# Patient Record
Sex: MALE | Race: BLACK OR AFRICAN AMERICAN | NOT HISPANIC OR LATINO | Employment: OTHER | URBAN - METROPOLITAN AREA
[De-identification: names, ages, dates, MRNs, and addresses within clinical notes are randomized per-mention and may not be internally consistent; named-entity substitution may affect disease eponyms.]

---

## 2017-01-03 ENCOUNTER — AMBULATORY - HEALTHEAST (OUTPATIENT)
Dept: BEHAVIORAL HEALTH | Facility: CLINIC | Age: 52
End: 2017-01-03

## 2017-01-03 DIAGNOSIS — F11.20 OPIOID TYPE DEPENDENCE (H): ICD-10-CM

## 2017-01-03 DIAGNOSIS — F39 EPISODIC MOOD DISORDER (H): ICD-10-CM

## 2017-01-03 DIAGNOSIS — F41.1 GENERALIZED ANXIETY DISORDER: ICD-10-CM

## 2017-01-03 ASSESSMENT — MIFFLIN-ST. JEOR: SCORE: 2271.01

## 2017-01-19 ENCOUNTER — OFFICE VISIT - HEALTHEAST (OUTPATIENT)
Dept: INTERNAL MEDICINE | Facility: CLINIC | Age: 52
End: 2017-01-19

## 2017-01-19 DIAGNOSIS — F11.21 OPIOID DEPENDENCE IN REMISSION (H): ICD-10-CM

## 2017-01-19 DIAGNOSIS — R07.9 CHEST PAIN, UNSPECIFIED TYPE: ICD-10-CM

## 2017-01-19 DIAGNOSIS — Z12.11 SCREENING FOR COLON CANCER: ICD-10-CM

## 2017-01-19 DIAGNOSIS — I10 ESSENTIAL HYPERTENSION WITH GOAL BLOOD PRESSURE LESS THAN 140/90: ICD-10-CM

## 2017-01-19 DIAGNOSIS — G47.33 OSA ON CPAP: ICD-10-CM

## 2017-01-19 DIAGNOSIS — K21.9 GASTROESOPHAGEAL REFLUX DISEASE WITHOUT ESOPHAGITIS: ICD-10-CM

## 2017-01-19 DIAGNOSIS — J41.0 SIMPLE CHRONIC BRONCHITIS (H): ICD-10-CM

## 2017-01-19 DIAGNOSIS — F41.1 GENERALIZED ANXIETY DISORDER: ICD-10-CM

## 2017-01-19 DIAGNOSIS — E66.9 OBESITY: ICD-10-CM

## 2017-01-19 LAB
ATRIAL RATE - MUSE: 85 BPM
CHOLEST SERPL-MCNC: 196 MG/DL
DIASTOLIC BLOOD PRESSURE - MUSE: NORMAL MMHG
FASTING STATUS PATIENT QL REPORTED: YES
HBA1C MFR BLD: 6 % (ref 3.5–6)
HDLC SERPL-MCNC: 56 MG/DL
INTERPRETATION ECG - MUSE: NORMAL
LDLC SERPL CALC-MCNC: 129 MG/DL
P AXIS - MUSE: 61 DEGREES
PR INTERVAL - MUSE: 170 MS
QRS DURATION - MUSE: 100 MS
QT - MUSE: 368 MS
QTC - MUSE: 437 MS
R AXIS - MUSE: 46 DEGREES
SYSTOLIC BLOOD PRESSURE - MUSE: NORMAL MMHG
T AXIS - MUSE: 2 DEGREES
TRIGL SERPL-MCNC: 57 MG/DL
VENTRICULAR RATE- MUSE: 85 BPM

## 2017-01-19 ASSESSMENT — MIFFLIN-ST. JEOR: SCORE: 2280.08

## 2017-01-20 ENCOUNTER — COMMUNICATION - HEALTHEAST (OUTPATIENT)
Dept: INTERNAL MEDICINE | Facility: CLINIC | Age: 52
End: 2017-01-20

## 2017-01-24 ENCOUNTER — COMMUNICATION - HEALTHEAST (OUTPATIENT)
Dept: BEHAVIORAL HEALTH | Facility: CLINIC | Age: 52
End: 2017-01-24

## 2017-01-24 DIAGNOSIS — F41.1 GENERALIZED ANXIETY DISORDER: ICD-10-CM

## 2017-02-03 ENCOUNTER — COMMUNICATION - HEALTHEAST (OUTPATIENT)
Dept: BEHAVIORAL HEALTH | Facility: CLINIC | Age: 52
End: 2017-02-03

## 2017-02-03 DIAGNOSIS — F41.1 GENERALIZED ANXIETY DISORDER: ICD-10-CM

## 2017-02-07 ENCOUNTER — HOSPITAL ENCOUNTER (OUTPATIENT)
Dept: NUCLEAR MEDICINE | Facility: CLINIC | Age: 52
Discharge: HOME | End: 2017-02-07
Attending: INTERNAL MEDICINE

## 2017-02-07 ENCOUNTER — HOSPITAL ENCOUNTER (OUTPATIENT)
Dept: CARDIOLOGY | Facility: CLINIC | Age: 52
Discharge: HOME | End: 2017-02-07
Attending: INTERNAL MEDICINE

## 2017-02-07 ENCOUNTER — AMBULATORY - HEALTHEAST (OUTPATIENT)
Dept: BEHAVIORAL HEALTH | Facility: CLINIC | Age: 52
End: 2017-02-07

## 2017-02-07 DIAGNOSIS — F41.1 GENERALIZED ANXIETY DISORDER: ICD-10-CM

## 2017-02-07 DIAGNOSIS — R07.9 CHEST PAIN, UNSPECIFIED TYPE: ICD-10-CM

## 2017-02-07 DIAGNOSIS — F11.20 OPIOID TYPE DEPENDENCE (H): ICD-10-CM

## 2017-02-07 DIAGNOSIS — F39 EPISODIC MOOD DISORDER (H): ICD-10-CM

## 2017-02-07 LAB
CV STRESS CURRENT BP HE: NORMAL
CV STRESS CURRENT HR HE: 105
CV STRESS CURRENT HR HE: 108
CV STRESS CURRENT HR HE: 82
CV STRESS CURRENT HR HE: 83
CV STRESS CURRENT HR HE: 84
CV STRESS CURRENT HR HE: 84
CV STRESS CURRENT HR HE: 85
CV STRESS CURRENT HR HE: 86
CV STRESS CURRENT HR HE: 87
CV STRESS CURRENT HR HE: 88
CV STRESS CURRENT HR HE: 89
CV STRESS CURRENT HR HE: 90
CV STRESS CURRENT HR HE: 94
CV STRESS CURRENT HR HE: 95
CV STRESS DEVIATION TIME HE: NORMAL
CV STRESS EXERCISE STAGE HE: NORMAL
CV STRESS FINAL RESTING BP HE: NORMAL
CV STRESS FINAL RESTING HR HE: 85
CV STRESS MAX HR HE: 109
CV STRESS MAX TREADMILL GRADE HE: 0
CV STRESS MAX TREADMILL SPEED HE: 0
CV STRESS PEAK DIA BP HE: NORMAL
CV STRESS PEAK SYS BP HE: NORMAL
CV STRESS PHASE HE: NORMAL
CV STRESS PROTOCOL HE: NORMAL
CV STRESS RESTING PT POSITION HE: NORMAL
CV STRESS RESTING PT POSITION HE: NORMAL
CV STRESS ST DEVIATION AMOUNT HE: NORMAL
CV STRESS ST DEVIATION ELEVATION HE: NORMAL
CV STRESS ST EVELATION AMOUNT HE: NORMAL
CV STRESS TEST TYPE HE: NORMAL
CV STRESS TOTAL STAGE TIME MIN 1 HE: NORMAL
NUC STRESS EJECTION FRACTION: 66 %
STRESS ECHO BASELINE BP: NORMAL
STRESS ECHO BASELINE HR: 83
STRESS ECHO LAST STRESS BP: NORMAL
STRESS ECHO LAST STRESS HR: 94

## 2017-02-07 ASSESSMENT — MIFFLIN-ST. JEOR: SCORE: 2275.54

## 2017-03-06 ENCOUNTER — OFFICE VISIT - HEALTHEAST (OUTPATIENT)
Dept: BEHAVIORAL HEALTH | Facility: CLINIC | Age: 52
End: 2017-03-06

## 2017-03-06 DIAGNOSIS — F41.1 GENERALIZED ANXIETY DISORDER: ICD-10-CM

## 2017-03-06 DIAGNOSIS — F11.20 OPIOID USE DISORDER, SEVERE, DEPENDENCE (H): ICD-10-CM

## 2017-03-06 ASSESSMENT — MIFFLIN-ST. JEOR: SCORE: 2266.47

## 2017-03-07 ENCOUNTER — OFFICE VISIT - HEALTHEAST (OUTPATIENT)
Dept: BEHAVIORAL HEALTH | Facility: CLINIC | Age: 52
End: 2017-03-07

## 2017-03-07 DIAGNOSIS — F11.20 OPIOID USE DISORDER, SEVERE, DEPENDENCE (H): ICD-10-CM

## 2017-04-03 ENCOUNTER — OFFICE VISIT - HEALTHEAST (OUTPATIENT)
Dept: BEHAVIORAL HEALTH | Facility: CLINIC | Age: 52
End: 2017-04-03

## 2017-04-03 DIAGNOSIS — F11.20 OPIOID USE DISORDER, SEVERE, DEPENDENCE (H): ICD-10-CM

## 2017-04-03 DIAGNOSIS — J44.9 COPD (CHRONIC OBSTRUCTIVE PULMONARY DISEASE) (H): ICD-10-CM

## 2017-04-03 DIAGNOSIS — F41.1 GENERALIZED ANXIETY DISORDER: ICD-10-CM

## 2017-04-03 ASSESSMENT — MIFFLIN-ST. JEOR: SCORE: 2284.62

## 2017-04-28 ENCOUNTER — OFFICE VISIT - HEALTHEAST (OUTPATIENT)
Dept: INTERNAL MEDICINE | Facility: CLINIC | Age: 52
End: 2017-04-28

## 2017-04-28 DIAGNOSIS — K21.9 GASTROESOPHAGEAL REFLUX DISEASE WITHOUT ESOPHAGITIS: ICD-10-CM

## 2017-04-28 DIAGNOSIS — J41.0 SIMPLE CHRONIC BRONCHITIS (H): ICD-10-CM

## 2017-04-28 DIAGNOSIS — G47.33 OSA ON CPAP: ICD-10-CM

## 2017-04-28 DIAGNOSIS — F41.1 GENERALIZED ANXIETY DISORDER: ICD-10-CM

## 2017-04-28 DIAGNOSIS — I10 ESSENTIAL HYPERTENSION WITH GOAL BLOOD PRESSURE LESS THAN 140/90: ICD-10-CM

## 2017-04-28 DIAGNOSIS — F11.21 OPIOID DEPENDENCE IN REMISSION (H): ICD-10-CM

## 2017-04-28 ASSESSMENT — MIFFLIN-ST. JEOR: SCORE: 2280.08

## 2017-05-01 ENCOUNTER — OFFICE VISIT - HEALTHEAST (OUTPATIENT)
Dept: BEHAVIORAL HEALTH | Facility: CLINIC | Age: 52
End: 2017-05-01

## 2017-05-01 DIAGNOSIS — F41.1 GENERALIZED ANXIETY DISORDER: ICD-10-CM

## 2017-05-01 DIAGNOSIS — F11.20 OPIOID USE DISORDER, SEVERE, DEPENDENCE (H): ICD-10-CM

## 2017-05-01 ASSESSMENT — MIFFLIN-ST. JEOR: SCORE: 2271.01

## 2017-05-10 ENCOUNTER — COMMUNICATION - HEALTHEAST (OUTPATIENT)
Dept: BEHAVIORAL HEALTH | Facility: CLINIC | Age: 52
End: 2017-05-10

## 2017-05-26 ENCOUNTER — OFFICE VISIT - HEALTHEAST (OUTPATIENT)
Dept: INTERNAL MEDICINE | Facility: CLINIC | Age: 52
End: 2017-05-26

## 2017-05-26 DIAGNOSIS — F99 CHRONIC MENTAL ILLNESS: ICD-10-CM

## 2017-05-26 DIAGNOSIS — J44.9 COPD (CHRONIC OBSTRUCTIVE PULMONARY DISEASE) (H): ICD-10-CM

## 2017-05-26 DIAGNOSIS — F41.1 GENERALIZED ANXIETY DISORDER: ICD-10-CM

## 2017-05-26 ASSESSMENT — MIFFLIN-ST. JEOR: SCORE: 2289.15

## 2017-05-30 ENCOUNTER — OFFICE VISIT - HEALTHEAST (OUTPATIENT)
Dept: BEHAVIORAL HEALTH | Facility: CLINIC | Age: 52
End: 2017-05-30

## 2017-05-30 DIAGNOSIS — F11.20 OPIOID USE DISORDER, SEVERE, DEPENDENCE (H): ICD-10-CM

## 2017-05-30 DIAGNOSIS — F41.1 GENERALIZED ANXIETY DISORDER: ICD-10-CM

## 2017-05-30 ASSESSMENT — MIFFLIN-ST. JEOR: SCORE: 2293.69

## 2017-07-27 ENCOUNTER — AMBULATORY - HEALTHEAST (OUTPATIENT)
Dept: BEHAVIORAL HEALTH | Facility: CLINIC | Age: 52
End: 2017-07-27

## 2017-07-27 DIAGNOSIS — F41.1 GENERALIZED ANXIETY DISORDER: ICD-10-CM

## 2017-07-27 DIAGNOSIS — F11.20 OPIOID USE DISORDER, SEVERE, DEPENDENCE (H): ICD-10-CM

## 2017-07-27 ASSESSMENT — MIFFLIN-ST. JEOR: SCORE: 2271.01

## 2017-08-22 ENCOUNTER — OFFICE VISIT - HEALTHEAST (OUTPATIENT)
Dept: INTERNAL MEDICINE | Facility: CLINIC | Age: 52
End: 2017-08-22

## 2017-08-22 DIAGNOSIS — F41.1 GENERALIZED ANXIETY DISORDER: ICD-10-CM

## 2017-08-22 DIAGNOSIS — E66.09 NON MORBID OBESITY DUE TO EXCESS CALORIES: ICD-10-CM

## 2017-08-22 DIAGNOSIS — F99 CHRONIC MENTAL ILLNESS: ICD-10-CM

## 2017-08-22 DIAGNOSIS — K21.9 GASTROESOPHAGEAL REFLUX DISEASE WITHOUT ESOPHAGITIS: ICD-10-CM

## 2017-08-22 DIAGNOSIS — F11.21 OPIOID DEPENDENCE IN REMISSION (H): ICD-10-CM

## 2017-08-22 ASSESSMENT — MIFFLIN-ST. JEOR: SCORE: 2280.08

## 2017-08-25 ENCOUNTER — OFFICE VISIT - HEALTHEAST (OUTPATIENT)
Dept: BEHAVIORAL HEALTH | Facility: CLINIC | Age: 52
End: 2017-08-25

## 2017-08-25 DIAGNOSIS — F11.20 OPIOID USE DISORDER, SEVERE, DEPENDENCE (H): ICD-10-CM

## 2017-08-25 DIAGNOSIS — K59.00 CONSTIPATION: ICD-10-CM

## 2017-08-25 DIAGNOSIS — F41.1 GENERALIZED ANXIETY DISORDER: ICD-10-CM

## 2017-08-25 ASSESSMENT — MIFFLIN-ST. JEOR: SCORE: 2289.15

## 2017-09-19 ENCOUNTER — COMMUNICATION - HEALTHEAST (OUTPATIENT)
Dept: BEHAVIORAL HEALTH | Facility: CLINIC | Age: 52
End: 2017-09-19

## 2017-09-19 DIAGNOSIS — K21.9 GERD (GASTROESOPHAGEAL REFLUX DISEASE): ICD-10-CM

## 2017-10-20 ENCOUNTER — OFFICE VISIT - HEALTHEAST (OUTPATIENT)
Dept: BEHAVIORAL HEALTH | Facility: CLINIC | Age: 52
End: 2017-10-20

## 2017-10-20 DIAGNOSIS — F11.20 OPIOID USE DISORDER, SEVERE, DEPENDENCE (H): ICD-10-CM

## 2017-10-20 DIAGNOSIS — F41.1 GENERALIZED ANXIETY DISORDER: ICD-10-CM

## 2017-10-20 ASSESSMENT — MIFFLIN-ST. JEOR: SCORE: 2311.83

## 2017-11-17 ENCOUNTER — OFFICE VISIT - HEALTHEAST (OUTPATIENT)
Dept: BEHAVIORAL HEALTH | Facility: CLINIC | Age: 52
End: 2017-11-17

## 2017-11-17 DIAGNOSIS — F11.20 OPIOID USE DISORDER, SEVERE, DEPENDENCE (H): ICD-10-CM

## 2017-11-17 DIAGNOSIS — F41.1 GENERALIZED ANXIETY DISORDER: ICD-10-CM

## 2017-11-17 ASSESSMENT — MIFFLIN-ST. JEOR: SCORE: 2298.22

## 2017-12-18 ENCOUNTER — OFFICE VISIT - HEALTHEAST (OUTPATIENT)
Dept: BEHAVIORAL HEALTH | Facility: CLINIC | Age: 52
End: 2017-12-18

## 2017-12-18 DIAGNOSIS — F41.1 GENERALIZED ANXIETY DISORDER: ICD-10-CM

## 2017-12-18 DIAGNOSIS — F11.20 OPIOID USE DISORDER, SEVERE, DEPENDENCE (H): ICD-10-CM

## 2017-12-18 ASSESSMENT — MIFFLIN-ST. JEOR: SCORE: 2284.62

## 2018-01-09 ENCOUNTER — OFFICE VISIT - HEALTHEAST (OUTPATIENT)
Dept: INTERNAL MEDICINE | Facility: CLINIC | Age: 53
End: 2018-01-09

## 2018-01-09 DIAGNOSIS — R06.09 DOE (DYSPNEA ON EXERTION): ICD-10-CM

## 2018-01-09 DIAGNOSIS — E66.9 OBESITY: ICD-10-CM

## 2018-01-09 DIAGNOSIS — G47.33 OSA ON CPAP: ICD-10-CM

## 2018-01-09 DIAGNOSIS — J44.9 COPD (CHRONIC OBSTRUCTIVE PULMONARY DISEASE) (H): ICD-10-CM

## 2018-01-09 DIAGNOSIS — F11.21 OPIOID DEPENDENCE IN REMISSION (H): ICD-10-CM

## 2018-01-09 DIAGNOSIS — R06.09 OTHER FORMS OF DYSPNEA: ICD-10-CM

## 2018-01-09 LAB
ANION GAP SERPL CALCULATED.3IONS-SCNC: 9 MMOL/L (ref 5–18)
BUN SERPL-MCNC: 8 MG/DL (ref 8–22)
CALCIUM SERPL-MCNC: 9.3 MG/DL (ref 8.5–10.5)
CHLORIDE BLD-SCNC: 101 MMOL/L (ref 98–107)
CO2 SERPL-SCNC: 29 MMOL/L (ref 22–31)
CREAT SERPL-MCNC: 0.85 MG/DL (ref 0.7–1.3)
ERYTHROCYTE [DISTWIDTH] IN BLOOD BY AUTOMATED COUNT: 13.7 % (ref 11–14.5)
GFR SERPL CREATININE-BSD FRML MDRD: >60 ML/MIN/1.73M2
GLUCOSE BLD-MCNC: 99 MG/DL (ref 70–125)
HCT VFR BLD AUTO: 39.2 % (ref 40–54)
HGB BLD-MCNC: 13 G/DL (ref 14–18)
MCH RBC QN AUTO: 29.6 PG (ref 27–34)
MCHC RBC AUTO-ENTMCNC: 33.1 G/DL (ref 32–36)
MCV RBC AUTO: 89 FL (ref 80–100)
PLATELET # BLD AUTO: 247 THOU/UL (ref 140–440)
PMV BLD AUTO: 6.9 FL (ref 7–10)
POTASSIUM BLD-SCNC: 3.9 MMOL/L (ref 3.5–5)
RBC # BLD AUTO: 4.38 MILL/UL (ref 4.4–6.2)
SODIUM SERPL-SCNC: 139 MMOL/L (ref 136–145)
WBC: 5.6 THOU/UL (ref 4–11)

## 2018-01-09 ASSESSMENT — MIFFLIN-ST. JEOR: SCORE: 2339.05

## 2018-01-10 ENCOUNTER — AMBULATORY - HEALTHEAST (OUTPATIENT)
Dept: PULMONOLOGY | Facility: OTHER | Age: 53
End: 2018-01-10

## 2018-01-10 ENCOUNTER — COMMUNICATION - HEALTHEAST (OUTPATIENT)
Dept: INTERNAL MEDICINE | Facility: CLINIC | Age: 53
End: 2018-01-10

## 2018-01-10 DIAGNOSIS — R06.00 DYSPNEA: ICD-10-CM

## 2018-01-15 ENCOUNTER — OFFICE VISIT - HEALTHEAST (OUTPATIENT)
Dept: BEHAVIORAL HEALTH | Facility: CLINIC | Age: 53
End: 2018-01-15

## 2018-01-15 DIAGNOSIS — F41.1 GENERALIZED ANXIETY DISORDER: ICD-10-CM

## 2018-01-15 DIAGNOSIS — F11.20 OPIOID USE DISORDER, SEVERE, DEPENDENCE (H): ICD-10-CM

## 2018-01-15 LAB
AMPHETAMINES UR QL SCN: NORMAL
BARBITURATES UR QL: NORMAL
BENZODIAZ UR QL: NORMAL
CANNABINOIDS UR QL SCN: NORMAL
COCAINE UR QL: NORMAL
CREAT UR-MCNC: 138.5 MG/DL
METHADONE UR QL SCN: NORMAL
OPIATES UR QL SCN: NORMAL
OXYCODONE UR QL: NORMAL
PCP UR QL SCN: NORMAL

## 2018-01-15 ASSESSMENT — MIFFLIN-ST. JEOR: SCORE: 2307.3

## 2018-01-18 ENCOUNTER — COMMUNICATION - HEALTHEAST (OUTPATIENT)
Dept: INTERNAL MEDICINE | Facility: CLINIC | Age: 53
End: 2018-01-18

## 2018-01-25 ENCOUNTER — OFFICE VISIT - HEALTHEAST (OUTPATIENT)
Dept: INTERNAL MEDICINE | Facility: CLINIC | Age: 53
End: 2018-01-25

## 2018-01-25 DIAGNOSIS — F11.21 OPIOID DEPENDENCE IN REMISSION (H): ICD-10-CM

## 2018-01-25 DIAGNOSIS — M79.89 LEG SWELLING: ICD-10-CM

## 2018-01-25 DIAGNOSIS — J41.0 SIMPLE CHRONIC BRONCHITIS (H): ICD-10-CM

## 2018-01-25 DIAGNOSIS — F99 CHRONIC MENTAL ILLNESS: ICD-10-CM

## 2018-01-25 DIAGNOSIS — K21.9 GASTROESOPHAGEAL REFLUX DISEASE WITHOUT ESOPHAGITIS: ICD-10-CM

## 2018-01-25 DIAGNOSIS — I10 ESSENTIAL HYPERTENSION: ICD-10-CM

## 2018-01-25 DIAGNOSIS — G47.33 OSA ON CPAP: ICD-10-CM

## 2018-01-25 DIAGNOSIS — Z12.11 SCREEN FOR COLON CANCER: ICD-10-CM

## 2018-01-25 LAB
ALBUMIN SERPL-MCNC: 3.3 G/DL (ref 3.5–5)
ALBUMIN UR-MCNC: ABNORMAL MG/DL
ALP SERPL-CCNC: 78 U/L (ref 45–120)
ALT SERPL W P-5'-P-CCNC: 15 U/L (ref 0–45)
ANION GAP SERPL CALCULATED.3IONS-SCNC: 10 MMOL/L (ref 5–18)
APPEARANCE UR: CLEAR
AST SERPL W P-5'-P-CCNC: 19 U/L (ref 0–40)
BACTERIA #/AREA URNS HPF: ABNORMAL HPF
BILIRUB SERPL-MCNC: 0.4 MG/DL (ref 0–1)
BILIRUB UR QL STRIP: NEGATIVE
BUN SERPL-MCNC: 11 MG/DL (ref 8–22)
CALCIUM SERPL-MCNC: 9.1 MG/DL (ref 8.5–10.5)
CHLORIDE BLD-SCNC: 103 MMOL/L (ref 98–107)
CO2 SERPL-SCNC: 30 MMOL/L (ref 22–31)
COLOR UR AUTO: YELLOW
CREAT SERPL-MCNC: 1.01 MG/DL (ref 0.7–1.3)
D DIMER PPP FEU-MCNC: 0.43 FEU UG/ML
ERYTHROCYTE [DISTWIDTH] IN BLOOD BY AUTOMATED COUNT: 13.4 % (ref 11–14.5)
GFR SERPL CREATININE-BSD FRML MDRD: >60 ML/MIN/1.73M2
GLUCOSE BLD-MCNC: 98 MG/DL (ref 70–125)
GLUCOSE UR STRIP-MCNC: NEGATIVE MG/DL
HCT VFR BLD AUTO: 39.1 % (ref 40–54)
HGB BLD-MCNC: 12.7 G/DL (ref 14–18)
HGB UR QL STRIP: NEGATIVE
KETONES UR STRIP-MCNC: NEGATIVE MG/DL
LEUKOCYTE ESTERASE UR QL STRIP: NEGATIVE
MCH RBC QN AUTO: 29.1 PG (ref 27–34)
MCHC RBC AUTO-ENTMCNC: 32.5 G/DL (ref 32–36)
MCV RBC AUTO: 90 FL (ref 80–100)
NITRATE UR QL: NEGATIVE
PH UR STRIP: 7.5 [PH] (ref 5–8)
PLATELET # BLD AUTO: 349 THOU/UL (ref 140–440)
PMV BLD AUTO: 6.9 FL (ref 7–10)
POTASSIUM BLD-SCNC: 3.8 MMOL/L (ref 3.5–5)
PROT SERPL-MCNC: 7.6 G/DL (ref 6–8)
RBC # BLD AUTO: 4.36 MILL/UL (ref 4.4–6.2)
RBC #/AREA URNS AUTO: ABNORMAL HPF
SODIUM SERPL-SCNC: 143 MMOL/L (ref 136–145)
SP GR UR STRIP: 1.02 (ref 1–1.03)
SQUAMOUS #/AREA URNS AUTO: ABNORMAL LPF
TSH SERPL DL<=0.005 MIU/L-ACNC: 1.72 UIU/ML (ref 0.3–5)
UROBILINOGEN UR STRIP-ACNC: ABNORMAL
WBC #/AREA URNS AUTO: ABNORMAL HPF
WBC: 8.9 THOU/UL (ref 4–11)

## 2018-01-25 ASSESSMENT — MIFFLIN-ST. JEOR: SCORE: 2298.22

## 2018-01-26 ENCOUNTER — COMMUNICATION - HEALTHEAST (OUTPATIENT)
Dept: INTERNAL MEDICINE | Facility: CLINIC | Age: 53
End: 2018-01-26

## 2018-02-02 ENCOUNTER — COMMUNICATION - HEALTHEAST (OUTPATIENT)
Dept: INTERNAL MEDICINE | Facility: CLINIC | Age: 53
End: 2018-02-02

## 2018-02-12 ENCOUNTER — COMMUNICATION - HEALTHEAST (OUTPATIENT)
Dept: BEHAVIORAL HEALTH | Facility: CLINIC | Age: 53
End: 2018-02-12

## 2018-02-12 DIAGNOSIS — F41.1 GENERALIZED ANXIETY DISORDER: ICD-10-CM

## 2018-02-21 ENCOUNTER — OFFICE VISIT - HEALTHEAST (OUTPATIENT)
Dept: PULMONOLOGY | Facility: OTHER | Age: 53
End: 2018-02-21

## 2018-02-21 ENCOUNTER — RECORDS - HEALTHEAST (OUTPATIENT)
Dept: ADMINISTRATIVE | Facility: OTHER | Age: 53
End: 2018-02-21

## 2018-02-21 ENCOUNTER — RECORDS - HEALTHEAST (OUTPATIENT)
Dept: PULMONOLOGY | Facility: OTHER | Age: 53
End: 2018-02-21

## 2018-02-21 DIAGNOSIS — I51.89 DIASTOLIC DYSFUNCTION: ICD-10-CM

## 2018-02-21 DIAGNOSIS — R06.00 DYSPNEA, UNSPECIFIED: ICD-10-CM

## 2018-02-21 ASSESSMENT — MIFFLIN-ST. JEOR: SCORE: 2326.35

## 2018-03-02 ENCOUNTER — HOSPITAL ENCOUNTER (OUTPATIENT)
Dept: CARDIOLOGY | Facility: HOSPITAL | Age: 53
Discharge: HOME | End: 2018-03-02
Attending: INTERNAL MEDICINE

## 2018-03-02 DIAGNOSIS — I51.89 DIASTOLIC DYSFUNCTION: ICD-10-CM

## 2018-03-02 LAB
AORTIC ROOT: 3.9 CM
AORTIC VALVE MEAN VELOCITY: 100 CM/S
AV DIMENSIONLESS INDEX VTI: 0.7
AV MEAN GRADIENT: 5 MMHG
AV PEAK GRADIENT: 7.7 MMHG
AV VALVE AREA: 2.5 CM2
AV VELOCITY RATIO: 0.8
BSA FOR ECHO PROCEDURE: 2.7 M2
CV BLOOD PRESSURE: NORMAL MMHG
CV ECHO HEIGHT: 70 IN
CV ECHO WEIGHT: 325 LBS
DOP CALC AO PEAK VEL: 139 CM/S
DOP CALC AO VTI: 29.8 CM
DOP CALC LVOT AREA: 3.8 CM2
DOP CALC LVOT DIAMETER: 2.2 CM
DOP CALC LVOT PEAK VEL: 105 CM/S
DOP CALC LVOT STROKE VOLUME: 75.2 CM3
DOP CALCLVOT PEAK VEL VTI: 19.8 CM
EJECTION FRACTION: 53 % (ref 55–75)
FRACTIONAL SHORTENING: 29.1 % (ref 28–44)
INTERVENTRICULAR SEPTUM IN END DIASTOLE: 1.1 CM (ref 0.6–1)
IVS/PW RATIO: 0.8
LA AREA 1: 13.1 CM2
LA AREA 2: 19.9 CM2
LEFT ATRIUM LENGTH: 5.24 CM
LEFT ATRIUM SIZE: 2.9 CM
LEFT ATRIUM VOLUME INDEX: 15.7 ML/M2
LEFT ATRIUM VOLUME: 42.3 ML
LEFT VENTRICLE DIASTOLIC VOLUME INDEX: 30 CM3/M2 (ref 34–74)
LEFT VENTRICLE DIASTOLIC VOLUME: 81 CM3 (ref 62–150)
LEFT VENTRICLE MASS INDEX: 100.9 G/M2
LEFT VENTRICLE SYSTOLIC VOLUME INDEX: 14.1 CM3/M2 (ref 11–31)
LEFT VENTRICLE SYSTOLIC VOLUME: 38 CM3 (ref 21–61)
LEFT VENTRICULAR INTERNAL DIMENSION IN DIASTOLE: 5.5 CM (ref 4.2–5.8)
LEFT VENTRICULAR INTERNAL DIMENSION IN SYSTOLE: 3.9 CM (ref 2.5–4)
LEFT VENTRICULAR MASS: 272.4 G
LEFT VENTRICULAR OUTFLOW TRACT MEAN GRADIENT: 2 MMHG
LEFT VENTRICULAR OUTFLOW TRACT MEAN VELOCITY: 68 CM/S
LEFT VENTRICULAR OUTFLOW TRACT PEAK GRADIENT: 4 MMHG
LEFT VENTRICULAR POSTERIOR WALL IN END DIASTOLE: 1.3 CM (ref 0.6–1)
LV STROKE VOLUME INDEX: 27.9 ML/M2
MITRAL VALVE E/A RATIO: 1
MV AVERAGE E/E' RATIO: 7.1 CM/S
MV DECELERATION TIME: 264 MS
MV E'TISSUE VEL-LAT: 11.3 CM/S
MV E'TISSUE VEL-MED: 9.94 CM/S
MV LATERAL E/E' RATIO: 6.7
MV MEDIAL E/E' RATIO: 7.6
MV PEAK A VELOCITY: 79 CM/S
MV PEAK E VELOCITY: 75.5 CM/S
NUC REST DIASTOLIC VOLUME INDEX: 5203.2 LBS
NUC REST SYSTOLIC VOLUME INDEX: 70 IN
TRICUSPID VALVE ANULAR PLANE SYSTOLIC EXCURSION: 2.4 CM

## 2018-03-02 ASSESSMENT — MIFFLIN-ST. JEOR: SCORE: 2326.35

## 2018-03-12 ENCOUNTER — OFFICE VISIT - HEALTHEAST (OUTPATIENT)
Dept: BEHAVIORAL HEALTH | Facility: CLINIC | Age: 53
End: 2018-03-12

## 2018-03-12 DIAGNOSIS — F41.1 GENERALIZED ANXIETY DISORDER: ICD-10-CM

## 2018-03-12 DIAGNOSIS — F11.20 OPIOID USE DISORDER, SEVERE, DEPENDENCE (H): ICD-10-CM

## 2018-03-12 LAB
AMPHETAMINES UR QL SCN: NORMAL
BARBITURATES UR QL: NORMAL
BENZODIAZ UR QL: NORMAL
BUPRENORPHINE QUAL URINE LHE: ABNORMAL
CANNABINOIDS UR QL SCN: NORMAL
COCAINE UR QL: NORMAL
CREAT UR-MCNC: 120.8 MG/DL
CREAT UR-MCNC: 125 MG/DL
METHADONE UR QL SCN: NORMAL
OPIATES UR QL SCN: NORMAL
OXYCODONE UR QL: NORMAL
PCP UR QL SCN: NORMAL

## 2018-03-12 ASSESSMENT — MIFFLIN-ST. JEOR: SCORE: 2307.3

## 2018-03-27 ENCOUNTER — COMMUNICATION - HEALTHEAST (OUTPATIENT)
Dept: INTERNAL MEDICINE | Facility: CLINIC | Age: 53
End: 2018-03-27

## 2018-04-03 ENCOUNTER — COMMUNICATION - HEALTHEAST (OUTPATIENT)
Dept: BEHAVIORAL HEALTH | Facility: CLINIC | Age: 53
End: 2018-04-03

## 2018-04-03 DIAGNOSIS — F41.1 GENERALIZED ANXIETY DISORDER: ICD-10-CM

## 2018-04-11 ENCOUNTER — COMMUNICATION - HEALTHEAST (OUTPATIENT)
Dept: INTERNAL MEDICINE | Facility: CLINIC | Age: 53
End: 2018-04-11

## 2018-04-25 ENCOUNTER — COMMUNICATION - HEALTHEAST (OUTPATIENT)
Dept: INTERNAL MEDICINE | Facility: CLINIC | Age: 53
End: 2018-04-25

## 2018-05-04 ENCOUNTER — COMMUNICATION - HEALTHEAST (OUTPATIENT)
Dept: INTERNAL MEDICINE | Facility: CLINIC | Age: 53
End: 2018-05-04

## 2018-05-04 DIAGNOSIS — J44.9 COPD (CHRONIC OBSTRUCTIVE PULMONARY DISEASE) (H): ICD-10-CM

## 2018-05-09 ENCOUNTER — COMMUNICATION - HEALTHEAST (OUTPATIENT)
Dept: INTERNAL MEDICINE | Facility: CLINIC | Age: 53
End: 2018-05-09

## 2018-05-09 DIAGNOSIS — J44.9 COPD (CHRONIC OBSTRUCTIVE PULMONARY DISEASE) (H): ICD-10-CM

## 2018-05-14 ENCOUNTER — OFFICE VISIT - HEALTHEAST (OUTPATIENT)
Dept: BEHAVIORAL HEALTH | Facility: CLINIC | Age: 53
End: 2018-05-14

## 2018-05-14 ENCOUNTER — AMBULATORY - HEALTHEAST (OUTPATIENT)
Dept: BEHAVIORAL HEALTH | Facility: CLINIC | Age: 53
End: 2018-05-14

## 2018-05-14 DIAGNOSIS — F11.20 OPIOID USE DISORDER, SEVERE, DEPENDENCE (H): ICD-10-CM

## 2018-05-14 DIAGNOSIS — F41.1 GENERALIZED ANXIETY DISORDER: ICD-10-CM

## 2018-05-14 LAB
AMPHETAMINES UR QL SCN: NORMAL
BARBITURATES UR QL: NORMAL
BENZODIAZ UR QL: NORMAL
CANNABINOIDS UR QL SCN: NORMAL
COCAINE UR QL: NORMAL
CREAT UR-MCNC: 66.1 MG/DL
METHADONE UR QL SCN: NORMAL
OPIATES UR QL SCN: NORMAL
OXYCODONE UR QL: NORMAL
PCP UR QL SCN: NORMAL

## 2018-05-14 ASSESSMENT — MIFFLIN-ST. JEOR: SCORE: 2320.9

## 2018-06-07 ENCOUNTER — COMMUNICATION - HEALTHEAST (OUTPATIENT)
Dept: BEHAVIORAL HEALTH | Facility: CLINIC | Age: 53
End: 2018-06-07

## 2018-06-08 ENCOUNTER — COMMUNICATION - HEALTHEAST (OUTPATIENT)
Dept: INTERNAL MEDICINE | Facility: CLINIC | Age: 53
End: 2018-06-08

## 2018-06-11 ENCOUNTER — OFFICE VISIT - HEALTHEAST (OUTPATIENT)
Dept: BEHAVIORAL HEALTH | Facility: CLINIC | Age: 53
End: 2018-06-11

## 2018-06-11 DIAGNOSIS — M54.9 BACK PAIN: ICD-10-CM

## 2018-06-11 DIAGNOSIS — F11.20 OPIOID USE DISORDER, SEVERE, DEPENDENCE (H): ICD-10-CM

## 2018-06-11 DIAGNOSIS — J44.9 COPD (CHRONIC OBSTRUCTIVE PULMONARY DISEASE) (H): ICD-10-CM

## 2018-06-11 ASSESSMENT — MIFFLIN-ST. JEOR: SCORE: 2334.51

## 2018-07-10 ENCOUNTER — COMMUNICATION - HEALTHEAST (OUTPATIENT)
Dept: BEHAVIORAL HEALTH | Facility: CLINIC | Age: 53
End: 2018-07-10

## 2018-07-11 ENCOUNTER — COMMUNICATION - HEALTHEAST (OUTPATIENT)
Dept: BEHAVIORAL HEALTH | Facility: CLINIC | Age: 53
End: 2018-07-11

## 2018-07-11 DIAGNOSIS — F41.1 GENERALIZED ANXIETY DISORDER: ICD-10-CM

## 2018-08-13 ENCOUNTER — OFFICE VISIT - HEALTHEAST (OUTPATIENT)
Dept: PULMONOLOGY | Facility: OTHER | Age: 53
End: 2018-08-13

## 2018-08-13 DIAGNOSIS — J98.8 REVERSIBLE AIRWAYS DISEASE: ICD-10-CM

## 2018-08-17 ENCOUNTER — AMBULATORY - HEALTHEAST (OUTPATIENT)
Dept: PULMONOLOGY | Facility: OTHER | Age: 53
End: 2018-08-17

## 2018-08-20 ENCOUNTER — OFFICE VISIT - HEALTHEAST (OUTPATIENT)
Dept: BEHAVIORAL HEALTH | Facility: CLINIC | Age: 53
End: 2018-08-20

## 2018-08-20 DIAGNOSIS — F41.1 GENERALIZED ANXIETY DISORDER: ICD-10-CM

## 2018-08-20 DIAGNOSIS — F11.20 OPIOID USE DISORDER, SEVERE, DEPENDENCE (H): ICD-10-CM

## 2018-08-20 LAB
AMPHETAMINES UR QL SCN: NORMAL
BARBITURATES UR QL: NORMAL
BENZODIAZ UR QL: NORMAL
BUPRENORPHINE QUAL URINE LHE: ABNORMAL
CANNABINOIDS UR QL SCN: NORMAL
COCAINE UR QL: NORMAL
CREAT UR-MCNC: 127 MG/DL
CREAT UR-MCNC: 127.7 MG/DL
METHADONE UR QL SCN: NORMAL
OPIATES UR QL SCN: NORMAL
OXYCODONE UR QL: NORMAL
PCP UR QL SCN: NORMAL

## 2018-08-20 ASSESSMENT — MIFFLIN-ST. JEOR: SCORE: 2329.98

## 2018-08-22 ENCOUNTER — OFFICE VISIT - HEALTHEAST (OUTPATIENT)
Dept: INTERNAL MEDICINE | Facility: CLINIC | Age: 53
End: 2018-08-22

## 2018-08-22 DIAGNOSIS — J98.4 RESTRICTIVE LUNG DISEASE: ICD-10-CM

## 2018-08-22 DIAGNOSIS — E66.01 SEVERE OBESITY (H): ICD-10-CM

## 2018-08-22 DIAGNOSIS — Z12.11 SCREENING FOR COLON CANCER: ICD-10-CM

## 2018-08-22 DIAGNOSIS — I10 ESSENTIAL HYPERTENSION: ICD-10-CM

## 2018-08-22 DIAGNOSIS — F11.21 OPIOID DEPENDENCE IN REMISSION (H): ICD-10-CM

## 2018-08-22 DIAGNOSIS — R60.0 LOWER EXTREMITY EDEMA: ICD-10-CM

## 2018-08-22 DIAGNOSIS — J41.0 SIMPLE CHRONIC BRONCHITIS (H): ICD-10-CM

## 2018-08-22 DIAGNOSIS — G47.33 OSA ON CPAP: ICD-10-CM

## 2018-08-22 ASSESSMENT — MIFFLIN-ST. JEOR: SCORE: 2334.51

## 2018-10-16 ENCOUNTER — OFFICE VISIT - HEALTHEAST (OUTPATIENT)
Dept: BEHAVIORAL HEALTH | Facility: CLINIC | Age: 53
End: 2018-10-16

## 2018-10-16 ENCOUNTER — AMBULATORY - HEALTHEAST (OUTPATIENT)
Dept: BEHAVIORAL HEALTH | Facility: CLINIC | Age: 53
End: 2018-10-16

## 2018-10-16 DIAGNOSIS — F11.20 OPIOID USE DISORDER, SEVERE, DEPENDENCE (H): ICD-10-CM

## 2018-10-16 DIAGNOSIS — F41.1 GENERALIZED ANXIETY DISORDER: ICD-10-CM

## 2018-10-16 LAB
AMPHETAMINES UR QL SCN: NORMAL
BARBITURATES UR QL: NORMAL
BENZODIAZ UR QL: NORMAL
CANNABINOIDS UR QL SCN: NORMAL
COCAINE UR QL: NORMAL
CREAT UR-MCNC: 153.9 MG/DL
METHADONE UR QL SCN: NORMAL
OPIATES UR QL SCN: NORMAL
OXYCODONE UR QL: NORMAL
PCP UR QL SCN: NORMAL

## 2018-10-16 ASSESSMENT — MIFFLIN-ST. JEOR: SCORE: 2307.3

## 2018-12-12 ENCOUNTER — COMMUNICATION - HEALTHEAST (OUTPATIENT)
Dept: INTERNAL MEDICINE | Facility: CLINIC | Age: 53
End: 2018-12-12

## 2018-12-12 ENCOUNTER — HOSPITAL ENCOUNTER (OUTPATIENT)
Dept: CT IMAGING | Facility: CLINIC | Age: 53
Discharge: HOME | End: 2018-12-12
Attending: INTERNAL MEDICINE

## 2018-12-12 ENCOUNTER — AMBULATORY - HEALTHEAST (OUTPATIENT)
Dept: INTERNAL MEDICINE | Facility: CLINIC | Age: 53
End: 2018-12-12

## 2018-12-12 ENCOUNTER — OFFICE VISIT - HEALTHEAST (OUTPATIENT)
Dept: INTERNAL MEDICINE | Facility: CLINIC | Age: 53
End: 2018-12-12

## 2018-12-12 DIAGNOSIS — I10 ESSENTIAL HYPERTENSION: ICD-10-CM

## 2018-12-12 DIAGNOSIS — I87.2 VENOUS STASIS DERMATITIS OF BOTH LOWER EXTREMITIES: ICD-10-CM

## 2018-12-12 DIAGNOSIS — F11.21 OPIOID DEPENDENCE IN REMISSION (H): ICD-10-CM

## 2018-12-12 DIAGNOSIS — E66.01 MORBID OBESITY (H): ICD-10-CM

## 2018-12-12 DIAGNOSIS — R51.9 ACUTE NONINTRACTABLE HEADACHE, UNSPECIFIED HEADACHE TYPE: ICD-10-CM

## 2018-12-12 DIAGNOSIS — J41.0 SIMPLE CHRONIC BRONCHITIS (H): ICD-10-CM

## 2018-12-12 DIAGNOSIS — F99 CHRONIC MENTAL ILLNESS: ICD-10-CM

## 2018-12-12 DIAGNOSIS — R73.03 PREDIABETES: ICD-10-CM

## 2018-12-12 DIAGNOSIS — J98.4 RESTRICTIVE LUNG DISEASE: ICD-10-CM

## 2018-12-12 DIAGNOSIS — G47.33 OSA ON CPAP: ICD-10-CM

## 2018-12-12 LAB — HBA1C MFR BLD: 6 % (ref 3.5–6)

## 2018-12-12 ASSESSMENT — MIFFLIN-ST. JEOR: SCORE: 2339.05

## 2018-12-13 ENCOUNTER — COMMUNICATION - HEALTHEAST (OUTPATIENT)
Dept: INTERNAL MEDICINE | Facility: CLINIC | Age: 53
End: 2018-12-13

## 2018-12-14 ENCOUNTER — COMMUNICATION - HEALTHEAST (OUTPATIENT)
Dept: INTERNAL MEDICINE | Facility: CLINIC | Age: 53
End: 2018-12-14

## 2018-12-14 DIAGNOSIS — F41.1 GENERALIZED ANXIETY DISORDER: ICD-10-CM

## 2019-01-10 ENCOUNTER — OFFICE VISIT - HEALTHEAST (OUTPATIENT)
Dept: BEHAVIORAL HEALTH | Facility: CLINIC | Age: 54
End: 2019-01-10

## 2019-01-10 ENCOUNTER — COMMUNICATION - HEALTHEAST (OUTPATIENT)
Dept: BEHAVIORAL HEALTH | Facility: CLINIC | Age: 54
End: 2019-01-10

## 2019-01-10 DIAGNOSIS — F11.20 OPIOID USE DISORDER, SEVERE, DEPENDENCE (H): ICD-10-CM

## 2019-01-10 DIAGNOSIS — F41.1 GENERALIZED ANXIETY DISORDER: ICD-10-CM

## 2019-01-10 LAB
AMPHETAMINES UR QL SCN: NORMAL
BARBITURATES UR QL: NORMAL
BENZODIAZ UR QL: NORMAL
BUPRENORPHINE QUAL URINE LHE: ABNORMAL
CANNABINOIDS UR QL SCN: NORMAL
COCAINE UR QL: NORMAL
CREAT UR-MCNC: 173.5 MG/DL
CREAT UR-MCNC: 173.9 MG/DL
METHADONE UR QL SCN: NORMAL
OPIATES UR QL SCN: NORMAL
OXYCODONE UR QL: NORMAL
PCP UR QL SCN: NORMAL

## 2019-01-10 ASSESSMENT — MIFFLIN-ST. JEOR: SCORE: 2316.37

## 2019-01-19 LAB
ETHYL GLUCURONIDE UR CFM-MCNC: <100 NG/ML
ETHYL SULFATE UR CFM-MCNC: 117 NG/ML

## 2019-03-14 ENCOUNTER — OFFICE VISIT - HEALTHEAST (OUTPATIENT)
Dept: BEHAVIORAL HEALTH | Facility: CLINIC | Age: 54
End: 2019-03-14

## 2019-03-14 DIAGNOSIS — F11.20 OPIOID USE DISORDER, SEVERE, DEPENDENCE (H): ICD-10-CM

## 2019-03-14 DIAGNOSIS — F33.1 MODERATE EPISODE OF RECURRENT MAJOR DEPRESSIVE DISORDER (H): ICD-10-CM

## 2019-03-14 DIAGNOSIS — F41.1 GENERALIZED ANXIETY DISORDER: ICD-10-CM

## 2019-03-14 LAB
AMPHETAMINES UR QL SCN: NORMAL
BARBITURATES UR QL: NORMAL
BENZODIAZ UR QL: NORMAL
CANNABINOIDS UR QL SCN: NORMAL
COCAINE UR QL: NORMAL
CREAT UR-MCNC: 126.6 MG/DL
METHADONE UR QL SCN: NORMAL
OPIATES UR QL SCN: NORMAL
OXYCODONE UR QL: NORMAL
PCP UR QL SCN: NORMAL

## 2019-03-14 ASSESSMENT — MIFFLIN-ST. JEOR: SCORE: 2298.22

## 2019-03-18 LAB
ETHYL GLUCURONIDE UR CFM-MCNC: 123 NG/ML
ETHYL SULFATE UR CFM-MCNC: <100 NG/ML

## 2019-04-25 ENCOUNTER — COMMUNICATION - HEALTHEAST (OUTPATIENT)
Dept: BEHAVIORAL HEALTH | Facility: CLINIC | Age: 54
End: 2019-04-25

## 2019-04-25 DIAGNOSIS — F41.1 GENERALIZED ANXIETY DISORDER: ICD-10-CM

## 2019-04-26 ENCOUNTER — COMMUNICATION - HEALTHEAST (OUTPATIENT)
Dept: BEHAVIORAL HEALTH | Facility: CLINIC | Age: 54
End: 2019-04-26

## 2019-04-26 ENCOUNTER — AMBULATORY - HEALTHEAST (OUTPATIENT)
Dept: BEHAVIORAL HEALTH | Facility: CLINIC | Age: 54
End: 2019-04-26

## 2019-04-26 DIAGNOSIS — F11.20 OPIOID USE DISORDER, SEVERE, DEPENDENCE (H): ICD-10-CM

## 2019-04-26 DIAGNOSIS — J98.8 REVERSIBLE AIRWAYS DISEASE: ICD-10-CM

## 2019-04-26 DIAGNOSIS — F41.1 GENERALIZED ANXIETY DISORDER: ICD-10-CM

## 2019-04-29 ENCOUNTER — COMMUNICATION - HEALTHEAST (OUTPATIENT)
Dept: BEHAVIORAL HEALTH | Facility: CLINIC | Age: 54
End: 2019-04-29

## 2019-05-16 ENCOUNTER — OFFICE VISIT - HEALTHEAST (OUTPATIENT)
Dept: BEHAVIORAL HEALTH | Facility: CLINIC | Age: 54
End: 2019-05-16

## 2019-05-16 DIAGNOSIS — E66.01 MORBID OBESITY (H): ICD-10-CM

## 2019-05-16 DIAGNOSIS — F11.20 OPIOID USE DISORDER, SEVERE, DEPENDENCE (H): ICD-10-CM

## 2019-05-16 LAB
AMPHETAMINES UR QL SCN: NORMAL
BARBITURATES UR QL: NORMAL
BENZODIAZ UR QL: NORMAL
CANNABINOIDS UR QL SCN: NORMAL
COCAINE UR QL: NORMAL
CREAT UR-MCNC: 240.2 MG/DL
METHADONE UR QL SCN: NORMAL
OPIATES UR QL SCN: NORMAL
OXYCODONE UR QL: NORMAL
PCP UR QL SCN: NORMAL

## 2019-05-16 ASSESSMENT — MIFFLIN-ST. JEOR: SCORE: 2329.98

## 2019-06-17 ENCOUNTER — COMMUNICATION - HEALTHEAST (OUTPATIENT)
Dept: BEHAVIORAL HEALTH | Facility: CLINIC | Age: 54
End: 2019-06-17

## 2019-07-03 ENCOUNTER — COMMUNICATION - HEALTHEAST (OUTPATIENT)
Dept: PHARMACY | Facility: CLINIC | Age: 54
End: 2019-07-03

## 2019-07-11 ENCOUNTER — OFFICE VISIT - HEALTHEAST (OUTPATIENT)
Dept: INTERNAL MEDICINE | Facility: CLINIC | Age: 54
End: 2019-07-11

## 2019-07-11 DIAGNOSIS — E66.01 MORBID OBESITY (H): ICD-10-CM

## 2019-07-11 DIAGNOSIS — F33.1 MODERATE EPISODE OF RECURRENT MAJOR DEPRESSIVE DISORDER (H): ICD-10-CM

## 2019-07-11 DIAGNOSIS — F11.21 OPIOID DEPENDENCE IN REMISSION (H): ICD-10-CM

## 2019-07-11 DIAGNOSIS — Z13.1 SCREENING FOR DIABETES MELLITUS: ICD-10-CM

## 2019-07-11 DIAGNOSIS — Z13.220 SCREENING FOR HYPERLIPIDEMIA: ICD-10-CM

## 2019-07-11 DIAGNOSIS — Z12.11 SCREEN FOR COLON CANCER: ICD-10-CM

## 2019-07-11 DIAGNOSIS — G47.33 OSA ON CPAP: ICD-10-CM

## 2019-07-11 DIAGNOSIS — Z11.4 SCREENING FOR HIV (HUMAN IMMUNODEFICIENCY VIRUS): ICD-10-CM

## 2019-07-11 DIAGNOSIS — Z11.3 SCREEN FOR STD (SEXUALLY TRANSMITTED DISEASE): ICD-10-CM

## 2019-07-11 DIAGNOSIS — I10 ESSENTIAL HYPERTENSION: ICD-10-CM

## 2019-07-11 DIAGNOSIS — Z12.11 SCREENING FOR COLON CANCER: ICD-10-CM

## 2019-07-11 LAB
ALBUMIN SERPL-MCNC: 3.8 G/DL (ref 3.5–5)
ALP SERPL-CCNC: 75 U/L (ref 45–120)
ALT SERPL W P-5'-P-CCNC: 16 U/L (ref 0–45)
ANION GAP SERPL CALCULATED.3IONS-SCNC: 10 MMOL/L (ref 5–18)
AST SERPL W P-5'-P-CCNC: 17 U/L (ref 0–40)
BILIRUB SERPL-MCNC: 0.2 MG/DL (ref 0–1)
BUN SERPL-MCNC: 12 MG/DL (ref 8–22)
CALCIUM SERPL-MCNC: 9.1 MG/DL (ref 8.5–10.5)
CHLORIDE BLD-SCNC: 107 MMOL/L (ref 98–107)
CHOLEST SERPL-MCNC: 178 MG/DL
CO2 SERPL-SCNC: 27 MMOL/L (ref 22–31)
CREAT SERPL-MCNC: 0.89 MG/DL (ref 0.7–1.3)
ERYTHROCYTE [DISTWIDTH] IN BLOOD BY AUTOMATED COUNT: 14.5 % (ref 11–14.5)
FASTING STATUS PATIENT QL REPORTED: NORMAL
GFR SERPL CREATININE-BSD FRML MDRD: >60 ML/MIN/1.73M2
GLUCOSE BLD-MCNC: 83 MG/DL (ref 70–125)
HBA1C MFR BLD: 6.3 % (ref 3.5–6)
HCT VFR BLD AUTO: 40.2 % (ref 40–54)
HDLC SERPL-MCNC: 52 MG/DL
HGB BLD-MCNC: 13.3 G/DL (ref 14–18)
HIV 1+2 AB+HIV1 P24 AG SERPL QL IA: NEGATIVE
LDLC SERPL CALC-MCNC: 106 MG/DL
MCH RBC QN AUTO: 30 PG (ref 27–34)
MCHC RBC AUTO-ENTMCNC: 33.2 G/DL (ref 32–36)
MCV RBC AUTO: 91 FL (ref 80–100)
PLATELET # BLD AUTO: 316 THOU/UL (ref 140–440)
PMV BLD AUTO: 7 FL (ref 7–10)
POTASSIUM BLD-SCNC: 4.4 MMOL/L (ref 3.5–5)
PROT SERPL-MCNC: 7.4 G/DL (ref 6–8)
RBC # BLD AUTO: 4.43 MILL/UL (ref 4.4–6.2)
SODIUM SERPL-SCNC: 144 MMOL/L (ref 136–145)
TRIGL SERPL-MCNC: 99 MG/DL
WBC: 7.1 THOU/UL (ref 4–11)

## 2019-07-11 ASSESSMENT — MIFFLIN-ST. JEOR: SCORE: 2307.3

## 2019-07-12 ENCOUNTER — COMMUNICATION - HEALTHEAST (OUTPATIENT)
Dept: INTERNAL MEDICINE | Facility: CLINIC | Age: 54
End: 2019-07-12

## 2019-07-31 ENCOUNTER — COMMUNICATION - HEALTHEAST (OUTPATIENT)
Dept: INTERNAL MEDICINE | Facility: CLINIC | Age: 54
End: 2019-07-31

## 2019-08-05 ENCOUNTER — COMMUNICATION - HEALTHEAST (OUTPATIENT)
Dept: INTERNAL MEDICINE | Facility: CLINIC | Age: 54
End: 2019-08-05

## 2019-08-06 ENCOUNTER — AMBULATORY - HEALTHEAST (OUTPATIENT)
Dept: INTERNAL MEDICINE | Facility: CLINIC | Age: 54
End: 2019-08-06

## 2019-08-06 DIAGNOSIS — E11.9 DIABETES MELLITUS, TYPE 2 (H): ICD-10-CM

## 2019-08-13 ENCOUNTER — COMMUNICATION - HEALTHEAST (OUTPATIENT)
Dept: PHARMACY | Facility: CLINIC | Age: 54
End: 2019-08-13

## 2019-08-13 ENCOUNTER — AMBULATORY - HEALTHEAST (OUTPATIENT)
Dept: EDUCATION SERVICES | Facility: CLINIC | Age: 54
End: 2019-08-13

## 2019-08-13 DIAGNOSIS — E11.9 DIABETES MELLITUS, TYPE 2 (H): ICD-10-CM

## 2019-08-14 ENCOUNTER — COMMUNICATION - HEALTHEAST (OUTPATIENT)
Dept: ADMINISTRATIVE | Facility: CLINIC | Age: 54
End: 2019-08-14

## 2019-08-15 ENCOUNTER — OFFICE VISIT - HEALTHEAST (OUTPATIENT)
Dept: BEHAVIORAL HEALTH | Facility: CLINIC | Age: 54
End: 2019-08-15

## 2019-08-15 DIAGNOSIS — F11.20 OPIOID USE DISORDER, SEVERE, DEPENDENCE (H): ICD-10-CM

## 2019-08-15 DIAGNOSIS — F41.1 GENERALIZED ANXIETY DISORDER: ICD-10-CM

## 2019-08-15 LAB
AMPHETAMINES UR QL SCN: NORMAL
BARBITURATES UR QL: NORMAL
BENZODIAZ UR QL: NORMAL
BUPRENORPHINE QUAL URINE LHE: ABNORMAL
CANNABINOIDS UR QL SCN: NORMAL
COCAINE UR QL: NORMAL
CREAT UR-MCNC: 250.3 MG/DL
CREAT UR-MCNC: 256.6 MG/DL
METHADONE UR QL SCN: NORMAL
OPIATES UR QL SCN: NORMAL
OXYCODONE UR QL: NORMAL
PCP UR QL SCN: NORMAL

## 2019-08-15 ASSESSMENT — MIFFLIN-ST. JEOR: SCORE: 2302.76

## 2019-08-16 ENCOUNTER — COMMUNICATION - HEALTHEAST (OUTPATIENT)
Dept: BEHAVIORAL HEALTH | Facility: CLINIC | Age: 54
End: 2019-08-16

## 2019-08-18 LAB
ETHYL GLUCURONIDE UR CFM-MCNC: <100 NG/ML
ETHYL SULFATE UR CFM-MCNC: <100 NG/ML

## 2019-08-19 ENCOUNTER — COMMUNICATION - HEALTHEAST (OUTPATIENT)
Dept: EDUCATION SERVICES | Facility: CLINIC | Age: 54
End: 2019-08-19

## 2019-08-20 ENCOUNTER — AMBULATORY - HEALTHEAST (OUTPATIENT)
Dept: EDUCATION SERVICES | Facility: CLINIC | Age: 54
End: 2019-08-20

## 2019-08-20 DIAGNOSIS — E11.9 DIABETES MELLITUS, TYPE 2 (H): ICD-10-CM

## 2019-08-21 ENCOUNTER — COMMUNICATION - HEALTHEAST (OUTPATIENT)
Dept: ADMINISTRATIVE | Facility: CLINIC | Age: 54
End: 2019-08-21

## 2019-09-03 ENCOUNTER — OFFICE VISIT - HEALTHEAST (OUTPATIENT)
Dept: SURGERY | Facility: CLINIC | Age: 54
End: 2019-09-03

## 2019-09-03 ENCOUNTER — AMBULATORY - HEALTHEAST (OUTPATIENT)
Dept: LAB | Facility: CLINIC | Age: 54
End: 2019-09-03

## 2019-09-03 DIAGNOSIS — R60.0 LOWER LEG EDEMA: ICD-10-CM

## 2019-09-03 DIAGNOSIS — I10 ESSENTIAL HYPERTENSION: ICD-10-CM

## 2019-09-03 DIAGNOSIS — F41.9 ANXIETY AND DEPRESSION: ICD-10-CM

## 2019-09-03 DIAGNOSIS — K21.9 GASTROESOPHAGEAL REFLUX DISEASE WITHOUT ESOPHAGITIS: ICD-10-CM

## 2019-09-03 DIAGNOSIS — J43.9 PULMONARY EMPHYSEMA, UNSPECIFIED EMPHYSEMA TYPE (H): ICD-10-CM

## 2019-09-03 DIAGNOSIS — F32.A ANXIETY AND DEPRESSION: ICD-10-CM

## 2019-09-03 DIAGNOSIS — G47.33 SLEEP APNEA, OBSTRUCTIVE: ICD-10-CM

## 2019-09-03 DIAGNOSIS — M54.5 LOW BACK PAIN, UNSPECIFIED BACK PAIN LATERALITY, UNSPECIFIED CHRONICITY, WITH SCIATICA PRESENCE UNSPECIFIED: ICD-10-CM

## 2019-09-03 DIAGNOSIS — R73.03 PRE-DIABETES: ICD-10-CM

## 2019-09-03 DIAGNOSIS — Z74.09 IMPAIRED PHYSICAL MOBILITY: ICD-10-CM

## 2019-09-03 DIAGNOSIS — E66.01 MORBID OBESITY WITH BMI OF 45.0-49.9, ADULT (H): ICD-10-CM

## 2019-09-03 DIAGNOSIS — M79.673 PAIN OF FOOT, UNSPECIFIED LATERALITY: ICD-10-CM

## 2019-09-03 LAB
25(OH)D3 SERPL-MCNC: 14.1 NG/ML (ref 30–80)
FERRITIN SERPL-MCNC: 114 NG/ML (ref 27–300)
PTH-INTACT SERPL-MCNC: 141 PG/ML (ref 10–86)
VIT B12 SERPL-MCNC: 710 PG/ML (ref 213–816)

## 2019-09-03 ASSESSMENT — MIFFLIN-ST. JEOR: SCORE: 2307.3

## 2019-09-05 LAB
SHBG SERPL-SCNC: 49 NMOL/L (ref 11–80)
TESTOST FREE SERPL-MCNC: 2.56 NG/DL (ref 4.7–24.4)
TESTOST SERPL-MCNC: 176 NG/DL (ref 240–950)

## 2019-09-06 ENCOUNTER — OFFICE VISIT - HEALTHEAST (OUTPATIENT)
Dept: SURGERY | Facility: CLINIC | Age: 54
End: 2019-09-06

## 2019-09-06 DIAGNOSIS — Z71.3 NUTRITIONAL COUNSELING: ICD-10-CM

## 2019-09-06 DIAGNOSIS — R73.03 PREDIABETES: ICD-10-CM

## 2019-09-06 DIAGNOSIS — E66.01 OBESITY, MORBID, BMI 40.0-49.9 (H): ICD-10-CM

## 2019-09-06 ASSESSMENT — MIFFLIN-ST. JEOR: SCORE: 2286.43

## 2019-09-17 ENCOUNTER — OFFICE VISIT - HEALTHEAST (OUTPATIENT)
Dept: EDUCATION SERVICES | Facility: CLINIC | Age: 54
End: 2019-09-17

## 2019-09-17 DIAGNOSIS — R73.03 PRE-DIABETES: ICD-10-CM

## 2019-10-01 ENCOUNTER — COMMUNICATION - HEALTHEAST (OUTPATIENT)
Dept: INTERNAL MEDICINE | Facility: CLINIC | Age: 54
End: 2019-10-01

## 2019-10-01 DIAGNOSIS — K21.9 GASTROESOPHAGEAL REFLUX DISEASE WITHOUT ESOPHAGITIS: ICD-10-CM

## 2019-10-04 ENCOUNTER — OFFICE VISIT - HEALTHEAST (OUTPATIENT)
Dept: SURGERY | Facility: CLINIC | Age: 54
End: 2019-10-04

## 2019-10-04 ENCOUNTER — AMBULATORY - HEALTHEAST (OUTPATIENT)
Dept: SURGERY | Facility: CLINIC | Age: 54
End: 2019-10-04

## 2019-10-04 DIAGNOSIS — Z71.3 NUTRITIONAL COUNSELING: ICD-10-CM

## 2019-10-04 DIAGNOSIS — R73.03 PREDIABETES: ICD-10-CM

## 2019-10-04 DIAGNOSIS — E66.01 OBESITY, MORBID, BMI 40.0-49.9 (H): ICD-10-CM

## 2019-10-04 DIAGNOSIS — E21.3 HYPERPARATHYROIDISM (H): ICD-10-CM

## 2019-10-04 DIAGNOSIS — E56.9 VITAMIN DEFICIENCY: ICD-10-CM

## 2019-10-04 ASSESSMENT — MIFFLIN-ST. JEOR: SCORE: 2225.65

## 2019-10-11 ENCOUNTER — COMMUNICATION - HEALTHEAST (OUTPATIENT)
Dept: INTERNAL MEDICINE | Facility: CLINIC | Age: 54
End: 2019-10-11

## 2019-10-11 ENCOUNTER — OFFICE VISIT - HEALTHEAST (OUTPATIENT)
Dept: INTERNAL MEDICINE | Facility: CLINIC | Age: 54
End: 2019-10-11

## 2019-10-11 DIAGNOSIS — F33.1 MODERATE EPISODE OF RECURRENT MAJOR DEPRESSIVE DISORDER (H): ICD-10-CM

## 2019-10-11 DIAGNOSIS — Z12.11 SCREENING FOR COLON CANCER: ICD-10-CM

## 2019-10-11 DIAGNOSIS — I10 ESSENTIAL HYPERTENSION: ICD-10-CM

## 2019-10-11 DIAGNOSIS — F11.21 OPIOID DEPENDENCE IN REMISSION (H): ICD-10-CM

## 2019-10-11 DIAGNOSIS — R73.03 PREDIABETES: ICD-10-CM

## 2019-10-11 DIAGNOSIS — E66.01 MORBID OBESITY WITH BMI OF 45.0-49.9, ADULT (H): ICD-10-CM

## 2019-10-11 LAB — HBA1C MFR BLD: 5.8 % (ref 3.5–6)

## 2019-10-11 ASSESSMENT — MIFFLIN-ST. JEOR: SCORE: 2248.33

## 2019-10-18 ENCOUNTER — COMMUNICATION - HEALTHEAST (OUTPATIENT)
Dept: INTERNAL MEDICINE | Facility: CLINIC | Age: 54
End: 2019-10-18

## 2019-11-03 ENCOUNTER — COMMUNICATION - HEALTHEAST (OUTPATIENT)
Dept: INTERNAL MEDICINE | Facility: CLINIC | Age: 54
End: 2019-11-03

## 2019-11-03 DIAGNOSIS — F41.1 GENERALIZED ANXIETY DISORDER: ICD-10-CM

## 2019-11-03 DIAGNOSIS — I10 ESSENTIAL HYPERTENSION: ICD-10-CM

## 2019-11-03 DIAGNOSIS — J41.0 SIMPLE CHRONIC BRONCHITIS (H): ICD-10-CM

## 2019-11-08 ENCOUNTER — OFFICE VISIT - HEALTHEAST (OUTPATIENT)
Dept: SURGERY | Facility: CLINIC | Age: 54
End: 2019-11-08

## 2019-11-08 DIAGNOSIS — E66.01 OBESITY, MORBID, BMI 40.0-49.9 (H): ICD-10-CM

## 2019-11-08 DIAGNOSIS — Z71.3 NUTRITIONAL COUNSELING: ICD-10-CM

## 2019-11-08 DIAGNOSIS — R73.03 PREDIABETES: ICD-10-CM

## 2019-11-08 ASSESSMENT — MIFFLIN-ST. JEOR: SCORE: 2200.7

## 2019-11-12 ENCOUNTER — COMMUNICATION - HEALTHEAST (OUTPATIENT)
Dept: BEHAVIORAL HEALTH | Facility: CLINIC | Age: 54
End: 2019-11-12

## 2019-11-12 ENCOUNTER — OFFICE VISIT - HEALTHEAST (OUTPATIENT)
Dept: BEHAVIORAL HEALTH | Facility: CLINIC | Age: 54
End: 2019-11-12

## 2019-11-12 DIAGNOSIS — F41.1 GENERALIZED ANXIETY DISORDER: ICD-10-CM

## 2019-11-12 DIAGNOSIS — F11.20 OPIOID USE DISORDER, SEVERE, DEPENDENCE (H): ICD-10-CM

## 2019-11-12 LAB
AMPHETAMINES UR QL SCN: NORMAL
BARBITURATES UR QL: NORMAL
BENZODIAZ UR QL: NORMAL
BUPRENORPHINE QUAL URINE LHE: ABNORMAL
CANNABINOIDS UR QL SCN: NORMAL
COCAINE UR QL: NORMAL
CREAT UR-MCNC: 110.8 MG/DL
CREAT UR-MCNC: 111.6 MG/DL
METHADONE UR QL SCN: NORMAL
OPIATES UR QL SCN: NORMAL
OXYCODONE UR QL: NORMAL
PCP UR QL SCN: NORMAL

## 2019-11-12 ASSESSMENT — MIFFLIN-ST. JEOR: SCORE: 2198.43

## 2019-11-13 ENCOUNTER — OFFICE VISIT - HEALTHEAST (OUTPATIENT)
Dept: INTERNAL MEDICINE | Facility: CLINIC | Age: 54
End: 2019-11-13

## 2019-11-13 ENCOUNTER — COMMUNICATION - HEALTHEAST (OUTPATIENT)
Dept: INTERNAL MEDICINE | Facility: CLINIC | Age: 54
End: 2019-11-13

## 2019-11-13 DIAGNOSIS — E11.9 TYPE 2 DIABETES MELLITUS WITHOUT COMPLICATION, WITHOUT LONG-TERM CURRENT USE OF INSULIN (H): ICD-10-CM

## 2019-11-13 DIAGNOSIS — R20.0 NUMBNESS AND TINGLING IN RIGHT HAND: ICD-10-CM

## 2019-11-13 DIAGNOSIS — E66.01 MORBID OBESITY WITH BMI OF 45.0-49.9, ADULT (H): ICD-10-CM

## 2019-11-13 DIAGNOSIS — R20.2 NUMBNESS AND TINGLING IN RIGHT HAND: ICD-10-CM

## 2019-11-13 ASSESSMENT — MIFFLIN-ST. JEOR: SCORE: 2212.04

## 2019-11-15 LAB
ETHYL GLUCURONIDE UR CFM-MCNC: <100 NG/ML
ETHYL SULFATE UR CFM-MCNC: <100 NG/ML

## 2019-11-25 ENCOUNTER — HOSPITAL ENCOUNTER (OUTPATIENT)
Dept: PHYSICAL MEDICINE AND REHAB | Facility: CLINIC | Age: 54
Discharge: HOME | End: 2019-11-25
Attending: INTERNAL MEDICINE

## 2019-11-25 DIAGNOSIS — R20.2 NUMBNESS AND TINGLING IN RIGHT HAND: ICD-10-CM

## 2019-11-25 DIAGNOSIS — R20.0 NUMBNESS AND TINGLING IN RIGHT HAND: ICD-10-CM

## 2019-11-26 ENCOUNTER — COMMUNICATION - HEALTHEAST (OUTPATIENT)
Dept: INTERNAL MEDICINE | Facility: CLINIC | Age: 54
End: 2019-11-26

## 2019-12-13 ENCOUNTER — OFFICE VISIT - HEALTHEAST (OUTPATIENT)
Dept: SURGERY | Facility: CLINIC | Age: 54
End: 2019-12-13

## 2019-12-13 DIAGNOSIS — Z71.3 NUTRITIONAL COUNSELING: ICD-10-CM

## 2019-12-13 DIAGNOSIS — E66.01 OBESITY, MORBID, BMI 40.0-49.9 (H): ICD-10-CM

## 2019-12-13 DIAGNOSIS — E11.9 TYPE 2 DIABETES MELLITUS WITHOUT COMPLICATION, WITHOUT LONG-TERM CURRENT USE OF INSULIN (H): ICD-10-CM

## 2019-12-13 ASSESSMENT — MIFFLIN-ST. JEOR: SCORE: 2171.22

## 2019-12-16 ENCOUNTER — COMMUNICATION - HEALTHEAST (OUTPATIENT)
Dept: SURGERY | Facility: CLINIC | Age: 54
End: 2019-12-16

## 2019-12-17 ENCOUNTER — OFFICE VISIT - HEALTHEAST (OUTPATIENT)
Dept: EDUCATION SERVICES | Facility: CLINIC | Age: 54
End: 2019-12-17

## 2019-12-17 DIAGNOSIS — E11.9 TYPE 2 DIABETES MELLITUS WITHOUT COMPLICATION, WITHOUT LONG-TERM CURRENT USE OF INSULIN (H): ICD-10-CM

## 2020-01-10 ENCOUNTER — OFFICE VISIT - HEALTHEAST (OUTPATIENT)
Dept: SURGERY | Facility: CLINIC | Age: 55
End: 2020-01-10

## 2020-01-10 DIAGNOSIS — K21.9 GASTROESOPHAGEAL REFLUX DISEASE WITHOUT ESOPHAGITIS: ICD-10-CM

## 2020-01-10 DIAGNOSIS — Z74.09 IMPAIRED PHYSICAL MOBILITY: ICD-10-CM

## 2020-01-10 DIAGNOSIS — F41.9 ANXIETY AND DEPRESSION: ICD-10-CM

## 2020-01-10 DIAGNOSIS — R60.0 LOWER LEG EDEMA: ICD-10-CM

## 2020-01-10 DIAGNOSIS — F32.A ANXIETY AND DEPRESSION: ICD-10-CM

## 2020-01-10 DIAGNOSIS — R73.03 PRE-DIABETES: ICD-10-CM

## 2020-01-10 DIAGNOSIS — I10 ESSENTIAL HYPERTENSION: ICD-10-CM

## 2020-01-10 DIAGNOSIS — G47.33 SLEEP APNEA, OBSTRUCTIVE: ICD-10-CM

## 2020-01-10 DIAGNOSIS — E66.01 MORBID OBESITY WITH BMI OF 45.0-49.9, ADULT (H): ICD-10-CM

## 2020-01-10 ASSESSMENT — MIFFLIN-ST. JEOR: SCORE: 2166.68

## 2020-01-30 ENCOUNTER — COMMUNICATION - HEALTHEAST (OUTPATIENT)
Dept: INTERNAL MEDICINE | Facility: CLINIC | Age: 55
End: 2020-01-30

## 2020-01-30 DIAGNOSIS — L30.9 DERMATITIS: ICD-10-CM

## 2020-02-13 ENCOUNTER — OFFICE VISIT - HEALTHEAST (OUTPATIENT)
Dept: BEHAVIORAL HEALTH | Facility: CLINIC | Age: 55
End: 2020-02-13

## 2020-02-13 DIAGNOSIS — K59.03 DRUG-INDUCED CONSTIPATION: ICD-10-CM

## 2020-02-13 DIAGNOSIS — F11.20 OPIOID USE DISORDER, SEVERE, DEPENDENCE (H): ICD-10-CM

## 2020-02-13 LAB
AMPHETAMINES UR QL SCN: NORMAL
BARBITURATES UR QL: NORMAL
BENZODIAZ UR QL: NORMAL
BUPRENORPHINE QUAL URINE LHE: ABNORMAL
CANNABINOIDS UR QL SCN: NORMAL
COCAINE UR QL: NORMAL
CREAT UR-MCNC: 171.7 MG/DL
CREAT UR-MCNC: 177.2 MG/DL
METHADONE UR QL SCN: NORMAL
OPIATES UR QL SCN: NORMAL
OXYCODONE UR QL: NORMAL
PCP UR QL SCN: NORMAL

## 2020-02-13 ASSESSMENT — MIFFLIN-ST. JEOR: SCORE: 2144

## 2020-02-16 LAB
ETHYL GLUCURONIDE UR CFM-MCNC: <100 NG/ML
ETHYL SULFATE UR CFM-MCNC: <100 NG/ML

## 2020-02-18 ENCOUNTER — COMMUNICATION - HEALTHEAST (OUTPATIENT)
Dept: INTERNAL MEDICINE | Facility: CLINIC | Age: 55
End: 2020-02-18

## 2020-02-18 ENCOUNTER — AMBULATORY - HEALTHEAST (OUTPATIENT)
Dept: EDUCATION SERVICES | Facility: CLINIC | Age: 55
End: 2020-02-18

## 2020-02-18 DIAGNOSIS — E11.9 DIABETES MELLITUS WITHOUT COMPLICATION (H): ICD-10-CM

## 2020-02-18 LAB — HBA1C MFR BLD: 5.6 % (ref 3.5–6)

## 2020-02-19 ENCOUNTER — COMMUNICATION - HEALTHEAST (OUTPATIENT)
Dept: SURGERY | Facility: CLINIC | Age: 55
End: 2020-02-19

## 2020-02-19 DIAGNOSIS — E21.3 HYPERPARATHYROIDISM (H): ICD-10-CM

## 2020-02-19 DIAGNOSIS — R79.89 LOW SERUM TESTOSTERONE LEVEL: ICD-10-CM

## 2020-02-26 ENCOUNTER — OFFICE VISIT - HEALTHEAST (OUTPATIENT)
Dept: INTERNAL MEDICINE | Facility: CLINIC | Age: 55
End: 2020-02-26

## 2020-02-26 DIAGNOSIS — E11.9 TYPE 2 DIABETES MELLITUS WITHOUT COMPLICATION, WITHOUT LONG-TERM CURRENT USE OF INSULIN (H): ICD-10-CM

## 2020-02-26 DIAGNOSIS — R21 RASH AND NONSPECIFIC SKIN ERUPTION: ICD-10-CM

## 2020-02-26 DIAGNOSIS — F33.1 MODERATE EPISODE OF RECURRENT MAJOR DEPRESSIVE DISORDER (H): ICD-10-CM

## 2020-02-26 DIAGNOSIS — E66.01 MORBID OBESITY WITH BMI OF 45.0-49.9, ADULT (H): ICD-10-CM

## 2020-02-26 DIAGNOSIS — I10 ESSENTIAL HYPERTENSION: ICD-10-CM

## 2020-02-26 DIAGNOSIS — F11.21 OPIOID DEPENDENCE IN REMISSION (H): ICD-10-CM

## 2020-02-26 DIAGNOSIS — G47.33 SLEEP APNEA, OBSTRUCTIVE: ICD-10-CM

## 2020-02-26 ASSESSMENT — PATIENT HEALTH QUESTIONNAIRE - PHQ9: SUM OF ALL RESPONSES TO PHQ QUESTIONS 1-9: 0

## 2020-02-26 ASSESSMENT — MIFFLIN-ST. JEOR: SCORE: 2166.68

## 2020-02-28 ENCOUNTER — OFFICE VISIT - HEALTHEAST (OUTPATIENT)
Dept: SURGERY | Facility: CLINIC | Age: 55
End: 2020-02-28

## 2020-02-28 DIAGNOSIS — E66.01 OBESITY, CLASS III, BMI 40-49.9 (MORBID OBESITY) (H): ICD-10-CM

## 2020-02-28 ASSESSMENT — MIFFLIN-ST. JEOR: SCORE: 2164.41

## 2020-03-04 ENCOUNTER — AMBULATORY - HEALTHEAST (OUTPATIENT)
Dept: LAB | Facility: CLINIC | Age: 55
End: 2020-03-04

## 2020-03-04 DIAGNOSIS — E21.3 HYPERPARATHYROIDISM (H): ICD-10-CM

## 2020-03-04 DIAGNOSIS — R79.89 LOW SERUM TESTOSTERONE LEVEL: ICD-10-CM

## 2020-03-04 LAB
ALBUMIN SERPL-MCNC: 3.2 G/DL (ref 3.5–5)
ALP SERPL-CCNC: 68 U/L (ref 45–120)
ALT SERPL W P-5'-P-CCNC: 12 U/L (ref 0–45)
ANION GAP SERPL CALCULATED.3IONS-SCNC: 10 MMOL/L (ref 5–18)
AST SERPL W P-5'-P-CCNC: 15 U/L (ref 0–40)
BILIRUB SERPL-MCNC: 0.7 MG/DL (ref 0–1)
BUN SERPL-MCNC: 12 MG/DL (ref 8–22)
CALCIUM SERPL-MCNC: 9.2 MG/DL (ref 8.5–10.5)
CHLORIDE BLD-SCNC: 103 MMOL/L (ref 98–107)
CO2 SERPL-SCNC: 29 MMOL/L (ref 22–31)
CREAT SERPL-MCNC: 0.94 MG/DL (ref 0.7–1.3)
GFR SERPL CREATININE-BSD FRML MDRD: >60 ML/MIN/1.73M2
GLUCOSE BLD-MCNC: 83 MG/DL (ref 70–125)
POTASSIUM BLD-SCNC: 4.2 MMOL/L (ref 3.5–5)
PROT SERPL-MCNC: 6.5 G/DL (ref 6–8)
PTH-INTACT SERPL-MCNC: 76 PG/ML (ref 10–86)
SODIUM SERPL-SCNC: 142 MMOL/L (ref 136–145)

## 2020-03-05 ENCOUNTER — COMMUNICATION - HEALTHEAST (OUTPATIENT)
Dept: PHARMACY | Facility: CLINIC | Age: 55
End: 2020-03-05

## 2020-03-05 LAB — 25(OH)D3 SERPL-MCNC: 9.4 NG/ML (ref 30–80)

## 2020-03-09 LAB
SHBG SERPL-SCNC: 42 NMOL/L (ref 11–80)
TESTOST FREE SERPL-MCNC: 3.88 NG/DL (ref 4.7–24.4)
TESTOST SERPL-MCNC: 235 NG/DL (ref 240–950)

## 2020-03-11 ENCOUNTER — AMBULATORY - HEALTHEAST (OUTPATIENT)
Dept: SURGERY | Facility: CLINIC | Age: 55
End: 2020-03-11

## 2020-03-11 DIAGNOSIS — E56.9 VITAMIN DEFICIENCY: ICD-10-CM

## 2020-03-17 ENCOUNTER — COMMUNICATION - HEALTHEAST (OUTPATIENT)
Dept: SURGERY | Facility: CLINIC | Age: 55
End: 2020-03-17

## 2020-04-03 ENCOUNTER — OFFICE VISIT - HEALTHEAST (OUTPATIENT)
Dept: SURGERY | Facility: CLINIC | Age: 55
End: 2020-04-03

## 2020-04-03 DIAGNOSIS — E66.01 OBESITY, CLASS III, BMI 40-49.9 (MORBID OBESITY) (H): ICD-10-CM

## 2020-04-03 DIAGNOSIS — Z71.3 NUTRITIONAL COUNSELING: ICD-10-CM

## 2020-04-03 DIAGNOSIS — E11.9 TYPE 2 DIABETES MELLITUS WITHOUT COMPLICATION, WITHOUT LONG-TERM CURRENT USE OF INSULIN (H): ICD-10-CM

## 2020-04-03 ASSESSMENT — MIFFLIN-ST. JEOR: SCORE: 2164.41

## 2020-04-09 ENCOUNTER — COMMUNICATION - HEALTHEAST (OUTPATIENT)
Dept: INTERNAL MEDICINE | Facility: CLINIC | Age: 55
End: 2020-04-09

## 2020-04-21 ENCOUNTER — OFFICE VISIT - HEALTHEAST (OUTPATIENT)
Dept: INTERNAL MEDICINE | Facility: CLINIC | Age: 55
End: 2020-04-21

## 2020-04-21 DIAGNOSIS — E11.9 TYPE 2 DIABETES MELLITUS WITHOUT COMPLICATION, WITHOUT LONG-TERM CURRENT USE OF INSULIN (H): ICD-10-CM

## 2020-04-21 DIAGNOSIS — R21 RASH AND NONSPECIFIC SKIN ERUPTION: ICD-10-CM

## 2020-04-21 DIAGNOSIS — E66.01 MORBID OBESITY WITH BMI OF 45.0-49.9, ADULT (H): ICD-10-CM

## 2020-04-21 DIAGNOSIS — G47.33 OSA ON CPAP: ICD-10-CM

## 2020-04-21 DIAGNOSIS — F33.1 MODERATE EPISODE OF RECURRENT MAJOR DEPRESSIVE DISORDER (H): ICD-10-CM

## 2020-04-21 DIAGNOSIS — I10 ESSENTIAL HYPERTENSION: ICD-10-CM

## 2020-04-21 DIAGNOSIS — F11.21 OPIOID DEPENDENCE IN REMISSION (H): ICD-10-CM

## 2020-05-05 ENCOUNTER — COMMUNICATION - HEALTHEAST (OUTPATIENT)
Dept: SURGERY | Facility: CLINIC | Age: 55
End: 2020-05-05

## 2020-05-06 ENCOUNTER — OFFICE VISIT - HEALTHEAST (OUTPATIENT)
Dept: SURGERY | Facility: CLINIC | Age: 55
End: 2020-05-06

## 2020-05-06 DIAGNOSIS — G47.33 OSA ON CPAP: ICD-10-CM

## 2020-05-06 DIAGNOSIS — E66.01 MORBID OBESITY WITH BMI OF 45.0-49.9, ADULT (H): ICD-10-CM

## 2020-05-06 DIAGNOSIS — E56.9 VITAMIN DEFICIENCY: ICD-10-CM

## 2020-05-06 DIAGNOSIS — K59.03 CONSTIPATION DUE TO PAIN MEDICATION: ICD-10-CM

## 2020-05-07 ENCOUNTER — OFFICE VISIT - HEALTHEAST (OUTPATIENT)
Dept: BEHAVIORAL HEALTH | Facility: CLINIC | Age: 55
End: 2020-05-07

## 2020-05-07 DIAGNOSIS — F11.20 OPIOID USE DISORDER, SEVERE, DEPENDENCE (H): ICD-10-CM

## 2020-05-19 ENCOUNTER — AMBULATORY - HEALTHEAST (OUTPATIENT)
Dept: EDUCATION SERVICES | Facility: CLINIC | Age: 55
End: 2020-05-19

## 2020-05-19 DIAGNOSIS — E11.9 TYPE 2 DIABETES MELLITUS WITHOUT COMPLICATION, WITHOUT LONG-TERM CURRENT USE OF INSULIN (H): ICD-10-CM

## 2020-06-16 ENCOUNTER — COMMUNICATION - HEALTHEAST (OUTPATIENT)
Dept: INTERNAL MEDICINE | Facility: CLINIC | Age: 55
End: 2020-06-16

## 2020-06-17 ENCOUNTER — OFFICE VISIT - HEALTHEAST (OUTPATIENT)
Dept: INTERNAL MEDICINE | Facility: CLINIC | Age: 55
End: 2020-06-17

## 2020-06-17 DIAGNOSIS — Z20.822 EXPOSURE TO 2019 NOVEL CORONAVIRUS: ICD-10-CM

## 2020-06-19 ENCOUNTER — AMBULATORY - HEALTHEAST (OUTPATIENT)
Dept: FAMILY MEDICINE | Facility: CLINIC | Age: 55
End: 2020-06-19

## 2020-06-19 DIAGNOSIS — Z20.822 EXPOSURE TO 2019 NOVEL CORONAVIRUS: ICD-10-CM

## 2020-06-20 ENCOUNTER — COMMUNICATION - HEALTHEAST (OUTPATIENT)
Dept: FAMILY MEDICINE | Facility: CLINIC | Age: 55
End: 2020-06-20

## 2020-06-22 ENCOUNTER — COMMUNICATION - HEALTHEAST (OUTPATIENT)
Dept: FAMILY MEDICINE | Facility: CLINIC | Age: 55
End: 2020-06-22

## 2020-07-02 ENCOUNTER — COMMUNICATION - HEALTHEAST (OUTPATIENT)
Dept: BEHAVIORAL HEALTH | Facility: CLINIC | Age: 55
End: 2020-07-02

## 2020-07-09 ENCOUNTER — COMMUNICATION - HEALTHEAST (OUTPATIENT)
Dept: SURGERY | Facility: CLINIC | Age: 55
End: 2020-07-09

## 2020-07-10 ENCOUNTER — COMMUNICATION - HEALTHEAST (OUTPATIENT)
Dept: SURGERY | Facility: CLINIC | Age: 55
End: 2020-07-10

## 2020-07-27 ENCOUNTER — COMMUNICATION - HEALTHEAST (OUTPATIENT)
Dept: EDUCATION SERVICES | Facility: CLINIC | Age: 55
End: 2020-07-27

## 2020-07-27 DIAGNOSIS — E11.9 DIABETES MELLITUS, TYPE 2 (H): ICD-10-CM

## 2020-08-07 ENCOUNTER — OFFICE VISIT - HEALTHEAST (OUTPATIENT)
Dept: BEHAVIORAL HEALTH | Facility: CLINIC | Age: 55
End: 2020-08-07

## 2020-08-07 DIAGNOSIS — F11.20 OPIOID USE DISORDER, SEVERE, DEPENDENCE (H): ICD-10-CM

## 2020-08-07 DIAGNOSIS — K59.03 DRUG-INDUCED CONSTIPATION: ICD-10-CM

## 2020-08-07 DIAGNOSIS — J98.8 REVERSIBLE AIRWAYS DISEASE: ICD-10-CM

## 2020-08-07 ASSESSMENT — ANXIETY QUESTIONNAIRES
7. FEELING AFRAID AS IF SOMETHING AWFUL MIGHT HAPPEN: NOT AT ALL
3. WORRYING TOO MUCH ABOUT DIFFERENT THINGS: NOT AT ALL
6. BECOMING EASILY ANNOYED OR IRRITABLE: NOT AT ALL
5. BEING SO RESTLESS THAT IT IS HARD TO SIT STILL: NOT AT ALL
2. NOT BEING ABLE TO STOP OR CONTROL WORRYING: NOT AT ALL
GAD7 TOTAL SCORE: 0
4. TROUBLE RELAXING: NOT AT ALL
1. FEELING NERVOUS, ANXIOUS, OR ON EDGE: NOT AT ALL

## 2020-08-07 ASSESSMENT — PATIENT HEALTH QUESTIONNAIRE - PHQ9: SUM OF ALL RESPONSES TO PHQ QUESTIONS 1-9: 3

## 2020-08-10 ENCOUNTER — COMMUNICATION - HEALTHEAST (OUTPATIENT)
Dept: INTERNAL MEDICINE | Facility: CLINIC | Age: 55
End: 2020-08-10

## 2020-08-11 ENCOUNTER — COMMUNICATION - HEALTHEAST (OUTPATIENT)
Dept: INTERNAL MEDICINE | Facility: CLINIC | Age: 55
End: 2020-08-11

## 2020-08-11 ENCOUNTER — AMBULATORY - HEALTHEAST (OUTPATIENT)
Dept: EDUCATION SERVICES | Facility: CLINIC | Age: 55
End: 2020-08-11

## 2020-08-11 DIAGNOSIS — E11.9 TYPE 2 DIABETES MELLITUS WITHOUT COMPLICATION, WITHOUT LONG-TERM CURRENT USE OF INSULIN (H): ICD-10-CM

## 2020-08-17 ENCOUNTER — COMMUNICATION - HEALTHEAST (OUTPATIENT)
Dept: BEHAVIORAL HEALTH | Facility: CLINIC | Age: 55
End: 2020-08-17

## 2020-08-23 ENCOUNTER — COMMUNICATION - HEALTHEAST (OUTPATIENT)
Dept: EDUCATION SERVICES | Facility: CLINIC | Age: 55
End: 2020-08-23

## 2020-08-23 DIAGNOSIS — E11.9 DIABETES MELLITUS, TYPE 2 (H): ICD-10-CM

## 2020-08-27 ENCOUNTER — COMMUNICATION - HEALTHEAST (OUTPATIENT)
Dept: INTERNAL MEDICINE | Facility: CLINIC | Age: 55
End: 2020-08-27

## 2020-08-28 ENCOUNTER — OFFICE VISIT - HEALTHEAST (OUTPATIENT)
Dept: INTERNAL MEDICINE | Facility: CLINIC | Age: 55
End: 2020-08-28

## 2020-08-28 DIAGNOSIS — R07.89 ATYPICAL CHEST PAIN: ICD-10-CM

## 2020-08-28 DIAGNOSIS — E66.01 MORBID OBESITY WITH BMI OF 45.0-49.9, ADULT (H): ICD-10-CM

## 2020-08-28 DIAGNOSIS — Z12.11 SCREEN FOR COLON CANCER: ICD-10-CM

## 2020-08-28 DIAGNOSIS — I10 ESSENTIAL HYPERTENSION: ICD-10-CM

## 2020-08-28 DIAGNOSIS — K21.9 GASTROESOPHAGEAL REFLUX DISEASE WITHOUT ESOPHAGITIS: ICD-10-CM

## 2020-08-28 ASSESSMENT — MIFFLIN-ST. JEOR: SCORE: 2257.4

## 2020-09-01 ENCOUNTER — COMMUNICATION - HEALTHEAST (OUTPATIENT)
Dept: SURGERY | Facility: CLINIC | Age: 55
End: 2020-09-01

## 2020-09-01 DIAGNOSIS — R79.89 LOW SERUM TESTOSTERONE LEVEL: ICD-10-CM

## 2020-09-01 DIAGNOSIS — E21.3 HYPERPARATHYROIDISM (H): ICD-10-CM

## 2020-09-11 ENCOUNTER — AMBULATORY - HEALTHEAST (OUTPATIENT)
Dept: LAB | Facility: CLINIC | Age: 55
End: 2020-09-11

## 2020-09-11 DIAGNOSIS — E21.3 HYPERPARATHYROIDISM (H): ICD-10-CM

## 2020-09-11 DIAGNOSIS — R79.89 LOW SERUM TESTOSTERONE LEVEL: ICD-10-CM

## 2020-09-11 LAB
25(OH)D3 SERPL-MCNC: 33.9 NG/ML (ref 30–80)
ALBUMIN SERPL-MCNC: 3.9 G/DL (ref 3.5–5)
ALP SERPL-CCNC: 82 U/L (ref 45–120)
ALT SERPL W P-5'-P-CCNC: 25 U/L (ref 0–45)
ANION GAP SERPL CALCULATED.3IONS-SCNC: 10 MMOL/L (ref 5–18)
AST SERPL W P-5'-P-CCNC: 27 U/L (ref 0–40)
BILIRUB SERPL-MCNC: 0.3 MG/DL (ref 0–1)
BUN SERPL-MCNC: 12 MG/DL (ref 8–22)
CALCIUM SERPL-MCNC: 9.4 MG/DL (ref 8.5–10.5)
CHLORIDE BLD-SCNC: 100 MMOL/L (ref 98–107)
CO2 SERPL-SCNC: 29 MMOL/L (ref 22–31)
CREAT SERPL-MCNC: 0.88 MG/DL (ref 0.7–1.3)
GFR SERPL CREATININE-BSD FRML MDRD: >60 ML/MIN/1.73M2
GLUCOSE BLD-MCNC: 88 MG/DL (ref 70–125)
POTASSIUM BLD-SCNC: 4.5 MMOL/L (ref 3.5–5)
PROT SERPL-MCNC: 8.1 G/DL (ref 6–8)
PTH-INTACT SERPL-MCNC: 108 PG/ML (ref 10–86)
SODIUM SERPL-SCNC: 139 MMOL/L (ref 136–145)

## 2020-09-16 ENCOUNTER — AMBULATORY - HEALTHEAST (OUTPATIENT)
Dept: SURGERY | Facility: CLINIC | Age: 55
End: 2020-09-16

## 2020-09-16 DIAGNOSIS — E56.9 VITAMIN DEFICIENCY: ICD-10-CM

## 2020-09-16 DIAGNOSIS — E21.3 HYPERPARATHYROIDISM (H): ICD-10-CM

## 2020-09-16 LAB
SHBG SERPL-SCNC: 46 NMOL/L (ref 11–80)
TESTOST FREE SERPL-MCNC: 6.28 NG/DL (ref 4.7–24.4)
TESTOST SERPL-MCNC: 385 NG/DL (ref 240–950)

## 2020-09-25 ENCOUNTER — OFFICE VISIT - HEALTHEAST (OUTPATIENT)
Dept: CARDIOLOGY | Facility: CLINIC | Age: 55
End: 2020-09-25

## 2020-09-25 DIAGNOSIS — I50.30 HEART FAILURE WITH PRESERVED EJECTION FRACTION, NYHA CLASS I (H): ICD-10-CM

## 2020-09-25 DIAGNOSIS — I20.89 OTHER FORMS OF ANGINA PECTORIS (H): ICD-10-CM

## 2020-09-25 ASSESSMENT — MIFFLIN-ST. JEOR: SCORE: 2263.3

## 2020-09-29 ENCOUNTER — COMMUNICATION - HEALTHEAST (OUTPATIENT)
Dept: EDUCATION SERVICES | Facility: CLINIC | Age: 55
End: 2020-09-29

## 2020-09-29 ENCOUNTER — COMMUNICATION - HEALTHEAST (OUTPATIENT)
Dept: INTERNAL MEDICINE | Facility: CLINIC | Age: 55
End: 2020-09-29

## 2020-09-29 DIAGNOSIS — E11.9 TYPE 2 DIABETES MELLITUS WITHOUT COMPLICATION, WITHOUT LONG-TERM CURRENT USE OF INSULIN (H): ICD-10-CM

## 2020-10-01 ENCOUNTER — OFFICE VISIT - HEALTHEAST (OUTPATIENT)
Dept: EDUCATION SERVICES | Facility: CLINIC | Age: 55
End: 2020-10-01

## 2020-10-01 ENCOUNTER — COMMUNICATION - HEALTHEAST (OUTPATIENT)
Dept: INTERNAL MEDICINE | Facility: CLINIC | Age: 55
End: 2020-10-01

## 2020-10-01 DIAGNOSIS — E11.9 DIABETES MELLITUS, TYPE 2 (H): ICD-10-CM

## 2020-10-01 LAB — HBA1C MFR BLD: 5.7 %

## 2020-10-02 ENCOUNTER — HOSPITAL ENCOUNTER (OUTPATIENT)
Dept: CARDIOLOGY | Facility: CLINIC | Age: 55
Discharge: HOME | End: 2020-10-02
Attending: INTERNAL MEDICINE

## 2020-10-02 LAB
AORTIC ROOT: 2.8 CM
AORTIC VALVE MEAN VELOCITY: 77.6 CM/S
ASCENDING AORTA: 3.6 CM
AV DIMENSIONLESS INDEX VTI: 0.6
AV MEAN GRADIENT: 3 MMHG
AV PEAK GRADIENT: 4.2 MMHG
AV VALVE AREA: 2.3 CM2
AV VELOCITY RATIO: 0.7
BSA FOR ECHO PROCEDURE: 2.64 M2
CV BLOOD PRESSURE: NORMAL MMHG
CV ECHO HEIGHT: 70 IN
CV ECHO WEIGHT: 311 LBS
DOP CALC AO PEAK VEL: 103 CM/S
DOP CALC AO VTI: 22.9 CM
DOP CALC LVOT AREA: 3.8 CM2
DOP CALC LVOT DIAMETER: 2.2 CM
DOP CALC LVOT PEAK VEL: 69.9 CM/S
DOP CALC LVOT STROKE VOLUME: 52.8 CM3
DOP CALCLVOT PEAK VEL VTI: 13.9 CM
EJECTION FRACTION: 50 % (ref 55–75)
FRACTIONAL SHORTENING: 28.8 % (ref 28–44)
INTERVENTRICULAR SEPTUM IN END DIASTOLE: 1 CM (ref 0.6–1)
IVS/PW RATIO: 1
LA AREA 1: 19.7 CM2
LA AREA 2: 19.4 CM2
LEFT ATRIUM LENGTH: 5.04 CM
LEFT ATRIUM SIZE: 3.2 CM
LEFT ATRIUM VOLUME INDEX: 24.4 ML/M2
LEFT ATRIUM VOLUME: 64.5 ML
LEFT VENTRICLE CARDIAC INDEX: 1.5 L/MIN/M2
LEFT VENTRICLE CARDIAC OUTPUT: 3.9 L/MIN
LEFT VENTRICLE DIASTOLIC VOLUME INDEX: 42 CM3/M2 (ref 34–74)
LEFT VENTRICLE DIASTOLIC VOLUME: 111 CM3 (ref 62–150)
LEFT VENTRICLE HEART RATE: 74 BPM
LEFT VENTRICLE MASS INDEX: 73.5 G/M2
LEFT VENTRICLE SYSTOLIC VOLUME INDEX: 20.8 CM3/M2 (ref 11–31)
LEFT VENTRICLE SYSTOLIC VOLUME: 55 CM3 (ref 21–61)
LEFT VENTRICULAR INTERNAL DIMENSION IN DIASTOLE: 5.2 CM (ref 4.2–5.8)
LEFT VENTRICULAR INTERNAL DIMENSION IN SYSTOLE: 3.7 CM (ref 2.5–4)
LEFT VENTRICULAR MASS: 194.2 G
LEFT VENTRICULAR OUTFLOW TRACT MEAN GRADIENT: 1 MMHG
LEFT VENTRICULAR OUTFLOW TRACT MEAN VELOCITY: 49.8 CM/S
LEFT VENTRICULAR OUTFLOW TRACT PEAK GRADIENT: 2 MMHG
LEFT VENTRICULAR POSTERIOR WALL IN END DIASTOLE: 1 CM (ref 0.6–1)
LV STROKE VOLUME INDEX: 20 ML/M2
MITRAL VALVE E/A RATIO: 1
MV AVERAGE E/E' RATIO: 4.9 CM/S
MV DECELERATION TIME: 211 MS
MV E'TISSUE VEL-LAT: 16.4 CM/S
MV E'TISSUE VEL-MED: 13 CM/S
MV LATERAL E/E' RATIO: 4.4
MV MEDIAL E/E' RATIO: 5.6
MV PEAK A VELOCITY: 70.1 CM/S
MV PEAK E VELOCITY: 72.3 CM/S
NUC REST DIASTOLIC VOLUME INDEX: 4976 LBS
NUC REST SYSTOLIC VOLUME INDEX: 70 IN

## 2020-10-02 ASSESSMENT — MIFFLIN-ST. JEOR: SCORE: 2261.94

## 2020-10-06 ENCOUNTER — COMMUNICATION - HEALTHEAST (OUTPATIENT)
Dept: CARDIOLOGY | Facility: CLINIC | Age: 55
End: 2020-10-06

## 2020-10-07 ENCOUNTER — HOSPITAL ENCOUNTER (OUTPATIENT)
Dept: CARDIOLOGY | Facility: CLINIC | Age: 55
Discharge: HOME | End: 2020-10-07
Attending: INTERNAL MEDICINE

## 2020-10-07 ENCOUNTER — HOSPITAL ENCOUNTER (OUTPATIENT)
Dept: NUCLEAR MEDICINE | Facility: CLINIC | Age: 55
Discharge: HOME | End: 2020-10-07
Attending: INTERNAL MEDICINE

## 2020-10-07 LAB
CV STRESS CURRENT BP HE: NORMAL
CV STRESS CURRENT HR HE: 103
CV STRESS CURRENT HR HE: 105
CV STRESS CURRENT HR HE: 106
CV STRESS CURRENT HR HE: 113
CV STRESS CURRENT HR HE: 80
CV STRESS CURRENT HR HE: 81
CV STRESS CURRENT HR HE: 81
CV STRESS CURRENT HR HE: 82
CV STRESS CURRENT HR HE: 93
CV STRESS CURRENT HR HE: 93
CV STRESS CURRENT HR HE: 94
CV STRESS CURRENT HR HE: 95
CV STRESS CURRENT HR HE: 98
CV STRESS CURRENT HR HE: 99
CV STRESS DEVIATION TIME HE: NORMAL
CV STRESS ECHO PERCENT HR HE: NORMAL
CV STRESS EXERCISE STAGE HE: NORMAL
CV STRESS FINAL RESTING BP HE: NORMAL
CV STRESS FINAL RESTING HR HE: 95
CV STRESS MAX HR HE: 115
CV STRESS MAX TREADMILL GRADE HE: 0
CV STRESS MAX TREADMILL SPEED HE: 0
CV STRESS PEAK DIA BP HE: NORMAL
CV STRESS PEAK SYS BP HE: NORMAL
CV STRESS PHASE HE: NORMAL
CV STRESS PROTOCOL HE: NORMAL
CV STRESS RESTING PT POSITION HE: NORMAL
CV STRESS RESTING PT POSITION HE: NORMAL
CV STRESS ST DEVIATION AMOUNT HE: NORMAL
CV STRESS ST DEVIATION ELEVATION HE: NORMAL
CV STRESS ST EVELATION AMOUNT HE: NORMAL
CV STRESS TEST TYPE HE: NORMAL
CV STRESS TOTAL STAGE TIME MIN 1 HE: NORMAL
NUC STRESS EJECTION FRACTION: 63 %
RATE PRESSURE PRODUCT: NORMAL
STRESS ECHO BASELINE DIASTOLIC HE: 67
STRESS ECHO BASELINE HR: 81
STRESS ECHO BASELINE SYSTOLIC BP: 108
STRESS ECHO CALCULATED PERCENT HR: 69 %
STRESS ECHO LAST STRESS DIASTOLIC BP: 71
STRESS ECHO LAST STRESS HR: 103
STRESS ECHO LAST STRESS SYSTOLIC BP: 103
STRESS ECHO TARGET HR: 167

## 2020-10-08 ENCOUNTER — COMMUNICATION - HEALTHEAST (OUTPATIENT)
Dept: BEHAVIORAL HEALTH | Facility: CLINIC | Age: 55
End: 2020-10-08

## 2020-10-21 ENCOUNTER — OFFICE VISIT - HEALTHEAST (OUTPATIENT)
Dept: CARDIOLOGY | Facility: CLINIC | Age: 55
End: 2020-10-21

## 2020-10-21 DIAGNOSIS — I50.32 CHRONIC DIASTOLIC HEART FAILURE (H): ICD-10-CM

## 2020-10-21 ASSESSMENT — MIFFLIN-ST. JEOR: SCORE: 2326.8

## 2020-11-04 ENCOUNTER — OFFICE VISIT - HEALTHEAST (OUTPATIENT)
Dept: BEHAVIORAL HEALTH | Facility: CLINIC | Age: 55
End: 2020-11-04

## 2020-11-04 DIAGNOSIS — F33.1 MODERATE EPISODE OF RECURRENT MAJOR DEPRESSIVE DISORDER (H): ICD-10-CM

## 2020-11-04 DIAGNOSIS — F41.1 GENERALIZED ANXIETY DISORDER: ICD-10-CM

## 2020-11-04 DIAGNOSIS — F11.20 OPIOID USE DISORDER, SEVERE, DEPENDENCE (H): ICD-10-CM

## 2020-11-04 DIAGNOSIS — K59.03 DRUG-INDUCED CONSTIPATION: ICD-10-CM

## 2020-11-04 ASSESSMENT — ANXIETY QUESTIONNAIRES
IF YOU CHECKED OFF ANY PROBLEMS ON THIS QUESTIONNAIRE, HOW DIFFICULT HAVE THESE PROBLEMS MADE IT FOR YOU TO DO YOUR WORK, TAKE CARE OF THINGS AT HOME, OR GET ALONG WITH OTHER PEOPLE: NOT DIFFICULT AT ALL
GAD7 TOTAL SCORE: 0
5. BEING SO RESTLESS THAT IT IS HARD TO SIT STILL: NOT AT ALL
4. TROUBLE RELAXING: NOT AT ALL
2. NOT BEING ABLE TO STOP OR CONTROL WORRYING: NOT AT ALL
3. WORRYING TOO MUCH ABOUT DIFFERENT THINGS: NOT AT ALL
7. FEELING AFRAID AS IF SOMETHING AWFUL MIGHT HAPPEN: NOT AT ALL
6. BECOMING EASILY ANNOYED OR IRRITABLE: NOT AT ALL
1. FEELING NERVOUS, ANXIOUS, OR ON EDGE: NOT AT ALL

## 2020-11-04 ASSESSMENT — PATIENT HEALTH QUESTIONNAIRE - PHQ9: SUM OF ALL RESPONSES TO PHQ QUESTIONS 1-9: 0

## 2020-11-23 ENCOUNTER — OFFICE VISIT - HEALTHEAST (OUTPATIENT)
Dept: INTERNAL MEDICINE | Facility: CLINIC | Age: 55
End: 2020-11-23

## 2020-11-23 DIAGNOSIS — L03.319 CELLULITIS AND ABSCESS OF TRUNK: ICD-10-CM

## 2020-11-23 DIAGNOSIS — L02.219 CELLULITIS AND ABSCESS OF TRUNK: ICD-10-CM

## 2020-11-23 ASSESSMENT — MIFFLIN-ST. JEOR: SCORE: 2320.9

## 2020-12-02 ENCOUNTER — COMMUNICATION - HEALTHEAST (OUTPATIENT)
Dept: BEHAVIORAL HEALTH | Facility: CLINIC | Age: 55
End: 2020-12-02

## 2020-12-02 DIAGNOSIS — K59.03 DRUG-INDUCED CONSTIPATION: ICD-10-CM

## 2020-12-17 ENCOUNTER — COMMUNICATION - HEALTHEAST (OUTPATIENT)
Dept: INTERNAL MEDICINE | Facility: CLINIC | Age: 55
End: 2020-12-17

## 2020-12-17 DIAGNOSIS — K59.03 DRUG-INDUCED CONSTIPATION: ICD-10-CM

## 2020-12-30 ENCOUNTER — COMMUNICATION - HEALTHEAST (OUTPATIENT)
Dept: BEHAVIORAL HEALTH | Facility: CLINIC | Age: 55
End: 2020-12-30

## 2020-12-30 DIAGNOSIS — F11.20 OPIOID USE DISORDER, SEVERE, DEPENDENCE (H): ICD-10-CM

## 2021-01-05 ENCOUNTER — COMMUNICATION - HEALTHEAST (OUTPATIENT)
Dept: BEHAVIORAL HEALTH | Facility: CLINIC | Age: 56
End: 2021-01-05

## 2021-01-14 ENCOUNTER — OFFICE VISIT - HEALTHEAST (OUTPATIENT)
Dept: BEHAVIORAL HEALTH | Facility: CLINIC | Age: 56
End: 2021-01-14

## 2021-01-14 DIAGNOSIS — F11.20 OPIOID USE DISORDER, SEVERE, DEPENDENCE (H): ICD-10-CM

## 2021-01-14 DIAGNOSIS — K59.03 DRUG-INDUCED CONSTIPATION: ICD-10-CM

## 2021-01-14 ASSESSMENT — ANXIETY QUESTIONNAIRES
7. FEELING AFRAID AS IF SOMETHING AWFUL MIGHT HAPPEN: NOT AT ALL
GAD7 TOTAL SCORE: 0
6. BECOMING EASILY ANNOYED OR IRRITABLE: NOT AT ALL
2. NOT BEING ABLE TO STOP OR CONTROL WORRYING: NOT AT ALL
4. TROUBLE RELAXING: NOT AT ALL
5. BEING SO RESTLESS THAT IT IS HARD TO SIT STILL: NOT AT ALL
1. FEELING NERVOUS, ANXIOUS, OR ON EDGE: NOT AT ALL
3. WORRYING TOO MUCH ABOUT DIFFERENT THINGS: NOT AT ALL

## 2021-01-14 ASSESSMENT — PATIENT HEALTH QUESTIONNAIRE - PHQ9: SUM OF ALL RESPONSES TO PHQ QUESTIONS 1-9: 2

## 2021-01-25 ENCOUNTER — COMMUNICATION - HEALTHEAST (OUTPATIENT)
Dept: BEHAVIORAL HEALTH | Facility: CLINIC | Age: 56
End: 2021-01-25

## 2021-03-21 ENCOUNTER — COMMUNICATION - HEALTHEAST (OUTPATIENT)
Dept: BEHAVIORAL HEALTH | Facility: CLINIC | Age: 56
End: 2021-03-21

## 2021-03-21 DIAGNOSIS — I10 ESSENTIAL HYPERTENSION: ICD-10-CM

## 2021-03-21 DIAGNOSIS — J98.8 REVERSIBLE AIRWAYS DISEASE: ICD-10-CM

## 2021-03-22 ENCOUNTER — COMMUNICATION - HEALTHEAST (OUTPATIENT)
Dept: INTERNAL MEDICINE | Facility: CLINIC | Age: 56
End: 2021-03-22

## 2021-03-22 DIAGNOSIS — I10 ESSENTIAL HYPERTENSION: ICD-10-CM

## 2021-03-22 DIAGNOSIS — J98.8 REVERSIBLE AIRWAYS DISEASE: ICD-10-CM

## 2021-03-22 DIAGNOSIS — K59.03 DRUG-INDUCED CONSTIPATION: ICD-10-CM

## 2021-03-29 ENCOUNTER — OFFICE VISIT - HEALTHEAST (OUTPATIENT)
Dept: INTERNAL MEDICINE | Facility: CLINIC | Age: 56
End: 2021-03-29

## 2021-03-29 DIAGNOSIS — J44.1 CHRONIC OBSTRUCTIVE PULMONARY DISEASE WITH ACUTE EXACERBATION (H): ICD-10-CM

## 2021-03-29 DIAGNOSIS — G47.33 OSA ON CPAP: ICD-10-CM

## 2021-03-29 DIAGNOSIS — E66.01 MORBID OBESITY WITH BMI OF 45.0-49.9, ADULT (H): ICD-10-CM

## 2021-03-29 DIAGNOSIS — F33.1 MODERATE EPISODE OF RECURRENT MAJOR DEPRESSIVE DISORDER (H): ICD-10-CM

## 2021-03-29 DIAGNOSIS — I50.32 CHRONIC DIASTOLIC HEART FAILURE (H): ICD-10-CM

## 2021-04-15 ENCOUNTER — OFFICE VISIT - HEALTHEAST (OUTPATIENT)
Dept: BEHAVIORAL HEALTH | Facility: CLINIC | Age: 56
End: 2021-04-15

## 2021-04-15 ENCOUNTER — OFFICE VISIT - HEALTHEAST (OUTPATIENT)
Dept: SURGERY | Facility: CLINIC | Age: 56
End: 2021-04-15

## 2021-04-15 DIAGNOSIS — K21.9 GASTROESOPHAGEAL REFLUX DISEASE WITHOUT ESOPHAGITIS: ICD-10-CM

## 2021-04-15 DIAGNOSIS — E66.01 MORBID OBESITY WITH BMI OF 45.0-49.9, ADULT (H): ICD-10-CM

## 2021-04-15 DIAGNOSIS — G47.33 SLEEP APNEA, OBSTRUCTIVE: ICD-10-CM

## 2021-04-15 DIAGNOSIS — F32.A ANXIETY AND DEPRESSION: ICD-10-CM

## 2021-04-15 DIAGNOSIS — F41.9 ANXIETY AND DEPRESSION: ICD-10-CM

## 2021-04-15 DIAGNOSIS — Z74.09 IMPAIRED PHYSICAL MOBILITY: ICD-10-CM

## 2021-04-15 DIAGNOSIS — R60.0 LOWER LEG EDEMA: ICD-10-CM

## 2021-04-15 DIAGNOSIS — I10 ESSENTIAL HYPERTENSION: ICD-10-CM

## 2021-04-15 DIAGNOSIS — R73.03 PRE-DIABETES: ICD-10-CM

## 2021-04-15 DIAGNOSIS — F11.20 OPIOID USE DISORDER, SEVERE, DEPENDENCE (H): ICD-10-CM

## 2021-04-22 ENCOUNTER — COMMUNICATION - HEALTHEAST (OUTPATIENT)
Dept: INTERNAL MEDICINE | Facility: CLINIC | Age: 56
End: 2021-04-22

## 2021-04-22 DIAGNOSIS — J98.8 REVERSIBLE AIRWAYS DISEASE: ICD-10-CM

## 2021-04-27 ENCOUNTER — COMMUNICATION - HEALTHEAST (OUTPATIENT)
Dept: SURGERY | Facility: CLINIC | Age: 56
End: 2021-04-27

## 2021-04-27 DIAGNOSIS — R73.03 PRE-DIABETES: ICD-10-CM

## 2021-04-27 DIAGNOSIS — F41.9 ANXIETY AND DEPRESSION: ICD-10-CM

## 2021-04-27 DIAGNOSIS — R60.0 LOWER LEG EDEMA: ICD-10-CM

## 2021-04-27 DIAGNOSIS — K21.9 GASTROESOPHAGEAL REFLUX DISEASE WITHOUT ESOPHAGITIS: ICD-10-CM

## 2021-04-27 DIAGNOSIS — Z74.09 IMPAIRED PHYSICAL MOBILITY: ICD-10-CM

## 2021-04-27 DIAGNOSIS — I10 ESSENTIAL HYPERTENSION: ICD-10-CM

## 2021-04-27 DIAGNOSIS — F32.A ANXIETY AND DEPRESSION: ICD-10-CM

## 2021-04-27 DIAGNOSIS — E66.01 MORBID OBESITY WITH BMI OF 45.0-49.9, ADULT (H): ICD-10-CM

## 2021-04-27 DIAGNOSIS — G47.33 SLEEP APNEA, OBSTRUCTIVE: ICD-10-CM

## 2021-04-30 ENCOUNTER — COMMUNICATION - HEALTHEAST (OUTPATIENT)
Dept: SURGERY | Facility: CLINIC | Age: 56
End: 2021-04-30

## 2021-05-11 ENCOUNTER — AMBULATORY - HEALTHEAST (OUTPATIENT)
Dept: SURGERY | Facility: CLINIC | Age: 56
End: 2021-05-11

## 2021-05-18 ENCOUNTER — COMMUNICATION - HEALTHEAST (OUTPATIENT)
Dept: INTERNAL MEDICINE | Facility: CLINIC | Age: 56
End: 2021-05-18

## 2021-05-18 DIAGNOSIS — J98.8 REVERSIBLE AIRWAYS DISEASE: ICD-10-CM

## 2021-05-20 ENCOUNTER — OFFICE VISIT - HEALTHEAST (OUTPATIENT)
Dept: SURGERY | Facility: CLINIC | Age: 56
End: 2021-05-20

## 2021-05-20 DIAGNOSIS — E66.01 OBESITY, MORBID, BMI 40.0-49.9 (H): ICD-10-CM

## 2021-05-20 DIAGNOSIS — Z71.3 NUTRITIONAL COUNSELING: ICD-10-CM

## 2021-05-20 DIAGNOSIS — E11.9 TYPE 2 DIABETES MELLITUS WITHOUT COMPLICATION, WITHOUT LONG-TERM CURRENT USE OF INSULIN (H): ICD-10-CM

## 2021-05-27 ASSESSMENT — PATIENT HEALTH QUESTIONNAIRE - PHQ9
SUM OF ALL RESPONSES TO PHQ QUESTIONS 1-9: 2
SUM OF ALL RESPONSES TO PHQ QUESTIONS 1-9: 3
SUM OF ALL RESPONSES TO PHQ QUESTIONS 1-9: 0
SUM OF ALL RESPONSES TO PHQ QUESTIONS 1-9: 0

## 2021-05-28 ASSESSMENT — ANXIETY QUESTIONNAIRES
GAD7 TOTAL SCORE: 0

## 2021-05-28 ASSESSMENT — ASTHMA QUESTIONNAIRES
ACT_TOTALSCORE: 25
ACT_TOTALSCORE: 25
ACT_TOTALSCORE: 24

## 2021-05-28 NOTE — TELEPHONE ENCOUNTER
Generic substitution request received from [facility]  Medication: Suboxone 8/2 mg subl/bucc/film     Given to provider to review

## 2021-05-28 NOTE — TELEPHONE ENCOUNTER
Patient says that he was prescribed the generic Suboxone and needs the name brand. He has not picked up the generic prescription and has 2 days supply left.

## 2021-05-28 NOTE — TELEPHONE ENCOUNTER
Received notification from [facility] that a system issue caused the medication to be rejected at the pharmacy. However, the medication does not require a prior authorization for up to 3 films per day. They have corrected the system issue and the pharmacy has approved the claim. No PA necessary. Paperwork sent to medical records.

## 2021-05-29 NOTE — TELEPHONE ENCOUNTER
Form ([facility] physician verification form)needs form completed asap and faxed back to [facility] at [phone].form labeled and placed in Dr.Brunner mailbox.

## 2021-05-30 ENCOUNTER — RECORDS - HEALTHEAST (OUTPATIENT)
Dept: ADMINISTRATIVE | Facility: CLINIC | Age: 56
End: 2021-05-30

## 2021-05-30 VITALS — HEIGHT: 70 IN | BODY MASS INDEX: 44.67 KG/M2 | WEIGHT: 312 LBS

## 2021-05-30 VITALS — HEIGHT: 70 IN | BODY MASS INDEX: 45.1 KG/M2 | WEIGHT: 315 LBS

## 2021-05-30 VITALS — WEIGHT: 315 LBS | HEIGHT: 70 IN | BODY MASS INDEX: 45.1 KG/M2

## 2021-05-30 VITALS — BODY MASS INDEX: 45.1 KG/M2 | WEIGHT: 315 LBS | HEIGHT: 70 IN

## 2021-05-30 VITALS — HEIGHT: 70 IN | WEIGHT: 313 LBS | BODY MASS INDEX: 44.81 KG/M2

## 2021-05-30 VITALS — HEIGHT: 70 IN | WEIGHT: 314 LBS | BODY MASS INDEX: 44.95 KG/M2

## 2021-05-30 VITALS — HEIGHT: 70 IN | WEIGHT: 315 LBS | BODY MASS INDEX: 45.1 KG/M2

## 2021-05-30 VITALS — WEIGHT: 313 LBS | HEIGHT: 70 IN | BODY MASS INDEX: 44.81 KG/M2

## 2021-05-30 NOTE — PROGRESS NOTES
Office Visit - Follow Up   Tobin Bryant   54 y.o. male    Date of Visit: 7/11/2019    Chief Complaint   Patient presents with     Nutrition Counseling     Labs Only     dm and hepatitis        Assessment and Plan   1. Moderate episode of recurrent major depressive disorder (H)  Seems stable, continue current medications per psychiatry    2. Opioid dependence in remission (H)  On Suboxone, per Tushar Crawford    3. Essential hypertension  Blood pressure controlled, continue current medications    4. YANIRA on CPAP  Stable    5. Screen for colon cancer  - Ambulatory referral for Colonoscopy    6. Screening for hyperlipidemia  - Lipid Cascade    7. Screening for diabetes mellitus  - Glycosylated Hemoglobin A1c    8. Screen for STD (sexually transmitted disease)  - Chlamydia trachomatis & Neisseria gonorrhoeae, Amplified Detection    9. Screening for colon cancer  - Occult Blood, QL, Immunochemical, Fecal; Future    10. Screening for HIV (human immunodeficiency virus)  - HIV Antigen/Antibody Screening Cascade    11. Morbid obesity (H)  - HM2(CBC w/o Differential)  - Comprehensive Metabolic Panel  - Ambulatory referral to Bariatric Care: Surgical and Non-Surgical  The following high BMI interventions were performed this visit: encouragement to exercise and lifestyle education regarding diet    Return in about 4 weeks (around 8/8/2019) for recheck.     History of Present Illness   This 54 y.o. old man comes in for follow-up.  Overall feeling okay.  Mood stable.  Would like to lose weight but is having a hard time.  Breathing generally stable.  No chest pain.  No nausea vomiting or diarrhea.  No fever chills or night sweats.  Reflux controlled.    Review of Systems: A comprehensive review of systems was negative except as noted.     Medications, Allergies and Problem List   Reviewed, reconciled and updated  Post Discharge Medication Reconciliation Status:      Physical Exam   General Appearance:   No acute distress    BP  "122/84 (Patient Site: Left Arm, Patient Position: Sitting, Cuff Size: Adult Regular)   Pulse 81   Ht 5' 10\" (1.778 m)   Wt (!) 321 lb (145.6 kg)   SpO2 97%   BMI 46.06 kg/m      HEENT exam is unremarkable  Neck supple no thyromegaly or nodule palpable  Lymphatic no cervical lymphadenopathy  Cardiovascular regular rate and rhythm no murmur gallop or rub  Pulmonary lungs are clear to auscultation bilaterally  Gastrointestinal abdomen soft nontender nondistended no organomegaly  Neurologic exam is non focal  Psychiatric pleasant, no confusion or agitation        Additional Information   Current Outpatient Medications   Medication Sig Dispense Refill     albuterol (PROAIR HFA;PROVENTIL HFA;VENTOLIN HFA) 90 mcg/actuation inhaler Inhale 2 puffs every 6 (six) hours as needed for wheezing. 1 Inhaler 6     ARIPiprazole (ABILIFY) 10 MG tablet Take 10 mg by mouth daily.  3     budesonide-formoterol (SYMBICORT) 80-4.5 mcg/actuation inhaler Inhale 2 puffs 2 (two) times a day. 1 Inhaler 6     buPROPion (WELLBUTRIN XL) 150 MG 24 hr tablet Take 1 tablet by mouth every morning.       cane Marisel Use as needed to assist with ambulation.   Pt with COPD. 1 each 0     cloNIDine HCl (CATAPRES) 0.1 MG tablet TAKE 1 TABLET(0.1 MG) BY MOUTH TWICE DAILY AS NEEDED. 60 tablet 0     furosemide (LASIX) 20 MG tablet Take 1 tablet (20 mg total) by mouth daily. 90 tablet 3     omeprazole (PRILOSEC) 20 MG capsule Take 1 capsule (20 mg total) by mouth 2 (two) times a day before meals. 60 capsule 1     polyethylene glycol (MIRALAX) 17 gram packet Take 17 g by mouth daily as needed.       SUBOXONE 8-2 mg Film per sublingual film 1 film three times a day SL 90 Film 2     triamcinolone (KENALOG) 0.1 % cream Apply topically 2 (two) times a day. 80 g 5     No current facility-administered medications for this visit.      Allergies   Allergen Reactions     Seafood Other (See Comments)     Eyes turn yellow       Ibuprofen Nausea And Vomiting     Social " History     Tobacco Use     Smoking status: Never Smoker     Smokeless tobacco: Never Used   Substance Use Topics     Alcohol use: No     Drug use: No       Review and/or order of clinical lab tests:  Review and/or order of radiology tests:  Review and/or order of medicine tests:  Discussion of test results with performing physician:  Decision to obtain old records and/or obtain history from someone other than the patient:  Review and summarization of old records and/or obtaining history from someone other than the patient and.or discussion of case with another health care provider:  Independent visualization of image, tracing or specimen itself:    Time:      Pawan Castro MD

## 2021-05-31 ENCOUNTER — RECORDS - HEALTHEAST (OUTPATIENT)
Dept: ADMINISTRATIVE | Facility: CLINIC | Age: 56
End: 2021-05-31

## 2021-05-31 VITALS — BODY MASS INDEX: 45.1 KG/M2 | HEIGHT: 70 IN | WEIGHT: 315 LBS

## 2021-05-31 VITALS — BODY MASS INDEX: 45.1 KG/M2 | WEIGHT: 315 LBS | HEIGHT: 70 IN

## 2021-05-31 VITALS — WEIGHT: 313 LBS | HEIGHT: 70 IN | BODY MASS INDEX: 44.81 KG/M2

## 2021-05-31 VITALS — HEIGHT: 70 IN | BODY MASS INDEX: 45.1 KG/M2 | WEIGHT: 315 LBS

## 2021-05-31 VITALS — WEIGHT: 315 LBS | HEIGHT: 70 IN | BODY MASS INDEX: 45.1 KG/M2

## 2021-05-31 VITALS — WEIGHT: 315 LBS | BODY MASS INDEX: 45.1 KG/M2 | HEIGHT: 70 IN

## 2021-05-31 VITALS — HEIGHT: 70 IN | WEIGHT: 315 LBS | BODY MASS INDEX: 45.1 KG/M2

## 2021-05-31 NOTE — PROGRESS NOTES
Assessment: pt is here for diabetes education and counseling. New dx. Has a strong family history on both sides. Lives by himself but has great family support.     - Is trying to watch his diet/habits more closely now.  - eats 2-3 meals/d, skips lunch at times. Drinks plenty water for hydration now than drinking juice previously. Uses a lot of canned fruits and veggies. Encouraged more fresh f/v and using the frozen (plain) kind as a back up.     Food recall:  BF: 2-3 fruits, water  L: salami sandwich, canned veg/fruit  D: pork chops, canned beans    - Has also been trying to get back into a walking routine now. Encouraged  at least 30 mins of PA/exercise daily, as able.     Discussed DM basics, BS and A1C goals, complications of uncontrolled diabetes, sx and tx of hyper and hypoglycemia, general diet guidelines and CHO rich foods, concept of budgeting and balancing. Encouraged small steps. Demonstrated Accu Check Guide Meter: pt returned demonstration of meter use. Will send in prescription for testing supplies.        Plan:   - test BS 2x/d  - eat 3 meals/d, do not skip meals  - PA/exercise as able. Aim for at least 30 mins on most days. You can spread it out throughout the day.  - eat fruit Vs drinking fruit juice  - continue to drink plenty of water  - switch to fresh fruits and veggies Vs the canned ones  -bring meter/BS log to f/u appt  -take small steps  - f/u in 4-6 weeks        Subjective and Objective:      Tobin Bryant is referred by Dr. Castro for Diabetes Education.     Lab Results   Component Value Date    HGBA1C 6.3 (H) 07/11/2019     Goals:   - test BS 2x/d  - eat 3 meals/d, do not skip meals  - f/u in 4-6 weeks          Education:     Monitoring   Meter (per above goals): Assessed, Discussed and Literature provided  Monitoring: Assessed, Discussed and Literature provided  BG goals: Assessed, Discussed and Literature provided    Nutrition Management  Nutrition Management: Assessed, Discussed and  Literature provided  Weight: Assessed, Discussed and Literature provided  Portions/Balance: Assessed, Discussed and Literature provided  Carb ID/Count: Assessed, Discussed and Literature provided  Label Reading: Assessed, Discussed and Literature provided  Heart Healthy Fats: Assessed, Discussed and Literature provided  Menu Planning: Assessed, Discussed and Literature provided  Dining Out: Assessed and Discussed  Physical Activity: Assessed, Discussed and Literature provided  Medications: Assessed and Discussed  Orals: Assessed and Discussed  Injected Medications: Not addressed       Diabetes Disease Process: Assessed, Discussed and Literature provided    Acute Complications: Prevent, Detect, Treat:  Hypoglycemia: Assessed, Discussed and Literature provided  Hyperglycemia: Assessed, Discussed and Literature provided  Sick Days: Assessed, Discussed and Literature provided  Driving: Not addressed    Chronic Complications  Foot Care:Assessed and Discussed  Skin Care: Assessed and Discussed  Eye: Assessed and Discussed  ABC: Assessed, Discussed and Literature provided  Teeth:Assessed and Discussed  Goal Setting and Problem Solving: Assessed and Discussed  Barriers: Assessed and Discussed  Psychosocial Adjustments: Assessed      Time spent with the patient: 60 minutes for diabetes education and counseling.   Previous Education: no  Visit Type:JOSIANE Chery  8/13/2019

## 2021-05-31 NOTE — PATIENT INSTRUCTIONS - HE
[facility] Bariatric Basics    Remember to:    -Eat 3 meals a day (not 2, not 5) Chew your food well/SLOW down  -Eat your protein first  -Be a water drinker/Minize liquid calories (no regular pop, no juice) skim or 1% milk OK  -Sleep 7-8 hours each night. Address sleep if problematic  -Stress management is important. Address if problematic  -Move-8000 steps daily Muscle: maintain your muscle mass (strength training 2X/wk)  -Wheat, not white (bread, pasta, crackers, kendal, bagels, tortillas, rice)  -Limit restaurant, cafeteria, take out, drive through to 2 times per week or less  -Minimize caffeine, alcohol, and night-time snacking  -Consider keeping a food diary (i.e. My Fitness Pal, Lose It, or other food tracker)  -Follow up with the dietitian      **Some lean proteins: chicken, turkey, tuna, salmon, crab, fish, shrimp, scallops, lobster, lean cuts of beef and pork, luncheon meats, veggie burgers, beans (black, lima, garbanzo, amezquita, kidney, refried), chile, cottage cheese, string cheese, other cheese, eggs, tofu, peanut butter, nuts, vegan crumbles, greek yogurt

## 2021-05-31 NOTE — PROGRESS NOTES
Tobin is here today for reviewing meter use since he had been having issues testing BS at home. He arrived with his Accu Chek Guide Me meter, strips and lancing device. Reviewed testing basics and demonstrated meter use. Pt was able to return demonstration, however, he kept getting an error message on his meter. Had him use a different (new) meter and that issue was resolved. Sent home a new meter with him. Pt verbalized understanding and agreeable to call with any further questions/concerns - number provided.     Hayley Chery RDN, LD  Diabetes Care and Education

## 2021-05-31 NOTE — TELEPHONE ENCOUNTER
Recruitment call for MTM and talked to patient, would like call back in 2 weeks.    Dahiana Byrd, MTM coordinator intern

## 2021-05-31 NOTE — TELEPHONE ENCOUNTER
Hayley    Patient is having issues with his meter. He does not know what he is doing wrong.    He also states that he is not on any medication, so why is he testing his sugars and seeing a DM ED?    I notified him that his PCP was the person who referred him to see a DM ED, so if he as further questions, he can ask his PCP.    Hayley, please call pt back about the meter issue @ [phone]

## 2021-05-31 NOTE — TELEPHONE ENCOUNTER
Patient is still having issues with using the meter. He would like to come in to see you asap. The soonest 60 minute appointment with you isn't until the end of September. Are you okay with seeing him at your next 30 minute visit?

## 2021-05-31 NOTE — TELEPHONE ENCOUNTER
Patient was called and decided that he would check with Dr. Castro for a refill since Dr. Castro is his PCP.

## 2021-05-31 NOTE — TELEPHONE ENCOUNTER
Hayley,    Patient called. He would like a call back from you to discuss his blood sugar level. He had a blood sugar reading in the 120's today.    Tobin @ [phone]

## 2021-05-31 NOTE — TELEPHONE ENCOUNTER
"Patient is interested in Dr Brunner prescribing him a \"water pill\" as she has done once in the past. Patient just saw Dr Brunner yesterday. [phone] is call back number, pt was informed that a RN would give him a call likely to get some more information.   "

## 2021-05-31 NOTE — TELEPHONE ENCOUNTER
Who is calling:  patient  Reason for Call:  Patient received lab results in the mail & would like to speak with someone to clarify the results.  Date of last appointment with primary care: 7/11/19  Okay to leave a detailed message: Yes

## 2021-05-31 NOTE — PROGRESS NOTES
BARIATRIC CONSULTATION    Impression: Tobin Bryant is a 54 y.o. year old male with  has a past medical history of Anxiety and depression, Foot pain, GERD (gastroesophageal reflux disease), Hypertension, Impaired physical mobility, Low back pain, Lower leg edema, Morbid obesity with BMI of 45.0-49.9, adult (H), Pre-diabetes, and Sleep apnea, obstructive.  Poor functional capacity and musculoskeletal disability in the setting of the abovementioned weight related co-morbidities. His Body mass index is 46.06 kg/m ..    Plan: Will work on label reading. He is confident he can take a walk a day and has almost eliminated pop and juice which will help his sugars. He is not interested in a GLP-1 RA d/t injection. Dietitian visit. Phentermine. Labs  We discussed [facility] Bariatric Basics including:  -eating 3 meals daily  -eating protein first  -eating slowly, chewing food well  -avoiding/limiting calorie containing beverages  -choosing wheat, not white with breads, crackers, pastas, kendal, bagels, tortillas, rice  -limiting restaurant or cafeteria eating to twice a week or less    We discussed the importance of restorative sleep and stress management in maintaining a healthy weight.    We reviewed medications associated with weight gain.    We discussed insulin resistance and glycemic index as it relates to appetite and weight control.     We discussed the National Weight Control Registry healthy weight maintenance strategies and ways to optimize metabolism.  We discussed the importance of physical activity including cardiovascular and strength training in maintaining a healthier weight and explored viable options.    We discussed medications available for weight loss including Phentermine, Phendimetrazine, Topamax, Qsymia, Lorcaserin, Diethylproprion, Orlistat, Contrave, Saxenda, and Vyvanse. We discussed the risks and benefits of each. We discussed indications, contraindications, potential side effects, and estimated  "costs of each. Literature was provided. Tobin understands that not using a weight loss medication is an option.   60 minutes spent with patient. >50% in counseling.      History Surrounding Consultation  Struggles with weight started at age 5 yrs ago  His weight at age 20 was played FB and Basket ball in HS, 190#  He has had several past supervised and unsupervised weight loss attempts  The most weight lost was: ?  Unfortunately there was not durable weight maintenance.  History of bulimia, anorexia, or binge eating disorder? no  If Present has eating disorder been in remission at least 3 years? NA  Night time eating? no    Dietary History  Meals per day: varies 2  Snacks: late night  Typical Snack: cereal, sandwich, oranges, apples  Who does the grocery shopping? He does  Who does the cooking? He does  A typical meal includes: B: cereal or eggs, grimaldo, L: sandwich on white, D: BBQ ribs, corn  Regular Pop: Mountain Dew, Lemonade, Whole milk, cut out juices   Juice: yes-recently stopped d/t pre diabetes was \"a lot.\"  Caffeine: Mt. Dew  Amount of restaurant eating per week: 2-3  Eating a the table with the TV off? In bedroom    Physical Activity Patterns  Current physical activity routine includes: used to walk,     Limitations from being physically active on a regular basis includes: cold weather, LBP    He describes his general health as: good/on the bad side    Past Medical History  HTN: yes  Dyslipidemia: no  YANIRA: yes  Obesity Hypoventilation: NO  DM2: no DM1: no DX: no Most recent AIC: 6.3  Neuropathy: hands  Nephropathy: no  Retinopathy: no  IFG or \"pre-DM\": yes pre-diabetes  MI: no  CVA:no  CHF: no  Heart Valves: no  Previous cardiac testing includes: NA  Cancers: no  Kidney Disease: no  DVT: no  PE: no  Colitis: no  Crohn's: no  IBS: no  PUD: no  Fatty Liver: no  Abnormal LFTs: no  Hepatitis: no  Asthma: yes  Bronchitis:yes  Pneumonia: no  Other Lung Problems: COPD  Back Pain:yes  DDD: ?  Gout: " no  Fibromyalgia: no  USI: no  Severe Headaches: no  Seizures: no If so, last seizure: no  Pseudotumor: no  PCOS: NA  Menstrual Irregularity: NA  Menorrhagia: NA  Infertility: NA  Thyroid problems: no  Thyroid medications: no  Glaucoma: no  HIV positive: NO  MRSA/VRE history: no  History of Blood transfusion: no  Anemia: yes    Health Care Maintenance  Colonoscopy: has had colon screening with guiaic  Mammogram: NA  Pap: NA    Medications   Current Outpatient Medications   Medication Sig Dispense Refill     albuterol (PROAIR HFA;PROVENTIL HFA;VENTOLIN HFA) 90 mcg/actuation inhaler Inhale 2 puffs every 6 (six) hours as needed for wheezing. 1 Inhaler 6     ARIPiprazole (ABILIFY) 10 MG tablet Take 10 mg by mouth daily.  3     blood glucose test (ACCU-CHEK GUIDE) strips Test 2 times a day 100 strip 6     budesonide-formoterol (SYMBICORT) 80-4.5 mcg/actuation inhaler Inhale 2 puffs 2 (two) times a day. 1 Inhaler 6     buPROPion (WELLBUTRIN XL) 150 MG 24 hr tablet Take 1 tablet by mouth every morning.       cane Marisel Use as needed to assist with ambulation.   Pt with COPD. 1 each 0     cloNIDine HCl (CATAPRES) 0.1 MG tablet TAKE 1 TABLET(0.1 MG) BY MOUTH TWICE DAILY AS NEEDED. 60 tablet 0     furosemide (LASIX) 20 MG tablet Take 1 tablet (20 mg total) by mouth daily. 90 tablet 3     generic lancets Test 2 times a day 100 each 6     omeprazole (PRILOSEC) 20 MG capsule Take 1 capsule (20 mg total) by mouth 2 (two) times a day before meals. 60 capsule 1     polyethylene glycol (MIRALAX) 17 gram packet Take 17 g by mouth daily as needed.       SUBOXONE 8-2 mg Film per sublingual film 1 film three times a day SL 90 Film 2     triamcinolone (KENALOG) 0.1 % cream Apply topically 2 (two) times a day. 80 g 5     No current facility-administered medications for this visit.      Allergies   Seafood and Ibuprofen  Past Surgical History  Past Surgical History:   Procedure Laterality Date     CATARACT EXTRACTION Right      KELOID  EXCISION      neck     History of problems with anesthesia: no  History of Malignant Hyperthermia: NO    Gynecological History  NA    Family History  family history includes No Medical Problems in his brother, brother, daughter, father, mother, sister, sister, sister, sister, and son.    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, zhen      Addiction History  Smoking History:   Started smoking: never Quit smoking: NA Total years of tobacco use: never  Alcohol use: none  Current or Past history of alcohol or substance abuse: yes-  Last used: NA  Chemical Dependency Treatment History: yes couple of times  Chemicals: heroin    Psychiatric History  Diagnoses: depression and axiety  Treated by: [facility]  Psychiatric Hospitalizations: no  Suicide attempts: no  ECT: no  Panic attacks: no  History of Abuse: no    Palliative Medicine History  Involvement in a pain clinic: no    ROS  Sleep  Snoring: YANIRA  PND: YANIRA  Witnessed Apneas: YANIRA  [place]: YANIRA  STOP BANG: YANIRA  General  Fatigue: yes  Sleep Quality:interrupted with CPAP maybe 6-8 hours  HEENT  Visual changes: no  Gastrointestinal  Heartburn: yes  Dysphagia: no  Cardiovascular  Murmur: no  Elevated BP: no  Chest Pain with Exertion: no  Dyspnea with Exertion: yes  Palpitations: no  Lower Extremity Edema: yes  Syncope: no  Pulmonary  Shortness of breath at rest: no  Snoring: yes  PND: YANIRA  Wheezing: yes  CPAP use: yes-trying  Gastrointestinal  Trouble swallowing:no  Heartburn: yes  HX UGI/EGD: no  Abdominal pain: no  Hematochezia: no  Urologic  Hesitancy: no  Urgency: no  Genitourinary  ED: no  Menorrhagia: NA  Dysmenorrhea: NA  Neurologic  Severe headache:no  Paresthesias: hands  Psychiatric  Moods Stable: yes  Hallucinations: no  Rheumatologic  Myalgias: yes  Arthralgias: yes  Endocrine  Polydipsia: yes  Polyuria: no  Galactorrhea: no  Heat intolerance: maybe  Hirsutism:  "no  Musculoskeletal  Joint pain;yes  Falls: no  Use of cane, crutch or motorized scooter: cane  Hematologic  Abnormal Bleeding or Clotting: no  Dermatologic  Skin Tags: no  Striae: no  Furuncless: no  Acne: no  Intertrigo: no  Lower Leg ulcers: no      Physical Exam  Height: 5' 10\" (1.778 m) (9/3/2019 10:08 AM)  Initial Weight: 321 lbs (9/3/2019 10:08 AM)  Weight: (!) 321 lb (145.6 kg) (9/3/2019 10:08 AM)  Weight loss from initial: 0 (9/3/2019 10:08 AM)  % Weight loss: 0 % (9/3/2019 10:08 AM)  BMI (Calculated): 46.1 (9/3/2019 10:08 AM)  SpO2: 95 % (9/3/2019 10:08 AM)  Waist Circumference (In): 57 Inches (9/3/2019 10:08 AM)  Hip Circumference (In): 58 Inches (9/3/2019 10:08 AM)  Neck Circumference (In): 19 Inches (9/3/2019 10:08 AM)        General Appearance  No acute distress. Obesity: central  Alert: yes  Sleepy: a little  HEENT  PERRLA, EOMI  Neck  Stout: 19\" No carotid bruits  Airway: 3+  Cardiovascular  Rhythm regular Rate Regular  Murmur: no  Pulmonary  [place] Score: YANIRA  Lungs clear to ascultation  Abdomen  No rashes.   Post surgical Scars: no  Extremities:  Pitting edema: managed  Palpable distal pulses: 2+  Varicose veins: no  Neurologic  Tremors: no  Psychiatric  Thought Content Organized  Mood appears stable  Endocrine  Moon Facies: NO  Dorsal Thoracic Prominence: NO  Skin tags: yes  Acanthosis nigricans: yes  Dermatologic  Intertrigo: no, LE edema and discoloration    No images are attached to the encounter.    Total time with patient 60 minutes, >50% in counseling and coordination of care.        "

## 2021-05-31 NOTE — TELEPHONE ENCOUNTER
Test Results  Who is calling ?: Patient  Who ordered the test:  Pawan Castro MD  Type of test: Lab  Date of test:  7/15/19  Where was the test performed: Clinic  What are your questions/concerns?: Patient received lab letter, requesting to speak to nurse for clarification of diabetes results.   Okay to leave a detailed message?:  Yes

## 2021-05-31 NOTE — TELEPHONE ENCOUNTER
Order placed for diabetic educator-patient notified that he will be receiving a call to get set up for that. He verbalized understanding.

## 2021-05-31 NOTE — TELEPHONE ENCOUNTER
Called and spoke with patient and went through his lab results with him.  He is wondering about his A1C.

## 2021-05-31 NOTE — TELEPHONE ENCOUNTER
Hayley- He is having difficulty with using the meter you provided him with yesterday. Please call him to discuss.    Tobin @ [phone]

## 2021-06-01 VITALS — WEIGHT: 315 LBS | BODY MASS INDEX: 45.1 KG/M2 | HEIGHT: 70 IN

## 2021-06-01 VITALS — BODY MASS INDEX: 46.99 KG/M2 | WEIGHT: 315 LBS

## 2021-06-01 VITALS — WEIGHT: 315 LBS | HEIGHT: 70 IN | BODY MASS INDEX: 45.1 KG/M2

## 2021-06-01 VITALS — HEIGHT: 70 IN | WEIGHT: 315 LBS | BODY MASS INDEX: 45.1 KG/M2

## 2021-06-01 VITALS — BODY MASS INDEX: 45.1 KG/M2 | WEIGHT: 315 LBS | HEIGHT: 70 IN

## 2021-06-01 NOTE — TELEPHONE ENCOUNTER
Refill Approved    Rx renewed per Medication Renewal Policy. Medication was last renewed on 12/14/18.    Shabnam Peterson, [facility] Triage/Med Refill 10/2/2019     Requested Prescriptions   Pending Prescriptions Disp Refills     omeprazole (PRILOSEC) 20 MG capsule [Pharmacy Med Name: OMEPRAZOLE 20MG CAPSULES] 60 capsule 0     Sig: TAKE 1 CAPSULE(20 MG) BY MOUTH TWICE DAILY BEFORE MEALS       GI Medications Refill Protocol Passed - 10/1/2019  2:38 PM        Passed - PCP or prescribing provider visit in last 12 or next 3 months.     Last office visit with prescriber/PCP: 7/11/2019 Pawan Castro MD OR same dept: 7/11/2019 Pawan Castro MD OR same specialty: 7/11/2019 Pawan Castro MD  Last physical: Visit date not found Last MTM visit: Visit date not found   Next visit within 3 mo: Visit date not found  Next physical within 3 mo: Visit date not found  Prescriber OR PCP: Pawan Castro MD  Last diagnosis associated with med order: There are no diagnoses linked to this encounter.  If protocol passes may refill for 12 months if within 3 months of last provider visit (or a total of 15 months).

## 2021-06-01 NOTE — PROGRESS NOTES
Assessment: Tobin is here for diabetes management follow up. He arrives with his Accu Check Guide Me meter. BS control is looking very good overall. Tests 2x/d: fasting and 2 hours after dinner. He has also lost 8 lbs since the last visit and can feel that the clothes are fitting better. He attributes it to the dietary changes that he has been making and also the wt loss medication that he is on. Pt has no concerns at this point.    Fasting BS: 107, 92, 86, 84, 79, 92, 94, 81, 91, 83, 90, 111, 103, 112, 89, 100, 89, 103, 102, 123, 94  with 100% BS in range  BS after dinner: 110,130,115,110,120,108,107, 105, 122, 133,117, 101, 108,105,120,127 with 100% BS in range  Lows: none     7 day avg (n= 11) =102  14 day avg (n=21) = 100    -  Is trying to watch his diet more closely, faviola the high CHO foods and portions. Has 2 meals/d typically. Is using less canned f/v now than before. Reviewed general diet guidelines and also the wt loss basics. Congratulated him on his progress and on making positive lifestyle changes. Discussed how even 5-10% of his current body wt would help improve his BS control. Encouraged small steps.     Food recall:   BF: 2 bowls of unsweetened cheerios with whole milk  D: hamburger/turkey sandwich   Drinks lots of water throughout the day    - Has also been trying to get back into a walking routine. Encouraged  at least 30 mins of PA/exercise daily, as able. It can be spread out throughout the day if time is a constraint.          Plan:   - continue to test BS 2x/d  - eat 3 meals/d, do not skip meals  - PA/exercise as able. Aim for at least 30 mins on most days. You can spread it out throughout the day.  - continue to drink plenty of water  -bring meter/BS log to f/u appt  - f/u in 3 months or sooner if concerns            Subjective and Objective:       Tobin Bryant is referred by Dr. Castro for Diabetes Education.            Lab Results   Component Value Date     HGBA1C 6.3 (H) 07/11/2019       Goals:   - test BS 2x/d  - eat 3 meals/d, do not skip meals  - f/u in 3 months or sooner if concerns        Subjective and Objective:      Tobin Bryant is referred by Dr. Castro for Diabetes Education.     Lab Results   Component Value Date    HGBA1C 6.3 (H) 07/11/2019         Education:     Monitoring   Meter (per above goals): Assessed and Discussed  Monitoring: Assessed and Discussed  BG goals: Assessed and Discussed    Nutrition Management  Nutrition Management: Assessed and Discussed  Weight: Assessed and Discussed  Portions/Balance: Assessed and Discussed  Carb ID/Count: Assessed and Discussed  Label Reading: Assessed  Heart Healthy Fats: Assessed and Discussed  Menu Planning: Assessed and Discussed  Dining Out: Not addressed  Physical Activity: Assessed and Discussed  Medications: Assessed  Orals: Not addressed    Diabetes Disease Process: Assessed    Acute Complications: Prevent, Detect, Treat:  Hypoglycemia: Assessed and Discussed  Hyperglycemia: Assessed and Discussed  Sick Days: Not addressed  Driving: Not addressed    Chronic Complications  Foot Care:Assessed  Skin Care: Assessed  Eye: Assessed  ABC: Assessed  Teeth:Not addressed  Goal Setting and Problem Solving: Assessed and Discussed  Barriers: Assessed and Discussed  Psychosocial Adjustments: Assessed      Time spent with the patient: 60 minutes for diabetes education and counseling.   Previous Education: yes  Visit Type:JOSIANE Chery  9/17/2019

## 2021-06-01 NOTE — PROGRESS NOTES
"Medical  Weight Loss Initial Diet Evaluation  Tobin is presenting today for a new weight management nutrition consultation. Pt has had an initial appointment with Dr. Burnham.   Pt desires weight loss to improve blood sugars level to prevent DM. Since meeting with bariatrician, pt reports cutting back on breads, rice, and sweets. Pt also kept a simple food log for initial visit.   Weight loss medication: Phentermine. Taking 1/2 pill daily.   Weight Loss Goal: 250 lb   Nutrition Assessment:   Anthropometrics:  Pt's Initial Weight: 321 lbs  Weight: (!) 316 lb 6.4 oz (143.5 kg)  Weight loss from initial: 4.6  % Weight loss: 1.43 %    BMI:   Vitals:    09/06/19 1300   Weight: (!) 316 lb 6.4 oz (143.5 kg)      IBW: 172 lb   Estimated RMR ([place]-St Jeor equation): 2293 kcal   Recommended Protein Intake: 60-80 grams of protein/day  Medical History:     Patient Active Problem List   Diagnosis     Opioid dependence, on agonist therapy with buprenorphine, Dr. Rincon     Generalized anxiety disorder     COPD (chronic obstructive pulmonary disease) (H)     Chronic mental illness - Yrn     Essential hypertension     YANIRA on CPAP     Gastroesophageal reflux disease without esophagitis     Restrictive lung disease     Morbid obesity (H)     Moderate episode of recurrent major depressive disorder (H)     Hypertension     Pre-diabetes     Sleep apnea, obstructive     Anxiety and depression     Low back pain     Foot pain     GERD (gastroesophageal reflux disease)     Emphysema of lung (H)     Lower leg edema     Impaired physical mobility     Morbid obesity with BMI of 45.0-49.9, adult (H)     Low serum testosterone level     Vitamin deficiency     Increased PTH level     Diabetes: No  Nutrition History:   Food allergies: Yes Seafood and peach.   Food intolerances/aversions: No   Cultural or Adventism food customs: No  Weight loss history: \"it just fell off me\"   Biggest weight loss struggle per pt: sedentary lifestyle.  "   Vitamins/Mineral Supplementation: none   Dietary Recall:  Wake up time: 10:00am  Breakfast: none   Snack:none   Lunch:12:00pm frosted flakes (6g)   Snack:  Mandarin oranges or lemonade   Dinner: double cheeseburger with chips (30g)  Snack: 1 can soda and cupcake   Overnight eating: No  Eating out (frequency/week): seldom   Hydration (type/oz. per day):  Water: 64 oz   Caffeine:soda  Carbonation: at night 3 cans mountain dew   Juice: none   Alcohol : none   Exercise:  Routine exercise established: No  Pt tries to go walking.    Nutrition Diagnosis (PES statement):   1. (NI-1.3)Excessive energy intake related to Food and nutrition related knowledge deficit concerning excessive energy/oral intake as evidenced by Intake of high caloric density foods/beverages (soda) at meals and/or snacks; large portion; frequent grazing; Estimated intake that exceeds estimated daily energy intake; Binge eating patterns;  and BMI 45.   Nutrition Intervention:  1. Discussed with patient how to build a meal: the importance of including a lean/low fat protein at each meal, include a source of vegetables at a minimum of lunch and dinner, and limiting carbohydrate intake to 1 serving (15 grams) per meal.  2. Placed emphasis on importance of developing a healthy eating routine, aiming for 3 meals a day and no snacks.   3. Educated on sources of lean protein, portion sizes, and the amount of grams found in each source. Recommend pt to aim for 20-30 grams of protein at each meal; 60-80 grams per day.  Goals established by patient:   1. Eat three meals per day   Handouts provided:  Plate Method  Meal Planner  Nutrition Monitoring/Evaluation:   Follow up:  Pt will follow up in 1 month(s) with bariatrician and 1 month(s) with dietitian.   Time spent with patient: 30 minutes  Safia Yang RD   ABN: Yes

## 2021-06-02 VITALS — BODY MASS INDEX: 45.1 KG/M2 | WEIGHT: 315 LBS | HEIGHT: 70 IN

## 2021-06-02 VITALS — HEIGHT: 70 IN | WEIGHT: 315 LBS | BODY MASS INDEX: 45.1 KG/M2

## 2021-06-02 VITALS — HEIGHT: 70 IN | BODY MASS INDEX: 45.1 KG/M2 | WEIGHT: 315 LBS

## 2021-06-02 VITALS — BODY MASS INDEX: 45.1 KG/M2 | HEIGHT: 70 IN | WEIGHT: 315 LBS

## 2021-06-02 NOTE — TELEPHONE ENCOUNTER
Test Results  Who is calling? Patient     Who ordered the test: PCP     Type of test: Lab     Date of test:  10/11/19    Where was the test performed:  [facility]     What are your questions/concerns?:  Pt would like to know what numbers should he bring his A1c level down to? Patient called in for results CMA relayed result note and patient has further questions.  Please advise   Okay to leave a detailed message?:  Yes

## 2021-06-02 NOTE — PROGRESS NOTES
"  Office Visit - Follow Up   Tobin Bryant   54 y.o. male    Date of Visit: 10/11/2019    Chief Complaint   Patient presents with     Diabetes     Hypertension        Assessment and Plan   1. Opioid dependence in remission (H)  Continue Suboxone    2. Essential hypertension  Pressure controlled continue current medication    3. Moderate episode of recurrent major depressive disorder (H)  Mood stable, continue current medication    4. Prediabetes  With excellent weight loss I expect improvement in his numbers  - Glycosylated Hemoglobin A1c    5. Screening for colon cancer  - Occult Blood(ICT); Future  - Occult Blood(ICT); Future  - Occult Blood(ICT); Future    6. Morbid obesity with BMI of 45.0-49.9, adult (H)  The following high BMI interventions were performed this visit: encouragement to exercise and lifestyle education regarding diet    Return in about 3 months (around 1/11/2020) for recheck.     History of Present Illness   This 54 y.o. old man comes in for follow-up.  Overall doing well.  Has lost 18 pounds with the help of the bariatric clinic.  He is eating less carbohydrates and less food in general.  He is trying to increase his physical activity.  Mood stable.  Not using drugs.  No chest pain or shortness of breath.    Review of Systems: A comprehensive review of systems was negative except as noted.     Medications, Allergies and Problem List   Reviewed, reconciled and updated  Post Discharge Medication Reconciliation Status:      Physical Exam   General Appearance:   No acute distress    /78 (Patient Site: Left Arm, Patient Position: Sitting, Cuff Size: Adult Regular)   Pulse 85   Ht 5' 10\" (1.778 m)   Wt (!) 308 lb (139.7 kg)   SpO2 93%   BMI 44.19 kg/m      HEENT exam is unremarkable  Neck supple no thyromegaly or nodule palpable  Lymphatic no cervical lymphadenopathy  Cardiovascular regular rate and rhythm no murmur gallop or rub  Pulmonary lungs are clear to auscultation " bilaterally  Gastrointestinal abdomen soft nontender nondistended no organomegaly  Neurologic exam is non focal  Psychiatric pleasant, no confusion or agitation        Additional Information   Current Outpatient Medications   Medication Sig Dispense Refill     albuterol (PROAIR HFA;PROVENTIL HFA;VENTOLIN HFA) 90 mcg/actuation inhaler Inhale 2 puffs every 6 (six) hours as needed for wheezing. 1 Inhaler 6     ARIPiprazole (ABILIFY) 10 MG tablet Take 10 mg by mouth daily.  3     blood glucose test (ACCU-CHEK GUIDE) strips Test 2 times a day 100 strip 6     budesonide-formoterol (SYMBICORT) 80-4.5 mcg/actuation inhaler Inhale 2 puffs 2 (two) times a day. 1 Inhaler 6     buPROPion (WELLBUTRIN XL) 150 MG 24 hr tablet Take 1 tablet by mouth every morning.       cane Marisel Use as needed to assist with ambulation.   Pt with COPD. 1 each 0     cloNIDine HCl (CATAPRES) 0.1 MG tablet TAKE 1 TABLET(0.1 MG) BY MOUTH TWICE DAILY AS NEEDED. 60 tablet 0     ergocalciferol (VITAMIN D2) 50,000 unit capsule Take 1 capsule (50,000 Units total) by mouth 3 (three) times a week. 39 capsule 0     furosemide (LASIX) 20 MG tablet Take 1 tablet (20 mg total) by mouth daily. 90 tablet 3     generic lancets Test 2 times a day 100 each 6     omeprazole (PRILOSEC) 20 MG capsule TAKE 1 CAPSULE(20 MG) BY MOUTH TWICE DAILY BEFORE MEALS 180 capsule 3     phentermine (ADIPEX-P) 37.5 mg tablet Take 1/2 to 1 tablet in the morning. 90 tablet 0     polyethylene glycol (MIRALAX) 17 gram packet Take 17 g by mouth daily as needed.       SUBOXONE 8-2 mg Film per sublingual film 1 film three times a day SL 90 Film 2     triamcinolone (KENALOG) 0.1 % cream Apply topically 2 (two) times a day. 80 g 5     No current facility-administered medications for this visit.      Allergies   Allergen Reactions     Seafood Other (See Comments)     Eyes turn yellow       Ibuprofen Nausea And Vomiting     Social History     Tobacco Use     Smoking status: Never Smoker      Smokeless tobacco: Never Used   Substance Use Topics     Alcohol use: No     Drug use: No       Review and/or order of clinical lab tests:  Review and/or order of radiology tests:  Review and/or order of medicine tests:  Discussion of test results with performing physician:  Decision to obtain old records and/or obtain history from someone other than the patient:  Review and summarization of old records and/or obtaining history from someone other than the patient and.or discussion of case with another health care provider:  Independent visualization of image, tracing or specimen itself:    Time:      Pawan Castro MD

## 2021-06-02 NOTE — TELEPHONE ENCOUNTER
Refill Approved    Rx renewed per Medication Renewal Policy. Medication was last renewed on .  cloNIDine HCl (CATAPRES) 0.1 MG tablet 60 tablet 0 12/14/2018     furosemide (LASIX) 20 MG tablet 90 tablet 3 8/22/2018       Jordyn [place], [facility] Triage/Med Refill 11/3/2019     Requested Prescriptions   Pending Prescriptions Disp Refills     furosemide (LASIX) 20 MG tablet [Pharmacy Med Name: FUROSEMIDE 20MG TABLETS] 330 tablet 0     Sig: TAKE 1 TABLET BY MOUTH EVERY DAY       Diuretics/Combination Diuretics Refill Protocol  Passed - 11/3/2019  3:52 AM        Passed - Visit with PCP or prescribing provider visit in past 12 months     Last office visit with prescriber/PCP: 10/11/2019 Pawan Castro MD OR same dept: 10/11/2019 Pawan Castro MD OR same specialty: 10/11/2019 Pawan Castro MD  Last physical: Visit date not found Last MTM visit: Visit date not found   Next visit within 3 mo: Visit date not found  Next physical within 3 mo: Visit date not found  Prescriber OR PCP: Pawan Castro MD  Last diagnosis associated with med order: 1. Generalized anxiety disorder  - cloNIDine HCl (CATAPRES) 0.1 MG tablet [Pharmacy Med Name: CLONIDINE 0.1MG TABLETS]; TAKE 1 TABLET(0.1 MG) BY MOUTH TWICE DAILY AS NEEDED.  Dispense: 60 tablet; Refill: 0    If protocol passes may refill for 12 months if within 3 months of last provider visit (or a total of 15 months).             Passed - Serum Potassium in past 12 months      Lab Results   Component Value Date    Potassium 4.4 07/11/2019             Passed - Serum Sodium in past 12 months      Lab Results   Component Value Date    Sodium 144 07/11/2019             Passed - Blood pressure on file in past 12 months     BP Readings from Last 1 Encounters:   10/11/19 124/78             Passed - Serum Creatinine in past 12 months      Creatinine   Date Value Ref Range Status   07/11/2019 0.89 0.70 - 1.30 mg/dL Final             cloNIDine HCl  (CATAPRES) 0.1 MG tablet [Pharmacy Med Name: CLONIDINE 0.1MG TABLETS] 60 tablet 0     Sig: TAKE 1 TABLET(0.1 MG) BY MOUTH TWICE DAILY AS NEEDED       Clonidine/Hydralazine Refill Protocol Passed - 11/3/2019  3:52 AM        Passed - PCP or prescribing provider visit in past 12 months       Last office visit with prescriber/PCP: 10/11/2019 Pawan Castro MD OR same dept: 10/11/2019 Pawan Castro MD OR same specialty: 10/11/2019 Pawan Castro MD  Last physical: Visit date not found Last MTM visit: Visit date not found   Next visit within 3 mo: Visit date not found  Next physical within 3 mo: Visit date not found  Prescriber OR PCP: Pawan Castro MD  Last diagnosis associated with med order: 1. Generalized anxiety disorder  - cloNIDine HCl (CATAPRES) 0.1 MG tablet [Pharmacy Med Name: CLONIDINE 0.1MG TABLETS]; TAKE 1 TABLET(0.1 MG) BY MOUTH TWICE DAILY AS NEEDED.  Dispense: 60 tablet; Refill: 0    If protocol passes may refill for 12 months if within 3 months of last provider visit (or a total of 15 months).             Passed - Blood pressure filed in past 12 months     BP Readings from Last 1 Encounters:   10/11/19 124/78

## 2021-06-02 NOTE — PROGRESS NOTES
Medical  Weight Loss Follow-Up Diet Evaluation  Assessment:  Tobin is presenting today for a follow up weight management nutrition consultation. Pt has had an initial appointment with Dr. Burnham.  Weight loss medication: Phentermine. Taking 1/2 pill daily.  Pt's Initial Weight: 321 lbs  Weight: (!) 303 lb (137.4 kg)  Weight loss from initial: 18  % Weight loss: 5.61 %    BMI: Body mass index is 43.48 kg/m .  IBW: 166 lbs    Estimated RMR ([place]-St Jeor equation): 2293 kcals   Recommended Protein Intake: 60-80 grams of protein/day  Patient Active Problem List:     Patient Active Problem List   Diagnosis     Opioid dependence, on agonist therapy with buprenorphine, Dr. Rincon     Generalized anxiety disorder     COPD (chronic obstructive pulmonary disease) (H)     Chronic mental illness - Yrn     Essential hypertension     YANIRA on CPAP     Gastroesophageal reflux disease without esophagitis     Restrictive lung disease     Morbid obesity (H)     Moderate episode of recurrent major depressive disorder (H)     Hypertension     Pre-diabetes     Sleep apnea, obstructive     Anxiety and depression     Low back pain     Foot pain     GERD (gastroesophageal reflux disease)     Emphysema of lung (H)     Lower leg edema     Impaired physical mobility     Morbid obesity with BMI of 45.0-49.9, adult (H)     Low serum testosterone level     Vitamin deficiency     Increased PTH level     Diabetes: pre-diabetes    Progress on goals from last visit: Pt cut out chocolate milk and soda over past month. Pt also took out sweetened cereal and starting to have breakfast sometimes.     Dietary Recall:  Breakfast: 9-10am- cheerios w/ whole milk (10g)   Snack:none   Lunch:1-2pm- 2 oranges (0g)  Snack: none   Dinner:chili w/ 2 hot dogs (20g)  Snack: crackers   Overnight eating: No  Eating out (frequency/week): none   Hydration (type/oz. per day):  Water: 64 +crystal light   Exercise:  Routine exercise established: Yes  Walking.       Nutrition Diagnosis:    Overweight/Obesity (NC 3.3) related to overeating and poor lifestyle habits as evidenced by patient's subjective statements and BMI 43.     Intervention:  1. Recommend calorie/nutrient modification via increased protein.  2. Reviewed lean protein sources; aiming for 20-30 grams each meal.  3. Discussed benefits of exercise for weight loss.     Monitoring/Evaluation:    Goals:  1. Increased exercise via steps and weights  2. Increased protein at each meal.     Follow up:  Pt will follow up in 1 month(s) with bariatrician and 1 month(s) with dietitian.     Time spent with patient: 15 minutes  Safia Yang RD     ABN signed: Yes

## 2021-06-03 ENCOUNTER — RECORDS - HEALTHEAST (OUTPATIENT)
Dept: ADMINISTRATIVE | Facility: CLINIC | Age: 56
End: 2021-06-03

## 2021-06-03 VITALS — HEIGHT: 70 IN | WEIGHT: 315 LBS | BODY MASS INDEX: 45.1 KG/M2

## 2021-06-03 VITALS — BODY MASS INDEX: 43.38 KG/M2 | WEIGHT: 303 LBS | HEIGHT: 70 IN

## 2021-06-03 VITALS — BODY MASS INDEX: 41.66 KG/M2 | HEIGHT: 70 IN | WEIGHT: 291 LBS

## 2021-06-03 VITALS
SYSTOLIC BLOOD PRESSURE: 117 MMHG | HEIGHT: 70 IN | WEIGHT: 315 LBS | BODY MASS INDEX: 45.1 KG/M2 | OXYGEN SATURATION: 95 % | DIASTOLIC BLOOD PRESSURE: 77 MMHG | HEART RATE: 79 BPM | RESPIRATION RATE: 16 BRPM

## 2021-06-03 VITALS
HEART RATE: 117 BPM | WEIGHT: 300 LBS | SYSTOLIC BLOOD PRESSURE: 132 MMHG | BODY MASS INDEX: 42.95 KG/M2 | OXYGEN SATURATION: 90 % | DIASTOLIC BLOOD PRESSURE: 84 MMHG | HEIGHT: 70 IN

## 2021-06-03 VITALS
DIASTOLIC BLOOD PRESSURE: 78 MMHG | BODY MASS INDEX: 44.09 KG/M2 | SYSTOLIC BLOOD PRESSURE: 124 MMHG | WEIGHT: 308 LBS | OXYGEN SATURATION: 93 % | HEIGHT: 70 IN | HEART RATE: 85 BPM

## 2021-06-03 VITALS — WEIGHT: 292 LBS | BODY MASS INDEX: 41.9 KG/M2

## 2021-06-03 VITALS
HEART RATE: 87 BPM | WEIGHT: 297 LBS | DIASTOLIC BLOOD PRESSURE: 76 MMHG | SYSTOLIC BLOOD PRESSURE: 142 MMHG | HEIGHT: 70 IN | BODY MASS INDEX: 42.52 KG/M2

## 2021-06-03 VITALS — BODY MASS INDEX: 45.1 KG/M2 | WEIGHT: 315 LBS | HEIGHT: 70 IN

## 2021-06-03 VITALS — WEIGHT: 297.5 LBS | HEIGHT: 70 IN | BODY MASS INDEX: 42.59 KG/M2

## 2021-06-03 VITALS — WEIGHT: 308.04 LBS | BODY MASS INDEX: 44.2 KG/M2

## 2021-06-03 NOTE — PROGRESS NOTES
Patient presents at the request of Dr. Pawan Castro for right upper extremity EMG.  He has diffuse right upper extremity paresthesias intermittently to include all fingers.  He is right-handed.  No muscle atrophy or weakness noted on exam.    EMG/NCS  results: Please see scanned full report    Comment NCS: Normal study  1.  Normal nerve conduction studies right upper extremity.    Comment EMG: Normal study  1.  Normal needle EMG right upper extremity.    Interpretation: Normal study    1. There is no electrodiagnostic evidence of cervical radiculopathy, brachial plexopathy, or focal neuropathy in the right upper extremity.  Specifically, there is no electrodiagnostic evidence of median neuropathy at the wrist in the right upper extremity.      The testing was completed in its entirety by the physician.       It was our pleasure caring for your patient today, if there any questions or concerns please do not hesitate to contact us.

## 2021-06-03 NOTE — PATIENT INSTRUCTIONS - HE
Thank you for choosing the [facility] for your EMG testing.    The ordering provider will receive your final EMG results within the next few days.  Please follow up with your provider for the results and further treatment recommendations.

## 2021-06-03 NOTE — TELEPHONE ENCOUNTER
Refill request received for Suboxone.  One was sent to [facility] today, from appointment.  Call placed to [facility], and they stated they received the electronic script, so this request was refused.

## 2021-06-03 NOTE — TELEPHONE ENCOUNTER
----- Message from Pawan Castro MD sent at 11/26/2019  8:30 AM CST -----      ----- Message -----  From: Gurmeet Coned DO  Sent: 11/25/2019   1:07 PM CST  To: Pawan Castro MD     Yes

## 2021-06-03 NOTE — PROGRESS NOTES
"Medical  Weight Loss Follow-Up Diet Evaluation  Assessment:  Tobin is presenting today for a follow up weight management nutrition consultation. Pt has had an initial appointment with Dr. Burnham.  Weight loss medication: Phentermine. Not discussed.  Pt's Initial Weight: 321 lbs  Weight: (!) 297 lb 8 oz (134.9 kg)  Weight loss from initial: 23.5  % Weight loss: 7.32 %    BMI: Body mass index is 42.69 kg/m .  IBW: 166 lbs    Estimated RMR ([place]-St Jeor equation): 2293 kcals   Recommended Protein Intake: 60-80 grams of protein/day  Patient Active Problem List:     Patient Active Problem List   Diagnosis     Opioid dependence, on agonist therapy with buprenorphine, Dr. Rincon     COPD (chronic obstructive pulmonary disease) (H)     Chronic mental illness - Yrn     Essential hypertension     YANIRA on CPAP     Gastroesophageal reflux disease without esophagitis     Restrictive lung disease     Moderate episode of recurrent major depressive disorder (H)     Pre-diabetes     Sleep apnea, obstructive     GERD (gastroesophageal reflux disease)     Morbid obesity with BMI of 45.0-49.9, adult (H)     Low serum testosterone level     Increased PTH level     Diabetes: Yes  Testing blood sugars at home 87-92 mg/dL     Progress on goals from last visit: Pt reports sticking to \"routine\" by cutting out simple carbohydrates and reducing portion sizes.He also has been cutting back on fruit.     Dietary Recall:  Breakfast: none or cheerios and 2% milk (10g)  Snack:none   Lunch: none or salamni sandwich   Snack: none   Dinner:2 bbq pork chops, vegetables (40g)   Snack: none   Overnight eating: No  Hydration (type/oz. per day):  Water: 64 oz + crystal light   Exercise:  Routine exercise established: Yes  Mountain climbing- 30 minutes.      Nutrition Diagnosis:    Overweight/Obesity (NC 3.3) related to overeating and poor lifestyle habits as evidenced by patient's subjective statements and BMI 42.     Intervention:  1. Nutrition " education: Reviewed food groups and encouraged increased protein intake (20-30 grams) at each meal.   2. Nutrition counseling: Encouraged pt to cut back on cereal and try higher protein breakfast option.     Monitoring/Evaluation:    Goals:  1. Swap breakfast from cereal to eggs and english muffin.   2. Eat three meals per day.     Follow up:  Pt will follow up in 1 month(s) with bariatrician and 1 month(s) with dietitian.     Time spent with patient: 30 minutes  Safia Yang RD     ABN signed: Yes

## 2021-06-04 VITALS
HEART RATE: 80 BPM | WEIGHT: 315 LBS | HEIGHT: 70 IN | BODY MASS INDEX: 45.1 KG/M2 | RESPIRATION RATE: 12 BRPM | SYSTOLIC BLOOD PRESSURE: 120 MMHG | DIASTOLIC BLOOD PRESSURE: 80 MMHG

## 2021-06-04 VITALS
DIASTOLIC BLOOD PRESSURE: 70 MMHG | OXYGEN SATURATION: 95 % | BODY MASS INDEX: 44.38 KG/M2 | HEIGHT: 70 IN | HEART RATE: 84 BPM | SYSTOLIC BLOOD PRESSURE: 122 MMHG | WEIGHT: 310 LBS

## 2021-06-04 VITALS — BODY MASS INDEX: 41.54 KG/M2 | HEIGHT: 70 IN

## 2021-06-04 VITALS
WEIGHT: 285 LBS | HEART RATE: 107 BPM | BODY MASS INDEX: 40.8 KG/M2 | SYSTOLIC BLOOD PRESSURE: 128 MMHG | DIASTOLIC BLOOD PRESSURE: 83 MMHG | HEIGHT: 70 IN

## 2021-06-04 VITALS
DIASTOLIC BLOOD PRESSURE: 70 MMHG | HEART RATE: 88 BPM | RESPIRATION RATE: 16 BRPM | WEIGHT: 311.3 LBS | BODY MASS INDEX: 44.57 KG/M2 | SYSTOLIC BLOOD PRESSURE: 102 MMHG | HEIGHT: 70 IN

## 2021-06-04 VITALS — WEIGHT: 285 LBS | BODY MASS INDEX: 40.89 KG/M2

## 2021-06-04 VITALS
OXYGEN SATURATION: 98 % | HEART RATE: 100 BPM | HEIGHT: 70 IN | DIASTOLIC BLOOD PRESSURE: 74 MMHG | SYSTOLIC BLOOD PRESSURE: 122 MMHG | BODY MASS INDEX: 41.52 KG/M2 | WEIGHT: 290 LBS

## 2021-06-04 VITALS — HEIGHT: 70 IN | BODY MASS INDEX: 44.52 KG/M2 | WEIGHT: 311 LBS

## 2021-06-04 VITALS
HEART RATE: 93 BPM | HEIGHT: 70 IN | SYSTOLIC BLOOD PRESSURE: 123 MMHG | DIASTOLIC BLOOD PRESSURE: 79 MMHG | WEIGHT: 290 LBS | OXYGEN SATURATION: 98 % | BODY MASS INDEX: 41.52 KG/M2 | RESPIRATION RATE: 16 BRPM

## 2021-06-04 VITALS — HEIGHT: 70 IN | BODY MASS INDEX: 41.44 KG/M2 | WEIGHT: 289.5 LBS

## 2021-06-04 VITALS — WEIGHT: 289.5 LBS | BODY MASS INDEX: 41.44 KG/M2 | HEIGHT: 70 IN

## 2021-06-04 NOTE — PROGRESS NOTES
Assessment/plan: pt here for diabetes management follow up. He arrives with his Accu Check Guide Me meter. Had questions about the poking device and changing lancets. Diabetes educator reviewed steps: pt verbalized understanding. Excellent BS control overall. He has lost ~ 30 lbs since August and feels that the clothes are fitting better; will continue to work on losing more, especially around his mid section.     - he attributes the wt loss to:healthier cooking methods/choices, no rice/sweet cereals/bread, more whole grains, limiting sweets/desserts to 2x/wk, doing more vegetables with meals and also drinking plenty of water (with sugar free crystal lite)     - No other concerns at this point.     SMBG:   Arrives with his meter, tests, 2x/d   Fasting BS: 76 -145 with 99% BS in range  BS after dinner: 82 - 142  with 100% BS in range  Lows: none, knows how to treat. Reviewed sx and tx of hypo and hyperglycemia: pt verbalized understanding.     7 day avg (n= 11) =110  14 day avg (n=21) = 106     -  Has been trying to follow the guidelines for his diet and intake. Consumes 2-3 meals/d typically. Reviewed general diet guidelines and also the wt loss basics.   -  Exercises at home, 1-3x/wk.  Encouraged  at least 30 mins of PA/exercise daily, as able. It can be spread out throughout the day      Congratulated Tobin on his progress and on making positive lifestyle changes.Disucssed how wt loss would help with insulin sensitivity and hence with overall diabetes management and other chronic conditions. Encouraged small steps. He will follow up in 3 months and will also be due for an A1C then.      -     Subjective and Objective:      Tobin Bryant is referred by Dr. Castro for Diabetes Education.     Lab Results   Component Value Date    HGBA1C 5.8 10/11/2019         Education:     Monitoring   Meter (per above goals): Assessed  Monitoring: Assessed  BG goals: Assessed and Discussed    Nutrition Management  Nutrition  Management: Assessed  Weight: Assessed  Portions/Balance: Assessed and Discussed  Carb ID/Count: Assessed and Discussed  Label Reading: Assessed and Discussed  Heart Healthy Fats: Assessed  Menu Planning: Assessed  Dining Out: assessed and discussed   Physical Activity: Assessed and Discussed  Medications: Assessed  Orals: Assessed  Injected Medications: Assessed     Diabetes Disease Process: Assessed and Discussed    Acute Complications: Prevent, Detect, Treat:  Hypoglycemia: Assessed and Discussed  Hyperglycemia: Assessed and Discussed  Sick Days: Assessed  Driving: Assessed    Chronic Complications  Foot Care:Assessed and Discussed  Skin Care: Assessed  Eye: Assessed and Discussed  ABC: Assessed  Teeth:Assessed  Goal Setting and Problem Solving: Assessed and Discussed  Barriers: Assessed and Discussed  Psychosocial Adjustments: Assessed      Time spent with the patient: 60 minutes for diabetes education and counseling.   Previous Education: yes  Visit Type:JOSIANE Chery  12/17/2019

## 2021-06-04 NOTE — TELEPHONE ENCOUNTER
Pt called asking for review of nutrition intervention presented at last RD visit. Discussed components of last visit.

## 2021-06-04 NOTE — PROGRESS NOTES
Medical  Weight Loss Follow-Up Diet Evaluation  Assessment:  Tobin is presenting today for a follow up weight management nutrition consultation. Pt has had an initial appointment with Dr. Burnham.  Weight loss medication: Phentermine.   Pt's Initial Weight: 321 lbs  Weight: (!) 291 lb (132 kg)  Weight loss from initial: 30  % Weight loss: 9.35 %    BMI: Body mass index is 41.75 kg/m .  IBW:  166 lbs    Estimated RMR ([place]-St Jeor equation): 2293 kcal  Recommended Protein Intake: 60-80 grams of protein/day  Patient Active Problem List:     Patient Active Problem List   Diagnosis     Opioid dependence, on agonist therapy with buprenorphine, Dr. Rincon     COPD (chronic obstructive pulmonary disease) (H)     Chronic mental illness - Yrn     Essential hypertension     YANIRA on CPAP     Gastroesophageal reflux disease without esophagitis     Restrictive lung disease     Moderate episode of recurrent major depressive disorder (H)     Sleep apnea, obstructive     GERD (gastroesophageal reflux disease)     Morbid obesity with BMI of 45.0-49.9, adult (H)     Low serum testosterone level     Increased PTH level     Type 2 diabetes mellitus without complication, without long-term current use of insulin (H)     Diabetes: Yes     Progress on goals from last visit: Pt switched to egg and english muffin homemade for breakfast.     Dietary Recall:  Breakfast: english muffin, egg, sausage ( 20g)   Snack:none   Lunch:1/2 turkey sandwich (20g)   Snack: none   Dinner:can pork and beans, 2 hot dogs, mixed vegetables (30g)   Snack: none   Overnight eating: No  Eating out (frequency/week): none   Hydration (type/oz. per day):  Water: 64 oz + crystal light   Hot chocolate periodically   Exercise:  Routine exercise established: Yes  Mountain climbing, TV exercises.      Nutrition Diagnosis:    Overweight/Obesity (NC 3.3) related to overeating and poor lifestyle habits as evidenced by patient's subjective statements and BMI 41.        Intervention:  1. Nutrition education: Discussed the benefit of strength training for muscle building and weight loss. Reviewed whole grains and benefits of increasing vegetables.   2. Nutrition counseling: Encouraged pt via motivational interviewing techniques.     Monitoring/Evaluation:    Goals:  1. Incorporate strength training.  2. At a serving of vegetables at lunch meal.    Follow up:  Pt will follow up in 1 month(s) with bariatrician and 2 month(s) with dietitian.     Time spent with patient: 15 minutes  Safia Yang RD     ABN signed: Yes

## 2021-06-05 VITALS
SYSTOLIC BLOOD PRESSURE: 128 MMHG | HEART RATE: 88 BPM | HEIGHT: 70 IN | BODY MASS INDEX: 45.1 KG/M2 | OXYGEN SATURATION: 97 % | DIASTOLIC BLOOD PRESSURE: 84 MMHG | WEIGHT: 315 LBS

## 2021-06-05 VITALS — BODY MASS INDEX: 46.49 KG/M2 | HEIGHT: 70 IN

## 2021-06-05 NOTE — TELEPHONE ENCOUNTER
RN cannot approve Refill Request    RN can NOT refill this medication med is not covered by policy/route to provider. Last office visit: 11/13/2019 Pawan Castro MD Last Physical: Visit date not found Last MTM visit: Visit date not found Last visit same specialty: 11/13/2019 Pawan Castro MD.  Next visit within 3 mo: Visit date not found  Next physical within 3 mo: Visit date not found      Barbara Higgins, [facility] Triage/Med Refill 1/30/2020    Requested Prescriptions   Pending Prescriptions Disp Refills     triamcinolone (KENALOG) 0.1 % cream [Pharmacy Med Name: TRIAMCINOLONE 0.1% CREAM 80GM] 80 g 5     Sig: APPLY EXTERNALLY TO THE AFFECTED AREA TWICE DAILY       There is no refill protocol information for this order

## 2021-06-05 NOTE — PROGRESS NOTES
"Bariatric Follow-up    54 y.o.  male BMI:Body mass index is 41.61 kg/m .    Weight:   Wt Readings from Last 1 Encounters:   01/10/20 (!) 290 lb (131.5 kg)    pounds  Height: 5' 10\" (1.778 m) (1/10/2020 11:10 AM)  Initial Weight: 321 lbs (1/10/2020 11:10 AM)  Weight: (!) 290 lb (131.5 kg) (1/10/2020 11:10 AM)  Weight loss from initial: 31 (1/10/2020 11:10 AM)  % Weight loss: 9.66 % (1/10/2020 11:10 AM)  BMI (Calculated): 41.6 (1/10/2020 11:10 AM)  SpO2: 98 % (1/10/2020 11:10 AM)  Waist Circumference (In): 57 Inches (9/3/2019 10:08 AM)  Hip Circumference (In): 58 Inches (9/3/2019 10:08 AM)  Neck Circumference (In): 19 Inches (9/3/2019 10:08 AM)      Comorbidities:  Patient Active Problem List   Diagnosis     Opioid dependence, on agonist therapy with buprenorphine, Dr. Rincon     COPD (chronic obstructive pulmonary disease) (H)     Chronic mental illness - Yrn     Essential hypertension     YANIRA on CPAP     Gastroesophageal reflux disease without esophagitis     Restrictive lung disease     Moderate episode of recurrent major depressive disorder (H)     Sleep apnea, obstructive     GERD (gastroesophageal reflux disease)     Morbid obesity with BMI of 45.0-49.9, adult (H)     Low serum testosterone level     Increased PTH level     Type 2 diabetes mellitus without complication, without long-term current use of insulin (H)     Reversible airways disease     Moderate persistent asthma with acute exacerbation     History of heroin abuse (H)     Interim: 35# down (he reports initial as 325#). A1c has gone down from 6.3 to 5.8 He will have that rechecked in Feb. He has a f/u with the sleep center for f/u. Feels he doesn't need his CPAP anymore.  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 F/U dietitian. Stay the course. .Continue elevation of legs and compression stockings. Refill phentermine  -We reviewed his medications and whether associated with " weight gain.    We discussed [facility] Bariatric Basics including:  -eating 3 meals daily  -eating protein first  -eating slowly, chewing food well  -avoiding/limiting calorie containing beverages  -choosing wheat, not white with breads, crackers, pastas, kendal, bagels, tortillas, rice  -limiting restaurant or cafeteria eating to twice a week or less  -We discussed the importance of restorative sleep and stress management in maintaining a healthy weight.  -We discussed insulin resistance and glycemic index as it relates to appetite and weight control  -We discussed the National Weight Control Registry healthy weight maintenance strategies and ways to optimize metabolism.  -We discussed the importance of physical activity including cardiovascular and strength training in maintaining a healthier weight and explored viable options.    Most recent labs:  Lab Results   Component Value Date    WBC 7.1 07/11/2019    HGB 13.3 (L) 07/11/2019    HCT 40.2 07/11/2019    MCV 91 07/11/2019     07/11/2019     Lab Results   Component Value Date    CHOL 178 07/11/2019     Lab Results   Component Value Date    HDL 52 07/11/2019     Lab Results   Component Value Date    LDLCALC 106 07/11/2019     Lab Results   Component Value Date    TRIG 99 07/11/2019       Lab Results   Component Value Date    ALT 16 07/11/2019    AST 17 07/11/2019    ALKPHOS 75 07/11/2019    BILITOT 0.2 07/11/2019     Lab Results   Component Value Date    HGBA1C 5.8 10/11/2019     Lab Results   Component Value Date    PZYSWDNS02 710 09/03/2019     Lab Results   Component Value Date    SFOYTFCR26OF 14.1 (L) 09/03/2019     Lab Results   Component Value Date    FERRITIN 114 09/03/2019     Lab Results   Component Value Date     (H) 09/03/2019       Lab Results   Component Value Date    TSH 1.72 01/25/2018       DIETARY HISTORY  Meals Per Day: 2-3  Eating Protein First?: sometimes  Food Diary: B:english muffin with egg and sausage or 2 waffles,  L:turkey  "or ham sandwich on [employer] D:baked pork chop  Snacks Per Day: occ  Typical Snack: sandwich on [employer] or cookies or cupcake  Fluid Intake: intentional  Portion Control: improved  Calorie Containing Beverages: no. Just Crystal light  Alcohol per week: no  Typical Protein Food Choices: variety  Choosing Whole Grains: yes  Grocery Shopping is done by: he does  Food Preparation is done by: he does  Meals at Restaurant/Cafeteria/Take Out Per Week: none  Eating at the Table: no  TV is Off During Meals: no    Positive Changes Since Last Visit: baked, not fried, no pop, exericising 3-4X/wk  Struggling With: nothing comes to mind    Knowledgeable in Reading Food Labels: yes  Getting Adequate Protein: yes  Sleeping 7-8 hours/day Ok  Stress management OK    PHYSICAL ACTIVITY PATTERNS:  Cardiovascular: mountain climbing  Strength Training: no    REVIEW OF SYSTEMS  GENERAL/CONSTITUTIONAL:  Fatigue: yes-edications  CARDIOVASCULAR:  Chest Pain with Exertion: no  PULMONARY:  Dyspnea on exertion: improved  CPAP Use: on and off  Tobacco Use: never  Asthma Controlled: yes  GASTROINTESTINAL:  GERD/Heartburn: no  Gallbladder:   UROLOGIC:  Urinary Symptoms: no  NEUROLOGIC:  Headaches: couple times stress related  Paresthesias:   PSYCHIATRIC:  Moods: OK  MUSCULOSKELETAL/RHEUMATOLOGIC  Arthralgias: improved  Myalgias: improved  ENDOCRINE:  Monitoring Blood Sugars: no  Sugars Well Controlled: yes A1c normalized  DERMATOLOGIC:  Rashes: no    PHYSICAL EXAM:  Vitals: /79   Pulse 93   Resp 16   Ht 5' 10\" (1.778 m)   Wt (!) 290 lb (131.5 kg)   SpO2 98%   BMI 41.61 kg/m    Height: 5' 10\" (1.778 m) (1/10/2020 11:10 AM)  Initial Weight: 321 lbs (1/10/2020 11:10 AM)  Weight: (!) 290 lb (131.5 kg) (1/10/2020 11:10 AM)  Weight loss from initial: 31 (1/10/2020 11:10 AM)  % Weight loss: 9.66 % (1/10/2020 11:10 AM)  BMI (Calculated): 41.6 (1/10/2020 11:10 AM)  SpO2: 98 % (1/10/2020 11:10 AM)  Waist Circumference (In): 57 Inches (9/3/2019 10:08 AM)  Hip " Circumference (In): 58 Inches (9/3/2019 10:08 AM)  Neck Circumference (In): 19 Inches (9/3/2019 10:08 AM)      GEN: Pleasant, well groomed, in no acute distress  EYES: EOMI,  ENT: airway patent  NECK: no carotid bruits, no anterior/supraclavicular lymphadenopathy, thyroid normal   HEART: Rhythm regular, rate regular, no murmur   LUNGS: Clear  ABDOMEN: soft, non-tender, obese, no rashes   VASCULAR: 2+  lower extremity edema  MUSCULOSKELETAL:  muscle mass OK  SKIN:  + color changes of venous stasis, no ulcerations    Time spent with patients 30 minutes, >50% in counseling and coordination of care.

## 2021-06-05 NOTE — PATIENT INSTRUCTIONS - HE
MEDICATIONS FOR WEIGHT LOSS    PHENTERMINE (Adipex): approved in 1959 for appetite suppression.  It has stimulant effects and cannot be used with Ritalin, Concerta, or other stimulants.  It is not addictive although it's chemically related to amphetamines.  Amphetamines are addictive. The most common side effects are dry mouth, increased energy and concentration, increased pulse, and constipation.  You should not take phentermine if you have glaucoma, hyperthyroidism, or uncontrolled/untreated hypertension.  $24-$30 for 90 tablets    PHENDIMETRAZINE (Bontril): Appetite suppressant/sympathomimetic.  Controlled substance.  Side effects and contraindications similar to phentermine.  $45-$60 for 3 month supply    TOPIRAMATE (Topamax): Anti-seizure medication, also used to prevent migraines.  Side effects include paresthesia, glaucoma, altered concentration, attention difficulties, memory and speech problems, metabolic acidosis, depression, increase in body temperature and decrease sweating, kidney stones, and weight loss.  Do not take Topamax while taking Depakote as this can cause high ammonia levels.  You must have reliable birth control as Topamax can cause birth defects.  Discontinue slowly to avoid seizure.  Insurance usually covers Topiramate.    QSYMIA (Phentermine + Topamax):  See above information about phentermine and Topamax.  Most common side effects are paresthesia, dizziness, distortion of taste, insomnia, constipation, and dry mouth.  $150-$220 per month    LORCASERIN (Belviq):  Approved in 2012. It is a serotonin 2C receptor agonist which reduces appetite and promotes satiety.  Average weight loss in 6000 patients was 3-3.7% over placebo.  Most common side effects include headache, dizziness, fatigue, nausea, dry mouth, and constipation. Lorcaserin should be discontinued after 12 weeks if the patient does not lose 5% of their body weight.  Use caution in patients on SSRIs, triptans, bupropion, and  tramadol.  $120-$215 per month    DIETHYLPROPRION (Tenuate): Sympathomimetic amine.  Appetite suppressant.  Doses 25 mg before meals or 75 mg per day.  Most common side effects are hypertension, palpitations, EKG changes, and increased seizures in epileptics.  There can be a possible adverse reaction with alcohol.  $70-$90 per 3 months    XENICAL(Orlistat) (William OTC): Approved in 1999.  A fat-blocker.  It reduces absorption of fat by approximately 30%.  It has beneficial effects on lipid levels.  Side effects include diarrhea, abdominal cramping, fecal incontinence, oily spotting, and flatus with discharge.  Side effects are minimized if the patient limits their dietary fat to no more than 30% of their diet.  Patients must take a multivitamin daily to avoid vitamin D, E, A, and K deficiency.  $120 per month          CONTRAVE (naltrexone/buproprion): Approved in 2014.  It is a combination pill including an opioid receptor blocker and a long-standing antidepressant.  Most common side effects include nausea, constipation, headache, vomiting, dizziness, trouble sleeping, dry mouth, and diarrhea.  With all antidepressants watch for mood changes and suicide ideation.  Bupropion has been known to lower the seizure threshold in those prone to seizures.  It should not be used in a patient with a recent history of bulimia. It has been associated with liver damage from taking higher than recommended doses.  Do not use countrave if you have taken opioid medications or opioid street drugs in the past 7-10 days, if you are currently on opioids, methadone, or if you are pregnant.  Do not use contrave if you have recently stopped using alcohol or benzodiazepines.  Taper off contrave slowly.  Dosing: titrate up to 2 tablets twice daily of the Naltrexone 8 mg/ Buproprion 90 mg tablets.  $200 for 90 tablets    SAXENDA (Liraglutide): A daily injectable (3mg daily) medication used for type 2 diabetes. Glucagon-like peptide-1 (GLP-1)  agonist. Contraindications include personal or family history of medullary thyroid cancer or MEN type 2. Acute pancreatitis has been observed in patients taking liraglutide. Liraglutide causes C-cell tumors in rats and mice. It is unknown whether liraglutide causes tumors in humans. Start at 0.6mg, increasing the dose weekly up to 3mg.     VYVANSE (Lisdexamfetamine dimesylate): a CNS stimulant used to treat ADHD. Indicated for the treatment of moderate to severe Binge Eating Disorder in Adults. Contraindicated in patients with known heart disease, structural abnormalities of the heart, serious heart arrhythmias or unexplained syncope. CNS stimulants such as vyvanse may cause manic or psychotic symptoms in patients with BPAD or pre-existing psychosis. Use with caution in patients with Raynaud's phenomenon. Most common side effects include dry mouth, insomnia, decreased appetite, increased heart rate, jittery feeling, constipation and anxiety.

## 2021-06-06 NOTE — PROGRESS NOTES
"  Office Visit - Follow Up   Tobin Bryant   54 y.o. male    Date of Visit: 2/26/2020    Chief Complaint   Patient presents with     Rash        Assessment and Plan   1. Type 2 diabetes mellitus without complication, without long-term current use of insulin (H)  Well-controlled continue current plan    2. Essential hypertension  Blood pressure controlled continue same    3. Rash and nonspecific skin eruption  - triamcinolone (KENALOG) 0.1 % ointment; Apply topically 2 (two) times a day.  Dispense: 80 g; Refill: 0    4. Sleep apnea, obstructive  Continue CPAP    5. Moderate episode of recurrent major depressive disorder (H)  Mood stable continue current medication    6. Opioid dependence in remission (H)  Continue Suboxone    7. Morbid obesity with BMI of 45.0-49.9, adult (H)  The following high BMI interventions were performed this visit: encouragement to exercise and lifestyle education regarding diet    Return in about 3 months (around 5/26/2020) for recheck.     History of Present Illness   This 54 y.o. old man comes in for follow-up.  He continues to work on losing weight.  Has had a bit of a plateau.  Still eating a healthy diet and trying to exercise.  His blood sugars have come down nicely last hemoglobin A1c was under 6%.  Remains sober, using Suboxone.  Using CPAP for sleep apnea.  Mood stable.  Has a rash on his legs    Review of Systems: A comprehensive review of systems was negative except as noted.     Medications, Allergies and Problem List   Reviewed, reconciled and updated  Post Discharge Medication Reconciliation Status:      Physical Exam   General Appearance:   No acute distress    /74 (Patient Site: Right Arm, Patient Position: Sitting, Cuff Size: Adult Regular)   Pulse 100   Ht 5' 10\" (1.778 m)   Wt (!) 290 lb (131.5 kg)   SpO2 98%   BMI 41.61 kg/m      HEENT exam is unremarkable  Neck supple no thyromegaly or nodule palpable  Lymphatic no cervical lymphadenopathy  Cardiovascular " regular rate and rhythm no murmur gallop or rub  Pulmonary lungs are clear to auscultation bilaterally  Gastrointestinal abdomen soft nontender nondistended no organomegaly  Neurologic exam is non focal  Psychiatric pleasant, no confusion or agitation   He has scaly raised plaques on his legs with some excoriation     Additional Information   Current Outpatient Medications   Medication Sig Dispense Refill     albuterol (PROAIR HFA;PROVENTIL HFA;VENTOLIN HFA) 90 mcg/actuation inhaler Inhale 2 puffs every 6 (six) hours as needed for wheezing. 1 Inhaler 6     ARIPiprazole (ABILIFY) 10 MG tablet Take 10 mg by mouth daily.  3     aspirin 81 MG EC tablet Take 1 tablet (81 mg total) by mouth daily.  0     atorvastatin (LIPITOR) 20 MG tablet Take 1 tablet (20 mg total) by mouth daily. 90 tablet 3     blood glucose test (ACCU-CHEK GUIDE) strips Test 2 times a day 100 strip 6     budesonide-formoterol (SYMBICORT) 80-4.5 mcg/actuation inhaler Inhale 2 puffs 2 (two) times a day. 1 Inhaler 6     buPROPion (WELLBUTRIN XL) 150 MG 24 hr tablet Take 1 tablet by mouth every morning.       cane Marisel Use as needed to assist with ambulation.   Pt with COPD. 1 each 0     cloNIDine HCl (CATAPRES) 0.1 MG tablet TAKE 1 TABLET(0.1 MG) BY MOUTH TWICE DAILY AS NEEDED 180 tablet 3     docusate sodium (COLACE) 100 MG capsule Take 1 capsule (100 mg total) by mouth 2 (two) times a day. 60 capsule 1     ergocalciferol (VITAMIN D2) 50,000 unit capsule Take 1 capsule (50,000 Units total) by mouth 3 (three) times a week. 39 capsule 0     furosemide (LASIX) 20 MG tablet TAKE 1 TABLET BY MOUTH EVERY DAY 90 tablet 3     generic lancets Test 2 times a day 100 each 6     omeprazole (PRILOSEC) 20 MG capsule TAKE 1 CAPSULE(20 MG) BY MOUTH TWICE DAILY BEFORE MEALS 180 capsule 3     phentermine (ADIPEX-P) 37.5 mg tablet Take 1/2 to 1 tablet in the morning. 90 tablet 1     polyethylene glycol (MIRALAX) 17 gram packet Take 17 g by mouth daily as needed.        SUBOXONE 8-2 mg Film per sublingual film 1 film three times a day SL 90 Film 2     triamcinolone (KENALOG) 0.1 % ointment Apply topically 2 (two) times a day. 80 g 0     No current facility-administered medications for this visit.      Allergies   Allergen Reactions     Seafood Other (See Comments)     Eyes turn yellow       Ibuprofen Nausea And Vomiting     Social History     Tobacco Use     Smoking status: Never Smoker     Smokeless tobacco: Never Used   Substance Use Topics     Alcohol use: No     Drug use: No       Review and/or order of clinical lab tests:  Review and/or order of radiology tests:  Review and/or order of medicine tests:  Discussion of test results with performing physician:  Decision to obtain old records and/or obtain history from someone other than the patient:  Review and summarization of old records and/or obtaining history from someone other than the patient and.or discussion of case with another health care provider:  Independent visualization of image, tracing or specimen itself:    Time:      Pawan Castro MD

## 2021-06-06 NOTE — TELEPHONE ENCOUNTER
----- Message from Adelina Burnham MD sent at 9/5/2019  5:55 PM CDT -----  Regarding: f/u labs  PTH, D, CMP and Testosterone in 6 mo. Make sure this is an AM lab. Does not need to be fasting but should be done as close to 8am as possible (d/t the Testosterone being highest in the am). Thanks!!!

## 2021-06-06 NOTE — TELEPHONE ENCOUNTER
Called pt to discuss having his labs checked around 8 am at [facility] at the beginning of March.  He will need to set that up with his medical ride company closer to that date. We will discuss at his upcoming visit with the dietitian.     Yaneli Gibbons RN, CBN  [facility]  P [phone]  F [phone]

## 2021-06-06 NOTE — TELEPHONE ENCOUNTER
Received MTM referral from [employer].    Attempt #1 on 03/05/2020 at 11:39am    Outcome: patient declined.    Thank you,  Chata Brown, MTM Coordinator Intern

## 2021-06-06 NOTE — PROGRESS NOTES
Correct pharmacy verified with patient and confirmed in snapshot? [x] yes []no    Charge captured ? [x] yes  [] no    Medications Phoned  to Pharmacy [] yes [x]no  Name of Pharmacist:  List Medications, including dose, quantity and instructions      Medication Prescriptions given to patient   [] yes  [x] no   List the name of the drug the prescription was written for.       Medications ordered this visit were e-scribed.  Verified by order class [x] yes  [] no  Suboxone 8-2 mg  Colace 100 mg    Medication changes or discontinuations were communicated to patient's pharmacy: [] yes  [x] no    UA collected [x] yes  [] no    [place] Prescription Monitoring Program Reviewed? [x] yes  [] no    Referrals were made to:none      Future appointment was made: [x] yes  [] no    Dictation completed at time of chart check: [] yes  [x] no    I have checked the documentation for today s encounters and the above information has been reviewed and completed.

## 2021-06-06 NOTE — TELEPHONE ENCOUNTER
Pt called in and would like a call from Olga to discuss his diet. He is wondering about doing intermittent fasting.    Yaneli Gibbons RN, CBN  [facility]  P [phone]  F [phone]

## 2021-06-06 NOTE — PROGRESS NOTES
Medical  Weight Loss Follow-Up Diet Evaluation  Assessment:  Tobin is presenting today for a follow up weight management nutrition consultation. Pt has had an initial appointment with Dr. Burnham  Weight loss medication: Phentermine- recently ran out, will start again today- goal weight is 270lb  Pt's Initial Weight: 321 lbs  Weight: (!) 289 lb 8 oz (131.3 kg)  Weight loss from initial: 31.5  % Weight loss: 9.81 %    BMI: Body mass index is 41.54 kg/m .  IBW: 165-175 lbs    Estimated RMR ([place]-St Jeor equation): 2174kcal   Recommended Protein Intake: 60-80 grams of protein/day  Patient Active Problem List:  Patient Active Problem List   Diagnosis     Opioid dependence, on agonist therapy with buprenorphine, Dr. Rincon     COPD (chronic obstructive pulmonary disease) (H)     Chronic mental illness - Yrn     Essential hypertension     YANIRA on CPAP     Gastroesophageal reflux disease without esophagitis     Restrictive lung disease     Moderate episode of recurrent major depressive disorder (H)     GERD (gastroesophageal reflux disease)     Morbid obesity with BMI of 45.0-49.9, adult (H)     Low serum testosterone level     Increased PTH level     Type 2 diabetes mellitus without complication, without long-term current use of insulin (H)     Moderate persistent asthma with acute exacerbation     Diabetes: Yes usually running 89-95 in am     Progress on goals from last visit: patient is eating 1-2 meals per day  1. Strength training- goal not met  2. Vegetable at lunch- goal somewhat met- bought some canned mixed vegetables  Patient feels as though he doesn't need his CPAP anymore    Dietary Recall:  Breakfast: 6am- 2 english muffin with scrambled egg and sausage  Lunch:none  Dinner:8:30p- 2 hot dogs with buns  Hydration (type/oz. per day):  Patient reports that he only drinks crystal light  Exercise:  Routine exercise established: Yes  Exercise at home 2 times per week 30 min (7 songs)- mountain climbers-  encouraged adding in other movements for variety    Nutrition Diagnosis:  Overweight/Obesity (NC 3.3) related to overeating and poor lifestyle habits as evidenced by skipping meals, lack of exercise and BMI 41.54  Intervention:  1. Food and/or nutrient delivery: encouraged Robles to eat three meals per day, focusing on protein at each meal- options for this were discussed  2. Nutrition counseling: goal setting    Monitoring/Evaluation:    Goals:  1. Eat three meals per day  2. Exercise at least 3 days per week    Follow up:  Pt will follow up in 1 month(s) with dietitian.     Time spent with patient: 25 minutes  Olga Hayes RD     ABN signed: Yes

## 2021-06-06 NOTE — PROGRESS NOTES
Assessment: Robles is here for diabetes management follow up. Excellent glycemic control; A1C today: 5.6<5.8 (10/11/20). Has continued to lose wt and is down ~23  lbs since October 2019, wants to shed more.     - shares that he has been making tremendous changes in his lifestyle, especially in the way he eats and also his physical activity routine.      - No other concerns at this point.     SMBG:   Arrives with his Accu Check Guide Me meter. Tests, 1-2x/d   Fasting BS: 76 - 142 with 99% BS in range  BS after dinner: 85 - 135  with 100% BS in range  7 day avg  =104  14 day avg = 103  Lows: none, knows how to treat.   Reviewed sx and tx of hypo and hyperglycemia: pt verbalized understanding.        -  Consumes 2 meals/d, typically ends up skipping Lunch. We reviewed general diet guidelines and also the wt loss basics.   Food Recall:  BF: english muffin, sausage  L; hamburger (homemade)  Snack: a bowl of regular vanilla ice cream   D: pork chop sandwich (home made)  S: vanilla Ice cream     - Drinks water with crystal lite.    -  Continues to exercise at home, 2-3x/wk. Encouraged at least 30 mins of PA/exercise daily, as able. We discussed how it can also be spread out throughout the day       Plan: Congratulated Tobin on his hard work and encouraged him to continue. Reviewed how wt loss would help with insulin sensitivity and hence with overall diabetes management and other chronic conditions. He will continue to stay active and work on eating more healthfully. Robles will follow up in 3 months and will also be due for A1C draw. Pt agreeable to call if 3 high or 2 low BS in a week; has our phone number          Subjective and Objective:      Tobin Bryant is referred by Dr. Castro for Diabetes Education.     Lab Results   Component Value Date    HGBA1C 5.8 10/11/2019     Goals:  Not skip meals  Include more veggies and protein for satiety  F/u in 3 months       Education:     Monitoring   Meter (per above goals):  Assessed  Monitoring: Assessed  BG goals: Assessed and Discussed    Nutrition Management  Nutrition Management: Assessed and Discussed  Weight: Assessed and Discussed  Portions/Balance: Assessed  Carb ID/Count: Assessed  Label Reading: Assessed  Heart Healthy Fats: Assessed  Menu Planning: Assessed  Dining Out: Assessed  Physical Activity: Assessed and Discussed  Medications: Assessed  Orals: Assessed  Injected Medications: Assessed     Diabetes Disease Process: Assessed    Acute Complications: Prevent, Detect, Treat:  Hypoglycemia: Assessed and Discussed  Hyperglycemia: Assessed and Discussed  Sick Days: Assessed  Driving: Assessed    Chronic Complications  Foot Care:Assessed  Skin Care: Assessed  Eye: Assessed  ABC: Assessed  Teeth:Assessed  Goal Setting and Problem Solving: Assessed and Discussed  Barriers: Assessed and discussed  Psychosocial Adjustments: Assessed      Time spent with the patient: 60 minutes for diabetes education and counseling.   Previous Education: yes  Visit Type:JOSIANE Chery  2/18/2020

## 2021-06-07 NOTE — PROGRESS NOTES
"Tobin Bryant is a 54 y.o. male who is being evaluated via a billable telephone visit.      The patient has been notified of following:     \"This telephone visit will be conducted via a call between you and your physician/provider. We have found that certain health care needs can be provided without the need for a physical exam.  This service lets us provide the care you need with a short phone conversation.  If a prescription is necessary we can send it directly to your pharmacy.  If lab work is needed we can place an order for that and you can then stop by our lab to have the test done at a later time.    If during the course of the call the physician/provider feels a telephone visit is not appropriate, you will not be charged for this service.\"     Tobin Bryant complains of    Chief Complaint   Patient presents with     Nutrition Counseling       I have reviewed and updated the patient's Past Medical History, Social History, Family History and Medication List.    ALLERGIES  Seafood and Ibuprofen    Additional provider notes:     Medical  Weight Loss Follow-Up Diet Evaluation  Assessment:  Tobin is presenting today for a follow up weight management nutrition consultation. Pt has had an initial appointment with Dr. Burnham  Weight loss medication: Phentermine.  Pt's Initial Weight: 321 lbs  Weight: (!) 289 lb 8 oz (131.3 kg)  Weight loss from initial: 31.5  % Weight loss: 9.81 %    BMI: Body mass index is 41.54 kg/m .  IBW: 165-175 lbs    Estimated RMR ([place]-St Jeor equation): 2174kcal   Recommended Protein Intake: 60-80 grams of protein/day  Patient Active Problem List:  Patient Active Problem List   Diagnosis     Opioid dependence, on agonist therapy with buprenorphine, Dr. Rincon     COPD (chronic obstructive pulmonary disease) (H)     Chronic mental illness - Yrn     Essential hypertension     YANIRA on CPAP     Gastroesophageal reflux disease without esophagitis     Restrictive lung disease     " Moderate episode of recurrent major depressive disorder (H)     GERD (gastroesophageal reflux disease)     Morbid obesity with BMI of 45.0-49.9, adult (H)     Low serum testosterone level     Increased PTH level     Type 2 diabetes mellitus without complication, without long-term current use of insulin (H)     Moderate persistent asthma with acute exacerbation     Diabetes: Yes 100-135    Progress on goals from last visit: patient reports that he has been fasting, sometimes only eating one meal between 5-7am. We discussed balanced meals with protein, healthy carb and veggies.    Dietary Recall:  Breakfast: 5am- 2 breakfast sandwiches  Dinner:7-8pm- pork chop sandwich  Hydration (type/oz. per day):  Water: 64oz- water and crystal light  Exercise:  Routine exercise established: Yes  No change in exercise- doing this 2 times per week     Nutrition Diagnosis:    Overweight/Obesity (NC 3.3) related to overeating and poor lifestyle habits as evidenced by skipping meals, lack of exercise and BMI 41.54    Intervention:  1. Food and/or nutrient delivery: encouraged patient to increase veggie intake, having a veggie and protein for at least one meal per day  2. Nutrition counseling: motivational interviewing    Monitoring/Evaluation:    Goals:  1. Eat at least 1 veggie per day    Assessment/Plan:  1. Type 2 diabetes mellitus without complication, without long-term current use of insulin (H)    2. Obesity, Class III, BMI 40-49.9 (morbid obesity) (H)    3. Nutritional counseling      Patient to follow up in 1 months(s) with bariatrician and 2-3 month(s) with RD    Phone call duration: 15 minutes    Olga Hayes RD

## 2021-06-07 NOTE — TELEPHONE ENCOUNTER
"Patient calling to request an order for the same heart test he had \"Over in [place]\"  Patient could not tell me what type of heart test he requesting but he said they wanted him to come back in about a month to repeat it.  He said \"Dr. Castro or his nurse would know.\"  Can you ask Dr. Castro what he would like to do with this patient as I did not want to schedule him for a f/u phone visit with any Internal Medicine provider as not sure what he exactly needs, and patient did not seem to not completely know either.  \"I think I went to [facility].\" I did not see any tests he needs to have done, I do see labs in the system, but asked him and he said it was a repeat heart scan of some sort.\" Can Dr. Castro call him back to let him know what he should do? Thank you!    "

## 2021-06-07 NOTE — PROGRESS NOTES
"Tobin Bryant is a 54 y.o. male who is being evaluated via a billable telephone visit.      The patient has been notified of following:     \"This telephone visit will be conducted via a call between you and your physician/provider. We have found that certain health care needs can be provided without the need for a physical exam.  This service lets us provide the care you need with a short phone conversation.  If a prescription is necessary we can send it directly to your pharmacy.  If lab work is needed we can place an order for that and you can then stop by our lab to have the test done at a later time.    Telephone visits are billed at different rates depending on your insurance coverage. During this emergency period, for some insurers they may be billed the same as an in-person visit.  Please reach out to your insurance provider with any questions.    If during the course of the call the physician/provider feels a telephone visit is not appropriate, you will not be charged for this service.\"    Patient has given verbal consent to a Telephone visit? Yes    Patient would like to receive their AVS by AVS Preference: Sherri.    Additional provider notes: This patient is seen via a phone visit.  Overall doing well.  Continues to lose weight/stable weight.  No chest pain or shortness of breath.  Improved exercise tolerance.  Rash is improved on his leg.  Has some what sounds like hemosiderin deposition.  Mood stable.  Remains sober.  Following with the weight loss clinic.    Patient does not appear short of breath with telephonic visit    Assessment/Plan:  1. Essential hypertension  Blood pressure has been okay continue current medications follow-up 3 months    2. Type 2 diabetes mellitus without complication, without long-term current use of insulin (H)  Has been well controlled, continue current plan    3. YANIRA on CPAP  Able    4. Opioid dependence in remission (H)  Remains on Suboxone per the chemical dependency " program    5. Moderate episode of recurrent major depressive disorder (H)  Continue with current medication per psychiatry    6. Rash and nonspecific skin eruption  Resolved with triamcinolone, use as needed    7. Morbid obesity with BMI of 45.0-49.9, adult (H)  Discussed importance of weight loss through current measures as he is done an excellent job    Phone call duration:  11 minutes    Pawan Castro MD

## 2021-06-08 NOTE — PROGRESS NOTES
"Addiction  Nurse Only Visit Note    2/7/2017  Date of last visit: 1/3/17    Reason for today's visit:   Chief Complaint   Patient presents with     Follow-up       Vitals:    02/07/17 1017   BP: 119/80   Patient Site: Right Arm   Patient Position: Sitting   Cuff Size: Adult Large   Pulse: 84   Temp: 98.4  F (36.9  C)   TempSrc: Oral   Weight: (!) 314 lb (142.4 kg)   Height: 5' 10\" (1.778 m)        If having pain, who will address pain:  0    Is pt assisted: No    Urine for toxicology sent today: yes    Last UA date: 1/3/17  Reviewed with patient: yes  Results: Dam neg    AIMS:     Pill count: NA  (Controlled substance only)  Medications needing renewal: see note    Substance use history:    Using alcohol:No  Using street drugs or unprescribed medications: No  Using opioids other that Suboxone:No  Using tobacco:No    Recovery environment:  Attending formal chemical dependency programming: No  Attending \"outside\" mutual help meetings: No  Has a chemical dependency sponsor: Yes: Describe: friend that acts as sponsor  Has other elements of structure: Yes: Describe: TV, read  Current Living Situation: own place    Cravings: no    Sleep: no    Depression: yes 5/5 see psychiatrist at [facility]    Anxiety: yes 4/5    Panic Attacks: no    Paranoia: no, doesn't like crowds    Hallucinations: no    Suicidal Ideation: no    Homicidal Ideation:no  Comments: none    Physical Problems: yes doing follow-up cardiac appts/lab, he's not sure why these are scheduled    [place]  was reviewed prior to seeing patient today. See note below for embedded report.     Note: No issues or concerns regarding Suboxone. Sees psychiatry provider at [facility]. Has [facility] appt today, anxious that he'll get there in time. Doesn't go to meetings, doesn't like [employer], has a friend whom he considers a sponsor who has been sober for a long time. Says he doesn't do much, stays home and watches TV. Concerned about his weight today, briefly discussed healthy " eating and exercise.     Suboxone 8/2 mg sl film, takes tid, #90 0-RF, called to pharmacy, spoke with pharmacist Bryon, order read back for accuracy.    Patient Instructions   Continue Medications as ordered.  No alcohol or unprescribed drug use.  No driving, if sedated.  Come to the Emergency Room if not feeling safe.  Call the clinic with any questions [phone].  Follow up as directed, for your appointments, per your After Visit Summary Form.      Call the clinic if any concerns: [facility] [phone]  [facility] [phone]  and Report to the emergency room if you feel like harming yourself or others or are psychotic        Vicky VAMSI Santos    2017 10:23 AM    MATT BUTTS  Search Criteria: Last Name 'matt' and First Name 'criseldas' and  = ' and Request Period = '16' to  ' - 3 out of 3 Recipients Selected.  Fill Date Product, Str, Form Qty Days Pt ID Prescriber Written RX# N/R* Pharm MED+  ---------- -------------------------------- ------ ---- --------- ---------- ---------- ------------ ----- --------- ------  2017 SUBOXONE 8 MG-2 MG SL FILM 90.00 30 [id number] 2017 [id number] N [id number] 720.0  2016 SUBOXONE 8 MG-2 MG SL FILM 90.00 30 [id number] 2016 [id number] N [id number] 720.0  *N/R N=New R=Refill  +MED Daily  Prescribers for prescriptions listed  ----------------------------------------------------------------------------------------------------------------------------------  [id number] ADAN CARCAMO MD; [address], [place] [address]  Pharmacies that dispensed prescriptions listed  ----------------------------------------------------------------------------------------------------------------------------------  [id number] [facility]; : LUIS # [id number], [address]  [facility] [address],  Patients that match search  criteria  ----------------------------------------------------------------------------------------------------------------------------------  [id number] MATT BUTTS CONCHA, [facility]; [address] [place] [address] , [place] [address]  [id number] MATT BUTTS, [facility]; [address], [place] [address]  [id number] MATT BUTTS, [facility]; [address] , [place] [address]  MED Summary  This section displays cumulative MED values by unique recipient. The MED Max value is the maximum occurrence of cumulative MED  sustained for any 3 consecutive days. This value is calculated based on prescriptions dispensed during the date range requested.  -----------------------------------------------------------------------------------------------------------------------------------  [address]; 1965; [address] [facility] [address], [place] [address]

## 2021-06-08 NOTE — PROGRESS NOTES
"Tobin Bryant is a 54 y.o. male who is being evaluated via a billable telephone visit.      The patient has been notified of following:     \"This telephone visit will be conducted via a call between you and your physician/provider. We have found that certain health care needs can be provided without the need for a physical exam.  This service lets us provide the care you need with a short phone conversation.  If a prescription is necessary we can send it directly to your pharmacy.  If lab work is needed we can place an order for that and you can then stop by our lab to have the test done at a later time.    Telephone visits are billed at different rates depending on your insurance coverage. During this emergency period, for some insurers they may be billed the same as an in-person visit.  Please reach out to your insurance provider with any questions.    If during the course of the call the physician/provider feels a telephone visit is not appropriate, you will not be charged for this service.\"    Patient has given verbal consent to a Telephone visit? Yes    What phone number would you like to be contacted at? [phone]    Patient would like to receive their AVS by AVS Preference: Mail a copy.     10;48 AM  10:59 AM    Additional provider notes: I am seeing Robles for evaluation of some concerns about exposure to COVID-19.  He has a close friend who had the disease.  His friend's mother also  from the disease.  He has been in close contact with him.  He does not have any specific symptoms related to COVID.  No fever chills cough shortness of breath body aches or other symptoms.    Assessment/Plan:  1. Exposure to 2019 novel coronavirus  - Asymptomatic COVID-19 Virus (CORONAVIRUS) PCR; Future    Phone call duration:  11 minutes    Pawan Castro MD;e  "

## 2021-06-08 NOTE — TELEPHONE ENCOUNTER
Orders being requested:   Covid 19 PCR    Reason service is needed/diagnosis:   Possible exposure    When are orders needed by:   ASAP    Where to send Orders: Epic     Okay to leave detailed message?  Yes    States he used the [facility] screening and they informed him since no active symptoms he is to check with his PCP for orders.

## 2021-06-08 NOTE — PROGRESS NOTES
"Tobin Bryant is a 54 y.o. male who is being evaluated via a billable telephone visit.      The patient has been notified of following:     \"This telephone visit will be conducted via a call between you and your physician/provider. We have found that certain health care needs can be provided without the need for a physical exam.  This service lets us provide the care you need with a short phone conversation.  If a prescription is necessary we can send it directly to your pharmacy.  If lab work is needed we can place an order for that and you can then stop by our lab to have the test done at a later time.    If during the course of the call the physician/provider feels a telephone visit is not appropriate, you will not be charged for this service.\"     Tobin Bryant complains of    Chief Complaint   Patient presents with     Bariatric     MWM Initial weight 321#       I have reviewed and updated the patient's Past Medical History, Social History, Family History and Medication List.    ALLERGIES  Seafood and Ibuprofen      Assessment/Plan:    1. Constipation due to pain medication  polyethylene glycol (GLYCOLAX) 17 gram/dose powder   2. Vitamin deficiency  ergocalciferol (VITAMIN D2) 1,250 mcg (50,000 unit) capsule      See below    Phone call contact time 33 min    Call Started at: 12:05  Call Ended at: 12:38      Signature:  Adelina Burnham MD, FAAFP    Bariatric Follow-up    54 y.o.  male BMI:Body mass index is 41.54 kg/m .    Weight:   Wt Readings from Last 1 Encounters:   04/03/20 (!) 289 lb 8 oz (131.3 kg)    pounds  Height: 5' 10\" (1.778 m) (5/6/2020 12:07 PM)  Initial Weight: 321 lbs (4/3/2020  2:00 PM)  Weight: (!) 289 lb 8 oz (131.3 kg) (4/3/2020  2:00 PM)  Weight loss from initial: 31.5 (4/3/2020  2:00 PM)  % Weight loss: 9.81 % (4/3/2020  2:00 PM)  BMI (Calculated): 41.5 (4/3/2020  2:00 PM)  SpO2: 98 % (2/26/2020  1:50 PM)  Waist Circumference (In): 57 Inches (9/3/2019 10:08 AM)  Hip " Circumference (In): 58 Inches (9/3/2019 10:08 AM)  Neck Circumference (In): 19 Inches (9/3/2019 10:08 AM)      Comorbidities:  Patient Active Problem List   Diagnosis     Opioid dependence, on agonist therapy with buprenorphine, Dr. Rincon     COPD (chronic obstructive pulmonary disease) (H)     Chronic mental illness - Yrn     Essential hypertension     YANIRA on CPAP     Gastroesophageal reflux disease without esophagitis     Restrictive lung disease     Moderate episode of recurrent major depressive disorder (H)     GERD (gastroesophageal reflux disease)     Morbid obesity with BMI of 45.0-49.9, adult (H)     Low serum testosterone level     Increased PTH level     Type 2 diabetes mellitus without complication, without long-term current use of insulin (H)     Moderate persistent asthma with acute exacerbation         Interim: Unsure of his weight. Continues vitamin D. Concerned about gynecomastia. Had a friend in [place] die with COVID-19 so staying at home    Plan:  DIET  Considering intermittent fasting. Encouraged 3 meals, protein first. To decrease late night binges and sweet cravings.   EXERCISE minimal lately, some walking to the store and rare mountain climbing exercises   PHARMACOTHERAPY phentermine on and off. Discussed taking a break and restarting. Discussed GLP-1 RA medications. Refilled D and miralax. PTH in 70's. Staying indoors.    using CPAP can help with Testosterone levels. Weight loss also has positive effect on T levels. Encouraged Robles to use his CPAP, resume walking and his mountain climber exercises with consistency. Push ups (or other chest exercises) can help with his core and chest muscles.      -We reviewed his medications and whether associated with weight gain.  Current Outpatient Medications on File Prior to Visit   Medication Sig Dispense Refill     albuterol (PROAIR HFA;PROVENTIL HFA;VENTOLIN HFA) 90 mcg/actuation inhaler Inhale 2 puffs every 6 (six) hours as needed for  wheezing. 1 Inhaler 6     ARIPiprazole (ABILIFY) 10 MG tablet Take 10 mg by mouth daily.  3     blood glucose test (ACCU-CHEK GUIDE) strips Test 2 times a day 100 strip 6     budesonide-formoterol (SYMBICORT) 80-4.5 mcg/actuation inhaler Inhale 2 puffs 2 (two) times a day. 1 Inhaler 6     buPROPion (WELLBUTRIN XL) 150 MG 24 hr tablet Take 1 tablet by mouth every morning.       cloNIDine HCl (CATAPRES) 0.1 MG tablet TAKE 1 TABLET(0.1 MG) BY MOUTH TWICE DAILY AS NEEDED 180 tablet 3     docusate sodium (COLACE) 100 MG capsule Take 1 capsule (100 mg total) by mouth 2 (two) times a day. 60 capsule 1     generic lancets Test 2 times a day 100 each 6     omeprazole (PRILOSEC) 20 MG capsule TAKE 1 CAPSULE(20 MG) BY MOUTH TWICE DAILY BEFORE MEALS 180 capsule 3     phentermine (ADIPEX-P) 37.5 mg tablet Take 1/2 to 1 tablet in the morning. 90 tablet 1     SUBOXONE 8-2 mg Film per sublingual film 1 film three times a day SL 90 Film 2     triamcinolone (KENALOG) 0.1 % ointment Apply topically 2 (two) times a day. 80 g 0     [DISCONTINUED] ergocalciferol (VITAMIN D2) 1,250 mcg (50,000 unit) capsule Take 1 capsule (50,000 Units total) by mouth once a week. 13 capsule 1     [DISCONTINUED] polyethylene glycol (MIRALAX) 17 gram packet Take 17 g by mouth daily as needed.       aspirin 81 MG EC tablet Take 1 tablet (81 mg total) by mouth daily.  0     atorvastatin (LIPITOR) 20 MG tablet Take 1 tablet (20 mg total) by mouth daily. 90 tablet 3     cane Marisel Use as needed to assist with ambulation.   Pt with COPD. 1 each 0     furosemide (LASIX) 20 MG tablet TAKE 1 TABLET BY MOUTH EVERY DAY 90 tablet 3     No current facility-administered medications on file prior to visit.         We discussed [facility] Bariatric Basics including:  -eating 3 meals daily  -eating protein first  -eating slowly, chewing food well  -avoiding/limiting calorie containing beverages  -choosing wheat, not white with breads, crackers, pastas, kendal, bagels,  tortillas, rice  -limiting restaurant or cafeteria eating to twice a week or less  -We discussed the importance of restorative sleep and stress management in maintaining a healthy weight.  -We discussed insulin resistance and glycemic index as it relates to appetite and weight control  -We discussed the National Weight Control Registry healthy weight maintenance strategies and ways to optimize metabolism.  -We discussed the importance of physical activity including cardiovascular and strength training in maintaining a healthier weight and explored viable options.    Most recent labs:  Lab Results   Component Value Date    WBC 7.1 07/11/2019    HGB 13.3 (L) 07/11/2019    HCT 40.2 07/11/2019    MCV 91 07/11/2019     07/11/2019     Lab Results   Component Value Date    CHOL 178 07/11/2019     Lab Results   Component Value Date    HDL 52 07/11/2019     Lab Results   Component Value Date    LDLCALC 106 07/11/2019     Lab Results   Component Value Date    TRIG 99 07/11/2019     Lab Results   Component Value Date    ALT 12 03/04/2020    AST 15 03/04/2020    ALKPHOS 68 03/04/2020    BILITOT 0.7 03/04/2020     Lab Results   Component Value Date    HGBA1C 5.6 02/18/2020     Lab Results   Component Value Date    EZJTTLVI32 710 09/03/2019     Lab Results   Component Value Date    AAYPKGTY59QM 9.4 (L) 03/04/2020     Lab Results   Component Value Date    FERRITIN 114 09/03/2019     Lab Results   Component Value Date    PTH 76 03/04/2020     Lab Results   Component Value Date    TSH 1.72 01/25/2018         DIETARY HISTORY  Meals Per Day: 2-3  Eating Protein First?: sometimes  Food Diary: B:breakfast sandwiches with eggs, and sausage on muffin sometimes cereal with whole milk and frosted flakes L:polish sausage D:1 polish sausage and 2 bowls of cereal  Snacks Per Day: yes  Typical Snack: cereal at night  Fluid Intake: crystal light  Portion Control: improved  Calorie Containing Beverages: no  Alcohol per week: no  Typical  "Protein Food Choices: variety  Choosing Whole Grains: some wheat bread  Grocery Shopping is done by: himself  Food Preparation is done by: himself  Meals at Restaurant/Cafeteria/Take Out Per Week: no  Eating at the Table: in Bedroom  TV is Off During Meals: no    Positive Changes Since Last Visit: maintaining 20# weight loss  Struggling With: exercise d/t COVID avoiding     Knowledgeable in Reading Food Labels: yes  Getting Adequate Protein: yes  Sleeping 7-8 hours/day will start using CPAP  Stress management doing OK but is stressed    PHYSICAL ACTIVITY PATTERNS:  Cardiovascular: walking, mountain climber exercises  Strength Training: not a lot    REVIEW OF SYSTEMS  GENERAL/CONSTITUTIONAL:  Fatigue: yes  CARDIOVASCULAR:  Chest Pain with Exertion: no  PULMONARY:  Dyspnea on exertion: yes  CPAP Use: on and off  Tobacco Use: no  GASTROINTESTINAL:  GERD/Heartburn: on PPI  UROLOGIC:  Urinary Symptoms: no  NEUROLOGIC:  Headaches: no new  Paresthesias: no  PSYCHIATRIC:  Moods: OK  MUSCULOSKELETAL/RHEUMATOLOGIC  Arthralgias: yes  Myalgias: yes  ENDOCRINE:  Monitoring Blood Sugars: yes  Sugars Well Controlled: yes-reports 60's to 1teens  DERMATOLOGIC:  Rashes: no    PHYSICAL EXAM: (most recent vitals and today's stated weight)  Vitals: Ht 5' 10\" (1.778 m)   BMI 41.54 kg/m    Height: 5' 10\" (1.778 m) (5/6/2020 12:07 PM)  Initial Weight: 321 lbs (4/3/2020  2:00 PM)  Weight: (!) 289 lb 8 oz (131.3 kg) (4/3/2020  2:00 PM)  Weight loss from initial: 31.5 (4/3/2020  2:00 PM)  % Weight loss: 9.81 % (4/3/2020  2:00 PM)  BMI (Calculated): 41.5 (4/3/2020  2:00 PM)  SpO2: 98 % (2/26/2020  1:50 PM)  Waist Circumference (In): 57 Inches (9/3/2019 10:08 AM)  Hip Circumference (In): 58 Inches (9/3/2019 10:08 AM)  Neck Circumference (In): 19 Inches (9/3/2019 10:08 AM)      GEN: Pleasant and in no acute distress  PSYCH: A&OX3,     I have reviewed the note as documented above.  This accurately captures the substance of my conversation with " the patient.  Thank you for the opportunity to participate in the care of your patient.    Adelina Burnham MD, FAAFP  [facility]  Diplomate, American Board of Obesity Medicine

## 2021-06-08 NOTE — PROGRESS NOTES
Office Visit - New Patient   Tobin Bryant   51 y.o.  male    Date of visit: 1/19/2017  Physician: Pawan Castro MD     Assessment and Plan   1. Generalized anxiety disorder  Continue management as outlined by psychiatry, seems to be well-controlled    2. Simple chronic bronchitis  Patient has had pulmonary function tests most recently I believe in 2015 showing mild restriction with an FEV1 of 68%    3. Essential hypertension with goal blood pressure less than 140/90  Blood pressure control, continue current medications    4. Opioid dependence in remission  He is on Suboxone, continue    5. YANIRA on CPAP    6. Chest pain, unspecified type  EKG without ischemic changes check labs and imaging as below, stress test  - HM2(CBC w/o Differential)  - Comprehensive Metabolic Panel  - Lipid Cascade  - Glycosylated Hemoglobin A1c  - Thyroid Cascade  - Electrocardiogram Perform - Clinic  - XR Chest PA and Lateral  - NM Pharmacologic Stress Test; Future    7. Obesity  See below    8. Screening for colon cancer  - Cologuard    9. Gastroesophageal reflux disease without esophagitis  Continue omeprazole    The following high BMI interventions were performed this visit: lifestyle education regarding diet    Return in about 4 weeks (around 2/16/2017) for recheck.     Chief Complaint   Chief Complaint   Patient presents with     Establish Care     Wants to make sure his heart is ok.          Patient Profile   Social History     Social History Narrative    Lives alone.  Not working (last worked 2006).  Originally from [place], some college.        Past Medical History   Patient Active Problem List   Diagnosis     Opioid dependence, on agonist therapy with buprenorphine, Dr. Rincon     Generalized anxiety disorder     COPD (chronic obstructive pulmonary disease)     Chronic mental illness - Yrn     Essential hypertension     YANIRA on CPAP     Gastroesophageal reflux disease without esophagitis       Past Surgical  History  He has a past surgical history that includes Keloid excision and Cataract extraction (Right).     History of Present Illness   This 51 y.o. old man comes in believed to establish care.  His medical history was reviewed, the electronic medical record is updated and it is reflected in his note.  His main concern today is chest discomfort.  This occurs when he is exerting himself maximally.  He developed pain under his left breast which is worse when he breathes.  He occasionally has sharp pain on his right shoulder.  He has no associated nausea but he has diaphoretic as he always sweats when he exerts himself.  I did access previous records to [facility] in [place] he had a stress echocardiogram which did not show any evidence of ischemia although it appears some of the images were difficult to obtain.  He has had previous chest x-rays were unremarkable in previous EKGs which showed normal sinus rhythm.  He does not smoke , lipids have generally been okay with his most recent  most recent HDL 49 and normal triglycerides.  He has had borderline blood sugars with a hemoglobin A1c of 6%.  He has never had a heart attack or stenting.  No history of stroke or peripheral vascular disease.  He does have underlying sleep apnea.  Uses a CPAP machine.  He struggles with his weight.    Review of Systems: A comprehensive review of systems was negative except as noted.     Medications and Allergies   Current Outpatient Prescriptions   Medication Sig Dispense Refill     albuterol (VENTOLIN HFA) 90 mcg/actuation inhaler Inhale 2 puffs every 6 (six) hours as needed for wheezing. 8.5 Inhaler 1     ARIPiprazole (ABILIFY) 10 MG tablet Take 10 mg by mouth daily.  3     atenolol (TENORMIN) 50 MG tablet Take 1 tablet (50 mg total) by mouth daily. 30 tablet 1     buprenorphine-naloxone (SUBOXONE) 8-2 mg Film per sublingual film Place 1 Film under the tongue 3 (three) times a day. NADEAN: [id number] 90 Film 0     buPROPion  "(WELLBUTRIN XL) 150 MG 24 hr tablet Take 1 tablet by mouth every morning.       cane Marisel Use as needed to assist with ambulation.   Pt with COPD. 1 each 0     cloNIDine HCl (CATAPRES) 0.1 MG tablet Take 1 tablet (0.1 mg total) by mouth 2 (two) times a day. 30 tablet 1     omeprazole (PRILOSEC) 20 MG capsule Take 1 capsule (20 mg total) by mouth 2 (two) times a day. 60 capsule 0     No current facility-administered medications for this visit.      Allergies   Allergen Reactions     Seafood Other (See Comments)     Eyes turn yellow          Family and Social History   Family History   Problem Relation Age of Onset     No Medical Problems Mother      No Medical Problems Father      No Medical Problems Sister      No Medical Problems Brother      No Medical Problems Sister      No Medical Problems Sister      No Medical Problems Sister      No Medical Problems Brother      No Medical Problems Daughter      No Medical Problems Son         Social History   Substance Use Topics     Smoking status: Never Smoker     Smokeless tobacco: Never Used     Alcohol use No        Physical Exam   General Appearance:   No acute distress    Visit Vitals     /76 (Patient Site: Left Arm, Patient Position: Sitting, Cuff Size: Adult Regular)     Pulse 77     Ht 5' 10\" (1.778 m)     Wt (!) 315 lb (142.9 kg)     SpO2 98%     BMI 45.2 kg/m2       EYES: Eyelids, conjunctiva, and sclera were normal. Pupils were normal. Cornea, iris, and lens were normal bilaterally.  HEAD, EARS, NOSE, MOUTH, AND THROAT: Head and face were normal. Hearing was normal to voice and the ears were normal to external exam. Nose appearance was normal and there was no discharge. Oropharynx was normal.  NECK: Neck appearance was enlarged. There were no neck masses and the thyroid was not enlarged.  RESPIRATORY: Breathing pattern was normal and the chest moved symmetrically.  Percussion/auscultatory percussion was normal.  Lung sounds were normal and there were no " abnormal sounds.  CARDIOVASCULAR: Heart rate and rhythm were normal.  S1 and S2 were normal and there were no extra sounds or murmurs. Peripheral pulses in arms and legs were normal.  Jugular venous pressure was normal.  There was no peripheral edema.  GASTROINTESTINAL: The abdomen was obese in contour.  Bowel sounds were present.  Percussion detected no organ enlargement or tenderness.  Palpation detected no tenderness, mass, or enlarged organs.   MUSCULOSKELETAL: Skeletal configuration was normal and muscle mass was normal for age. Joint appearance was overall normal.  LYMPHATIC: There were no enlarged nodes.  SKIN/HAIR/NAILS: Skin color was normal.  There were no skin lesions.  Hair and nails were normal.  NEUROLOGIC: The patient was alert and oriented to person, place, time, and circumstance. Speech was normal. Cranial nerves were normal. Motor strength was normal for age. The patient was normally coordinated.  PSYCHIATRIC:  Mood and affect were normal and the patient had normal recent and remote memory. The patient's judgment and insight were normal.     Additional Information   Review and/or order of clinical lab tests: Reviewed  Review and/or order of radiology tests: Reviewed  Review and/or order of medicine tests: Reviewed  Discussion of test results with performing physician:  Decision to obtain old records and/or obtain history from someone other than the patient: Patient filled out consent to access [facility] records  Review and summarization of old records and/or obtaining history from someone other than the patient and.or discussion of case with another health care provider:  Independent visualization of image, tracing or specimen itself: Personally reviewed his EKG, normal sinus rhythm with nonspecific T-wave abnormalit    Time:      Pawan Castro MD  Internal Medicine  Contact me at None

## 2021-06-08 NOTE — PROGRESS NOTES
"Addiction  Nurse Only Visit Note    1/3/2017  Date of last visit: 11/21/16    Reason for today's visit:   Chief Complaint   Patient presents with     Follow-up       Vitals:    01/03/17 1034   BP: 116/71   Patient Site: Right Arm   Patient Position: Sitting   Cuff Size: Adult Large   Pulse: 86   Temp: 98.7  F (37.1  C)   TempSrc: Oral   Weight: (!) 313 lb (142 kg)   Height: 5' 10\" (1.778 m)        If having pain, who will address pain:  No pain    Is pt assisted: No    Urine for toxicology sent today: yes    Last UA date: 11/21/16  Reviewed with patient: yes  Results: DAM neg, BUP pos    AIMS:     Pill count: NA  (Controlled substance only)  Medications needing renewal: see note    Substance use history:    Using alcohol:No  Using street drugs or unprescribed medications: No  Using opioids other that Suboxone:No  Using tobacco:No    Recovery environment:  Attending formal chemical dependency programming: No  Attending \"outside\" mutual help meetings: No  Has a chemical dependency sponsor: Yes: Describe: 2-3 times month  Has other elements of structure: Yes: Describe: watch TV  Current Living Situation: lives alone, own place    Cravings: no    Sleep: no , sleeps 6-8 hrs/noc    Depression: yes 5/5, sees psychiatrist at [facility], has appt coming up    Anxiety: yes 4.5/5    Panic Attacks: no    Paranoia: no    Hallucinations: yes little bit, voices say \"look around\"    Suicidal Ideation: no    Homicidal Ideation:no  Comments: none    Physical Problems: no    [place]  was reviewed prior to seeing patient today. See below for embedded report.    Note: No issues or concerns regarding the Suboxone. Arrived early for appt and then requests if he can go early. UA collected and then was able to be seen. Reports his depression and anxiety are high, some voices, has appt with psychiatric provider at [facility] coming up, has emergency contact information if needed.     Suboxone 8/2 mg sl film, takes tid, #90 0-RF, called to " pharmacy, spoke with pharmacist Bryon, order read back for accuracy, next appt, 17, Nurse Only Clinic.    Patient Instructions   Continue Medications as ordered.  No alcohol or unprescribed drug use.  No driving, if sedated.  Come to the Emergency Room if not feeling safe.  Call the clinic with any questions [phone].  Follow up as directed, for your appointments, per your After Visit Summary Form.      Call the clinic if any concerns: [facility] [phone]  [facility] [phone]  and Report to the emergency room if you feel like harming yourself or others or are psychotic        Vicky K Danielle    1/3/2017 10:40 AM  MATT BUTTS  Search Criteria: Last Name 'matt' and First Name 'criseldas' and  =  and Request Period = '10/01/16' to  ' - 3 out of 3 Recipients Selected.  Fill Date Product, Str, Form Qty Days Pt ID Prescriber Written RX# N/R* Pharm MED+  ---------- -------------------------------- ------ ---- --------- ---------- ---------- ------------ ----- --------- ------  2016 SUBOXONE 8 MG-2 MG SL FILM 90.00 30 [id number] 2016 [id number] N [id number] 720.0  2016 SUBOXONE 8 MG-2 MG SL FILM 90.00 30 [id number] 2016 [id number] N [id number] 720.0  *N/R N=New R=Refill  +MED Daily  Prescribers for prescriptions listed  ----------------------------------------------------------------------------------------------------------------------------------  [id number] ADAN CARCAMO MD; [address], [place] [address]  Pharmacies that dispensed prescriptions listed  ----------------------------------------------------------------------------------------------------------------------------------  [id number] [facility]; : [facility] # [id number], [address] [facility] [address],  Patients that match search  criteria  ----------------------------------------------------------------------------------------------------------------------------------  [id number] MATT BUTTS CONCHA, [facility]; [address] [place] [address] , [place] [address]  [id number] MATT BUTTS, [facility] 67; [address], [place] [address]  [id number] MATT BUTTS, [facility]; [address] [address] , [place] [address]  MED Summary  This section displays cumulative MED values by unique recipient. The MED Max value is the maximum occurrence of cumulative MED  sustained for any 3 consecutive days. This value is calculated based on prescriptions dispensed during the date range requested.  -----------------------------------------------------------------------------------------------------------------------------------  [address]; 1965; [address] [address], [place] [address]

## 2021-06-08 NOTE — PROGRESS NOTES
Chart was reviewed.  Concur with RN assessment and medications prescribed until next scheduled f/u.  Dr. Gonzales covering for Dr. Rincon

## 2021-06-08 NOTE — TELEPHONE ENCOUNTER
Spoke with the patient and relayed message below from Dr. Castro.  Patient verbalized understanding and has scheduled a phone visit with Dr. Castro for tomorrow to discuss.  He had no further questions at this time.  Alondra LOMBARDO CMA/CHRISSY....................1:11 PM

## 2021-06-09 NOTE — PROGRESS NOTES
Correct pharmacy verified with patient and confirmed in snapshot? [x] yes []no    Medications Phoned  to Pharmacy [] yes [x]no  Name of Pharmacist:  List Medications, including dose, quantity and instructions      Medication Prescriptions given to patient   [] yes  [x] no   List the name of the drug the prescription was written for.       Medications ordered this visit were e-scribed.  Verified by order class [x] yes  [] no  Ventolin, Suboxone, and Clonidine  Medication changes or discontinuations were communicated to patient's pharmacy: [] yes  [x] no    UA collected [x] yes    [] no    [place] Prescription Monitoring Program Reviewed? [x] yes  [] no    Referrals were made to: none    Future appointment was made: [x] yes  [] no  5/1/17  Dictation completed at time of chart check: [x] yes  [] no    I have checked the documentation for today s encounters and the above information has been reviewed and completed.

## 2021-06-09 NOTE — TELEPHONE ENCOUNTER
"Called pt to discuss his questions regarding medication. He would like to talk with  about possibly starting a medication that targets \"belly fat\" since he feels that is where his problem mainly is. We discussed using the medication that has been prescribed and increasing his physical activity. Pt would still like to talk with  if possible. I let him know that I will pass the message on to her and he verbalized understanding.    Yaneli Gibbons RN, CBN  [facility]  P [phone]  F [phone]    "

## 2021-06-09 NOTE — TELEPHONE ENCOUNTER
Coronavirus (COVID-19) Notification    Lab Result   Lab test 2019-nCoV rRt-PCR OR SARS-COV-2 PCR    Nasopharyngeal AND/OR Oropharyngeal swab is NEGATIVE for 2019-nCoV RNA [OR] SARS-COV-2 RNA (COVID-19) RNA    Your result was negative. This means that we didn't find the virus that causes COVID-19 in your sample. A test may show negative when you do actually have the virus. This can happen when the virus is in the early stages of infection, before you feel illness symptoms.    Tobin notified of above information.  He is aware letter was sent on 6/20/20.    If you have symptoms   Stay home and away from others (self-isolate) until you meet ALL of the guidelines below:    You've had no fever--and no medicine that reduces fever--for 3 full days (72 hours). And      Your other symptoms have gotten better. For example, your cough or breathing has improved. And     At least 10 days have passed since your symptoms started.    During this time:    Stay home. Don't go to work, school or anywhere else.     Stay in your own room, including for meals. Use your own bathroom if you can.    Stay away from others in your home. No hugging, kissing or shaking hands. No visitors.    Clean  high touch  surfaces often (doorknobs, counters, handles, etc.). Use a household cleaning spray or wipes. You can find a full list on the EPA website at www.epa.gov/pesticide-registration/list-n-disinfectants-use-against-sars-cov-2.    Cover your mouth and nose with a mask, tissue or washcloth to avoid spreading germs.    Wash your hands and face often with soap and water.    Going back to work  Check with your employer for any guidelines to follow for going back to work.  You are sent a letter for your Employer which will serve as formal document notice that you, the employee, tested negative for COVID-19, as of the testing date shown above.    If your symptoms worsen or other concerning symptoms, contact PCP, oncare or consider returning to  Emergency Dept.    Where can I get more information?    [facility]: [email or web address]    Coronavirus Basics: [email or web address]/coronavirus/basics.html    [facility] Hotline (991-220-0396)    {Name]  Inder Caldera RN  [facility]  Emergency Dept Lab Result RN  Ph# [phone]

## 2021-06-09 NOTE — TELEPHONE ENCOUNTER
"Discussed protecting his sleep, managing stress, eating protein first, exercising to keep his cortisol and insulin levels down. Restart phentermine after this 2 week break and it will help him to do that and keep the momentum going. He has lost 32# so far. Encouraged core strengthening to tone. No specific medication for \"belly fat.\"  "

## 2021-06-09 NOTE — PROGRESS NOTES
Correct pharmacy verified with patient and confirmed in snapshot? [x] yes []no    Medications Phoned  to Pharmacy [] yes [x]no  Name of Pharmacist:  List Medications, including dose, quantity and instructions      Medication Prescriptions given to patient   [] yes  [x] no   List the name of the drug the prescription was written for.      Medications ordered this visit were e-scribed.  Verified by order class [x] yes  [] no  Suboxone    Medication changes or discontinuations were communicated to patient's pharmacy:  [] yes  [x] no  Pharmacist Spoke With:     UA collected [x] yes  [] no    [place] Prescription Monitoring Program Reviewed? [x] yes  [] no    Referrals/Labs were made to: None    Completed Charge Capture?  [x] yes  [] no    Future appointment was made: [x] yes  [] no  4/3/17  Dictation completed at time of chart check: [x] yes  [] no    I have checked the documentation for today s encounters and the above information has been reviewed and completed.       23

## 2021-06-09 NOTE — TELEPHONE ENCOUNTER
Pt would like a call from Dr. Burnham if possible as has questions on a specific medication he's interested in.     [phone]  **OK to leave detailed VM**    NO idea how I ended up with right justification!!

## 2021-06-10 NOTE — PROGRESS NOTES
This video/telephone visit will be conducted via a call between you and your physician/provider. We have found that certain health care needs can be provided without the need for an in-person physical exam. This service lets us provide the care you need with a video /telephone conversation. If a prescription is necessary we can send it directly to your pharmacy. If lab work is needed we can place an order for that and you can then stop by our lab to have the test done at a later time.    Just as we bill insurance for in-person visits, we also bill insurance for video/telephone visits. If you have questions about your insurance coverage, we recommend that you speak with your insurance company.    Patient has given verbal consent for video/Telephone visit? Yes  Patient would like telephone visit, please call:  [phone]  JOSS/FAHEEM ZHOU CMA    Patient verified allergies, medications and pharmacy via phone. PHQ: 3 and SHABBIR: 0 done verbally with writer. Patient states he is ready for visit.    Unable to review [place] , awaiting access.

## 2021-06-10 NOTE — PROGRESS NOTES
Correct pharmacy verified with patient and confirmed in snapshot? [x] yes []no    Medications Phoned  to Pharmacy [] yes [x]no  Name of Pharmacist:  List Medications, including dose, quantity and instructions      Medication Prescriptions given to patient   [] yes  [x] no   List the name of the drug the prescription was written for.      Medications ordered this visit were e-scribed.  Verified by order class [x] yes  [] no  Suboxone    Medication changes or discontinuations were communicated to patient's pharmacy:  [] yes  [x] no  Pharmacist Spoke With:     UA collected [x] yes  [] no    [place] Prescription Monitoring [place] Reviewed? [x] yes  [] no    Referrals/Labs were made to: None    Completed Charge Capture?  [x] yes  [] no    Future appointment was made: [x] yes  [] no  5/30/17  Dictation completed at time of chart check: [] yes  [x] no    I have checked the documentation for today s encounters and the above information has been reviewed and completed.

## 2021-06-10 NOTE — TELEPHONE ENCOUNTER
8/17/20 Received call from patient requesting a refill on his constipation medication. Pt stated the medication melted when his house was being treated. He stated he had contacted the Pharmacy, but was directed to call the doctor.       Best number to call :  [phone]    Pharmacy: [facility] on [address] [phone]

## 2021-06-10 NOTE — PROGRESS NOTES
Office Visit - Follow Up   Tobin Bryant   51 y.o. male    Date of Visit: 4/28/2017    Chief Complaint   Patient presents with     Asthma        Assessment and Plan   1. Simple chronic bronchitis  There is no evidence of obstruction on his PFTs but he does have some air trapping and his pulmonologist therefore has given him bronchodilators which seemed to help occasionally when he uses them.  I think most of his breathing issues are from his weight.    2. YANIRA on CPAP  Continue CPAP    3. Opioid dependence in remission  Main sober on Suboxone    4. Generalized anxiety disorder  Generally stable, on clonidine also uses Wellbutrin    5. Gastroesophageal reflux disease without esophagitis  Tinea omeprazole, weight loss    6. Essential hypertension with goal blood pressure less than 140/90  Pressures controlled, taking atenolol    The following high BMI interventions were performed this visit: encouragement to exercise and lifestyle education regarding diet    Return in about 6 months (around 10/28/2017) for recheck.     History of Present Illness   This 51 y.o. old man comes in for follow-up of numerous medical problems.  He received a letter from his insurance company stating that he had lung disease.  This is very concerning for him.  He does see a pulmonologist and has mild restrictive lung disease and possible air-trapping.  He has been put on inhalers which he uses intermittently.  He has mild obstructive sleep apnea and has been using CPAP almost every night.  He is overweight.  He has some dyspnea on exertion.  We did stress test earlier this year which is negative for inducible ischemia.  He does not exercise much.  He denies history of smoking.  No cough.  No hemoptysis.  No chest pain or tightness.  He is not using opioids or other drugs.  Remains on Suboxone.  Reflux generally controlled.    Review of Systems: A comprehensive review of systems was negative except as noted.     Medications, Allergies and  "Problem List   Reviewed and updated     Physical Exam   General Appearance:   No acute distress    /80 (Patient Site: Left Arm, Patient Position: Sitting, Cuff Size: Adult Regular)  Pulse 73  Ht 5' 10\" (1.778 m)  Wt (!) 315 lb (142.9 kg)  SpO2 98%  BMI 45.2 kg/m2    HEENT exam is unremarkable, neck supple, no cervical lymphadenopathy, cardiovascular regular rate and rhythm no murmur gallop or rub, lungs are clear to auscultation bilaterally, abdomen soft nontender nondistended no organomegaly, neurologic exam is nonfocal, psychiatric pleasant, no confusion or agitation        Additional Information   Current Outpatient Prescriptions   Medication Sig Dispense Refill     albuterol (VENTOLIN HFA) 90 mcg/actuation inhaler Inhale 2 puffs every 6 (six) hours as needed for wheezing. 8.5 Inhaler 1     ARIPiprazole (ABILIFY) 10 MG tablet Take 10 mg by mouth daily.  3     atenolol (TENORMIN) 50 MG tablet Take 1 tablet (50 mg total) by mouth daily. 30 tablet 1     buprenorphine-naloxone (SUBOXONE) 8-2 mg Film per sublingual film Place 1 Film under the tongue 3 (three) times a day. 90 Film 0     buPROPion (WELLBUTRIN XL) 150 MG 24 hr tablet Take 1 tablet by mouth every morning.       cane Marisel Use as needed to assist with ambulation.   Pt with COPD. 1 each 0     cloNIDine HCl (CATAPRES) 0.1 MG tablet Take 1 tablet (0.1 mg total) by mouth 2 (two) times a day as needed. 60 tablet 0     omeprazole (PRILOSEC) 20 MG capsule Take 1 capsule (20 mg total) by mouth 2 (two) times a day. 60 capsule 0     No current facility-administered medications for this visit.      Allergies   Allergen Reactions     Seafood Other (See Comments)     Eyes turn yellow       Social History   Substance Use Topics     Smoking status: Never Smoker     Smokeless tobacco: Never Used     Alcohol use No       Review and/or order of clinical lab tests:  Review and/or order of radiology tests:  Review and/or order of medicine tests:  Discussion of test " results with performing physician:  Decision to obtain old records and/or obtain history from someone other than the patient:  Review and summarization of old records and/or obtaining history from someone other than the patient and.or discussion of case with another health care provider:  Independent visualization of image, tracing or specimen itself:    Time:      Pawan Castro MD

## 2021-06-10 NOTE — PROGRESS NOTES
Correct pharmacy verified with patient and confirmed in snapshot? [x] yes []no    Medications Phoned  to Pharmacy [] yes [x]no  Name of Pharmacist:  List Medications, including dose, quantity and instructions      Medication Prescriptions given to patient   [] yes  [x] no   List the name of the drug the prescription was written for.       Medications ordered this visit were e-scribed.  Verified by order class [x] yes  [] no   Suboxone 8-2 mg film   Medication changes or discontinuations were communicated to patient's pharmacy: [] yes  [x] no    UA collected [x] yes    [] no    [place] Prescription Monitoring [place] Reviewed? [x] yes  [] no    Referrals were made to:  None  Future appointment was made: [x] yes  [] no    Dictation completed at time of chart check: [x] yes  [] no    I have checked the documentation for today s encounters and the above information has been reviewed and completed.

## 2021-06-10 NOTE — TELEPHONE ENCOUNTER
Patient is requesting a print out of his weight loss from since he started seeing the weight loss clinic. I will print this off and give to Dr Castro's assistant to give to patient at his appointment tomorrow.    Brielle Lewis, CMA

## 2021-06-10 NOTE — TELEPHONE ENCOUNTER
Who is calling:  patient  Reason for Call:  Would like call back from his nurse regarding his weight loss management records.  Date of last appointment with primary care: 06/17/20  Okay to leave a detailed message: Yes

## 2021-06-10 NOTE — PROGRESS NOTES
________________________________________  Medications Phoned  to Pharmacy [] yes [x]no  Name of Pharmacist:  List Medications, including dose, quantity and instructions    Medications ordered this visit were e-scribed.  Verified by order class [x] yes  [] no  Albuterol Inhaler  Colace 100 mg  Suboxone 8-2 mg    Medication changes or discontinuations were communicated to patient's pharmacy: [] yes  [x] no    Dictation completed at time of chart check: [] yes  [x] no    I have checked the documentation for today s encounters and the above information has been reviewed and completed.

## 2021-06-10 NOTE — PROGRESS NOTES
"  Office Visit - Follow Up   Tobin Bryant   51 y.o. male    Date of Visit: 5/26/2017    Chief Complaint   Patient presents with     Paperwork        Assessment and Plan   1. COPD (chronic obstructive pulmonary disease)  - albuterol (VENTOLIN HFA) 90 mcg/actuation inhaler; Inhale 2 puffs every 6 (six) hours as needed for wheezing.  Dispense: 8.5 Inhaler; Refill: 1    2. Generalized anxiety disorder / Chronic mental illness - Yrn  I filled out his student loan paperwork to the best of my abilities.  We did review his Yrn consultation.  I do think it is difficult for him to work and he is on disability for his mental illness.  I think he has a severe impairment of his global function.    The following high BMI interventions were performed this visit: encouragement to exercise and lifestyle education regarding diet    Return in about 4 weeks (around 6/23/2017) for recheck.     History of Present Illness   This 51 y.o. old man with major depressive disorder with psychosis and schizoaffective disorder comes in for paperwork regarding his student loans.  He would like them for given.  He reports me that he is on Social Security and disability.  He lives independently.  He does not interact with many people.  He has severe social anxiety.  He is unable to hold a job because of his social anxiety and he also has occasional hallucinations.  He has chronic low back pain which makes prolonged physical labor difficult.  He has difficulty directing with other people.    Review of Systems: A comprehensive review of systems was negative except as noted.     Medications, Allergies and Problem List   Reviewed and updated     Physical Exam   General Appearance:   No acute distress, obese    /82 (Patient Site: Right Arm, Patient Position: Sitting, Cuff Size: Adult Regular)  Pulse 87  Ht 5' 10\" (1.778 m)  Wt (!) 317 lb (143.8 kg)  SpO2 95%  BMI 45.48 kg/m2         Additional Information   Current Outpatient " Prescriptions   Medication Sig Dispense Refill     albuterol (VENTOLIN HFA) 90 mcg/actuation inhaler Inhale 2 puffs every 6 (six) hours as needed for wheezing. 8.5 Inhaler 1     ARIPiprazole (ABILIFY) 10 MG tablet Take 10 mg by mouth daily.  3     atenolol (TENORMIN) 50 MG tablet Take 1 tablet (50 mg total) by mouth daily. 30 tablet 1     buprenorphine-naloxone (SUBOXONE) 8-2 mg Film per sublingual film Place 1 Film under the tongue 3 (three) times a day. 90 Film 0     buPROPion (WELLBUTRIN XL) 150 MG 24 hr tablet Take 1 tablet by mouth every morning.       cane Marisel Use as needed to assist with ambulation.   Pt with COPD. 1 each 0     cloNIDine HCl (CATAPRES) 0.1 MG tablet Take 1 tablet (0.1 mg total) by mouth 2 (two) times a day as needed. 60 tablet 0     omeprazole (PRILOSEC) 20 MG capsule Take 1 capsule (20 mg total) by mouth 2 (two) times a day. 60 capsule 0     No current facility-administered medications for this visit.      Allergies   Allergen Reactions     Seafood Other (See Comments)     Eyes turn yellow       Social History   Substance Use Topics     Smoking status: Never Smoker     Smokeless tobacco: Never Used     Alcohol use No     Time: total time spent with the patient was 25 minutes of which >50% was spent in counseling and coordination of care     Pawan Castro MD

## 2021-06-10 NOTE — TELEPHONE ENCOUNTER
Who is calling:  Patient   Reason for Call:  Patient stated that he had a test for his heart far back, not sure of time frame. Patient stated he wants to discuss those result with Pawan Castro MD again. Patient stated it is not blood work tests. Patient stated Pawan Castro MD would know. Patient declined to give further information.  Date of last appointment with primary care: n/a  Okay to leave a detailed message: Yes  [phone]

## 2021-06-10 NOTE — TELEPHONE ENCOUNTER
Spoke with the patient and relayed message below from Dr. Castro.  He verbalized understanding and will discuss further at his upcoming appointment with Dr. Castro.  Alondra LOMBARDO CMA/CHRISSY....................1:35 PM

## 2021-06-10 NOTE — TELEPHONE ENCOUNTER
Spoke to patient and let him know that Dr Brunner sent over a refill for the colace on 8-7-20 with 1 additional refill on it.  The patient said he is going to call over to his pharmacy.

## 2021-06-11 NOTE — TELEPHONE ENCOUNTER
Pt called the clinic and we discussed having his labs drawn next week. Appointment scheduled for 9/10/2020 at 0800 at the [facility] lab.    Yaneli Gibbons RN, CBN  [facility]  P [phone]  F [phone]

## 2021-06-11 NOTE — PROGRESS NOTES
Office Visit - Follow Up   Tobin Bryant   55 y.o. male    Date of Visit: 8/28/2020    Chief Complaint   Patient presents with     Referral     ct head         Assessment and Plan   1. Atypical chest pain  Etiology uncertain.  Stress test for similar symptoms 2017 was unremarkable.  Certainly has risk factors for coronary heart disease.  Not sure what the next best test is, repeat of the stress test or a CTA and will appreciate cardiology opinion  - Ambulatory referral to Cardiology    2. Screen for colon cancer    3. Essential hypertension  Blood pressure controlled continue same    4. Gastroesophageal reflux disease without esophagitis  Denies symptoms of reflux although current chest pain symptoms could be related to reflux    5. Morbid obesity with BMI of 45.0-49.9, adult (H)  The following high BMI interventions were performed this visit: encouragement to exercise and lifestyle education regarding diet    Return in about 4 weeks (around 9/25/2020) for recheck.     History of Present Illness   This 55 y.o. old man comes in today for evaluation of some chest pain he has been having.  It is on the left side.  There is no classic pattern although it does tend to occur more frequently in the evening.  It is described as sharp and dull and squeezing at times.  It does not radiate.  Not associated with shortness of breath.  Not exertional.  No radiation to the jaw or arm.  Previously has had nuclear medicine stress test which was unremarkable and echocardiogram which is generally unremarkable.  Also has some underlying pulmonary disease and follows with pulmonology.  States that reflux is controlled.    Review of Systems: A comprehensive review of systems was negative except as noted.     Medications, Allergies and Problem List   Reviewed, reconciled and updated  Post Discharge Medication Reconciliation Status:      Physical Exam   General Appearance:   No acute distress    /70 (Patient Site: Left Arm,  "Patient Position: Sitting, Cuff Size: Adult Regular)   Pulse 84   Ht 5' 10\" (1.778 m)   Wt (!) 310 lb (140.6 kg)   SpO2 95%   BMI 44.48 kg/m      HEENT exam is unremarkable  Neck supple no thyromegaly or nodule palpable  Lymphatic no cervical lymphadenopathy  Cardiovascular regular rate and rhythm no murmur gallop or rub  Pulmonary lungs are clear to auscultation bilaterally  Gastrointestinal abdomen soft nontender nondistended no organomegaly  Neurologic exam is non focal  Psychiatric pleasant, no confusion or agitation        Additional Information   Current Outpatient Medications   Medication Sig Dispense Refill     ACCU-CHEK GUIDE TEST STRIPS strips TEST TWICE DAILY 60 strip 6     albuterol (PROAIR HFA;PROVENTIL HFA;VENTOLIN HFA) 90 mcg/actuation inhaler Inhale 2 puffs every 6 (six) hours as needed for wheezing. 1 Inhaler 6     ARIPiprazole (ABILIFY) 10 MG tablet Take 10 mg by mouth daily.  3     aspirin 81 MG EC tablet Take 1 tablet (81 mg total) by mouth daily.  0     atorvastatin (LIPITOR) 20 MG tablet Take 1 tablet (20 mg total) by mouth daily. 90 tablet 3     budesonide-formoterol (SYMBICORT) 80-4.5 mcg/actuation inhaler Inhale 2 puffs 2 (two) times a day. 1 Inhaler 6     buPROPion (WELLBUTRIN XL) 150 MG 24 hr tablet Take 1 tablet by mouth every morning.       cane Marisel Use as needed to assist with ambulation.   Pt with COPD. 1 each 0     cloNIDine HCl (CATAPRES) 0.1 MG tablet TAKE 1 TABLET(0.1 MG) BY MOUTH TWICE DAILY AS NEEDED 180 tablet 3     docusate sodium (COLACE) 100 MG capsule Take 1 capsule (100 mg total) by mouth 2 (two) times a day. 60 capsule 1     furosemide (LASIX) 20 MG tablet TAKE 1 TABLET BY MOUTH EVERY DAY 90 tablet 3     generic lancets Test 2 times a day 100 each 6     omeprazole (PRILOSEC) 20 MG capsule TAKE 1 CAPSULE(20 MG) BY MOUTH TWICE DAILY BEFORE MEALS 180 capsule 3     phentermine (ADIPEX-P) 37.5 mg tablet Take 1/2 to 1 tablet in the morning. 90 tablet 1     polyethylene " glycol (GLYCOLAX) 17 gram/dose powder Take 17 g by mouth daily as needed. 510 g prn     SUBOXONE 8-2 mg Film per sublingual film 1 film three times a day SL 90 Film 2     triamcinolone (KENALOG) 0.1 % ointment Apply topically 2 (two) times a day. 80 g 0     No current facility-administered medications for this visit.      Allergies   Allergen Reactions     Seafood Other (See Comments)     Eyes turn yellow       Ibuprofen Nausea And Vomiting     Social History     Tobacco Use     Smoking status: Never Smoker     Smokeless tobacco: Never Used   Substance Use Topics     Alcohol use: No     Drug use: No       Review and/or order of clinical lab tests:  Review and/or order of radiology tests:  Review and/or order of medicine tests:  Discussion of test results with performing physician:  Decision to obtain old records and/or obtain history from someone other than the patient:  Review and summarization of old records and/or obtaining history from someone other than the patient and.or discussion of case with another health care provider:  Independent visualization of image, tracing or specimen itself:    Time:      Pawan Castro MD

## 2021-06-11 NOTE — PROGRESS NOTES
Assessment: pt seen today for DM education. He states he really just needs a replacement meter. Also due for A1c.   Pt brings in accu chek guide meter, when turned on it has E9 error (low battery). Pt was provided a new meter today.   He typically checks daily. He just had a DM appt with Hayley not long ago. Discussed that his DM seems to be well controlled according to his A1c history. Pt states he biggest complaint is how to loose weight. He notes he has cut out soda, sweets and pasta. Briefly discussed healthy eating and exercise. He has had an Rx for phentermine in the past and may try to get this refilled.     Plan: continue to monitor BG daily. Will do A1c today. F/u PRN     Subjective and Objective:      Tobin Bryant is referred by Dr. Castro for Diabetes Education.     Lab Results   Component Value Date    HGBA1C 5.6 02/18/2020         Current diabetes medications:  None       Education:     Monitoring   Meter (per above goals): Assessed, Discussed and Literature provided  Monitoring: Assessed and Discussed  BG goals: Discussed    Nutrition Management  Nutrition Management: Assessed and Discussed  Weight: Discussed  Portions/Balance: Assessed and Discussed  Carb ID/Count: Assessed and Discussed  Label Reading: Discussed  Heart Healthy Fats: Not addressed  Menu Planning: Assessed and Discussed  Dining Out: Not addressed  Physical Activity: Assessed and Discussed  Medications: Discussed    Diabetes Disease Process: Discussed    Acute Complications: Prevent, Detect, Treat:  Hypoglycemia: Not addressed  Hyperglycemia: Assessed and Discussed  Sick Days: Not addressed  Driving: Not addressed    Chronic Complications  Foot Care:Not addressed  Skin Care: Not addressed  Eye: Not addressed  ABC: Assessed  Teeth:Not addressed  Goal Setting and Problem Solving: Assessed and Discussed  Barriers: Assessed and Discussed  Psychosocial Adjustments: Assessed and Discussed      Time spent with the patient: 30 minutes for  diabetes education and counseling.   Previous Education: yes  Visit Type:JOSIANE Hernandez  10/1/2020

## 2021-06-11 NOTE — TELEPHONE ENCOUNTER
----- Message from Adelina Burnham MD sent at 3/11/2020  3:05 PM CDT -----  Regarding: f/u labs  D, PTH, CMP, Testosterone in 6 months. Thanks!!!

## 2021-06-11 NOTE — TELEPHONE ENCOUNTER
----- Message from Pawan Castro MD sent at 9/29/2020  2:40 PM CDT -----  Agree with referral as requested  ----- Message -----  From: Hayley Chery, Diabetes Ed  Sent: 9/29/2020   2:10 PM CDT  To: MD Dr. Matthew Ronquillo,     Would appreciate if you could send in an updated diabetes ed referral for this patient. Thanks!     Hayley

## 2021-06-11 NOTE — TELEPHONE ENCOUNTER
Left a message for the patient letting him know that the orders were placed and where to have them drawn and timing.  Nayana Escobar RN

## 2021-06-11 NOTE — TELEPHONE ENCOUNTER
Pt had forgotten about the diabetes education appt today and would want to r/s. Please contact pt to reschedule. Thanks    Hayley Chery RD, LD  Diabetes Care and Education

## 2021-06-11 NOTE — TELEPHONE ENCOUNTER
Date: 10/1/2020 Status: [facility]   Time: 8:30 AM Length: 60   Visit Type: OFFICE VISIT [[id number]] Copay: $0.00   Provider: Renetta Hernandez RN

## 2021-06-12 NOTE — PROGRESS NOTES
Correct pharmacy verified with patient and confirmed in snapshot? [x] yes []no    Medications Phoned  to Pharmacy [] yes [x]no  Name of Pharmacist:  List Medications, including dose, quantity and instructions      Medication Prescriptions given to patient   [] yes  [x] no   List the name of the drug the prescription was written for.       Medications ordered this visit were e-scribed.  Verified by order class [x] yes  [] no  Suboxone 8-2 mg    Medication changes or discontinuations were communicated to patient's pharmacy: [] yes  [x] no    UA collected [x] yes    [] no    [place] Prescription Monitoring [place] Reviewed? [x] yes  [] no    Referrals were made to:none      Future appointment was made: [x] yes  [] no    Dictation completed at time of chart check: [x] yes  [] no    I have checked the documentation for today s encounters and the above information has been reviewed and completed.

## 2021-06-12 NOTE — PROGRESS NOTES
"Addiction  Nurse Only Visit Note    7/27/2017  Date of last visit: 5/30/17    Reason for today's visit:   Chief Complaint   Patient presents with     Follow-up       Vitals:    07/27/17 1218   BP: 128/87   Patient Site: Right Arm   Patient Position: Sitting   Cuff Size: Adult Large   Pulse: 80   Temp: 98.4  F (36.9  C)   TempSrc: Oral   Weight: (!) 313 lb (142 kg)   Height: 5' 10\" (1.778 m)        If having pain, who will address pain:  No pain    Is pt assisted: No    Urine for toxicology sent today: yes    Last UA date: 5/30/17  Reviewed with patient: yes  Results: Dam neg    AIMS:     Pill count: NA  (Controlled substance only)  Medications needing renewal: see note    Substance use history:    Using alcohol:No  Using street drugs or unprescribed medications: No  Using opioids other that Suboxone:No  Using tobacco:No    Recovery environment:  Attending formal chemical dependency programming: No  Attending \"outside\" mutual help meetings: No  Has a chemical dependency sponsor: Yes: Describe: 1-2 times weekly  Has other elements of structure: Yes: Describe: helping friend do remodeling work  Current Living Situation: own place    Cravings: no    Sleep: no, sleeps approx 8 hr noc    Depression: yes not as bad    Anxiety: yes little bit    Panic Attacks: no    Paranoia: no    Hallucinations: no    Suicidal Ideation: no    Homicidal Ideation:no  Comments: none    Physical Problems: no    [place]  was reviewed prior to seeing patient today. See below for embedded report.    Note: Came early for appt., cooperative. States he has been trying to lose weight, says he just eats cereal and drinks milk, maybe some fruit. Discussed increasing his vegetables and proteins. Also encouraged him to talk with his psychiatrist as some of his psych meds can cause weight gain. No issues or concerns regarding Suboxone today. Not doing any meetings, has sober friend who he considers a sponsor and they have contact 1-2 times " weekly.    Suboxone 8/2 mg sl film, takes tid, called to pharmacy, spoke with  Gabriel José, order read back for accuracy. Next appt  with Dr. Brunner.    Patient Instructions   Continue Medications as ordered.  No alcohol or unprescribed drug use.  No driving, if sedated.  Come to the Emergency Room if not feeling safe.  Call the clinic with any questions [phone].  Follow up as directed, for your appointments, per your After Visit Summary Form.      Call the clinic if any concerns: [facility] [phone]  [facility] [phone]  and Report to the emergency room if you feel like harming yourself or others or are psychotic        Vicky Santos    2017 12:23 PM    MATT BUTTS  Search Criteria: Last Name 'matt' and First Name 'criseldas' and  = ' and Request Period = ' to  ' - 3 out of 3 Recipients Selected.  Fill Date Product, Str, Form Qty Days Pt ID Prescriber Written RX# N/R* Pharm **MED+  ---------- -------------------------------- ------ ---- --------- ---------- ---------- ------------ ----- --------- ------  2017 SUBOXONE 8 MG-2 MG SL FILM 90.00 30 [id number] 2017 [id number] R [id number] 720.0  2017 SUBOXONE 8 MG-2 MG SL FILM 90.00 30 [id number] 2017 [id number] N [id number] 720.0  2017 SUBOXONE 8 MG-2 MG SL FILM 78.00 30 [id number] 2017 [id number] N [id number] 624.0  *N/R N=New R=Refill  +MED Daily  Prescribers for prescriptions listed  ----------------------------------------------------------------------------------------------------------------------------------  [id number] BRUNNER, EMILY A MD; [facility] [address] [place] [address],, [place] [address]  Pharmacies that dispensed prescriptions listed  ----------------------------------------------------------------------------------------------------------------------------------  [id number] [facility] : LUIS [id number], [address] [facility] [address],  Patients that match search criteria  ----------------------------------------------------------------------------------------------------------------------------------  [id number] MATT KERN, [facility] 67; [address] [place] [address] , [place] [address]  [id number] GUTIERREZ BECKIE, [facility] 67; [address], [place] [address]  [id number] GUTIERREZ BECKIE, [facility] 67; [address] [facility] , [place] [address]  MED Summary  This section displays cumulative MED values by unique recipient. The MED Max value is the maximum occurrence of cumulative MED  sustained for any 3 consecutive days. This value is calculated based on prescriptions dispensed during the date range requested.  -----------------------------------------------------------------------------------------------------------------------------------  720 BECKIE GUTIERREZ; 1965; [address] [facility] [address], [place] [address]

## 2021-06-12 NOTE — PROGRESS NOTES
MATT BUTTS  Search Criteria: Last Name sameer' and First Name duarte' and  = ' and Request Period = ' to  ' - 3 out of 3 Recipients Selected.  Fill Date Product, Str, Form Qty Days Pt ID Prescriber Written RX# N/R* Pharm **MED+  ---------- -------------------------------- ------ ---- --------- ---------- ---------- ------------ ----- --------- ------  2017 SUBOXONE 8 MG-2 MG SL FILM 84.00 28 [id number] 2017 [id number] N [id number] 720.0  2017 SUBOXONE 8 MG-2 MG SL FILM 90.00 30 [id number] 2017 [id number] R [id number] 720.0  2017 SUBOXONE 8 MG-2 MG SL FILM 90.00 30 [id number] 2017 [id number] N [id number] 720.0  *N/R N=New R=Refill  +MED Daily  Prescribers for prescriptions listed  ----------------------------------------------------------------------------------------------------------------------------------  [id number] BRUNNER, EMILY A MD; [facility], [address] [facility] [address],, [place] [address]  Pharmacies that dispensed prescriptions listed  ----------------------------------------------------------------------------------------------------------------------------------  [id number] [facility]; : LUIS # [id number], [address] [facility] [address],  Patients that match search criteria  ----------------------------------------------------------------------------------------------------------------------------------  [id number] MATT KERN, [facility] 67; [address] [facility] , [place] [address]  [id number] MATT BUTTS,  67; [address], [place] [address]  [id number] MATT BUTTS,  67; [address] [facility] , [place] [address]  MED Summary  This section displays cumulative MED values by unique recipient. The MED Max value is the maximum occurrence of cumulative MED  sustained for any 3 consecutive days. This value is calculated based on prescriptions dispensed during the date  range requested.  -----------------------------------------------------------------------------------------------------------------------------------  [address] BEC[address]IE GUTIERREZ; 1965; [address] [facility] [address], [place] [address]

## 2021-06-12 NOTE — PROGRESS NOTES
________________________________________  Medications Phoned  to Pharmacy [] yes [x]no  Name of Pharmacist:  List Medications, including dose, quantity and instructions    Medications ordered this visit were e-scribed.  Verified by order class [x] yes  [] no  suboxone    Medication changes or discontinuations were communicated to patient's pharmacy: [] yes  [x] no    Dictation completed at time of chart check: [x] yes  [] no    I have checked the documentation for today s encounters and the above information has been reviewed and completed.

## 2021-06-12 NOTE — PROGRESS NOTES
This video/telephone visit will be conducted via a call between you and your physician/provider. We have found that certain health care needs can be provided without the need for an in-person physical exam. This service lets us provide the care you need with a video /telephone conversation. If a prescription is necessary we can send it directly to your pharmacy. If lab work is needed we can place an order for that and you can then stop by our lab to have the test done at a later time.    Just as we bill insurance for in-person visits, we also bill insurance for video/telephone visits. If you have questions about your insurance coverage, we recommend that you speak with your insurance company.    Patient has given verbal consent for video/Telephone visit  Patient would like the video visit invitation sent by: Patient unable to connect to video.  He is requesting a phone visit [phone]  JOSS/FAHEEM/YADI REDDY    Patient verified allergies, medications and pharmacy via phone. PHQ : 0 and SHABBIR: 0 done verbally with writer. Patient states yes  is ready for visit.    Patient states he is medication compliant.  Last picked up suboxone 10-8-20 and  reviewed.  Patient denies any cravings.  He does not attend groups.  Last DAM 2-13-20 negative and positive for BUP. He denies any new issues or concerns.

## 2021-06-12 NOTE — PROGRESS NOTES
Office Visit - Follow Up   Tobin Bryant   52 y.o. male    Date of Visit: 8/22/2017    Chief Complaint   Patient presents with     Paperwork        Assessment and Plan   1. Chronic mental illness - Yrn  I filled out his student loan paperwork to the best of my abilities.  We did review his Yrn consultation again.  I do think it is difficult for him to work and he is on disability for his mental illness.  I think he has a severe impairment of his global function.    2. Generalized anxiety disorder  Per Yrn continue current medication    3. Opioid dependence in remission  Follows in her pain clinic, on Suboxone    4. Gastroesophageal reflux disease without esophagitis  Continue omeprazole    5. Non morbid obesity due to excess calories  The following high BMI interventions were performed this visit: encouragement to exercise and lifestyle education regarding diet    Return in about 4 weeks (around 9/19/2017) for recheck.     History of Present Illness   This 52 y.o. old man with major depressive disorder with psychosis and schizoaffective disorder comes in for paperwork regarding his student loans.  Apparently they needed more information from the last time I filled out the forms.  He would like the loans forgiven.  He reports me that he is on Social Security and disability.  He lives independently.  He does not interact with many people.  He has severe social anxiety.  He is unable to hold a job because of his social anxiety and he also has occasional hallucinations.  He has chronic low back pain which makes prolonged physical labor difficult.  He has difficulty directing with other people.    Review of Systems: A comprehensive review of systems was negative except as noted.     Medications, Allergies and Problem List   Reviewed and updated     Physical Exam   General Appearance:   No acute distress, obese    /64 (Patient Site: Left Arm, Patient Position: Sitting, Cuff Size: Adult Regular)   "Pulse 78  Ht 5' 10\" (1.778 m)  Wt (!) 315 lb (142.9 kg)  SpO2 96%  BMI 45.2 kg/m2         Additional Information   Current Outpatient Prescriptions   Medication Sig Dispense Refill     albuterol (VENTOLIN HFA) 90 mcg/actuation inhaler Inhale 2 puffs every 6 (six) hours as needed for wheezing. 8.5 Inhaler 1     ARIPiprazole (ABILIFY) 10 MG tablet Take 10 mg by mouth daily.  3     atenolol (TENORMIN) 50 MG tablet Take 1 tablet (50 mg total) by mouth daily. 30 tablet 1     buprenorphine-naloxone (SUBOXONE) 8-2 mg Film per sublingual film Place 1 Film under the tongue 3 (three) times a day. 84 Film 0     buPROPion (WELLBUTRIN XL) 150 MG 24 hr tablet Take 1 tablet by mouth every morning.       cane Marisel Use as needed to assist with ambulation.   Pt with COPD. 1 each 0     cloNIDine HCl (CATAPRES) 0.1 MG tablet Take 1 tablet (0.1 mg total) by mouth 2 (two) times a day as needed. 60 tablet 0     omeprazole (PRILOSEC) 20 MG capsule Take 1 capsule (20 mg total) by mouth 2 (two) times a day. 60 capsule 0     No current facility-administered medications for this visit.      Allergies   Allergen Reactions     Seafood Other (See Comments)     Eyes turn yellow       Social History   Substance Use Topics     Smoking status: Never Smoker     Smokeless tobacco: Never Used     Alcohol use No     Time: total time spent with the patient was 25 minutes of which >50% was spent in counseling and coordination of care     Pawan Castro MD  "

## 2021-06-13 NOTE — PROGRESS NOTES
Correct pharmacy verified with patient and confirmed in snapshot? [x] yes []no    Charge captured ? [x] yes  [] no    Medications Phoned  to Pharmacy [] yes [x]no  Name of Pharmacist:  List Medications, including dose, quantity and instructions      Medication Prescriptions given to patient   [] yes  [x] no   List the name of the drug the prescription was written for.       Medications ordered this visit were e-scribed.  Verified by order class [x] yes  [] no  Suboxone 8-2 mg    Medication changes or discontinuations were communicated to patient's pharmacy: [] yes  [] no    UA collected [x] yes  [] no    [place] Pres[place] Monitoring [place] Reviewed? [x] yes  [] no    Referrals were made to: none     Future appointment was made: [x] yes  [] no    Dictation completed at time of chart check: [x] yes  [] no    I have checked the documentation for today s encounters and the above information has been reviewed and completed.

## 2021-06-13 NOTE — PROGRESS NOTES
" ASSESSMENT AND PLAN:    1. Cellulitis and abscess of trunk  Large cutaneous abscess under left breast.  Too big to I & D here in the clinic, will refer to general surgery for AM procedure.   - Ambulatory referral to General Surgery    CHIEF COMPLAINT:  Chief Complaint   Patient presents with     Recurrent Skin Infections     chest, under left breast     HISTORY OF PRESENT ILLNESS:  Tobin Bryant is a 55 y.o. male with 'boil' under the left breast.  It has gotten bigger and is sore.  No fever, or chills.     REVIEW OF SYSTEMS:   See HPI, all other systems on review are negative.    Past Medical History:   Diagnosis Date     Anxiety and depression      Foot pain      GERD (gastroesophageal reflux disease)      Hypertension      Impaired physical mobility      Increased PTH level 9/5/2019     Low back pain      Low serum testosterone level 9/5/2019     Lower leg edema      Morbid obesity with BMI of 45.0-49.9, adult (H)      Pre-diabetes      Sleep apnea, obstructive     CPAP     Vitamin deficiency 9/5/2019     Social History     Tobacco Use   Smoking Status Never Smoker   Smokeless Tobacco Never Used     Family History   Problem Relation Age of Onset     No Medical Problems Mother      No Medical Problems Father      No Medical Problems Sister      No Medical Problems Brother      No Medical Problems Sister      No Medical Problems Sister      No Medical Problems Sister      No Medical Problems Brother      No Medical Problems Daughter      No Medical Problems Son      Past Surgical History:   Procedure Laterality Date     CATARACT EXTRACTION Right      KELOID EXCISION      neck     VITALS:  Vitals:    11/23/20 1533   BP: 128/84   Patient Site: Right Arm   Patient Position: Sitting   Cuff Size: Adult Regular   Pulse: 88   SpO2: 97%   Weight: (!) 324 lb (147 kg)   Height: 5' 10\" (1.778 m)     Wt Readings from Last 3 Encounters:   11/23/20 (!) 324 lb (147 kg)   10/21/20 (!) 325 lb 4.8 oz (147.6 kg)   10/02/20 (!) " 311 lb (141.1 kg)     PHYSICAL EXAM:  Constitutional:  In NAD, alert and oriented  Left Breast:  Large 4 X 7 cm indurated warm tender area, no active drainage.     Current Outpatient Medications   Medication Sig Dispense Refill     ACCU-CHEK GUIDE TEST STRIPS strips TEST TWICE DAILY 60 strip 6     albuterol (PROAIR HFA;PROVENTIL HFA;VENTOLIN HFA) 90 mcg/actuation inhaler Inhale 2 puffs every 6 (six) hours as needed for wheezing. 1 Inhaler 6     ARIPiprazole (ABILIFY) 10 MG tablet Take 10 mg by mouth daily.  3     aspirin 81 MG EC tablet Take 1 tablet (81 mg total) by mouth daily.  0     atorvastatin (LIPITOR) 20 MG tablet Take 1 tablet (20 mg total) by mouth daily. 90 tablet 3     budesonide-formoterol (SYMBICORT) 80-4.5 mcg/actuation inhaler Inhale 2 puffs 2 (two) times a day. 1 Inhaler 6     buPROPion (WELLBUTRIN XL) 150 MG 24 hr tablet Take 1 tablet by mouth every morning.       cane Marisel Use as needed to assist with ambulation.   Pt with COPD. 1 each 0     cholecalciferol, vitamin D3, (VITAMIN D3) 5,000 unit Tab Take 1 tablet (5,000 Units total) by mouth daily. 90 tablet 3     cloNIDine HCl (CATAPRES) 0.1 MG tablet TAKE 1 TABLET(0.1 MG) BY MOUTH TWICE DAILY AS NEEDED 180 tablet 3     docusate sodium (COLACE) 100 MG capsule Take 1 capsule (100 mg total) by mouth 2 (two) times a day. 60 capsule 1     furosemide (LASIX) 20 MG tablet TAKE 1 TABLET BY MOUTH EVERY DAY 90 tablet 3     generic lancets Test 2 times a day 100 each 6     mometasone-formoterol (DULERA) 200-5 mcg/actuation HFAA inhaler Inhale 2 puffs 2 (two) times a day.       omeprazole (PRILOSEC) 20 MG capsule TAKE 1 CAPSULE(20 MG) BY MOUTH TWICE DAILY BEFORE MEALS 180 capsule 3     phentermine (ADIPEX-P) 37.5 mg tablet Take 1/2 to 1 tablet in the morning. 90 tablet 1     polyethylene glycol (GLYCOLAX) 17 gram/dose powder Take 17 g by mouth daily as needed. 510 g prn     SUBOXONE 8-2 mg Film per sublingual film 1 film three times a day SL 90 Film 1      triamcinolone (KENALOG) 0.1 % ointment Apply topically 2 (two) times a day. 80 g 0     Jesse Worrell MD  Internal Medicine  [facility]

## 2021-06-14 NOTE — TELEPHONE ENCOUNTER
Pt called in regards for his medication: SUBOXONE 8-2 mg Film per sublingual film and advised he requested a refill and that the pharmacy is advising his doctor won't fill it.    Pt is out of his medication and needs a refill sent to the following pharmacy:   [id number] - [place], [place] - [facility] AT [address]  [phone]    It looks like the request went to Brunner but she is not here and he was transferred to [facility].    Pt requested a callback sometime today for follow up on this @ [phone]

## 2021-06-14 NOTE — PROGRESS NOTES
Correct pharmacy verified with patient and confirmed in snapshot? [x] yes []no    Medications Phoned  to Pharmacy [] yes [x]no  Name of Pharmacist:  List Medications, including dose, quantity and instructions      Medication Prescriptions given to patient   [] yes  [x] no   List the name of the drug the prescription was written for.      Medications ordered this visit were e-scribed.  Verified by order class [x] yes  [] no  Suboxone    Medication changes or discontinuations were communicated to patient's pharmacy:  [] yes  [x] no  Pharmacist Spoke With:     UA collected [x] yes  [] no    [place] Pres[place] Monitoring [place] Reviewed? [x] yes  [] no    Referrals/Labs were made to:     Completed Charge Capture?  [x] yes  [] no    Future appointment was made: [x] yes  [] no  1/15/18  Dictation completed at time of chart check: [] yes  [x] no    I have checked the documentation for today s encounters and the above information has been reviewed and completed.

## 2021-06-14 NOTE — PROGRESS NOTES
"Tobin Bryant is a 55 y.o. male with PMH HTN, morbid obesity, prediabetes, COPD, YANIRA, anxiety, depression, and OUD on buprenorphine who is evaluated on this date for ongoing treatment of GENET.  Visit was conducted via telephone due to COVID19.  Pt was located in his home, I was located in my home office.  Start time 2:16p, end time of call 2:27p.      Pt has utilized buprenorphine for at least 7 years for treatment of OUD.  He states today his last use of other opioids was approximately 2 years ago.  He denies cravings currently, last experienced cravings 6-7 months ago after a conversation with someone that triggered thoughts.      He reports taking buprenorphine 24mg/day consistently.  He does experience OIC which is adequately treated with stool softener and he requests refill.      Pt has CPAP for diagnosed YANIRA. He states he needs to contact the responsible providers due to problems with the equipment, and that he has not been using CPAP as a result.  He did not recall being told of any central sleep apnea component. He did not want to discuss his other health issues in any further detail.     He states his mood has been \"fine,\" denies any significant changes with his living/social situation.      Physical Exam  Constitutional:       Comments: Speaking in full sentences without difficulty   Neurological:      Mental Status: He is alert and oriented to person, place, and time.   Psychiatric:         Speech: Speech normal.         Behavior: Behavior is cooperative.         Thought Content: Thought content normal.         Cognition and Memory: Cognition and memory normal.      Comments: Mood is neutral  Affect is irritable       1. Opioid use disorder, severe, dependence (H)  Controlled  Continue buprenorphine 24mg/day  Schedule f/u in 3 months, notify medical staff sooner if problems.   Proceed with plans to f/u with PCP/sleep clinic regarding problems with CPAP  - Drug Abuse 1+, Urine; Standing  - " Buprenorphine, Urine; Standing  - Ethyl Glucuronide and Ethyl Sulfate, Urine, Quantitative (CDCO ETG/S); Standing  - SUBOXONE 8-2 mg Film per sublingual film; 1 film three times a day SL  Dispense: 90 Film; Refill: 2    2. Drug-induced constipation    - docusate sodium (COLACE) 100 MG capsule; Take 1 capsule (100 mg total) by mouth 2 (two) times a day.  Dispense: 60 capsule; Refill: 1    TT 20min spent in review of medical records, , discussion with pt as noted

## 2021-06-14 NOTE — TELEPHONE ENCOUNTER
Called the pt to get him check in for appt but he was at the grocery store and said he will call us later to reschedule

## 2021-06-14 NOTE — TELEPHONE ENCOUNTER
Pt calls to request refill of suboxone.      Pt's pharmacy is [id number] - [place], [place] - [facility] AT [address]    Pt requests call-back at [phone] to follow up on this request

## 2021-06-14 NOTE — TELEPHONE ENCOUNTER
Date of Last Office Visit: 20 [facility]  Date of Next Office Visit: 21 - Intake with Emory  No shows since last visit: No  Cancellations since last visit:No  ED visits since last visit: NO    Medication Suboxone 8-2 mg date last ordered: 20  Qty: 90  Refills: 1  Called and spoke to pt. Today's count: Pt has 15 films on hand    SUBOXONE 8-2 mg Film per sublingual film 90 Film 1 2020     Si film three times a day SL        Lapse in therapy greater than 7 days: no  Medication refill request verified as identical to current order: yes  Result of Last DAM, VPA, Li+ Level, CBC, or Carbamazepine Level (at or since last visit): Pos BUP, neg DAM and ETG on 20      []Eligibility - not seen in last year    []Supervision - no future appointment    []Compliance     []Verification - order discrepancy    [x]Controlled Medication    []90 - day supply request    [x]Other :Transfer from [facility] and Brunner but new to Dr. Emory MAYEN pending medications    Current Medication list:         ACCU-CHEK GUIDE TEST STRIPS strips TEST TWICE DAILY   albuterol (PROAIR HFA;PROVENTIL HFA;VENTOLIN HFA) 90 mcg/actuation inhaler Inhale 2 puffs every 6 (six) hours as needed for wheezing.   ARIPiprazole (ABILIFY) 10 MG tablet Take 10 mg by mouth daily.   aspirin 81 MG EC tablet Take 1 tablet (81 mg total) by mouth daily.   atorvastatin (LIPITOR) 20 MG tablet Take 1 tablet (20 mg total) by mouth daily.   budesonide-formoterol (SYMBICORT) 80-4.5 mcg/actuation inhaler Inhale 2 puffs 2 (two) times a day.   buPROPion (WELLBUTRIN XL) 150 MG 24 hr tablet Take 1 tablet by mouth every morning.   cane Marisel Use as needed to assist with ambulation.   Pt with COPD.   cholecalciferol, vitamin D3, (VITAMIN D3) 5,000 unit Tab Take 1 tablet (5,000 Units total) by mouth daily.   cloNIDine HCl (CATAPRES) 0.1 MG tablet TAKE 1 TABLET(0.1 MG) BY MOUTH TWICE DAILY AS NEEDED   docusate sodium (COLACE) 100 MG capsule Take 1 capsule (100 mg  total) by mouth 2 (two) times a day.   furosemide (LASIX) 20 MG tablet TAKE 1 TABLET BY MOUTH EVERY DAY   generic lancets Test 2 times a day   mometasone-formoterol (DULERA) 200-5 mcg/actuation HFAA inhaler Inhale 2 puffs 2 (two) times a day.   omeprazole (PRILOSEC) 20 MG capsule TAKE 1 CAPSULE(20 MG) BY MOUTH TWICE DAILY BEFORE MEALS   phentermine (ADIPEX-P) 37.5 mg tablet Take 1/2 to 1 tablet in the morning.   polyethylene glycol (GLYCOLAX) 17 gram/dose powder Take 17 g by mouth daily as needed.   SUBOXONE 8-2 mg Film per sublingual film 1 film three times a day SL   triamcinolone (KENALOG) 0.1 % ointment Apply topically 2 (two) times a day.       Medication Plan of Care at last office visit with MD/CNP:    PLAN:    Plan:  1. Patient given a 30-day supply of suboxone 8/2 mg three times a day with 1refill   2. Patient to continue with his psychiatric follow-up. Continue medications per their regimen.   3. Patientto continue colace 100 mg po two times a day in addition to his miralax to help with his constipation.   4. Urine toxicology with DAM negative, bup positive and etg/ets deferred due to Covid  5. Patient to rtc in 2 months and sooner prn       [place], [place], [place], and [place] :Reviewed and no worrisome pharmacy activity noted        Fill Date ID Written Sold Drug Qty Days Prescriber Rx # Pharmacy Refill Daily Dose * Pymt Type    12/02/2020 1 11/04/2020 12/03/2020 Suboxone 8 Mg-2 Mg Sl Film  90.00 30 Se Dab [id number] [facility] ([facility] 1/1 24.00 mg [facility] [place]   11/05/2020 1 11/04/2020 11/06/2020 Suboxone 8 Mg-2 Mg Sl Film  90.00 30 Se Dab [id number] [facility] ([facility] 0/1 24.[place] [facility] [place]   10/08/2020 1 08/07/2020 10/08/2020 Suboxone 8 Mg-2 Mg Sl Film  90.00 30 Em Bru [id number] [facility] ([facility]396) 2/2 24.[place] [facility] [place]   09/09/2020 1 08/07/2020 09/11/2020 Suboxone 8 Mg-2 Mg Sl Film  90.00 30 Em Bru [id number] [facility] (6396) 1/2 24.[place] [facility] [place]   08/10/2020 1 08/07/2020 08/11/2020 Suboxone 8 Mg-2 Mg Sl Film  90.00 30 Em [facility] [id number] [facility]) 0/2 24.[place] [facility] [place]   07/14/2020 1 05/07/2020 07/14/2020 Suboxone 8 Mg-2 Mg Sl Film  90.00 30 Em [facility] [id number] [facility] ([facility] 2/2 [place]   06/12/2020 1 05/07/2020 06/13/2020 Suboxone 8 Mg-2 Mg Sl Film  90.00 30 Em Bru [id number] [facility] ([facility]396[facility] 1/2 [place]   05/15/2020 1 05/07/2020 05/16/2020 Suboxone 8 Mg-2 Mg Sl Film  90.00 30 Em Bru [id number] [facility] (6396) 0/2 24.[place]   04/08/2020 1 02/13/2020 04/11/2020 Suboxone 8 Mg-2 Mg Sl Film  90.00 30 Em Bru [id number] [facility] (6396) 2/2 [place]   03/16/2020 1 02/13/2020 03/16/2020 Suboxone 8 Mg-2 Mg Sl Film  90.00 30 Em Bru [id number] [facility] ([facility]396) 1/2 [place]   02/28/2020 1 01/10/2020 02/28/2020 Phentermine 37.5 Mg Tablet  90.00 90 Fa Gee [id number] [facility] (1287) 0/1  [facility] [place]   02/13/2020 1 02/13/2020 02/18/2020 Suboxone 8 Mg-2 Mg Sl Film  90.00 30 Em Bru [id number] [facility] (6396) 0/2 24.[place]   01/10/2020 1 11/12/2019 01/11/2020 Suboxone 8 Mg-2 Mg Sl Film  90.00 30 Em Bru [id number] [facility] (6396) 2/2 24.[place] mg [place]

## 2021-06-14 NOTE — TELEPHONE ENCOUNTER
Pt called wanting to know status of refill for Suboxone. Pt is seen by Dr. Cat and wanted to check refill status due to it being close to the end of the month. Pt requesting call back from RN team to discuss.

## 2021-06-14 NOTE — PROGRESS NOTES
Dr. Brunner's patient  Patient was seen today in follow-up for opiate dependence. Says he is doing well , client has no concerns today.     Last UDS performed on  10/20/17 and was: negative  Last BUP performed on  17 and was positive    See attached  report below      [employer] [place] Date: 17  Query Report Page#: 1  Patient Rx History Report  ANNETTE BUTTS  Search Criteria: Last Name 'Annette' and First Name 'Tobin' and  =  and Request Period =  to  ' - 3 out of 3 Recipients Selected.  Fill Date Product, Str, Form Qty Days Pt ID Prescriber Written RX# N/R* Pharm **MED+  ---------- -------------------------------- ------ ---- --------- ---------- ---------- ------------ ----- --------- ------  10/24/2017 SUBOXONE 8 MG-2 MG SL FILM 90.00 30 [id number] 10/20/2017 [id number] N [id number] 720.0  2017 SUBOXONE 8 MG-2 MG SL FILM 90.00 30 [id number] 2017 [id number] R [id number] 720.0  2017 SUBOXONE 8 MG-2 MG SL FILM 90.00 30 [id number] 2017 [id number] N [id number] 720.0  *N/R N=New R=Refill  +MED Daily  Prescribers for prescriptions listed  ----------------------------------------------------------------------------------------------------------------------------------  [id number] BRUNNER, EMILY A MD; [facility], [address],, [place] [address]  Pharmacies that dispensed prescriptions listed  ----------------------------------------------------------------------------------------------------------------------------------  [id number] [facility] : [facility] [id number], [address] [facility],, [facility] [address],  Patients that match search criteria  ----------------------------------------------------------------------------------------------------------------------------------  [id number] ANNETTE KERN  69; [address] , [place] [address]  [id number] ANNETTE BUTTS  69; [address], [place] [address]  [id number] MATT BUTTS,  69; [address] [place] [address] [address] , [place] [address]  MED Summary  This section displays cumulative MED values by unique recipient. The MED Max value is the maximum occurrence of cumulative MED  sustained for any 3 consecutive days. This value is calculated based on prescriptions dispensed during the date range requested.  -----------------------------------------------------------------------------------------------------------------------------------  [id number] BECKIE GUTIERREZ; 1965; [address] [place] [address] [address], [place] [address]  **Per CDC guidance, the conversion factors and associated daily morphine milligram equivalents for drugs prescribed as part of  medication-assisted treatment for opioid use disorder should not be used to benchmark against dosage thresholds meant for opioids  prescribed for pain.    Correct pharmacy verified with patient and confirmed in snapshot? [x] yes []no    Charge captured ? [x] yes  [] no    Medications Phoned  to Pharmacy [] yes [x]no  Name of Pharmacist:  List Medications, including dose, quantity and instructions      Medication Prescriptions given to patient   [] yes  [x] no   List the name of the drug the prescription was written for.       Medications ordered this visit were e-scribed.  Verified by order class [x] yes  [] no  Suboxone  Medication changes or discontinuations were communicated to patient's pharmacy: [] yes  [x] no    UA collected [x] yes  [] no    [place] Prescription Monitoring Program Reviewed? [x] yes  [] no    Referrals were made to:  none    Future appointment was made: [x] yes  [] no  17 With Brunner  Dictation completed at time of chart check: [x] yes  [] no    I have checked the documentation for today s encounters and the above information has been reviewed and completed.

## 2021-06-15 ENCOUNTER — OFFICE VISIT - HEALTHEAST (OUTPATIENT)
Dept: SURGERY | Facility: CLINIC | Age: 56
End: 2021-06-15

## 2021-06-15 DIAGNOSIS — E66.01 OBESITY, MORBID, BMI 40.0-49.9 (H): ICD-10-CM

## 2021-06-15 DIAGNOSIS — G47.33 SLEEP APNEA, OBSTRUCTIVE: ICD-10-CM

## 2021-06-15 DIAGNOSIS — R73.03 PREDIABETES: ICD-10-CM

## 2021-06-15 ASSESSMENT — MIFFLIN-ST. JEOR: SCORE: 2329.51

## 2021-06-15 NOTE — PROGRESS NOTES
Office Visit - Follow Up   Tobin Bryant   52 y.o. male    Date of Visit: 1/9/2018    Chief Complaint   Patient presents with     Cough        Assessment and Plan   1. MAN (dyspnea on exertion)  I do not appreciate any wheezing on exam today.  Recent cardiac ischemia evaluation was unremarkable.  Previously seen pulmonology.  PFTs not demonstrating an obstruction but did have some air trapping and he was started on Symbicort which she has not been taking.  I asked to restart Symbicort continue albuterol and follow-up with pulmonology.  Will check labs as below and an x-ray.  - HM2(CBC w/o Differential)  - Basic Metabolic Panel  - XR Chest 2 Views  - Ambulatory referral to Pulmonology    2. COPD (chronic obstructive pulmonary disease)  - budesonide-formoterol (SYMBICORT) 80-4.5 mcg/actuation inhaler; Inhale 2 puffs 2 (two) times a day.  Dispense: 1 Inhaler; Refill: 6  - Ambulatory referral to Pulmonology  - Cane, single point    3. YANIRA on CPAP  Not 100% compliant with CPAP  - Ambulatory referral to Pulmonology    4. Opioid dependence in remission  He is on Suboxone felt in the past that this is been contributing to hypoventilation at night    5. Obesity  - Cane, single point  The following high BMI interventions were performed this visit: encouragement to exercise and lifestyle education regarding diet    Return in about 4 weeks (around 2/6/2018) for recheck.     History of Present Illness   This 52 y.o. old comes in for evaluation of dyspnea on exertion.  This is a chronic problem for him and seems to have worsened recently.  He seen pulmonology outside system and it is felt that it is likely multifactorial including some obesity related hypoventilation night, obstructive sleep apnea, deconditioning, obesity and possible some mild obstructive lung disease.  He is given bronchodilators and this seemed to help somewhat.  His pulmonary function test did not demonstrate any obstructive disease although he had some  "air trapping.  We evaluated him for ischemic heart disease most recently last year which was unremarkable and he has had previous stress testing which was negative.  He has no significant cough.  No fever chills.  No chest pain no chest tightness.    Review of Systems: A comprehensive review of systems was negative except as noted.     Medications, Allergies and Problem List   Reviewed and updated     Physical Exam   General Appearance:   No acute distress    /80 (Patient Site: Left Arm, Patient Position: Sitting, Cuff Size: Adult Regular)  Pulse 95  Ht 5' 10\" (1.778 m)  Wt (!) 328 lb (148.8 kg)  SpO2 97%  BMI 47.06 kg/m2    HEENT exam is unremarkable  Neck supple no thyromegaly or nodule palpable  Lymphatic no cervical lymphadenopathy  Cardiovascular regular rate and rhythm no murmur gallop or rub  Pulmonary lungs are clear to auscultation bilaterally  Gastrointestinall abdomen soft nontender nondistended no organomegaly  Neurologic exam is non focal  Psychiatric pleasant, no confusion or agitation        Additional Information   Current Outpatient Prescriptions   Medication Sig Dispense Refill     albuterol (VENTOLIN HFA) 90 mcg/actuation inhaler Inhale 2 puffs every 6 (six) hours as needed for wheezing. 8.5 Inhaler 1     ARIPiprazole (ABILIFY) 10 MG tablet Take 10 mg by mouth daily.  3     buprenorphine-naloxone (SUBOXONE) 8-2 mg Film per sublingual film Place 1 Film under the tongue 3 (three) times a day. 90 Film 0     buPROPion (WELLBUTRIN XL) 150 MG 24 hr tablet Take 1 tablet by mouth every morning.       cane Marisel Use as needed to assist with ambulation.   Pt with COPD. 1 each 0     cloNIDine HCl (CATAPRES) 0.1 MG tablet Take 1 tablet (0.1 mg total) by mouth 2 (two) times a day as needed. 60 tablet 0     omeprazole (PRILOSEC) 20 MG capsule Take 20 mg by mouth 2 (two) times a day before meals.       polyethylene glycol (MIRALAX) 17 gram packet Take 17 g by mouth daily as needed.       " budesonide-formoterol (SYMBICORT) 80-4.5 mcg/actuation inhaler Inhale 2 puffs 2 (two) times a day. 1 Inhaler 6     No current facility-administered medications for this visit.      Allergies   Allergen Reactions     Seafood Other (See Comments)     Eyes turn yellow       Ibuprofen Nausea And Vomiting     Social History   Substance Use Topics     Smoking status: Never Smoker     Smokeless tobacco: Never Used     Alcohol use No       Review and/or order of clinical lab tests:  Review and/or order of radiology tests:  Review and/or order of medicine tests:  Discussion of test results with performing physician:  Decision to obtain old records and/or obtain history from someone other than the patient:  Review and summarization of old records and/or obtaining history from someone other than the patient and.or discussion of case with another health care provider:  Independent visualization of image, tracing or specimen itself:    Time:      Pawan Castro MD

## 2021-06-15 NOTE — PROGRESS NOTES
Office Visit - Follow Up   Tobin Bryant   52 y.o. male    Date of Visit: 1/25/2018    Chief Complaint   Patient presents with     Weight gain        Assessment and Plan   1. Screen for colon cancer  - Ambulatory referral for Colonoscopy    2. Leg swelling  Consistent with dependent edema.  No evidence of heart failure liver disease or kidney disease.  Check labs as below.  Not consistent with DVT will check a d-dimer.  Recommended leg elevation and compression garments.  Suboxone probably contributing  - Comprehensive Metabolic Panel  - HM2(CBC w/o Differential)  - Thyroid Cascade  - Urinalysis-UC if Indicated  - D-dimer, Quantitative  - Compression stockings    3. Essential hypertension  Blood pressure is controlled, continue clonidine    4. YANIRA on CPAP  He is actually not on CPAP, needs a new prescript  - Ambulatory referral to Sleep Medicine    5. Opioid dependence in remission  Continue Suboxone per chemical dependency specialist    6. Simple chronic bronchitis  Continue inhalers, follow-up with pulmonary needs PFTs    7. Gastroesophageal reflux disease without esophagitis  Continue omeprazole    8. Chronic mental illness - Ynr  Continue management per [facility] clinic    The following high BMI interventions were performed this visit: encouragement to exercise and lifestyle education regarding diet    Return in about 4 weeks (around 2/22/2018) for recheck.     History of Present Illness   This 52 y.o. old man comes in for follow-up.  Overall he has been doing okay.  He is noticed some swelling in his legs which is not necessarily new.  He has had cardiac evaluation with nuclear medicine stress test last year.  Normal ejection fraction.  Weight is been stable.  No asymmetry.  No pain.  He has obstructive sleep apnea but is not using CPAP, would need to meet with a sleep medicine specialist for prescription.  Is on Suboxone for opioid dependence.  Has reflux controlled with omeprazole.  Breathing generally  "stable, has not followed up with pulmonary as requested.    Review of Systems: A comprehensive review of systems was negative except as noted.     Medications, Allergies and Problem List   Reviewed and updated     Physical Exam   General Appearance:   No acute distress    /76 (Patient Site: Right Arm, Patient Position: Sitting, Cuff Size: Adult Regular)  Pulse 96  Ht 5' 10\" (1.778 m)  Wt (!) 319 lb (144.7 kg)  SpO2 96%  BMI 45.77 kg/m2    HEENT exam is unremarkable  Neck supple no thyromegaly or nodule palpable  Lymphatic no cervical lymphadenopathy  Cardiovascular regular rate and rhythm no murmur gallop or rub, minimal bilateral edema  Pulmonary lungs are clear to auscultation bilaterally  Gastrointestinall abdomen soft nontender nondistended no organomegaly  Neurologic exam is non focal  Psychiatric pleasant, no confusion or agitation        Additional Information   Current Outpatient Prescriptions   Medication Sig Dispense Refill     albuterol (VENTOLIN HFA) 90 mcg/actuation inhaler Inhale 2 puffs every 6 (six) hours as needed for wheezing. 8.5 Inhaler 1     ARIPiprazole (ABILIFY) 10 MG tablet Take 10 mg by mouth daily.  3     budesonide-formoterol (SYMBICORT) 80-4.5 mcg/actuation inhaler Inhale 2 puffs 2 (two) times a day. 1 Inhaler 6     buprenorphine-naloxone (SUBOXONE) 8-2 mg Film per sublingual film Place 1 Film under the tongue 3 (three) times a day. 90 Film 1     buPROPion (WELLBUTRIN XL) 150 MG 24 hr tablet Take 1 tablet by mouth every morning.       cane Marisel Use as needed to assist with ambulation.   Pt with COPD. 1 each 0     cloNIDine HCl (CATAPRES) 0.1 MG tablet Take 1 tablet (0.1 mg total) by mouth 2 (two) times a day as needed. 60 tablet 0     omeprazole (PRILOSEC) 20 MG capsule Take 20 mg by mouth 2 (two) times a day before meals.       polyethylene glycol (MIRALAX) 17 gram packet Take 17 g by mouth daily as needed.       No current facility-administered medications for this visit.  "     Allergies   Allergen Reactions     Seafood Other (See Comments)     Eyes turn yellow       Ibuprofen Nausea And Vomiting     Social History   Substance Use Topics     Smoking status: Never Smoker     Smokeless tobacco: Never Used     Alcohol use No       Review and/or order of clinical lab tests:  Review and/or order of radiology tests:  Review and/or order of medicine tests:  Discussion of test results with performing physician:  Decision to obtain old records and/or obtain history from someone other than the patient:  Review and summarization of old records and/or obtaining history from someone other than the patient and.or discussion of case with another health care provider:  Independent visualization of image, tracing or specimen itself:    Time:      Pawan Castro MD

## 2021-06-16 NOTE — PROGRESS NOTES
Pulmonary Clinic Consult Note  Date of Service: 2/21/2018    Reason for Consultation: Dyspnea on exertion    History:     HPI: Patient is a pleasant 52-year-old male who was referred here for evaluation of dyspnea on exertion.    Patient noted that he started having shortness of breath approximately 2 years ago, worse over the past 1 year.  He notes that he is able to walk approximately 1-2 blocks before he has to rest.  He can go up 2 flight of stairs.  He denies any cough. He denies any night sweats or weight loss.  He notes that over the past 6 months, he gained about 50-60 lbs.  He notes that he drinks pops and juices. He denies any recurrent URI infections.  However, he endorses that he occasionally wheezes.  He notes that he sleeps on pillows. He notes that his legs are edematous and uses stockings.  Pt does not exercise.     Pt notes that he was placed on Symbicort roughly about 1-2 months. He notes that his breathing is better with using Symbicort. He has a ventolin that he uses once a day which he also has relief with.     PMHx/PSHx:  YANIRA on CPAP - not using CPAP as mask need to be replaced.  GERD  Anxiety  Opioid dependence on Suboxone  Hypertension  History of cataract surgery    Social Hx:  Lifelong non-smoker  He used to work at restaurant as cook and   Lives in an apt  No pets  single    Review of Systems - 10 point review of system negative except for what is mentioned in the HPI.  Meds and Allergies:  See EHR for the updated medication list and Allergies. These were reviewed.     Family History   Problem Relation Age of Onset     No Medical Problems Mother      No Medical Problems Father      No Medical Problems Sister      No Medical Problems Brother      No Medical Problems Sister      No Medical Problems Sister      No Medical Problems Sister      No Medical Problems Brother      No Medical Problems Daughter      No Medical Problems Son          Exam/Data:   /78  Pulse 76  Resp  "20  Ht 5' 10\" (1.778 m)  Wt (!) 325 lb 3.2 oz (147.5 kg)  SpO2 94% Comment: RA  BMI 46.66 kg/m2    EXAM:  GEN: comfortable, NAD  HEENT: NCAT, EMOI, mmm  CVS: S1S2, RRR  Lung: good air entry bilaterally, no wheezing  Abd: obese  Ext: 3+ edema bilaterally   Vasc: intact radial pulses bilaterally  Neuro: nonfocal  Skin: no visible rash  Musculoskeletal: FROM all extremities  Psych: normal affect    DATA      PFT DATA 2/2018:  FEV1 50% predicted, FVC 51% predicted, ratio 78, there is reversibility.  TLC 64% predicted, RV 71% predicted, DLCO 67% predicted    Chest x-ray done on 1/2018: shows no evidence of infiltrates or congestion    Assessment/Plan:   Tobin Bryant is a 52 y.o. male lifelong non-smoker, morbidly obese male who was referred here for shortness of breath.  PFTs as noted above show symmetric decrease in FEV1 and FVC suggestive of restrictive lung disease.  He had a chest x-ray back on January 9, 2018 that was essentially unremarkable.  Shortness of breath is multifactorial but most likely secondary to his restrictive lung disease from morbid obesity, obesity hypoventilation syndrome, obstructive sleep apnea, and deconditioning.  Patient has been started on Symbicort given reversibility on his PFTs.      Recommendations:  Echo to check diastolic function  Encouraged to lose weight, diet and exercise  Encouraged to join a formal exercise program  Continue symbicort and albuterol for now  Encouraged to follow up with sleep to get his CPAP machine renewed    FOLLOW UP: 3 months    Maylin Cowan MD  Pulmonary and Critical Care Medicine  2/21/2018        Allergies   Allergen Reactions     Seafood Other (See Comments)     Eyes turn yellow       Ibuprofen Nausea And Vomiting       Medications:     Current Outpatient Prescriptions   Medication Sig Dispense Refill     albuterol (VENTOLIN HFA) 90 mcg/actuation inhaler Inhale 2 puffs every 6 (six) hours as needed for wheezing. 8.5 Inhaler 1     ARIPiprazole " (ABILIFY) 10 MG tablet Take 10 mg by mouth daily.  3     budesonide-formoterol (SYMBICORT) 80-4.5 mcg/actuation inhaler Inhale 2 puffs 2 (two) times a day. 1 Inhaler 6     buprenorphine-naloxone (SUBOXONE) 8-2 mg Film per sublingual film Place 1 Film under the tongue 3 (three) times a day. 90 Film 1     buPROPion (WELLBUTRIN XL) 150 MG 24 hr tablet Take 1 tablet by mouth every morning.       cane Marisel Use as needed to assist with ambulation.   Pt with COPD. 1 each 0     cloNIDine HCl (CATAPRES) 0.1 MG tablet TAKE 1 TABLET(0.1 MG) BY MOUTH TWICE DAILY AS NEEDED 60 tablet 0     omeprazole (PRILOSEC) 20 MG capsule Take 20 mg by mouth 2 (two) times a day before meals.       polyethylene glycol (MIRALAX) 17 gram packet Take 17 g by mouth daily as needed.       No current facility-administered medications for this visit.

## 2021-06-16 NOTE — PROGRESS NOTES
[facility] Addiction Medicine  Follow up    Visit was conducted using telephone. Pt was located in his home and I was located in home office. Start time 1420 end time 1435.    CC/HPI:   Tobin Bryant is a 55 y.o. male with PMH HTN, morbid obesity, prediabetes, COPD, YANIRA, anxiety, depression, and OUD on buprenorphine who is evaluated on this date for ongoing treatment of GENET.      Pt has utilized buprenorphine for at least His last use of illicit  opioids was in 2019.       Today pt states he has been taking buprenorphine 24mg/day most days, occasionally will take only 16mg depending on his sleep schedule.  He denies any significant cravings, denies use since last visit.  He manages OIC with PEG..  He would like to continue same dose.      Pt recently started taking phentermine for weight control.  He also had nutrition/exercise counseling with prescribing provider.      He endorses feeling frustrated and angry regarding the death of Gianfranco Payne and trial of Codey Chapman ongoing.  He does state he will take breaks from watching the trial and the news to help himself remain calm.     Current Outpatient Medications   Medication Instructions     albuterol (PROAIR HFA;PROVENTIL HFA;VENTOLIN HFA) 90 mcg/actuation inhaler INHALE 2 PUFFS BY MOUTH EVERY 6 HOURS AS NEEDED FOR WHEEZING     ARIPiprazole (ABILIFY) 10 mg, Oral, DAILY     atorvastatin (LIPITOR) 20 mg, Oral, DAILY     blood glucose test (ACCU-CHEK GUIDE TEST STRIPS) strips TEST TWICE DAILY     budesonide-formoteroL (SYMBICORT) 80-4.5 mcg/actuation inhaler 2 puffs, Inhalation, 2 times daily     buPROPion (WELLBUTRIN XL) 150 MG 24 hr tablet 1 tablet, Oral, Every morning     cane Marisel Use as needed to assist with ambulation.   Pt with COPD.     cholecalciferol (vitamin D3) (VITAMIN D3) 5,000 Units, Oral, DAILY     cloNIDine HCl (CATAPRES) 0.1 MG tablet TAKE 1 TABLET(0.1 MG) BY MOUTH TWICE DAILY AS NEEDED     docusate sodium (COLACE) 100 mg,  Oral, 2 times daily     fluticasone propion-salmeteroL (ADVAIR HFA) 45-21 mcg/actuation inhaler 2 puffs, Inhalation, 2 times daily     furosemide (LASIX) 20 mg, Oral, DAILY     generic lancets Test 2 times a day     guaiFENesin ER (MUCINEX) 600 mg, Oral, 2 times daily     mometasone-formoterol (DULERA) 200-5 mcg/actuation HFAA inhaler 2 puffs, Inhalation, 2 times daily     montelukast (SINGULAIR) 10 mg, Oral, Bedtime     omeprazole (PRILOSEC) 20 MG capsule TAKE 1 CAPSULE(20 MG) BY MOUTH TWICE DAILY BEFORE MEALS     phentermine (ADIPEX-P) 37.5 mg tablet Take 1/2 to 1 tablet in the morning.     polyethylene glycol (GLYCOLAX) 17 g, Oral, Daily PRN     SUBOXONE 8-2 mg Film per sublingual film 1 film three times a day SL       Objective:    Physical Exam  Constitutional:       Comments: Speaking in full sentences without difficulty   Neurological:      Mental Status: He is alert and oriented to person, place, and time.   Psychiatric:         Speech: Speech normal.         Behavior: Behavior is cooperative.         Thought Content: Thought content normal.         Cognition and Memory: Cognition and memory normal.      Comments: Mood is frustrated  Affect is congruent with mood and subject matter       1. Opioid use disorder, severe, dependence (H)  Controlled  Continue buprenorphine 24mg/day  Schedule telehub f/u in 3 months, notify medical staff sooner if problems.     - Drug Abuse 1+, Urine; Standing  - Buprenorphine, Urine; Standing  - Ethyl Glucuronide and Ethyl Sulfate, Urine, Quantitative (CDCO ETG/S); Standing  - SUBOXONE 8-2 mg Film per sublingual film; 1 film three times a day SL  Dispense: 90 Film; Refill: 2    2. Drug-induced constipation  Continue PEG and colace prn    At least 30min spent in review of medical record,  review, discussion of therapy options, symptoms, and recommendations, MI regarding self care including exercise and healthy eating.     Safia Cat MD

## 2021-06-16 NOTE — PROGRESS NOTES
________________________________________  Medications Phoned  to Pharmacy [] yes [x]no  Name of Pharmacist:  List Medications, including dose, quantity and instructions    Medications ordered this visit were e-scribed.  Verified by order class [x] yes  [] no  Suboxone 8-2 mg   Medication changes or discontinuations were communicated to patient's pharmacy: [] yes  [x] no    Dictation completed at time of chart check: [x] yes  [] no    I have checked the documentation for today s encounters and the above information has been reviewed and completed.

## 2021-06-16 NOTE — PATIENT INSTRUCTIONS - HE
Continue albuterol as needed    Long-acting inhalers sent-whichever insurance will cover-Dulera, Advair, and Symbicort to take twice daily    Singulair 10 mg daily    Guaifenesin 600 mg twice daily    Refill test strips

## 2021-06-16 NOTE — PROGRESS NOTES
Correct pharmacy verified with patient and confirmed in snapshot? [x] yes []no    Charge captured ? [x] yes  [] no    Medications Phoned  to Pharmacy [] yes [x]no  Name of Pharmacist:  List Medications, including dose, quantity and instructions      Medication Prescriptions given to patient   [] yes  [x] no   List the name of the drug the prescription was written for.       Medications ordered this visit were e-scribed.  Verified by order class [x] yes  [] no    Medication changes or discontinuations were communicated to patient's pharmacy: [] yes  [x] NA    UA collected [x] yes  [] no    [place] Prescription Monitoring Program Reviewed? [x] yes  [] no    Referrals were made to:  NA    Future appointment was made: [x] yes  [] no  5/14/18 with Dr. Brunner    Dictation completed at time of chart check: [x] yes  [] no    I have checked the documentation for today s encounters and the above information has been reviewed and completed.

## 2021-06-16 NOTE — TELEPHONE ENCOUNTER
Reason for Call:  Medication or medication refill:    Do you use a [facility]?  Name of the pharmacy and phone number for the current request:   [facility]    Name of the medication requested:   VENTOLIN INHALER   FURSOMIDE  DOCUSATE    Other request: N/A    Can we leave a detailed message on this number? Yes    Phone number patient can be reached at: Cell number on file:    Telephone Information:   Mobile [phone]       Best Time: ANYTIME    Call taken on 3/23/2021 at 11:13 AM by Shelia Stephen

## 2021-06-16 NOTE — PROGRESS NOTES
"Tobin Bryant is a 55 y.o. male who is being evaluated via a billable telephone visit.      The patient has been notified of following:     \"This telephone visit will be conducted via a call between you and your physician/provider. We have found that certain health care needs can be provided without the need for a physical exam.  This service lets us provide the care you need with a short phone conversation.  If a prescription is necessary we can send it directly to your pharmacy.  If lab work is needed we can place an order for that and you can then stop by our lab to have the test done at a later time.    Telephone visits are billed at different rates depending on your insurance coverage. During this emergency period, for some insurers they may be billed the same as an in-person visit.  Please reach out to your insurance provider with any questions.    If during the course of the call the physician/provider feels a telephone visit is not appropriate, you will not be charged for this service.\"    Patient has given verbal consent to a Telephone visit? Yes    What phone number would you like to be contacted at? [phone]    Patient would like to receive their AVS by AVS Preference: Mail a copy.    Bariatric Follow-up    55 y.o.  male BMI:Body mass index is 46.49 kg/m .    Weight:   Wt Readings from Last 1 Encounters:   11/23/20 (!) 324 lb (147 kg)    pounds  Height: 5' 10\" (1.778 m) (4/15/2021  7:00 AM)  Weight: (!) 324 lb (147 kg) (11/23/2020  3:33 PM)  BMI (Calculated): 46.5 (11/23/2020  3:33 PM)  SpO2: 97 % (11/23/2020  3:33 PM)      Comorbidities:  Patient Active Problem List   Diagnosis     Opioid dependence, on agonist therapy with buprenorphine, Dr. Rincon     COPD (chronic obstructive pulmonary disease) (H)     Chronic mental illness - Yrn     Essential hypertension     YANIRA on CPAP     Gastroesophageal reflux disease without esophagitis     Restrictive lung disease     Moderate episode of recurrent " major depressive disorder (H)     Morbid obesity with BMI of 45.0-49.9, adult (H)     Low serum testosterone level     Increased PTH level     Type 2 diabetes mellitus without complication, without long-term current use of insulin (H)     Moderate persistent asthma with acute exacerbation     Chronic diastolic heart failure (H)         Interim: Feels he has regained his weight    Plan:  DIET  3 meals, protein first. Stop juice and soda again   EXERCISE get back in to walking, mountain climbing, push ups   PHARMACOTHERAPY refill phentermine    eating protein first will help decrease wants for sweets     -We reviewed his medications and whether associated with weight gain.  Current Outpatient Medications on File Prior to Visit   Medication Sig Dispense Refill     albuterol (PROAIR HFA;PROVENTIL HFA;VENTOLIN HFA) 90 mcg/actuation inhaler INHALE 2 PUFFS BY MOUTH EVERY 6 HOURS AS NEEDED FOR WHEEZING 6.7 g 0     ARIPiprazole (ABILIFY) 10 MG tablet Take 10 mg by mouth daily.  3     atorvastatin (LIPITOR) 20 MG tablet Take 1 tablet (20 mg total) by mouth daily. 90 tablet 3     blood glucose test (ACCU-CHEK GUIDE TEST STRIPS) strips TEST TWICE DAILY 60 strip 6     budesonide-formoteroL (SYMBICORT) 80-4.5 mcg/actuation inhaler Inhale 2 puffs 2 (two) times a day. 1 Inhaler 6     buPROPion (WELLBUTRIN XL) 150 MG 24 hr tablet Take 1 tablet by mouth every morning.       cane Marisel Use as needed to assist with ambulation.   Pt with COPD. 1 each 0     cholecalciferol, vitamin D3, (VITAMIN D3) 5,000 unit Tab Take 1 tablet (5,000 Units total) by mouth daily. 90 tablet 3     cloNIDine HCl (CATAPRES) 0.1 MG tablet TAKE 1 TABLET(0.1 MG) BY MOUTH TWICE DAILY AS NEEDED 180 tablet 3     docusate sodium (COLACE) 100 MG capsule Take 1 capsule (100 mg total) by mouth 2 (two) times a day. 60 capsule 1     fluticasone propion-salmeteroL (ADVAIR HFA) 45-21 mcg/actuation inhaler Inhale 2 puffs 2 (two) times a day. 1 Inhaler 12      furosemide (LASIX) 20 MG tablet Take 1 tablet (20 mg total) by mouth daily. (Patient taking differently: Take 20 mg by mouth as needed. ) 90 tablet 3     generic lancets Test 2 times a day 100 each 6     guaiFENesin ER (MUCINEX) 600 mg 12 hr tablet Take 1 tablet (600 mg total) by mouth 2 (two) times a day. 60 tablet 2     mometasone-formoterol (DULERA) 200-5 mcg/actuation HFAA inhaler Inhale 2 puffs 2 (two) times a day. 1 Inhaler 2     montelukast (SINGULAIR) 10 mg tablet Take 1 tablet (10 mg total) by mouth at bedtime. 30 tablet 11     omeprazole (PRILOSEC) 20 MG capsule TAKE 1 CAPSULE(20 MG) BY MOUTH TWICE DAILY BEFORE MEALS (Patient taking differently: as needed. ) 180 capsule 3     phentermine (ADIPEX-P) 37.5 mg tablet Take 1/2 to 1 tablet in the morning. 90 tablet 1     polyethylene glycol (GLYCOLAX) 17 gram/dose powder Take 17 g by mouth daily as needed. 510 g prn     SUBOXONE 8-2 mg Film per sublingual film 1 film three times a day SL 90 Film 2     No current facility-administered medications on file prior to visit.         We discussed [facility] Bariatric Basics including:  -eating 3 meals daily  -eating protein first  -eating slowly, chewing food well  -avoiding/limiting calorie containing beverages  -choosing wheat, not white with breads, crackers, pastas, kendal, bagels, tortillas, rice  -limiting restaurant or cafeteria eating to twice a week or less  -We discussed the importance of restorative sleep and stress management in maintaining a healthy weight.  -We discussed insulin resistance and glycemic index as it relates to appetite and weight control  -We discussed the National Weight Control Registry healthy weight maintenance strategies and ways to optimize metabolism.  -We discussed the importance of physical activity including cardiovascular and strength training in maintaining a healthier weight and explored viable options.    Most recent labs:  Lab Results   Component Value Date    WBC 7.1  07/11/2019    HGB 13.3 (L) 07/11/2019    HCT 40.2 07/11/2019    MCV 91 07/11/2019     07/11/2019     Lab Results   Component Value Date    CHOL 178 07/11/2019     Lab Results   Component Value Date    HDL 52 07/11/2019     Lab Results   Component Value Date    LDLCALC 106 07/11/2019     Lab Results   Component Value Date    TRIG 99 07/11/2019     Lab Results   Component Value Date    ALT 25 09/11/2020    AST 27 09/11/2020    ALKPHOS 82 09/11/2020    BILITOT 0.3 09/11/2020     Lab Results   Component Value Date    HGBA1C 5.7 (H) 10/01/2020     Lab Results   Component Value Date    ZHNXEOFG94 710 09/03/2019     Lab Results   Component Value Date    HZNNVOVE86HB 33.9 09/11/2020     Lab Results   Component Value Date    FERRITIN 114 09/03/2019     Lab Results   Component Value Date     (H) 09/11/2020       Lab Results   Component Value Date    TSH 1.72 01/25/2018         DIETARY HISTORY  Meals Per Day: 2  Eating Protein First?: no  Food Diary: B:1-2 bowls of cereal L: D:chips with salsa  Snacks Per Day: yes  Typical Snack: cakes and ice cream  Fluid Intake: intentional  Portion Control: problematic  Calorie Containing Beverages: OJ  Alcohol per week: no  Typical Protein Food Choices: variety  Choosing Whole Grains: white  Grocery Shopping is done by: he does  Food Preparation is done by: he does  Meals at Restaurant/Cafeteria/Take Out Per Week: no  Eating at the Table:   TV is Off During Meals:     Positive Changes Since Last Visit: ready to get back on track  Struggling With: exercise    Knowledgeable in Reading Food Labels:   Getting Adequate Protein: yes  Sleeping 7-8 hours/day OK  Stress management     PHYSICAL ACTIVITY PATTERNS:  Cardiovascular: none  Strength Training: none    REVIEW OF SYSTEMS  GENERAL/CONSTITUTIONAL:  Fatigue: yes  CARDIOVASCULAR:  Chest Pain with Exertion: no  PULMONARY:  Dyspnea on exertion: yes  CPAP Use: no  Tobacco Use: no  GASTROINTESTINAL:  GERD/Heartburn:  "  UROLOGIC:  Urinary Symptoms:   NEUROLOGIC:  Headaches: no  Paresthesias: no  PSYCHIATRIC:  Moods: OK  MUSCULOSKELETAL/RHEUMATOLOGIC  Arthralgias: yes  Myalgias: yes  ENDOCRINE:  Monitoring Blood Sugars: no  Sugars Well Controlled: no  DERMATOLOGIC:  Rashes:     PHYSICAL EXAM: (most recent vitals and today's stated weight)  Vitals: Ht 5' 10\" (1.778 m)   BMI 46.49 kg/m    Height: 5' 10\" (1.778 m) (4/15/2021  7:00 AM)  Weight: (!) 324 lb (147 kg) (11/23/2020  3:33 PM)  BMI (Calculated): 46.5 (11/23/2020  3:33 PM)  SpO2: 97 % (11/23/2020  3:33 PM)      GEN: Pleasant and in no acute distress  PSYCH: A&OX3,     I have reviewed the note as documented above.  This accurately captures the substance of my conversation with the patient.  Thank you for the opportunity to participate in the care of your patient.    Adelina Burnham MD, FAAFP  [facility]  Diplomate, American Board of Obesity Medicine    Total time spent on the date of this encounter doing: chart review, review of test results, patient visit, physical exam, education, counseling, developing plan of care, and documenting = 30 minutes.            Rodger Hill LPN      "

## 2021-06-16 NOTE — TELEPHONE ENCOUNTER
Patient requesting his:  ALBUTEROL AND WATER PILL  PHARM:  [facility]    QUESTIONS PLEASE CALL PATIENT  [phone]

## 2021-06-16 NOTE — PROGRESS NOTES
This video/telephone visit will be conducted via a call between you and your physician/provider. We have found that certain health care needs can be provided without the need for an in-person physical exam. This service lets us provide the care you need with a video /telephone conversation. If a prescription is necessary we can send it directly to your pharmacy. If lab work is needed we can place an order for that and you can then stop by our lab to have the test done at a later time.    Just as we bill insurance for in-person visits, we also bill insurance for video/telephone visits. If you have questions about your insurance coverage, we recommend that you speak with your insurance company.    Patient has given verbal consent for Telephone visit? Yes   Patient would like the video visit invitation sent by: [phone]  JOSS/FAHEEM GOTTI CMA   Woke Patient up with phone call for check in for appointment today.   Pt denies any substance use.   No new concerns stated.   Patient verified allergies, medications and pharmacy via phone.  Patient states he is ready for visit.  [place]  to be reviewed by provider.

## 2021-06-16 NOTE — PROGRESS NOTES
Tobin is a 55 y.o. male being evaluated via a billable phone visit, and would like to be contacted via the following   Cell number on file:    Telephone Information:   Mobile [phone]        ASSESSMENT:  1. Chronic obstructive pulmonary disease with acute exacerbation (H)  Wheezing without history of smoking.  No history of sinus.  No beta-blockers.  Question referred acid reflux but asymptomatic.  Add long-acting beta agonist to short acting beta agonist.  Add inhaled steroid.  Trial of guaifenesin.  Side effects on Singulair  - montelukast (SINGULAIR) 10 mg tablet; Take 1 tablet (10 mg total) by mouth at bedtime.  Dispense: 30 tablet; Refill: 11  - fluticasone propion-salmeteroL (ADVAIR HFA) 45-21 mcg/actuation inhaler; Inhale 2 puffs 2 (two) times a day.  Dispense: 1 Inhaler; Refill: 12  - budesonide-formoteroL (SYMBICORT) 80-4.5 mcg/actuation inhaler; Inhale 2 puffs 2 (two) times a day.  Dispense: 1 Inhaler; Refill: 6  - mometasone-formoterol (DULERA) 200-5 mcg/actuation HFAA inhaler; Inhale 2 puffs 2 (two) times a day.  Dispense: 1 Inhaler; Refill: 2  - guaiFENesin ER (MUCINEX) 600 mg 12 hr tablet; Take 1 tablet (600 mg total) by mouth 2 (two) times a day.  Dispense: 60 tablet; Refill: 2    2. Chronic diastolic heart failure (H)  Volume good per history taking furosemide only as needed    3. Moderate episode of recurrent major depressive disorder (H)  Mood stable    4. Morbid obesity with BMI of 45.0-49.9, adult (H)  No diabetes.  Monitors sugars due to fluctuating weight  - blood glucose test (ACCU-CHEK GUIDE TEST STRIPS) strips; TEST TWICE DAILY  Dispense: 60 strip; Refill: 6    5. YANIRA on CPAP  Stable and compliant    Preventive Health Care:      PLAN:  Patient Instructions   Continue albuterol as needed    Long-acting inhalers sent-whichever insurance will cover-Dulera, Advair, and Symbicort to take twice daily    Singulair 10 mg daily    Guaifenesin 600 mg twice daily    Refill test strips    Return  in about 3 months (around 6/29/2021) for a phone/video follow up visit.      CHIEF COMPLAINT:  Chief Complaint   Patient presents with     Wheezing     a few weeks       HISTORY OF PRESENT ILLNESS:  Tobin is a 55 y.o. male contacting the clinic today via phone for complaints of wheezing.  He reports a history of asthma or COPD.  He does not smoke and never has.  He will sometimes wheeze at rest and sometimes wheeze with exertion.  He has had chest pain in the past but has none recently.  Wheezing does not seem to change with blood pressure, heart rate, or swelling in the feet.  He has acid reflux but feels like this is under control.  He denies symptoms of sinusitis.  He has never had a blood clot    He has sleep apnea and reports being compliant    He reports no history of diabetes but checks glycosylated hemoglobin and blood sugars for weight and prediabetes    REVIEW OF SYSTEMS:   Occasional peripheral edema.  No chest pain    PFSH:  Social History     Social History Narrative    Lives alone.  Not working (last worked 2006).  Originally from [place], some college.    2 grown children       TOBACCO USE:  Social History     Tobacco Use   Smoking Status Never Smoker   Smokeless Tobacco Never Used       VITALS:  There were no vitals filed for this visit.  Wt Readings from Last 3 Encounters:   11/23/20 (!) 324 lb (147 kg)   10/21/20 (!) 325 lb 4.8 oz (147.6 kg)   10/02/20 (!) 311 lb (141.1 kg)       PHYSICAL EXAM:  (observations via Phone)  Talkative.  No cough or wheezing    MEDICATIONS  Current Outpatient Medications   Medication Sig Dispense Refill     albuterol (PROAIR HFA;PROVENTIL HFA;VENTOLIN HFA) 90 mcg/actuation inhaler INHALE 2 PUFFS BY MOUTH EVERY 6 HOURS AS NEEDED FOR WHEEZING 6.7 g 0     ARIPiprazole (ABILIFY) 10 MG tablet Take 10 mg by mouth daily.  3     atorvastatin (LIPITOR) 20 MG tablet Take 1 tablet (20 mg total) by mouth daily. 90 tablet 3     blood glucose test (ACCU-CHEK GUIDE TEST STRIPS)  strips TEST TWICE DAILY 60 strip 6     budesonide-formoteroL (SYMBICORT) 80-4.5 mcg/actuation inhaler Inhale 2 puffs 2 (two) times a day. 1 Inhaler 6     buPROPion (WELLBUTRIN XL) 150 MG 24 hr tablet Take 1 tablet by mouth every morning.       cholecalciferol, vitamin D3, (VITAMIN D3) 5,000 unit Tab Take 1 tablet (5,000 Units total) by mouth daily. 90 tablet 3     cloNIDine HCl (CATAPRES) 0.1 MG tablet TAKE 1 TABLET(0.1 MG) BY MOUTH TWICE DAILY AS NEEDED 180 tablet 3     docusate sodium (COLACE) 100 MG capsule Take 1 capsule (100 mg total) by mouth 2 (two) times a day. 60 capsule 1     furosemide (LASIX) 20 MG tablet Take 1 tablet (20 mg total) by mouth daily. (Patient taking differently: Take 20 mg by mouth as needed. ) 90 tablet 3     generic lancets Test 2 times a day 100 each 6     mometasone-formoterol (DULERA) 200-5 mcg/actuation HFAA inhaler Inhale 2 puffs 2 (two) times a day. 1 Inhaler 2     omeprazole (PRILOSEC) 20 MG capsule TAKE 1 CAPSULE(20 MG) BY MOUTH TWICE DAILY BEFORE MEALS (Patient taking differently: as needed. ) 180 capsule 3     phentermine (ADIPEX-P) 37.5 mg tablet Take 1/2 to 1 tablet in the morning. 90 tablet 1     polyethylene glycol (GLYCOLAX) 17 gram/dose powder Take 17 g by mouth daily as needed. 510 g prn     cane [place] Use as needed to assist with ambulation.   Pt with COPD. 1 each 0     fluticasone propion-salmeteroL (ADVAIR HFA) 45-21 mcg/actuation inhaler Inhale 2 puffs 2 (two) times a day. 1 Inhaler 12     guaiFENesin ER (MUCINEX) 600 mg 12 hr tablet Take 1 tablet (600 mg total) by mouth 2 (two) times a day. 60 tablet 2     montelukast (SINGULAIR) 10 mg tablet Take 1 tablet (10 mg total) by mouth at bedtime. 30 tablet 11     SUBOXONE 8-2 mg Film per sublingual film 1 film three times a day SL 90 Film 2     No current facility-administered medications for this visit.        Notes summarized:   Labs, x-rays, cardiology, GI tests reviewed: Chest x-ray clear.  CT scan head clear.  Cardiac  tests without ischemia x2  New orders: No orders of the defined types were placed in this encounter.      Independent review of:  Supplemental history by:    Phone Start Time: 11:55 AM  Phone End time:  12:14 PM  Conversation plus orders: 19 minutes  Dictation time: 3 minutes    The visit lasted a total of 22 minutes     Patient would like to receive their AVS by AVS Preference: Mail a copy.    Danny Camacho MD

## 2021-06-17 ENCOUNTER — OFFICE VISIT - HEALTHEAST (OUTPATIENT)
Dept: BEHAVIORAL HEALTH | Facility: CLINIC | Age: 56
End: 2021-06-17

## 2021-06-17 DIAGNOSIS — F11.20 OPIOID USE DISORDER, SEVERE, DEPENDENCE (H): ICD-10-CM

## 2021-06-17 LAB
AMPHETAMINES UR QL SCN: NORMAL
BARBITURATES UR QL: NORMAL
BENZODIAZ UR QL: NORMAL
BUPRENORPHINE QUAL URINE LHE: ABNORMAL
CANNABINOIDS UR QL SCN: NORMAL
COCAINE UR QL: NORMAL
CREAT UR-MCNC: 104.9 MG/DL
CREAT UR-MCNC: 106 MG/DL
METHADONE UR QL SCN: NORMAL
OPIATES UR QL SCN: NORMAL
OXYCODONE UR QL: NORMAL
PCP UR QL SCN: NORMAL

## 2021-06-17 NOTE — TELEPHONE ENCOUNTER
Telephone Encounter by Juli Farris at 5/4/2021 11:20 AM     Author: Juli Farris Service: -- Author Type: --    Filed: 5/4/2021 11:20 AM Encounter Date: 4/27/2021 Status: Signed    : Juli Farris       PRIOR AUTHORIZATION DENIED    Denial Rational: state health plans do not cover medications used to aid in weight loss. Benefit exclusion. Patient may get medication but will be responsible for cost

## 2021-06-17 NOTE — TELEPHONE ENCOUNTER
Central PA team  [phone]  Pool: HE PA MED ([id number])          PA has been initiated.       PA form completed and faxed insurance via Cover My Meds     Key:  [id number]     Medication:  PHENTERMINE 37.5    Insurance:  [facility]        Response will be received via fax and may take up to 5-10 business days depending on plan

## 2021-06-17 NOTE — PROGRESS NOTES
"Tobin Bryant is a 55 y.o. male who is being evaluated via a billable telephone visit.      The patient has been notified of following:     \"This telephone visit will be conducted via a call between you and your physician/provider. We have found that certain health care needs can be provided without the need for a physical exam.  This service lets us provide the care you need with a short phone conversation.  If a prescription is necessary we can send it directly to your pharmacy.  If lab work is needed we can place an order for that and you can then stop by our lab to have the test done at a later time.    Telephone visits are billed at different rates depending on your insurance coverage. During this emergency period, for some insurers they may be billed the same as an in-person visit.  Please reach out to your insurance provider with any questions.    If during the course of the call the physician/provider feels a telephone visit is not appropriate, you will not be charged for this service.\"    Patient has given verbal consent to a Telephone visit? Yes    What phone number would you like to be contacted at? [phone]    Patient would like to receive their AVS by AVS Preference: Mail a copy.    Phone call duration: 20 minutes    Olga Hayes RD          Medical  Weight Loss Follow-Up Diet Evaluation  Assessment:  Tobin is presenting today for a follow up weight management nutrition consultation. Pt has had an initial appointment with Dr. Burnham  Weight loss medication: Phentermine.   Pt's No data recorded  BMI: There is no height or weight on file to calculate BMI.  Patient weight not recorded  Recommended Protein Intake: 60-80 grams of protein/day  Patient Active Problem List:  Patient Active Problem List   Diagnosis     Opioid dependence, on agonist therapy with buprenorphine, Dr. Rincon     COPD (chronic obstructive pulmonary disease) (H)     Chronic mental illness - Yrn     Essential hypertension "     YANIRA on CPAP     Gastroesophageal reflux disease without esophagitis     Restrictive lung disease     Moderate episode of recurrent major depressive disorder (H)     Morbid obesity with BMI of 45.0-49.9, adult (H)     Low serum testosterone level     Increased PTH level     Type 2 diabetes mellitus without complication, without long-term current use of insulin (H)     Moderate persistent asthma with acute exacerbation     Chronic diastolic heart failure (H)     Diabetes: has not been testing blood glucose   +allergy to peanuts    Progress on goals from last visit: picked up phentermine today, was exercising   +would like to put together a eating program  Will eat 1-2 times per day  +snacking on candy and sweets    Dietary Recall:  Breakfast: bowl of cereal- corn pops  Lunch: none  Dinner:3 chicken things and mixed veggies    Nutrition Diagnosis:    Overweight/Obesity (NC 3.3) related to overeating and poor lifestyle habits as evidenced by patient's report of infrequent meals, lack of activity, snacking on sweets and BMI 40      Intervention:  1. Food and/or nutrient delivery: encouraged patient to try to eat at least 2 times per day with protein each time, the importance of consistent nutrition was stressed  2. Nutrition counseling: goal setting and motivational interviewing    Monitoring/Evaluation:    Goals:  1. Eat at least 2 meals per day  2. Protein at each meal    Patient to follow up in 1 months(s) with bariatrician and 2 month(s) with MALGORZATA

## 2021-06-17 NOTE — TELEPHONE ENCOUNTER
Pt would like to talk to someone about some  medication questions. Pt didn't really give any other info but that.    [phone] -Confirmed

## 2021-06-17 NOTE — TELEPHONE ENCOUNTER
Left pt a message to discuss and ask which pharmacy he would like the script sent to so he can pay ye.    Yaneli Gibbons RN, CBN  [facility]  P [phone]  F [phone]

## 2021-06-17 NOTE — TELEPHONE ENCOUNTER
Prior Authorization Request  Who s requesting:  Patient and Pharmacy  Pharmacy Name and Location: [facility], [facility]  Medication Name: Phentermine  Insurance Plan: [employer] [place]  Insurance Member ID Number:  [id number]  CoverMyMeds Key:   Informed patient that prior authorizations can take up to 10 business days for response:   Yes  Okay to leave a detailed message: Yes

## 2021-06-17 NOTE — TELEPHONE ENCOUNTER
Called around to get pricing and he would like the phentermine sent to [facility] in [place] on [address]. Sent to  for approval.    Yaneli Gibbons RN, CBN  [facility]  P [phone]  F [phone]

## 2021-06-17 NOTE — PATIENT INSTRUCTIONS - HE
LEAN PROTEIN SOURCES    Protein Source Portion Calories Grams of Protein                           Nonfat, plain Greek yogurt    (10 grams sugar or less) 3/4 cup (6 oz)  12-17   Light Yogurt (10 grams sugar or less) 3/4 cup (6 oz)  6-8   Protein Shake 1 shake 110-180 15-30   Skim/1% Milk or lactose-free milk 1 cup ( 8 oz)  8   Plain or light, flavored soymilk 1 cup  7-8   Plain or light, hemp milk 1 cup 110 6   Fat Free or 1% Cottage Cheese 1/2 cup 90 15   Part skim ricotta cheese 1/2 cup 100 14   Part skim or reduced fat cheese slices 1/4cup, 3 dice 65-80 8     Mozzarella String Cheese 1 80 8   Canned tuna, chicken, crab or salmon  (canned in water)  1/2 cup 100 15-20   White fish (broiled, grilled, baked) 3 ounces 100 21   [place]/Tuna (broiled, grilled, baked) 3 ounces 150-180 21   Shrimp, Scallops, Lobster, Crab 3 ounces 100 21   Pork loin, Pork Tenderloin 3 ounces 150 21   Boneless, skinless chicken /turkey breast                          (broiled, grilled, baked) 3 ounces 120 21   [place], [place], [place], and Venison 3 ounces 120 21   Lean cuts of red meat and pork (sirloin,   round, tenderloin, flank, ground 93%-96%) 3 ounces 170 21   Lean or Extra Lean Ground Turkey 1/2 cup 150 20   90-95% Lean Wright Burger 1 rubén 140-180 21   Low-fat casserole with lean meat 3/4 cup 200 17   Luncheon Meats                                                        (turkey, lean ham, roast beef, chicken) 3 ounces 100 21   Egg (boiled, poached, scrambled) 1 Egg 60 7   Egg Substitute 1/2 cup 70 10   Nuts (limit to 1 serving per day)  3 Tbsp. 150 7   Nut [employer] (peanut, almond)  Limit to 1 serving or less daily 1 Tbsp. 90 4   Soy Burger (varies) 1  10-15   Garbanzo, Black, Donaldson Beans/Edamame 1/2 cup 110 7-9   Refried Beans 1/2 cup 100 7   Kidney and Lima beans 1/2 cup 110 7   Tempeh 3 oz 175 18   Vegan crumbles 1/2 cup 100 14   Tofu 1/2 cup 110 14   Chili (beans and extra lean beef or turkey) 1 cup 200  23   Lentils 1/2 cup 115 9   Black Bean Soup 1 cup 175 12     150 Calories or Less Snack Ideas   1 hardboiled egg with   cup berries  1 small apple with 1 hardboiled egg  10 almonds with   cup berries  2 clementines with 1 light string cheese  1 light string cheese with   sliced apple  1 light string cheese wrapped in 2 slices of turkey  4 100% whole wheat crackers (e.g. Triscuit) with 1 light string cheese    c. cottage cheese with   cup fruit and 1 Tbsp sunflower seeds     cup cottage cheese with   of an avocado     can tuna fish with 1 cup sliced cucumbers     cup roasted garbanzo beans with paprika and cayenne pepper    baked sweet potato with   cup chili beans or   cup cottage cheese  2 oz. nitrate free turkey slices with 1 cup carrots  1 container (6 oz) of low sugar (less than 10 grams of sugar) greek yogurt   3 Tablespoons of hummus with 1 cup sliced bell peppers   2 Tablespoons of hummus with 15 baby carrots  4 Tablespoons ranch dip made with plain Greek Yogurt and 3 mini cucumbers  1/4 cup nuts (any kind)  1 Tablespoon peanut butter with 1 stalk celery

## 2021-06-17 NOTE — TELEPHONE ENCOUNTER
Pt called to let us know that the medication that  ordered needs a PA. I let him know that a PA was started a few days ago but we haven't heard the results yet. Pt verbalized understanding.    Yaneli Gibbons RN, CBN  [facility]  P [phone]  F [phone]

## 2021-06-18 NOTE — PROGRESS NOTES
Correct pharmacy verified with patient and confirmed in snapshot? [x] yes []no    Charge captured ? [x] yes  [] no    Medications Phoned  to Pharmacy [] yes [x]no  Name of Pharmacist:  List Medications, including dose, quantity and instructions      Medication Prescriptions given to patient   [] yes  [x] no   List the name of the drug the prescription was written for.       Medications ordered this visit were e-scribed.  Verified by order class [x] yes  [] no    Medication changes or discontinuations were communicated to patient's pharmacy: [] yes  [x] no    UA collected [x] yes  [] no    [place] Prescription Monitoring Program Reviewed? [x] yes  [] no    Referrals were made to:  none  Future appointment was made: [x] yes  [] no  7/10/18  Dictation completed at time of chart check: [x] yes  [] no    I have checked the documentation for today s encounters and the above information has been reviewed and completed.

## 2021-06-19 NOTE — PROGRESS NOTES
We received a PA request for pt's Symbicort 80/4.5mcg.  I started a PA on CMM's (Key: [id number]).  It was also approved right away from 7/17/18-8/17/21.  I called [facility] pharmacy to let them know and they will let pt know once it is ready for pick-up.

## 2021-06-19 NOTE — PROGRESS NOTES
PULMONARY CLINIC FOLLOW UP NOTE    History:     HPI: Tobin Bryant is a 53 y.o. male, from non-smoker, morbid obesity, h/o YANIRA, anxiety, opioid dependence on Suboxone who is here for follow-up of shortness of breath which is thought to be secondary to multifactorial causes including morbid obesity, obesity hypoventilation syndrome, obstructive sleep apnea, and deconditioning.  He did demonstrate evidence of reversibility on his PFTs and had been started on Symbicort.    Interval History: since his initial visit, pt has not had hospitalizations. He is able to walk approximately 1-2 blocks before he has to rest.  He can go up 2 flights of stairs. He does not exercise.  He is currently using symbicort but notes that it is not covered. He denies any cough. He occasionally wheezes. He is not using CPAP because of ?? Insurance issues.    PMHx/PSHx:  YANIRA on CPAP - not using CPAP as mask need to be replaced.  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 Pertinent findings are noted in the HPI.    Exam/Data:   /72  Pulse 80  Resp 20  Wt (!) 327 lb 8 oz (148.6 kg)  SpO2 93% Comment: RA  BMI 46.99 kg/m2, Body mass index is 46.99 kg/(m^2).    GEN: comfortable, NAD  HEENT: NCAT, EMOI, mmm  CVS: S1S2, RRR  Lung: good air entry bilaterally, no wheezing  Abd: obese  Ext: 3+ edema bilaterally   Vasc: intact radial pulses bilaterally  Neuro: nonfocal  Skin: no visible rash  Musculoskeletal: FROM all extremities  Psych: normal affect    Data:   Labs and Imaging personally reviewed.  Pertinent findings include:    CXR 1/2018: FINDINGS: Heart size and pulmonary vascularity normal. The lungs are clear.    Echo on 3/2018:  1. Normal left ventricular size and systolic performance with a visually estimated ejection fraction  of 55%.   2. No significant valvular heart disease is identified on this study.   3. Normal right ventricular size and systolic performance.     PFT's on 2/2018:  FEV1/FVC is 78% and is normal.  FEV1 is 1.70 L or 50% predicted and is reduced.  FVC is 2.17 L or 51% predicted and reduced.  There was improvement in spirometry after a single inhaled dose of bronchodilator.  TLC is 4.63 L or 64% predicted and is reduced.  RV is 1.58 L or 71% predicted and is reduced.  DLCO is 20.64 mL/min/mmHg or 67% predicted and is reduced when it   is corrected for hemoglobin.     Impression:  Full Pulmonary Function Test is abnormal.    Spirometry is consistent with moderate restrictive ventilatory defect.  Spirometry is consistent with reversibility.  There is no hyperinflation.  There is no air-trapping.  Diffusion capacity when corrected for hemoglobin is mildly reduced.    Assessment/Plan:       Tobin Bryant is a 53 y.o. male, lifelong non-smoker, morbidly obese male who is here for f/u on MAN. PFTs as noted above show symmetric decrease in FEV1 and FVC suggestive of restrictive lung disease.  He had a chest x-ray back on January 9, 2018 that was essentially unremarkable.  Shortness of breath is multifactorial but most likely secondary to his restrictive lung disease from morbid obesity, obesity hypoventilation syndrome, obstructive sleep apnea, and deconditioning.  Patient has been started on Symbicort given reversibility on his PFTs, however, will give Dulera given that it is covered.  Echo done is normal and not indicative of distolic dysfunction per report.    Recommendations:  Bring inhalers with you next visit - symbicort and albuterol  Need to lose weigh and exercise  Would recommend follow up with Sleep Medicine  Would consider low dose lasix as he has 3-4 + edema    FOLLOW UP: 8 months    Maylin Cowan MD  Pulmonary and Critical Care Medicine  Electronically Signed on 8/13/2018    Current Outpatient Prescriptions    Medication Sig Dispense Refill     albuterol (PROAIR HFA;PROVENTIL HFA;VENTOLIN HFA) 90 mcg/actuation inhaler Inhale 2 puffs every 6 (six) hours as needed for wheezing.       ARIPiprazole (ABILIFY) 10 MG tablet Take 10 mg by mouth daily.  3     budesonide-formoterol (SYMBICORT) 80-4.5 mcg/actuation inhaler Inhale 2 puffs 2 (two) times a day.       buprenorphine-naloxone (SUBOXONE) 8-2 mg Film per sublingual film 1 film three times a day SL 90 Film 0     buPROPion (WELLBUTRIN XL) 150 MG 24 hr tablet Take 1 tablet by mouth every morning.       cane Marisel Use as needed to assist with ambulation.   Pt with COPD. 1 each 0     cloNIDine HCl (CATAPRES) 0.1 MG tablet TAKE 1 TABLET(0.1 MG) BY MOUTH TWICE DAILY AS NEEDED 60 tablet 0     EPINEPHrine (EPIPEN/ADRENACLICK/AUVI-Q) 0.3 mg/0.3 mL injection Inject 0.3 mg into the shoulder, thigh, or buttocks.       omeprazole (PRILOSEC) 20 MG capsule Take 20 mg by mouth 2 (two) times a day before meals.       polyethylene glycol (MIRALAX) 17 gram packet Take 17 g by mouth daily as needed.       No current facility-administered medications for this visit.      Allergies   Allergen Reactions     Seafood Other (See Comments)     Eyes turn yellow       Ibuprofen Nausea And Vomiting       Meds and Allergies: See EHR for the updated medication list and Allergies. These were reviewed.     Much or all of the text in this note was generated through the use of the Dragon Dictate voice-to-text software. Errors in spelling or words which seem out of context are unintentional. Sound alike errors, in particular, may have escaped editing.

## 2021-06-19 NOTE — LETTER
Letter by Pawan Castro MD at      Author: Pawan Castro MD Service: -- Author Type: --    Filed:  Encounter Date: 7/12/2019 Status: (Other)         Tobin Bryant  [address] [address]  [place] [address]             July 12, 2019         Dear Mr. Bryant,    Below are the results from your recent visit:    Resulted Orders   Glycosylated Hemoglobin A1c   Result Value Ref Range    Hemoglobin A1c 6.3 (H) 3.5 - 6.0 %   Lipid Cascade   Result Value Ref Range    Cholesterol 178 <=199 mg/dL    Triglycerides 99 <=149 mg/dL    HDL Cholesterol 52 >=40 mg/dL    LDL Calculated 106 <=129 mg/dL    Patient Fasting > 8hrs? Unknown    HM2(CBC w/o Differential)   Result Value Ref Range    WBC 7.1 4.0 - 11.0 thou/uL    RBC 4.43 4.40 - 6.20 mill/uL    Hemoglobin 13.3 (L) 14.0 - 18.0 g/dL    Hematocrit 40.2 40.0 - 54.0 %    MCV 91 80 - 100 fL    MCH 30.0 27.0 - 34.0 pg    MCHC 33.2 32.0 - 36.0 g/dL    RDW 14.5 11.0 - 14.5 %    Platelets 316 140 - 440 thou/uL    MPV 7.0 7.0 - 10.0 fL   Comprehensive Metabolic Panel   Result Value Ref Range    Sodium 144 136 - 145 mmol/L    Potassium 4.4 3.5 - 5.0 mmol/L    Chloride 107 98 - 107 mmol/L    CO2 27 22 - 31 mmol/L    Anion Gap, Calculation 10 5 - 18 mmol/L    Glucose 83 70 - 125 mg/dL    BUN 12 8 - 22 mg/dL    Creatinine 0.89 0.70 - 1.30 mg/dL    GFR MDRD Af Amer >60 >60 mL/min/1.73m2    GFR MDRD Non Af Amer >60 >60 mL/min/1.73m2    Bilirubin, Total 0.2 0.0 - 1.0 mg/dL    Calcium 9.1 8.5 - 10.5 mg/dL    Protein, Total 7.4 6.0 - 8.0 g/dL    Albumin 3.8 3.5 - 5.0 g/dL    Alkaline Phosphatase 75 45 - 120 U/L    AST 17 0 - 40 U/L    ALT 16 0 - 45 U/L    Narrative    Fasting Glucose reference range is 70-99 mg/dL per  American Diabetes Association (ADA) guidelines.   HIV Antigen/Antibody Screening Cascade   Result Value Ref Range    HIV Antigen / Antibody Negative Negative    Narrative    Method is Abbott HIV Ag/Ab for the detection of HIV p24 antigen, HIV-1 antibodies and  HIV-2 antibodies.       Labs generally stable.  Diabetes marker has increased slightly.  Please focus on reduction in calories and carbohydrates, increase physical activity and weight loss as we discussed.  I think you will find benefit from our weight loss clinic.    Please call with questions or contact us using [employer].    Sincerely,        Electronically signed by Pawan Castro MD

## 2021-06-19 NOTE — PROGRESS NOTES
Correct pharmacy verified with patient and confirmed in snapshot? [x] yes []no    Charge captured ? [x] yes  [] no    Medications Phoned  to Pharmacy [] yes [x]no  Name of Pharmacist:  List Medications, including dose, quantity and instructions      Medication Prescriptions given to patient   [] yes  [x] no   List the name of the drug the prescription was written for.       Medications ordered this visit were e-scribed.  Verified by order class [x] yes  [] no    Medication changes or discontinuations were communicated to patient's pharmacy: [] yes  [x] no    UA collected [x] yes  [] no    [place] Prescription Monitoring Program Reviewed? [x] yes  [] no    Referrals were made to:  NONE    Future appointment was made: [x] yes  [] no    Dictation completed at time of chart check: [] yes  [x] no    I have checked the documentation for today s encounters and the above information has been reviewed and completed.

## 2021-06-19 NOTE — LETTER
Letter by Pawan Castro MD at      Author: Pawan Castro MD Service: -- Author Type: --    Filed:  Encounter Date: 10/11/2019 Status: Signed         Neeruanca Pereirawer  [address] [place] [address] [address]  [place] [address]             October 11, 2019         Dear Mr. Bryant,    Below are the results from your recent visit:    Resulted Orders   Glycosylated Hemoglobin A1c   Result Value Ref Range    Hemoglobin A1c 5.8 3.5 - 6.0 %       Diabetes marker significantly improved, excellent keep up the good work    Please call with questions or contact us using [employer].    Sincerely,        Electronically signed by Pawan Castro MD

## 2021-06-20 NOTE — LETTER
Letter by Pawan Castro MD at      Author: Pawan Castro MD Service: -- Author Type: --    Filed:  Encounter Date: 2/18/2020 Status: (Other)         Roblesadelaanca KERN Bryant  [address]  [place] [address]             February 18, 2020         Dear Mr. Bryant,    Below are the results from your recent visit:    Resulted Orders   Glycosylated Hemoglobin A1c   Result Value Ref Range    Hemoglobin A1c 5.6 3.5 - 6.0 %       Labs look okay, excellent    Please call with questions or contact us using [employer].    Sincerely,        Electronically signed by Pawan Castro MD

## 2021-06-20 NOTE — LETTER
Letter by Pawan Castro MD at      Author: Pawan Castro MD Service: -- Author Type: --    Filed:  Encounter Date: 10/1/2020 Status: (Other)         Tobin Bryant  [address] [place] [address] [address]  [place] [address]             October 1, 2020         Dear Mr. Bryant,    Below are the results from your recent visit:    Resulted Orders   Glycosylated Hemoglobin A1C   Result Value Ref Range    Hemoglobin A1c 5.7 (H) <=5.6 %      Comment:      Normal <5.7% Prediabete 5.7-6.4% Diabletes 6.5% or higher - adopted from ADA consensus guidelines       Diabetes marker stable, excellent    Please call with questions or contact us using [facility].    Sincerely,        Electronically signed by Pawan Castro MD

## 2021-06-20 NOTE — PROGRESS NOTES
Office Visit - Follow Up   Tobin Bryant   53 y.o. male    Date of Visit: 8/22/2018    Chief Complaint   Patient presents with     Leg Swelling     both legs        Assessment and Plan   1. Screening for colon cancer  - Occult Blood, Fecal    2. Opioid dependence in remission (H)  Continue Suboxone, can contribute to some lower extremity edema    3. Simple chronic bronchitis (H)  Continue inhalers as prescribed by pulmonary    4. Restrictive lung disease  Likely multifactorial mainly driven by obesity    5. Essential hypertension  Blood pressure controlled continue current medication    6. YANIRA on CPAP  Currently does not have a CPAP machine encouraged to arrange the    7. Lower extremity edema  Likely related to obesity, venous insufficiency, obstructive sleep apnea.  Currently does not have much swelling.  I encouraged him to put on compression garments and to use furosemide and to elevate legs    8. Severe obesity (H)  The following high BMI interventions were performed this visit: encouragement to exercise and lifestyle education regarding diet    Return in about 4 weeks (around 9/19/2018) for recheck.     History of Present Illness   This 53 y.o. old man comes in for follow-up.  Since her last visit he has seen pulmonary and had pulmonary function tests performed.  We reviewed these.  He had an echocardiogram performed which were reviewed.  He is started on inhalers which have helped some.  Is not yet obtained his CPAP machine.  Some issues with cost.  He continues to follow with the addiction medicine clinic doing well, staying sober.  Mood stable.  He wonders about starting a diuretic for leg edema.  He does have compression garments but is not wearing them.    Review of Systems: A comprehensive review of systems was negative except as noted.     Medications, Allergies and Problem List   Reviewed and updated     Physical Exam   General Appearance:   No acute distress    /80 (Patient Site: Right Arm,  "Patient Position: Sitting, Cuff Size: Adult Regular)  Pulse 81  Ht 5' 10\" (1.778 m)  Wt (!) 327 lb (148.3 kg)  SpO2 94%  BMI 46.92 kg/m2    HEENT exam is unremarkable  Neck supple no thyromegaly or nodule palpable  Lymphatic no cervical lymphadenopathy  Cardiovascular regular rate and rhythm no murmur gallop or rub he has quite obese legs but no obvious edema  Pulmonary lungs are clear to auscultation bilaterally  Gastrointestinall abdomen soft nontender nondistended no organomegaly  Neurologic exam is non focal  Psychiatric pleasant, no confusion or agitation        Additional Information   Current Outpatient Prescriptions   Medication Sig Dispense Refill     albuterol (PROAIR HFA;PROVENTIL HFA;VENTOLIN HFA) 90 mcg/actuation inhaler Inhale 2 puffs every 6 (six) hours as needed for wheezing. 1 Inhaler 6     ARIPiprazole (ABILIFY) 10 MG tablet Take 10 mg by mouth daily.  3     budesonide-formoterol (SYMBICORT) 80-4.5 mcg/actuation inhaler Inhale 2 puffs 2 (two) times a day. 1 Inhaler 6     buprenorphine-naloxone (SUBOXONE) 8-2 mg Film per sublingual film 1 film three times a day SL; disp on 8/27/2018 90 Film 1     buPROPion (WELLBUTRIN XL) 150 MG 24 hr tablet Take 1 tablet by mouth every morning.       cane Marisel Use as needed to assist with ambulation.   Pt with COPD. 1 each 0     cloNIDine HCl (CATAPRES) 0.1 MG tablet TAKE 1 TABLET(0.1 MG) BY MOUTH TWICE DAILY AS NEEDED 60 tablet 0     EPINEPHrine (EPIPEN/ADRENACLICK/AUVI-Q) 0.3 mg/0.3 mL injection Inject 0.3 mg into the shoulder, thigh, or buttocks.       omeprazole (PRILOSEC) 20 MG capsule Take 20 mg by mouth 2 (two) times a day before meals.       polyethylene glycol (MIRALAX) 17 gram packet Take 17 g by mouth daily as needed.       furosemide (LASIX) 20 MG tablet Take 1 tablet (20 mg total) by mouth daily. 90 tablet 3     triamcinolone (KENALOG) 0.1 % cream Apply topically 2 (two) times a day. 80 g 5     No current facility-administered medications for this " visit.      Allergies   Allergen Reactions     Seafood Other (See Comments)     Eyes turn yellow       Ibuprofen Nausea And Vomiting     Social History   Substance Use Topics     Smoking status: Never Smoker     Smokeless tobacco: Never Used     Alcohol use No       Review and/or order of clinical lab tests: Reviewed  Review and/or order of radiology tests: Reviewed  Review and/or order of medicine tests: Viewed  Discussion of test results with performing physician:  Decision to obtain old records and/or obtain history from someone other than the patient:  Review and summarization of old records and/or obtaining history from someone other than the patient and.or discussion of case with another health care provider: Reviewed pulmonary documentation and personally spoke with pulmonologist regarding care  Independent visualization of image, tracing or specimen itself:    Time:      Pawan Castro MD

## 2021-06-20 NOTE — LETTER
Letter by Stefanie Beard RN at      Author: Stefanie Beard RN Service: -- Author Type: --    Filed:  Encounter Date: 6/20/2020 Status: (Other)       6/20/2020        Tobin Bryant  [address]  [place] [address]    This letter provides a written record that you were tested for COVID-19 on 6/19/20.     Your result was negative. This means that we didnt find the virus that causes COVID-19 in your sample. A test may show negative when you do actually have the virus. This can happen when the virus is in the early stages of infection, before you feel illness symptoms.    If you have symptoms   Stay home and away from others (self-isolate) until you meet ALL of the guidelines below:    Youve had no fever--and no medicine that reduces fever--for 3 full days (72 hours). And ?    Your other symptoms have gotten better. For example, your cough or breathing has improved. And?    At least 10 days have passed since your symptoms started.    During this time:    Stay home. Dont go to work, school or anywhere else.     Stay in your own room, including for meals. Use your own bathroom if you can.    Stay away from others in your home. No hugging, kissing or shaking hands. No visitors.    Clean high touch surfaces often (doorknobs, counters, handles, etc.). Use a household cleaning spray or wipes. You can find a full list on the EPA website at www.epa.gov/pesticide-registration/list-n-disinfectants-use-against-sars-cov-2.    Cover your mouth and nose with a mask, tissue or washcloth to avoid spreading germs.    Wash your hands and face often with soap and water.    Going back to work  Check with your employer for any guidelines to follow for going back to work.    Employers: This document serves as formal notice that your employee tested negative for COVID-19, as of the testing date shown above.

## 2021-06-20 NOTE — LETTER
Letter by Diana Parks RN at      Author: Diana Parks RN Service: -- Author Type: --    Filed:  Encounter Date: 7/2/2020 Status: (Other)       July 2, 2020          Tobin Bryant  [address]  [place] [address]      Dear Mr. Bryant:    Im writing to inform you that Emily Brunner, MD, will be leaving [facility] on August 28, 2020.   We apologize for this disruption in your care and we are committed to supporting your treatment needs.    If you have follow-up appointments after August 28, 2020, we will connect you with another addiction medicine provider within our system.     For medication refills, please contact your pharmacy and they will connect with us to initiate the refill process.  To schedule an appointment with Dr. Brunner before August 28, 2020 please call [facility] at [phone].    If you do not plan to return for ongoing medication management at this time, please let us know that as well, so we can note that in your medical record.   Again, we apologize for the inconvenience and look forward to continuing to provide you with the best possible care.    Sincerely,        Electronically signed by Diana Parks RN, CNP - Clinic Manager  [facility]

## 2021-06-20 NOTE — LETTER
Letter by Charles Colin MD at      Author: Charles Colin MD Service: -- Author Type: --    Filed:  Encounter Date: 10/8/2020 Status: (Other)         Tobin Bryant  [address]  [place] [address]               October 9, 2020    Im writing to inform you that Charles Gupta MD, will be leaving [facility] on November 11, 2020.   We apologize for this disruption in your care and we are committed to supporting your treatment needs.    If you have follow-up appointments after November 11, 2020, we will connect you with another addiction medicine provider within our system.     For medication refills, please contact your pharmacy and they will connect with us to initiate the refill process.  To schedule an appointment with Dr. Gupta before November 11, 2020, please call [facility] at [phone].    If you do not plan to return for ongoing medication management at this time, please let us know that as well, so we can note that in your medical record.   Again, we apologize for the inconvenience and look forward to continuing to provide you with the best possible care.    Sincerely,  Diana Parks CNP - Clinic Manager  [facility]

## 2021-06-21 LAB
ETHYL GLUCURONIDE UR CFM-MCNC: <100 NG/ML
ETHYL SULFATE UR CFM-MCNC: <100 NG/ML

## 2021-06-21 NOTE — PROGRESS NOTES
Correct pharmacy verified with patient and confirmed in snapshot? [x] yes []no    Charge captured ? [x] yes  [] no    Medications Phoned  to Pharmacy [] yes [x]no  Name of Pharmacist:  List Medications, including dose, quantity and instructions      Medication Prescriptions given to patient   [] yes  [x] no   List the name of the drug the prescription was written for.       Medications ordered this visit were e-scribed.  Verified by order class [x] yes  [] no  Suboxone 8-2 mg  Medication changes or discontinuations were communicated to patient's pharmacy: [] yes  [x] no    UA collected [x] yes  [] no    [place] Prescription Monitoring Program Reviewed? [x] yes  [] no    Referrals were made to:  none    Future appointment was made: [x] yes  [] no   1/10/19  Dictation completed at time of chart check: [x] yes  [] no    I have checked the documentation for today s encounters and the above information has been reviewed and completed.

## 2021-06-22 NOTE — PROGRESS NOTES
Office Visit - Follow Up   Tobin Bryant   53 y.o. male    Date of Visit: 12/12/2018    Chief Complaint   Patient presents with     pain in head     Shortness of Breath     Leg Swelling     left        Assessment and Plan   1. Venous stasis dermatitis of both lower extremities  Recommended elevation, compression, weight loss, compliance with CPAP, excellent blood pressure control, triamcinolone  - Compression stockings    2. YANIRA on CPAP    3. Essential hypertension  Blood pressure controlled continue current medication    4. Chronic mental illness - Yrn  Stable    5. Simple chronic bronchitis (H)  Continue inhalers, add diphenhydramine as needed for cough    6. Opioid dependence in remission (H)  Continue Suboxone    7. Restrictive lung disease  Likely related to obesity    8. Acute nonintractable headache, unspecified headache type  Exam unremarkable but some atypical features of headache will obtain a CT scan  - CT Head Without Contrast; Future    9. Prediabetes  - Glycosylated Hemoglobin A1c    10. Morbid obesity (H)  The following high BMI interventions were performed this visit: encouragement to exercise and lifestyle education regarding diet    Return in about 4 weeks (around 1/9/2019) for recheck.     History of Present Illness   This 53 y.o. old man comes in for follow-up.  Overall he has been about stable.  He continues to have some dyspnea with exertion.  He was in [place] and went to [facility] and sounds like he received some promethazine which actually helped with cough and breathing significantly.  He would like a refill of this.  He continues to have swelling in his legs and skin changes.  He states that when he does use the cream, triamcinolone this does help.  He has compression garments but does not wear them.  He continues on Suboxone for opioid dependence.  Mental health is generally stable.  Recently has been developing a right-sided headache which feels like a ball is stuck  "in the right side of his head and it throbs.  This is unlike any headache he is previously had.  No other neurological issue.  He has no lightheadedness or dizziness and no chest pain.    Review of Systems: A comprehensive review of systems was negative except as noted.     Medications, Allergies and Problem List   Reviewed, reconciled and updated     Physical Exam   General Appearance:   No acute distress    /84 (Patient Site: Right Arm, Patient Position: Sitting, Cuff Size: Adult Regular)   Pulse 76   Ht 5' 10\" (1.778 m)   Wt (!) 328 lb (148.8 kg)   SpO2 98%   BMI 47.06 kg/m      HEENT exam is unremarkable  Neck supple no thyromegaly or nodule palpable  Lymphatic no cervical lymphadenopathy  Cardiovascular regular rate and rhythm no murmur gallop or rub, venous stasis both lower extremities with stasis dermatitis  Pulmonary lungs are clear to auscultation bilaterally  Gastrointestinall abdomen soft nontender nondistended no organomegaly  Neurologic exam is non focal  Psychiatric pleasant, no confusion or agitation        Additional Information   Current Outpatient Medications   Medication Sig Dispense Refill     albuterol (PROAIR HFA;PROVENTIL HFA;VENTOLIN HFA) 90 mcg/actuation inhaler Inhale 2 puffs every 6 (six) hours as needed for wheezing. 1 Inhaler 6     ARIPiprazole (ABILIFY) 10 MG tablet Take 10 mg by mouth daily.  3     budesonide-formoterol (SYMBICORT) 80-4.5 mcg/actuation inhaler Inhale 2 puffs 2 (two) times a day. 1 Inhaler 6     buprenorphine-naloxone (SUBOXONE) 8-2 mg Film per sublingual film 1 film three times a day SL; disp on 8/27/2018 90 Film 2     buPROPion (WELLBUTRIN XL) 150 MG 24 hr tablet Take 1 tablet by mouth every morning.       cane Marisel Use as needed to assist with ambulation.   Pt with COPD. 1 each 0     cloNIDine HCl (CATAPRES) 0.1 MG tablet TAKE 1 TABLET(0.1 MG) BY MOUTH TWICE DAILY AS NEEDED 60 tablet 0     EPINEPHrine (EPIPEN/ADRENACLICK/AUVI-Q) 0.3 mg/0.3 mL injection " Inject 0.3 mg into the shoulder, thigh, or buttocks.       furosemide (LASIX) 20 MG tablet Take 1 tablet (20 mg total) by mouth daily. 90 tablet 3     omeprazole (PRILOSEC) 20 MG capsule Take 20 mg by mouth 2 (two) times a day before meals.       polyethylene glycol (MIRALAX) 17 gram packet Take 17 g by mouth daily as needed.       triamcinolone (KENALOG) 0.1 % cream Apply topically 2 (two) times a day. 80 g 5     diphenhydrAMINE (BENYLIN) 12.5 mg/5 mL syrup Take 10 mL (25 mg total) by mouth 4 (four) times a day as needed (cough). 237 mL 5     No current facility-administered medications for this visit.      Allergies   Allergen Reactions     Seafood Other (See Comments)     Eyes turn yellow       Ibuprofen Nausea And Vomiting     Social History     Tobacco Use     Smoking status: Never Smoker     Smokeless tobacco: Never Used   Substance Use Topics     Alcohol use: No     Drug use: No       Review and/or order of clinical lab tests:  Review and/or order of radiology tests:  Review and/or order of medicine tests:  Discussion of test results with performing physician:  Decision to obtain old records and/or obtain history from someone other than the patient:  Review and summarization of old records and/or obtaining history from someone other than the patient and.or discussion of case with another health care provider:  Independent visualization of image, tracing or specimen itself:    Time:      Pawan Castro MD

## 2021-06-23 NOTE — TELEPHONE ENCOUNTER
Patient is at his pharmacy and they are telling him he can only get 30 out of the 90 that were ordered.  We have not gotten a PA from the pharmacy.

## 2021-06-25 NOTE — PROGRESS NOTES
Correct pharmacy verified with patient and confirmed in snapshot? [x] yes []no    Charge captured ? [x] yes  [] no    Medications Phoned  to Pharmacy [] yes [x]no  Name of Pharmacist:  List Medications, including dose, quantity and instructions      Medication Prescriptions given to patient   [] yes  [x] no   List the name of the drug the prescription was written for.       Medications ordered this visit were e-scribed.  Verified by order class [x] yes  [] no  Suboxone 8-2 mg   Medication changes or discontinuations were communicated to patient's pharmacy: [] yes  [x] no    UA collected [x] yes  [] no    [place] Prescription Monitoring Program Reviewed? [x] yes  [] no    Referrals were made to:  None    Future appointment was made: [x] yes  [] no  5/16/2019  Dictation completed at time of chart check: [] yes  [x] no    I have checked the documentation for today s encounters and the above information has been reviewed and completed.

## 2021-06-26 NOTE — PROGRESS NOTES
"[facility] Addiction Medicine  Follow up    Visit was conducted using telephone. Pt was located in [facility] office and I was located in [facility] office. Start time 1105 end time 1130.    CC/HPI:   Tobin Bryant is a 55 y.o. male with PMH HTN, morbid obesity, prediabetes, asthma, YANIRA, anxiety, depression, and OUD on buprenorphine who is evaluated on this date for ongoing treatment of GENET.  Visit was conducted using Telehub video.  Pt was located in [facility] clinic, I was located in [facility] office.  Start time 1105, end time 1130.     Pt has been treated with buprenorphine for OUD through [facility] since 2014.        I last met with pt on 4/15/21.  A/P at that time was:  1. Opioid use disorder, severe, dependence (H)  Controlled  Continue buprenorphine 24mg/day  Schedule telehub f/u in 3 months, notify medical staff sooner if problems.        Today pt states he has continue to take buprenorphine 24mg/day. He denies any significant cravings for opioids or other substances.  He manages constipation with stool softeners.  He is pleased with effects of his OUD treatment and would like to continue.   review is as expected.     Pt is working with a bariatric clinic for weight loss.  He recently started phentermine, which at first caused him to feel \"jittery,\" but that is resolving.         Current Outpatient Medications   Medication Instructions     albuterol (PROAIR HFA;PROVENTIL HFA;VENTOLIN HFA) 90 mcg/actuation inhaler INHALE 2 PUFFS BY MOUTH EVERY 6 HOURS AS NEEDED FOR WHEEZING     ARIPiprazole (ABILIFY) 10 mg, Oral, DAILY     atorvastatin (LIPITOR) 20 mg, Oral, DAILY     blood glucose test (ACCU-CHEK GUIDE TEST STRIPS) strips TEST TWICE DAILY     budesonide-formoteroL (SYMBICORT) 80-4.5 mcg/actuation inhaler 2 puffs, Inhalation, 2 times daily     buPROPion (WELLBUTRIN XL) 150 MG 24 hr tablet 1 tablet, Oral, Every morning "     cane Marisel Use as needed to assist with ambulation.   Pt with COPD.     cholecalciferol (vitamin D3) (VITAMIN D3) 5,000 Units, Oral, DAILY     cloNIDine HCl (CATAPRES) 0.1 MG tablet TAKE 1 TABLET(0.1 MG) BY MOUTH TWICE DAILY AS NEEDED     docusate sodium (COLACE) 100 mg, Oral, 2 times daily     fluticasone propion-salmeteroL (ADVAIR HFA) 45-21 mcg/actuation inhaler 2 puffs, Inhalation, 2 times daily     furosemide (LASIX) 20 mg, Oral, DAILY     generic lancets Test 2 times a day     guaiFENesin ER (MUCINEX) 600 mg, Oral, 2 times daily     mometasone-formoterol (DULERA) 200-5 mcg/actuation HFAA inhaler 2 puffs, Inhalation, 2 times daily     montelukast (SINGULAIR) 10 mg, Oral, Bedtime     omeprazole (PRILOSEC) 20 MG capsule TAKE 1 CAPSULE(20 MG) BY MOUTH TWICE DAILY BEFORE MEALS     phentermine (ADIPEX-P) 37.5 mg tablet Take 1/2 to 1 tablet in the morning.     SUBOXONE 8-2 mg Film per sublingual film 1 film three times a day SL       Objective:    Physical Exam  Constitutional:       Appearance: Normal appearance.      Comments: Speaking in full sentences without difficulty   Neurological:      Mental Status: He is alert and oriented to person, place, and time.      Coordination: Coordination is intact.   Psychiatric:         Attention and Perception: Attention normal.         Mood and Affect: Mood and affect normal.         Speech: Speech normal.         Behavior: Behavior is cooperative.         Thought Content: Thought content normal.         Cognition and Memory: Cognition and memory normal.      Comments: Mood is frustrated  Affect is congruent with mood and subject matter       Labs:  No results found for this or any previous visit (from the past 240 hour(s)).        Assessment and Plan:   1. Opioid use disorder, severe, dependence (H)  Controlled, in sustained remission.  Continue buprenorphine 24mg/day  F/u medication management labs as listed below  - Drug Abuse 1+, Urine  - Buprenorphine, Urine  - Ethyl  Glucuronide and Ethyl Sulfate, Urine, Quantitative (CDCO ETG/S)  - SUBOXONE 8-2 mg Film per sublingual film; 1 film three times a day SL  Dispense: 90 Film; Refill: 1    Return in about 2 months (around 8/17/2021) for using a phone visit.      At least 30min spent in review of medical record,  review, obtaining histories, providing support for weight loss, discussion of follow up options    Safia Cat MD

## 2021-06-26 NOTE — PROGRESS NOTES
"Tobin Bryant is a 55 y.o. male who is being evaluated via a billable telephone visit.      The patient has been notified of following:     \"This telephone visit will be conducted via a call between you and your physician/provider. We have found that certain health care needs can be provided without the need for a physical exam.  This service lets us provide the care you need with a short phone conversation.  If a prescription is necessary we can send it directly to your pharmacy.  If lab work is needed we can place an order for that and you can then stop by our lab to have the test done at a later time.    Telephone visits are billed at different rates depending on your insurance coverage. During this emergency period, for some insurers they may be billed the same as an in-person visit.  Please reach out to your insurance provider with any questions.    If during the course of the call the physician/provider feels a telephone visit is not appropriate, you will not be charged for this service.\"    Patient has given verbal consent to a Telephone visit? Yes    What phone number would you like to be contacted at? [phone]    Patient would like to receive their AVS by AVS Preference: Mail a copy.  Tobin Bryant is a 55 y.o. male who is being evaluated via a billable telephone visit.      The patient has been notified of following:     \"This telephone visit will be conducted via a call between you and your physician/provider. We have found that certain health care needs can be provided without the need for a physical exam.  This service lets us provide the care you need with a short phone conversation.  If a prescription is necessary we can send it directly to your pharmacy.  If lab work is needed we can place an order for that and you can then stop by our lab to have the test done at a later time.    If during the course of the call the physician/provider feels a telephone visit is not appropriate, you will " "not be charged for this service.\"     Tobin CONCHA Bryant complains of    Chief Complaint   Patient presents with     Bariatric       I have reviewed and updated the patient's Past Medical History, Social History, Family History and Medication List.    ALLERGIES  Seafood and Ibuprofen      Assessment/Plan:    1. Obesity, morbid, BMI 40.0-49.9 (H)     2. Sleep apnea, obstructive     3. Prediabetes          Phone call contact time    Call Started at: 1:30p  Call Ended at: 1:50pm      Signature:  Adelina Burnham MD, FAAFP    Bariatric Follow-up    55 y.o.  male BMI:Body mass index is 46.92 kg/m .    Weight:   Wt Readings from Last 1 Encounters:   06/15/21 (!) 327 lb (148.3 kg)    pounds  Height: 5' 10\" (1.778 m) (6/15/2021  1:00 PM)  Initial Weight: 327 lbs (6/15/2021  1:00 PM)  Weight: (!) 327 lb (148.3 kg) (6/15/2021  1:00 PM)  Weight loss from initial: 0 (6/15/2021  1:00 PM)  % Weight loss: 0 % (6/15/2021  1:00 PM)  BMI (Calculated): 46.9 (6/15/2021  1:00 PM)  SpO2: 97 % (11/23/2020  3:33 PM)      Comorbidities:  Patient Active Problem List   Diagnosis     Opioid dependence, on agonist therapy with buprenorphine, Dr. Rincon     COPD (chronic obstructive pulmonary disease) (H)     Chronic mental illness - Yrn     Essential hypertension     YANIRA on CPAP     Gastroesophageal reflux disease without esophagitis     Restrictive lung disease     Moderate episode of recurrent major depressive disorder (H)     Morbid obesity with BMI of 45.0-49.9, adult (H)     Low serum testosterone level     Increased PTH level     Type 2 diabetes mellitus without complication, without long-term current use of insulin (H)     Moderate persistent asthma with acute exacerbation     Chronic diastolic heart failure (H)         Interim: Weighted at Pulmonologist 5 days ago and was surprised to be 327#.     Plan:  DIET  Sent protein handout by mail. Encouraged protein first approach to keep sugars down, appetite   EXERCISE intends to " "take a walk a day to keep cortisol down. Concerned about \"belly fat.\" Encouraged to get out for even a short walk a day. He believes he can do this   PHARMACOTHERAPY continue phentermine in the am    Encouraged to use CPaP. Discussed ways to keep sugar, insulin, and cortisol levels down. Sleep well, protein first, stress management. Will continue to work with the dietitian.      -We reviewed his medications and whether associated with weight gain.  Current Outpatient Medications on File Prior to Visit   Medication Sig Dispense Refill     albuterol (PROAIR HFA;PROVENTIL HFA;VENTOLIN HFA) 90 mcg/actuation inhaler INHALE 2 PUFFS BY MOUTH EVERY 6 HOURS AS NEEDED FOR WHEEZING 6.7 g 3     ARIPiprazole (ABILIFY) 10 MG tablet Take 10 mg by mouth daily.  3     atorvastatin (LIPITOR) 20 MG tablet Take 1 tablet (20 mg total) by mouth daily. 90 tablet 3     blood glucose test (ACCU-CHEK GUIDE TEST STRIPS) strips TEST TWICE DAILY 60 strip 6     budesonide-formoteroL (SYMBICORT) 80-4.5 mcg/actuation inhaler Inhale 2 puffs 2 (two) times a day. 1 Inhaler 6     buPROPion (WELLBUTRIN XL) 150 MG 24 hr tablet Take 1 tablet by mouth every morning.       cane Marisel Use as needed to assist with ambulation.   Pt with COPD. 1 each 0     cholecalciferol, vitamin D3, (VITAMIN D3) 5,000 unit Tab Take 1 tablet (5,000 Units total) by mouth daily. 90 tablet 3     cloNIDine HCl (CATAPRES) 0.1 MG tablet TAKE 1 TABLET(0.1 MG) BY MOUTH TWICE DAILY AS NEEDED 180 tablet 3     docusate sodium (COLACE) 100 MG capsule Take 1 capsule (100 mg total) by mouth 2 (two) times a day. 60 capsule 1     fluticasone propion-salmeteroL (ADVAIR HFA) 45-21 mcg/actuation inhaler Inhale 2 puffs 2 (two) times a day. 1 Inhaler 12     furosemide (LASIX) 20 MG tablet Take 1 tablet (20 mg total) by mouth daily. (Patient taking differently: Take 20 mg by mouth as needed. ) 90 tablet 3     generic lancets Test 2 times a day 100 each 6     guaiFENesin ER (MUCINEX) 600 mg " 12 hr tablet Take 1 tablet (600 mg total) by mouth 2 (two) times a day. 60 tablet 2     mometasone-formoterol (DULERA) 200-5 mcg/actuation HFAA inhaler Inhale 2 puffs 2 (two) times a day. 1 Inhaler 2     montelukast (SINGULAIR) 10 mg tablet Take 1 tablet (10 mg total) by mouth at bedtime. 30 tablet 11     omeprazole (PRILOSEC) 20 MG capsule TAKE 1 CAPSULE(20 MG) BY MOUTH TWICE DAILY BEFORE MEALS (Patient taking differently: as needed. ) 180 capsule 3     phentermine (ADIPEX-P) 37.5 mg tablet Take 1/2 to 1 tablet in the morning. 90 tablet 1     SUBOXONE 8-2 mg Film per sublingual film 1 film three times a day SL 90 Film 2     No current facility-administered medications on file prior to visit.         We discussed [facility] Bariatric Basics including:  -eating 3 meals daily  -eating protein first  -eating slowly, chewing food well  -avoiding/limiting calorie containing beverages  -choosing wheat, not white with breads, crackers, pastas, kendal, bagels, tortillas, rice  -limiting restaurant or cafeteria eating to twice a week or less  -We discussed the importance of restorative sleep and stress management in maintaining a healthy weight.  -We discussed insulin resistance and glycemic index as it relates to appetite and weight control  -We discussed the National Weight Control Registry healthy weight maintenance strategies and ways to optimize metabolism.  -We discussed the importance of physical activity including cardiovascular and strength training in maintaining a healthier weight and explored viable options.    Most recent labs:  Lab Results   Component Value Date    WBC 7.1 07/11/2019    HGB 13.3 (L) 07/11/2019    HCT 40.2 07/11/2019    MCV 91 07/11/2019     07/11/2019     Lab Results   Component Value Date    CHOL 178 07/11/2019     Lab Results   Component Value Date    HDL 52 07/11/2019     Lab Results   Component Value Date    LDLCALC 106 07/11/2019     Lab Results   Component Value Date    TRIG 99  07/11/2019     Lab Results   Component Value Date    ALT 25 09/11/2020    AST 27 09/11/2020    ALKPHOS 82 09/11/2020    BILITOT 0.3 09/11/2020     Lab Results   Component Value Date    HGBA1C 5.7 (H) 10/01/2020     Lab Results   Component Value Date    CVNUGGQS57 710 09/03/2019     Lab Results   Component Value Date    UQWHSQQD95AT 33.9 09/11/2020     Lab Results   Component Value Date    FERRITIN 114 09/03/2019     Lab Results   Component Value Date     (H) 09/11/2020       Lab Results   Component Value Date    TSH 1.72 01/25/2018         DIETARY HISTORY  Meals Per Day: 2  Eating Protein First?: no  Food Diary: B:honey nut cheerios L: D:polish sausage  Snacks Per Day: yes  Typical Snack: granola bars, nachos with beef and cheese  Fluid Intake: sips on water throughout the day,  Portion Control: problematic  Calorie Containing Beverages: no  Alcohol per week: no  Typical Protein Food Choices: eggs, chicken, pork, chile, cheese if on sandwich  Choosing Whole Grains: wheat bread now  Grocery Shopping is done by: himself  Food Preparation is done by: himself  Meals at Restaurant/Cafeteria/Take Out Per Week: no  Eating at the Table:  TV is Off During Meals:     Positive Changes Since Last Visit:   Struggling With: weight loss, asthma exacerbation    Knowledgeable in Reading Food Labels: no  Getting Adequate Protein: ?  Sleeping 7-8 hours/day not using CPAP  Stress management OK    PHYSICAL ACTIVITY PATTERNS:  Cardiovascular: none  Strength Training: none    REVIEW OF SYSTEMS  GENERAL/CONSTITUTIONAL:  Fatigue: yes  CARDIOVASCULAR:  Chest Pain with Exertion: no  PULMONARY:  Dyspnea on exertion: yes  CPAP Use: not currently  Tobacco Use: none  GASTROINTESTINAL:  GERD/Heartburn: on PPI  UROLOGIC:  Urinary Symptoms: no  NEUROLOGIC:  Headaches:   Paresthesias: no  PSYCHIATRIC:  Moods: OK  MUSCULOSKELETAL/RHEUMATOLOGIC  Arthralgias: yes  Myalgias: yes  ENDOCRINE:  Monitoring Blood Sugars: no  Sugars Well Controlled: A1c  "was 5.7  DERMATOLOGIC:  Rashes:     PHYSICAL EXAM: (most recent vitals and today's stated weight)  Vitals: Ht 5' 10\" (1.778 m)   Wt (!) 327 lb (148.3 kg)   BMI 46.92 kg/m    Height: 5' 10\" (1.778 m) (6/15/2021  1:00 PM)  Initial Weight: 327 lbs (6/15/2021  1:00 PM)  Weight: (!) 327 lb (148.3 kg) (6/15/2021  1:00 PM)  Weight loss from initial: 0 (6/15/2021  1:00 PM)  % Weight loss: 0 % (6/15/2021  1:00 PM)  BMI (Calculated): 46.9 (6/15/2021  1:00 PM)  SpO2: 97 % (11/23/2020  3:33 PM)      GEN: Pleasant and in no acute distress  PSYCH: A&OX3,     I have reviewed the note as documented above.  This accurately captures the substance of my conversation with the patient.  Thank you for the opportunity to participate in the care of your patient    Total time spent on the date of this encounter doing: chart review, review of test results, patient visit, physical exam, education, counseling, developing plan of care, and documenting = 20 minutes.      Adelina Burnham MD, FAAFP  [facility]  Diplomate, American Board of Obesity Medicine      "

## 2021-06-26 NOTE — PROGRESS NOTES
This video/telephone visit will be conducted via a call between you and your physician/provider. We have found that certain health care needs can be provided without the need for an in-person physical exam. This service lets us provide the care you need with a video /telephone conversation. If a prescription is necessary we can send it directly to your pharmacy. If lab work is needed we can place an order for that and you can then stop by a lab to have the test done at a later time.    Just as we bill insurance for in-person visits, we also bill insurance for video/telephone visits. If you have questions about your insurance coverage, we recommend that you speak with your insurance company.    Patient has given verbal consent for video/Telephone visit? yes  Patient would like the video visit invitation sent by: Text to cell phone: ADRIANA Send to email: ADRIANA or SIP CALL to:  [email or web address]  Pt says he is doing so-so,  unclear who is currently prescribing his psych meds but according to pt, his former psychiatrist left and he needs appt with another provider.   Pt reports having enough refills   UA collected and sent to the lab.  JOSS/FAHEEM ; Phillip MARISCAL LPN  Patient verified allergies, medications and pharmacy via phone.  Patient states he  is ready for visit.      ________________________________________  Medications Phoned  to Pharmacy [] yes [x]no  Name of Pharmacist:  List Medications, including dose, quantity and instructions    Medications ordered this visit were e-scribed.  Verified by order class [] yes  [] no    Medication changes or discontinuations were communicated to patient's pharmacy: [] yes  [] no    Dictation completed at time of chart check: [] yes  [] no    I have checked the documentation for today s encounters and the above information has been reviewed and completed.

## 2021-06-29 NOTE — PROGRESS NOTES
Progress Notes by Mary Verdugo MD at 10/21/2020  3:10 PM     Author: Mary Verdugo MD Service: -- Author Type: Physician    Filed: 10/27/2020  9:15 AM Encounter Date: 10/21/2020 Status: Signed    : Mary Verdugo MD (Physician)           Thank you, Dr. Castro, for asking us to see Tobin Bryant at the [facility].      Assessment/Recommendations   Assessment:    1.  Chest pain: Atypical chest pain with negative stress testing.  No further cardiac work-up recommended at this time.  2.   Heart failure with preserved ejection fraction: Currently well compensated on low-dose of Lasix  3.  History of major depression  4.  History of opioid dependence in remission  5.  Hypertension  6.  COPD     Plan:  1.  Continue on aspirin and statin therapy  2.    Recommend exercise, diet and weight loss  3.    Continue leg elevation, compression stockings, take Lasix on a daily basis and low-sodium diet  May follow-up as needed     History of Present Illness    Mr. Tobin Bryant is a 55 y.o. male history of morbid obesity, major depression, opioid dependence in remission, YANIRA on CPAP, COPD and hypertension who I am seeing today for initial consultation.  He underwent a Lexiscan nuclear stress test in 2017 that was negative for inducible ischemia and echocardiogram done in 2018 was unremarkable.  He has history of heroin abuse and has not used for couple years now.  He has a CPAP and has an appointment with sleep study center to reevaluate his settings.  He had an episode of chest discomfort that sounded type typical in nature.  Stress test done on 10/7/2020 was negative for inducible ischemia showed EF of 63%.  Echocardiogram showed left ventricular ejection fraction of 50 to 55% otherwise unremarkable.  Has not had any further chest discomfort.       Physical Examination Review of Systems   Vitals:    10/21/20 1515   BP: 120/80   Pulse: 80   Resp: 12     Body mass  index is 46.68 kg/m .  Wt Readings from Last 3 Encounters:   10/21/20 (!) 325 lb 4.8 oz (147.6 kg)   10/02/20 (!) 311 lb (141.1 kg)   09/25/20 (!) 311 lb 4.8 oz (141.2 kg)       General Appearance:   alert, no apparent distress   HEENT:  no scleral icterus; the mucous membranes are pink and moist                                  Neck: No jvd   Chest: the spine is straight and the chest is symmetric   Lungs:   respirations unlabored; the lungs are clear to auscultation   Cardiovascular:   regular rhythm with normal first and second heart sounds and no murmurs or gallops   Abdomen:  no organomegaly, masses, bruits, or tenderness; bowel sounds are present   Extremities: no cyanosis, clubbing, or edema   Skin: no xanthelasma    General: WNL  Eyes: WNL  Ears/Nose/Throat: WNL  Lungs: WNL  Heart: WNL  Stomach: WNL  Bladder: WNL  Muscle/Joints: WNL  Skin: WNL  Nervous System: WNL  Mental Health: WNL     Blood: WNL     Medical History  Surgical History Family History Social History   Past Medical History:   Diagnosis Date   ? Anxiety and depression    ? Foot pain    ? GERD (gastroesophageal reflux disease)    ? Hypertension    ? Impaired physical mobility    ? Increased PTH level 9/5/2019   ? Low back pain    ? Low serum testosterone level 9/5/2019   ? Lower leg edema    ? Morbid obesity with BMI of 45.0-49.9, adult (H)    ? Pre-diabetes    ? Sleep apnea, obstructive     CPAP   ? Vitamin deficiency 9/5/2019    Past Surgical History:   Procedure Laterality Date   ? CATARACT EXTRACTION Right    ? KELOID EXCISION      neck    Family History   Problem Relation Age of Onset   ? No Medical Problems Mother    ? No Medical Problems Father    ? No Medical Problems Sister    ? No Medical Problems Brother    ? No Medical Problems Sister    ? No Medical Problems Sister    ? No Medical Problems Sister    ? No Medical Problems Brother    ? No Medical Problems Daughter    ? No Medical Problems Son     Social History     Socioeconomic  History   ? Marital status: Single     Spouse name: Not on file   ? Number of children: Not on file   ? Years of education: Not on file   ? Highest education level: Not on file   Occupational History   ? Not on file   Social Needs   ? Financial resource strain: Not on file   ? Food insecurity     Worry: Not on file     Inability: Not on file   ? Transportation needs     Medical: Not on file     Non-medical: Not on file   Tobacco Use   ? Smoking status: Never Smoker   ? Smokeless tobacco: Never Used   Substance and Sexual Activity   ? Alcohol use: No   ? Drug use: No   ? Sexual activity: Not Currently   Lifestyle   ? Physical activity     Days per week: Not on file     Minutes per session: Not on file   ? Stress: Not on file   Relationships   ? Social connections     Talks on phone: Not on file     Gets together: Not on file     Attends [employer] service: Not on file     Active member of club or organization: Not on file     Attends meetings of clubs or organizations: Not on file     Relationship status: Not on file   ? Intimate partner violence     Fear of current or ex partner: Not on file     Emotionally abused: Not on file     Physically abused: Not on file     Forced sexual activity: Not on file   Other Topics Concern   ? Not on file   Social History Narrative    Lives alone.  Not working (last worked 2006).  Originally from [place], some college.    2 grown children          Medications  Allergies   Current Outpatient Medications   Medication Sig Dispense Refill   ? ACCU-CHEK GUIDE TEST STRIPS strips TEST TWICE DAILY 60 strip 6   ? albuterol (PROAIR HFA;PROVENTIL HFA;VENTOLIN HFA) 90 mcg/actuation inhaler Inhale 2 puffs every 6 (six) hours as needed for wheezing. 1 Inhaler 6   ? ARIPiprazole (ABILIFY) 10 MG tablet Take 10 mg by mouth daily.  3   ? atorvastatin (LIPITOR) 20 MG tablet Take 1 tablet (20 mg total) by mouth daily. 90 tablet 3   ? budesonide-formoterol (SYMBICORT) 80-4.5 mcg/actuation inhaler Inhale  2 puffs 2 (two) times a day. 1 Inhaler 6   ? buPROPion (WELLBUTRIN XL) 150 MG 24 hr tablet Take 1 tablet by mouth every morning.     ? cholecalciferol, vitamin D3, (VITAMIN D3) 5,000 unit Tab Take 1 tablet (5,000 Units total) by mouth daily. 90 tablet 3   ? docusate sodium (COLACE) 100 MG capsule Take 1 capsule (100 mg total) by mouth 2 (two) times a day. 60 capsule 1   ? furosemide (LASIX) 20 MG tablet TAKE 1 TABLET BY MOUTH EVERY DAY 90 tablet 3   ? generic lancets Test 2 times a day 100 each 6   ? mometasone-formoterol (DULERA) 200-5 mcg/actuation HFAA inhaler Inhale 2 puffs 2 (two) times a day.     ? omeprazole (PRILOSEC) 20 MG capsule TAKE 1 CAPSULE(20 MG) BY MOUTH TWICE DAILY BEFORE MEALS 180 capsule 3   ? phentermine (ADIPEX-P) 37.5 mg tablet Take 1/2 to 1 tablet in the morning. 90 tablet 1   ? polyethylene glycol (GLYCOLAX) 17 gram/dose powder Take 17 g by mouth daily as needed. 510 g prn   ? SUBOXONE 8-2 mg Film per sublingual film 1 film three times a day SL 90 Film 2   ? triamcinolone (KENALOG) 0.1 % ointment Apply topically 2 (two) times a day. 80 g 0   ? aspirin 81 MG EC tablet Take 1 tablet (81 mg total) by mouth daily.  0   ? cane Marisel Use as needed to assist with ambulation.   Pt with COPD. 1 each 0   ? cloNIDine HCl (CATAPRES) 0.1 MG tablet TAKE 1 TABLET(0.1 MG) BY MOUTH TWICE DAILY AS NEEDED 180 tablet 3     No current facility-administered medications for this visit.       Allergies   Allergen Reactions   ? Seafood Other (See Comments)     Eyes turn yellow     ? Ibuprofen Nausea And Vomiting         Lab Results    Chemistry/lipid CBC Cardiac Enzymes/BNP/TSH/INR   Lab Results   Component Value Date    CHOL 178 07/11/2019    HDL 52 07/11/2019    LDLCALC 106 07/11/2019    TRIG 99 07/11/2019    CREATININE 0.88 09/11/2020    BUN 12 09/11/2020    K 4.5 09/11/2020     09/11/2020     09/11/2020    CO2 29 09/11/2020    Lab Results   Component Value Date    WBC 7.1 07/11/2019    HGB 13.3 (L)  07/11/2019    HCT 40.2 07/11/2019    MCV 91 07/11/2019     07/11/2019    Lab Results   Component Value Date    CKMB 4 03/29/2014    TROPONINI <0.01 09/21/2017    BNP <10 09/21/2017    TSH 1.72 01/25/2018    INR 0.95 03/29/2014

## 2021-06-29 NOTE — PROGRESS NOTES
"Progress Notes by Hayley Chery, Diabetes Ed at 5/19/2020  1:00 PM     Author: Hayley Chery, Diabetes Ed Service: -- Author Type: Diabetes Ed    Filed: 5/19/2020  5:04 PM Encounter Date: 5/19/2020 Status: Attested    : Hayley Chery, Diabetes Ed (Diabetes Ed) Cosigner: Pawan Castro MD at 5/20/2020  8:05 AM    Attestation signed by Pawan Castro MD at 5/20/2020  8:05 AM    [facility]                  Convert to telephone    Diabetes Educator has received verbal consent for a telephone visit for the patient      Assessment: virtual visit for f/u on diabetes management via telephone  Excellent glycemic control; A1C: 5.6 (2/18)  Wt has held steady; he would like to lose some more though  His goal is to stay off of diabetes meds   No other concerns today.     SMBG:    Tests 1-2x/d   Reports ~100% of his readings being at goal except for a couple times when he had had a \"treat\" (sweet)  Lows: none, knows how to treat.   We reviewed sx and tx of hypo and hyperglycemia: pt verbalized understanding.        - Consumes 2-3 meals/d now, with no fast foods at all. Has also been trying to be more mindful of his choices now.  - Drinks water with crystal lite.     -  Continues to exercise at home, 2-3x/wk.      Plan: Robles will continue to stay active and focus on eating healthfully.   To follow up in 3 months; he will also be due for A1C draw at that time.   Pt  will call if the BS pattern changes: has our phone number         Subjective and Objective:      Tobin Bryant is referred by  for Diabetes Education.     Lab Results   Component Value Date    HGBA1C 5.6 02/18/2020         Education:     Monitoring   Meter (per above goals): Assessed and Discussed  Monitoring: Assessed and Discussed  BG goals: Assessed    Nutrition Management  Nutrition Management: Assessed and Discussed  Weight: Assessed and Discussed  Portions/Balance: Assessed  Carb ID/Count: Assessed and Discussed  Label Reading: Assessed  Heart " Healthy Fats: Not addressed  Menu Planning: Assessed  Dining Out: Assessed and Discussed  Physical Activity: Assessed and Discussed  Medications: Assessed  Orals: Assessed  Injected Medications: assessed    Diabetes Disease Process: Assessed    Acute Complications: Prevent, Detect, Treat:  Hypoglycemia: Assessed  Hyperglycemia: Assessed  Sick Days: Assessed  Driving: Assessed    Chronic Complications  Foot Care:Assessed  Skin Care: Assessed  Eye: Assessed  ABC: Assessed  Teeth:Assessed  Goal Setting and Problem Solving: Assessed  Barriers: Assessed and Discussed  Psychosocial Adjustments: Assessed      Time spent with the patient: 30 minutes for diabetes education and counseling.   Previous Education: yes  Visit Type:JOSIANE Chery  5/19/2020

## 2021-06-29 NOTE — PROGRESS NOTES
Progress Notes by Heena Alcazar CMA at 5/7/2020 12:45 PM     Author: Heena Alcazar CMA Service: Addiction Care Author Type: Certified Medical Assistant    Filed: 5/7/2020  3:33 PM Encounter Date: 5/7/2020 Status: Addendum    : Heena Alcazar CMA (Certified Medical Assistant)    Related Notes: Original Note by Heena Alcazar CMA (Certified Medical Assistant) filed at 5/7/2020  3:32 PM       This video/telephone visit will be conducted via a call between you and your physician/provider. We have found that certain health care needs can be provided without the need for an in-person physical exam. This service lets us provide the care you need with a video /telephone conversation. If a prescription is necessary we can send it directly to your pharmacy. If lab work is needed we can place an order for that and you can then stop by our lab to have the test done at a later time.   Just as we bill insurance for in-person visits, we also bill insurance for video/telephone visits. If you have questions about your insurance coverage, we recommend that you speak with your insurance company.   Patient has given verbal consent for video/Telephone visit? yes  JOSS/FAHEEM STEVENSON    Patient verified allergies, medications and pharmacy via phone. Patient states he  is ready for visit.      ________________________________________  Medications Phoned  to Pharmacy [] yes [x]no  Name of Pharmacist:  List Medications, including dose, quantity and instructions    Medications ordered this visit were e-scribed.  Verified by order class [x] yes  [] no  Suboxone 8-2 mg    Medication changes or discontinuations were communicated to patient's pharmacy: [] yes  [x] no    Dictation completed at time of chart check: [x] yes  [] no    I have checked the documentation for todays encounters and the above information has been reviewed and completed.

## 2021-06-29 NOTE — PROGRESS NOTES
"Progress Notes by Hayley Chery, Diabetes Ed at 8/11/2020  2:00 PM     Author: Hayley Chery, Diabetes Ed Service: -- Author Type: Diabetes Ed    Filed: 8/11/2020  3:28 PM Encounter Date: 8/11/2020 Status: Attested    : Hayley Chery, Diabetes Ed (Diabetes Ed) Cosigner: Pawan Castro MD at 8/11/2020  4:50 PM    Attestation signed by Pawan Castro MD at 8/11/2020  4:50 PM    [facility]                  Diabetes Educator has received verbal consent for a telephone visit for the patient.  Declined video visit    Assessment:   F/u visit to assess glycemic control  Excellent glycemic control with 100% of his BS at goal (based on SMBG pattern)  A1C: 5.6 (2/18) - he is due for another A1C draw  He hasn't been able to weigh himself in the past few months but feels things have held steady - has lost ~ 30 lbs so far    Wants to target his belly fat and is wondering if there is a specific medication for that - we discussed wt control tips and lifestyle factors including: regular exercise, mindful eating, adequate sleep, effective stress management etc. Encouraged strengthening/resistance exercises to tone and there being no specific meds that target \"belly fat\".     His goal remains to stay off of diabetes meds   No other concerns today.     SMBG:   Tests 1-2x/d, at different times  BS throughout the day: 92, 104, 123, 99, 100, 112, 106, 86, 97, 84, 120, 90, 116, 89,   Lows: although the pt denies any s/s, he did have two episodes of lows in the past 2-3 months with BS: 67, 66  Pt is not on any diabetes medication. On further assessment, pt admits to skipping meals often and has only 2 meals/d on average  We reviewed sx and tx of hypo and hyperglycemia: pt verbalized understanding.  We reviewed general food guidelines and the importance of consistent meals and CHO intake - pt expressed understanding      Consumes 2 meals/d typically and admits to skipping lunch  - watches the food choices and portions  Does admit " to candy/ice cream a couple times/wk   Drinks a fair amount of water with crystal lite     Food recall:  BF: Cereal  D: [place]ili/baked pork chop, mixed veg    Does admit to falling off the wagon and not exercising x 1 month; is back on track now since last week   Walks daily and also does some exercises at home, 2-3x/wk.      Plan:   Continue with the current DM management plan - regular monitoring of BG, daily physical activity as able/safe, eating healthy & watching portions of carbs  Eat 3 small meals/d with consistent CHO; not skip meals  To follow up in ~ 6 -8 weeks (or sooner if concerns)   PCP: 8/28;  he will also be due for A1C draw at that time - there are standing orders form 2/18  Pt  will call if the BS pattern changes: has our phone number         Subjective and Objective:      Tobin Bryant is referred by Dr. Castro for Diabetes Education.     Lab Results   Component Value Date    HGBA1C 5.6 02/18/2020       Education:     Monitoring   Meter (per above goals): Assessed and Discussed  Monitoring: Assessed and Discussed  BG goals: Assessed    Nutrition Management  Nutrition Management: Assessed and Discussed  Weight: Assessed and Discussed  Portions/Balance: Assessed and Discussed  Carb ID/Count: Assessed and Discussed  Label Reading: Assessed and Discussed  Heart Healthy Fats: Assessed  Menu Planning: Assessed and Discussed  Dining Out: Assessed  Physical Activity: Assessed and Discussed  Medications: Assessed and Discussed  Orals: Assessed  Injected Medications: Assessed     Diabetes Disease Process: Assessed    Acute Complications: Prevent, Detect, Treat:  Hypoglycemia: Assessed and Discussed  Hyperglycemia: Assessed and Discussed  Sick Days: Assessed  Driving: Assessed    Chronic Complications  Foot Care:Assessed  Skin Care: Assessed  Eye: Assessed  ABC: Assessed  Teeth:Assessed  Goal Setting and Problem Solving: Assessed and Discussed  Barriers: Assessed  Psychosocial Adjustments: Assessed      Time:  50 minutes  for diabetes education and counseling  Previous Education: yes  Visit Type:JOSIANE Chery  8/11/2020

## 2021-06-29 NOTE — PROGRESS NOTES
Progress Notes by Mary Verdugo MD at 9/25/2020  2:30 PM     Author: Mary Verdugo MD Service: -- Author Type: Physician    Filed: 9/25/2020  3:17 PM Encounter Date: 9/25/2020 Status: Signed    : Mary Verdugo MD (Physician)           Thank you, Dr. Castro, for asking us to see Tobin Bryant at the [facility].      Assessment/Recommendations   Assessment:    1.  Chest pain: Atypical chest pain however significant cardiac risk factors we will proceed with Lexiscan nuclear stress testing for further evaluation  2.  Reported history of heart failure with preserved ejection fraction with evidence of lymphedema on exam today and no complaints of worsening shortness of breath.  We will proceed with an echocardiogram to reevaluate for structural heart disease.  He takes a low dose of Lasix 20 mg daily.  3.  History of major depression  4.  History of opioid dependence in remission  5.  Hypertension  6.  COPD    Plan:  1.  Continue on aspirin and statin therapy  2.  Lexiscan nuclear stress testing  3.  Echocardiogram  4.  Recommend leg elevation, take Lasix on a daily basis and low-sodium diet  Follow-up in 1-2 months       History of Present Illness    Mr. Tboin Bryant is a 55 y.o. male history of morbid obesity, major depression, opioid dependence in remission, YANIRA on CPAP, COPD and hypertension who I am seeing today for initial consultation.  He underwent a Lexiscan nuclear stress test in 2017 that was negative for inducible ischemia and echocardiogram done in 2018 was unremarkable.  He is a little bit of a poor historian.  Reports he had an episode of chest tightness that occurred a couple weeks ago.  He believes it happened at rest and he describes as a tightness without radiation that occurred for only a few minutes.  No exertional chest pain.  He denies any increased shortness of breath or increased edema although he does have significant lymphedema  on exam bilaterally.    Lexiscan nuclear stress test 2/7/17    The pharmacologic nuclear stress test is negative for inducible myocardial ischemia or infarction.    Normal resting left ventricular ejection fraction of 66%.    No prior study.    Echocardiogram 3/2/18  1. Normal left ventricular size and systolic performance with a visually estimated ejection fraction of 55%.   2. No significant valvular heart disease is identified on this study.   3. Normal right ventricular size and systolic performance.        Physical Examination Review of Systems   Vitals:    09/25/20 1424   BP: 102/70   Pulse: 88   Resp: 16     Body mass index is 44.67 kg/m .  Wt Readings from Last 3 Encounters:   09/25/20 (!) 311 lb 4.8 oz (141.2 kg)   08/28/20 (!) 310 lb (140.6 kg)   04/03/20 (!) 289 lb 8 oz (131.3 kg)       General Appearance:   alert, no apparent distress   HEENT:  no scleral icterus; the mucous membranes are pink and moist                                  Neck: jugular venous pressure normal   Chest: the spine is straight and the chest is symmetric   Lungs:   respirations unlabored; the lungs are clear to auscultation   Cardiovascular:   regular rhythm with normal first and second heart sounds and no murmurs or gallops   Abdomen:  no organomegaly, masses, bruits, or tenderness; bowel sounds are present   Extremities: lymphedema   Skin: no xanthelasma    General: WNL  Eyes: WNL  Ears/Nose/Throat: WNL  Lungs: WNL  Heart: WNL  Stomach: WNL  Bladder: WNL  Muscle/Joints: WNL  Skin: WNL  Nervous System: WNL  Mental Health: WNL     Blood: WNL     Medical History  Surgical History Family History Social History   Past Medical History:   Diagnosis Date   ? Anxiety and depression    ? Foot pain    ? GERD (gastroesophageal reflux disease)    ? Hypertension    ? Impaired physical mobility    ? Increased PTH level 9/5/2019   ? Low back pain    ? Low serum testosterone level 9/5/2019   ? Lower leg edema    ? Morbid obesity with BMI of  45.0-49.9, adult (H)    ? Pre-diabetes    ? Sleep apnea, obstructive     CPAP   ? Vitamin deficiency 9/5/2019    Past Surgical History:   Procedure Laterality Date   ? CATARACT EXTRACTION Right    ? KELOID EXCISION      neck    Family History   Problem Relation Age of Onset   ? No Medical Problems Mother    ? No Medical Problems Father    ? No Medical Problems Sister    ? No Medical Problems Brother    ? No Medical Problems Sister    ? No Medical Problems Sister    ? No Medical Problems Sister    ? No Medical Problems Brother    ? No Medical Problems Daughter    ? No Medical Problems Son     Social History     Socioeconomic History   ? Marital status: Single     Spouse name: Not on file   ? Number of children: Not on file   ? Years of education: Not on file   ? Highest education level: Not on file   Occupational History   ? Not on file   Social Needs   ? Financial resource strain: Not on file   ? Food insecurity     Worry: Not on file     Inability: Not on file   ? Transportation needs     Medical: Not on file     Non-medical: Not on file   Tobacco Use   ? Smoking status: Never Smoker   ? Smokeless tobacco: Never Used   Substance and Sexual Activity   ? Alcohol use: No   ? Drug use: No   ? Sexual activity: Not Currently   Lifestyle   ? Physical activity     Days per week: Not on file     Minutes per session: Not on file   ? Stress: Not on file   Relationships   ? Social connections     Talks on phone: Not on file     Gets together: Not on file     Attends Caodaism service: Not on file     Active member of club or organization: Not on file     Attends meetings of clubs or organizations: Not on file     Relationship status: Not on file   ? Intimate partner violence     Fear of current or ex partner: Not on file     Emotionally abused: Not on file     Physically abused: Not on file     Forced sexual activity: Not on file   Other Topics Concern   ? Not on file   Social History Narrative    Lives alone.  Not working  (last worked 2006).  Originally from [place], [facility].    2 grown children          Medications  Allergies   Current Outpatient Medications   Medication Sig Dispense Refill   ? ACCU-CHEK GUIDE TEST STRIPS strips TEST TWICE DAILY 60 strip 6   ? albuterol (PROAIR HFA;PROVENTIL HFA;VENTOLIN HFA) 90 mcg/actuation inhaler Inhale 2 puffs every 6 (six) hours as needed for wheezing. 1 Inhaler 6   ? ARIPiprazole (ABILIFY) 10 MG tablet Take 10 mg by mouth daily.  3   ? budesonide-formoterol (SYMBICORT) 80-4.5 mcg/actuation inhaler Inhale 2 puffs 2 (two) times a day. 1 Inhaler 6   ? buPROPion (WELLBUTRIN XL) 150 MG 24 hr tablet Take 1 tablet by mouth every morning.     ? cloNIDine HCl (CATAPRES) 0.1 MG tablet TAKE 1 TABLET(0.1 MG) BY MOUTH TWICE DAILY AS NEEDED 180 tablet 3   ? docusate sodium (COLACE) 100 MG capsule Take 1 capsule (100 mg total) by mouth 2 (two) times a day. 60 capsule 1   ? furosemide (LASIX) 20 MG tablet TAKE 1 TABLET BY MOUTH EVERY DAY 90 tablet 3   ? generic lancets Test 2 times a day 100 each 6   ? mometasone-formoterol (DULERA) 200-5 mcg/actuation HFAA inhaler Inhale 2 puffs 2 (two) times a day.     ? omeprazole (PRILOSEC) 20 MG capsule TAKE 1 CAPSULE(20 MG) BY MOUTH TWICE DAILY BEFORE MEALS 180 capsule 3   ? phentermine (ADIPEX-P) 37.5 mg tablet Take 1/2 to 1 tablet in the morning. 90 tablet 1   ? SUBOXONE 8-2 mg Film per sublingual film 1 film three times a day SL 90 Film 2   ? aspirin 81 MG EC tablet Take 1 tablet (81 mg total) by mouth daily.  0   ? atorvastatin (LIPITOR) 20 MG tablet Take 1 tablet (20 mg total) by mouth daily. 90 tablet 3   ? cane Marisel Use as needed to assist with ambulation.   Pt with COPD. 1 each 0   ? cholecalciferol, vitamin D3, (VITAMIN D3) 5,000 unit Tab Take 1 tablet (5,000 Units total) by mouth daily. 90 tablet 3   ? polyethylene glycol (GLYCOLAX) 17 gram/dose powder Take 17 g by mouth daily as needed. 510 g prn   ? triamcinolone (KENALOG) 0.1 % ointment Apply  topically 2 (two) times a day. 80 g 0     No current facility-administered medications for this visit.       Allergies   Allergen Reactions   ? Seafood Other (See Comments)     Eyes turn yellow     ? Ibuprofen Nausea And Vomiting         Lab Results    Chemistry/lipid CBC Cardiac Enzymes/BNP/TSH/INR   Lab Results   Component Value Date    CHOL 178 07/11/2019    HDL 52 07/11/2019    LDLCALC 106 07/11/2019    TRIG 99 07/11/2019    CREATININE 0.88 09/11/2020    BUN 12 09/11/2020    K 4.5 09/11/2020     09/11/2020     09/11/2020    CO2 29 09/11/2020    Lab Results   Component Value Date    WBC 7.1 07/11/2019    HGB 13.3 (L) 07/11/2019    HCT 40.2 07/11/2019    MCV 91 07/11/2019     07/11/2019    Lab Results   Component Value Date    CKMB 4 03/29/2014    TROPONINI <0.01 09/21/2017    BNP <10 09/21/2017    TSH 1.72 01/25/2018    INR 0.95 03/29/2014

## 2021-06-30 NOTE — PROGRESS NOTES
Progress Notes by Vanessa Parada LPN at 1/14/2021  2:15 PM     Author: Vanessa Parada LPN Service: -- Author Type: Licensed Nurse    Filed: 1/14/2021  4:58 PM Encounter Date: 1/14/2021 Status: Signed    : Vanessa Parada LPN (Licensed Nurse)       This video/telephone visit will be conducted via a call between you and your physician/provider. We have found that certain health care needs can be provided without the need for an in-person physical exam. This service lets us provide the care you need with a video /telephone conversation. If a prescription is necessary we can send it directly to your pharmacy. If lab work is needed we can place an order for that and you can then stop by our lab to have the test done at a later time.   Just as we bill insurance for in-person visits, we also bill insurance for video/telephone visits. If you have questions about your insurance coverage, we recommend that you speak with your insurance company.   Patient has given verbal consent for video/Telephone visit? Yes  Patient would like telephone visit, please call: [phone]   JOSS/FAHEEM : Phillip MARISCAL LPN  Pt is a transfer from Dr. Gupta  Patient verified allergies, medications and pharmacy via phone.  PHQ: 2 and SHABBIR: 0 done verbally.   Patient states he is ready for visit.              ________________________________________  Medications Phoned  to Pharmacy [] yes [x]no  Name of Pharmacist:  List Medications, including dose, quantity and instructions    Medications ordered this visit were e-scribed.  Verified by order class [x] yes  [] no  Colace and SSuboxone  Medication changes or discontinuations were communicated to patient's pharmacy: [] yes  [x] no    Dictation completed at time of chart check: [] yes  [x] no    I have checked the documentation for todays encounters and the above information has been reviewed and completed.

## 2021-07-03 NOTE — ADDENDUM NOTE
Addendum Note by Brunner, Emily A, MD at 3/6/2017 12:32 PM     Author: Brunner, Emily A, MD Service: -- Author Type: Physician    Filed: 3/6/2017 12:32 PM Encounter Date: 3/6/2017 Status: Signed    : Brunner, Emily A, MD (Physician)    Addended by: BRUNNER, EMILY A on: 3/6/2017 12:32 PM        Modules accepted: Orders

## 2021-07-04 NOTE — ADDENDUM NOTE
Addendum Note by Clarisa Black LPN at 3/22/2021  3:57 PM     Author: Clarisa Black LPN Service: -- Author Type: Licensed Nurse    Filed: 3/22/2021  3:57 PM Encounter Date: 3/21/2021 Status: Signed    : Clarisa Black LPN (Licensed Nurse)    Addended by: CLARISA BLACK on: 3/22/2021 03:57 PM        Modules accepted: Orders

## 2021-07-04 NOTE — ADDENDUM NOTE
Addendum Note by Adelina Cabello MD at 5/11/2021  2:19 PM     Author: Adelina Cabello MD Service: -- Author Type: Physician    Filed: 5/11/2021  2:19 PM Encounter Date: 4/27/2021 Status: Signed    : Adelina Cabello MD (Physician)    Addended by: ADELINA BANKS on: 5/11/2021 02:19 PM        Modules accepted: Orders

## 2021-07-04 NOTE — ADDENDUM NOTE
Addendum Note by Yaneli Herman, RN at 5/10/2021  4:41 PM     Author: Yaneli Herman, RN Service: -- Author Type: Registered Nurse    Filed: 5/10/2021  4:41 PM Encounter Date: 4/27/2021 Status: Signed    : Yaneli Herman, RN (Registered Nurse)    Addended by: YANELI HERMAN on: 5/10/2021 04:41 PM        Modules accepted: Orders

## 2021-07-06 VITALS — BODY MASS INDEX: 45.1 KG/M2 | HEIGHT: 70 IN | WEIGHT: 315 LBS

## 2021-07-14 PROBLEM — I20.89 OTHER FORMS OF ANGINA PECTORIS (H): Status: RESOLVED | Noted: 2021-03-29 | Resolved: 2021-03-29

## 2021-08-03 PROBLEM — E66.01 MORBID OBESITY (H): Status: RESOLVED | Noted: 2018-12-12 | Resolved: 2019-10-11

## 2021-08-19 ENCOUNTER — VIRTUAL VISIT (OUTPATIENT)
Dept: BEHAVIORAL HEALTH | Facility: CLINIC | Age: 56
End: 2021-08-19
Payer: COMMERCIAL

## 2021-08-19 DIAGNOSIS — F11.20 OPIOID USE DISORDER, SEVERE, DEPENDENCE (H): Primary | ICD-10-CM

## 2021-08-19 PROCEDURE — 99214 OFFICE O/P EST MOD 30 MIN: CPT | Mod: GT | Performed by: FAMILY MEDICINE

## 2021-08-19 RX ORDER — BUPRENORPHINE AND NALOXONE 8; 2 MG/1; MG/1
FILM, SOLUBLE BUCCAL; SUBLINGUAL
Qty: 90 FILM | Refills: 0 | Status: SHIPPED | OUTPATIENT
Start: 2021-08-19 | End: 2021-10-07

## 2021-08-19 RX ORDER — DOCUSATE SODIUM 100 MG/1
100 CAPSULE, LIQUID FILLED ORAL
COMMUNITY
Start: 2020-02-13 | End: 2022-03-09

## 2021-08-19 RX ORDER — PHENTERMINE HYDROCHLORIDE 37.5 MG/1
TABLET ORAL
COMMUNITY
Start: 2021-05-11 | End: 2021-11-30

## 2021-08-19 RX ORDER — ARIPIPRAZOLE 10 MG/1
10 TABLET ORAL DAILY
COMMUNITY
End: 2022-04-21

## 2021-08-19 RX ORDER — FUROSEMIDE 20 MG
20 TABLET ORAL
COMMUNITY
Start: 2021-03-23 | End: 2022-04-21

## 2021-08-19 RX ORDER — CLONIDINE HYDROCHLORIDE 0.1 MG/1
TABLET ORAL
COMMUNITY
End: 2022-04-21

## 2021-08-19 RX ORDER — ALBUTEROL SULFATE 90 UG/1
AEROSOL, METERED RESPIRATORY (INHALATION)
COMMUNITY
Start: 2021-04-22 | End: 2022-08-03

## 2021-08-19 RX ORDER — BUPRENORPHINE AND NALOXONE 8; 2 MG/1; MG/1
FILM, SOLUBLE BUCCAL; SUBLINGUAL
COMMUNITY
Start: 2021-06-17 | End: 2021-08-19

## 2021-08-19 RX ORDER — BUPROPION HYDROCHLORIDE 150 MG/1
150 TABLET ORAL EVERY MORNING
COMMUNITY
End: 2022-04-21

## 2021-08-19 NOTE — PROGRESS NOTES
[facility]  Follow up      Assessment and Plan:  1. Opioid use disorder, severe, dependence (H)  Reporting continued control of symptoms with current therapy  Continue buprenorphine 24mg/day  Continue Colace prn constipation  Encouraged him to contact his sponsor for added support  Recommend he schedule with PCP as soon as able to address other chronic health conditions including HTN.   - buprenorphine HCl-naloxone HCl (SUBOXONE) 8-2 MG per film; 2 sl qam, 1 sl qpm  Dispense: 90 Film; Refill: 0      Return in about 6 weeks (around 9/30/2021) for Follow up with [facility] visit in [facility].  .        Subjective:  CC/HPI:   Tobin Bryant is a 56 y.o. male with PMH HTN, morbid obesity, prediabetes, asthma, YANIRA, anxiety, depression, and OUD on buprenorphine who is evaluated on this date for ongoing treatment of GENET.  Visit was conducted using [employer] telephone platform.  Pt was located at home, I was located in [facility] office.    Start: 08/19/2021 11:05 am  Stop: 08/19/2021 11:31 am    Pt has been treated with buprenorphine for OUD through [facility] since 2014.  Pt reports he has maintained sobriety since ~2016.      I last met with pt on 6/17/21/21.  A/P at that time was:   1. Opioid use disorder, severe, dependence (H)  Controlled, in sustained remission.  Continue buprenorphine 24mg/day  F/u medication management labs as listed below  - Drug Abuse 1+, Urine  - Buprenorphine, Urine  - Ethyl Glucuronide and Ethyl Sulfate, Urine, Quantitative ([facility] ETG/S)  - SUBOXONE 8-2 mg Film per sublingual film; 1 film three times a day SL  Dispense: 90 Film; Refill: 1        Today pt states he has continue to take buprenorphine 24mg/day; he takes these 2 sl in AM and 1 in PM. He denies any significant cravings for opioids or other substances.  He manages constipation with stool softeners.  He is pleased with effects of his OUD treatment and would like to  continue.   review is as expected.     Pt states that in the last 2 months his maternal aunt , and his partner of 12 years .  He has been spending time with his mother in [place] for added support for himself and for her.  Pt states he continues to feel sad on some days due to grieving their losses, but states he is slowly feeling better.  He denies suicidality.   He did seek emergency department evaluation in 2021 due to chest and shoulder pain; troponin was negative and EKG was reassuring.  Pt states he has not had further episodes of chest pain since that ED evaluation.     Pt is working with a bariatric clinic for weight loss.  He is  taking phentermine 37.5mg daily most days. He is not experiencing any side effects from this medication currently.     Pt reported taking medications as listed below only.  We reviewed his described prn use of clonidine for HTN, and I recommended he schedule with PCP as soon as he is able to discuss treatment of HTN  He also does not list atorvastatin as a medication he is taking which was reported in the past.  He uses albuterol inhaler prn SOB and states he rarely needs this medication during warmer months.      Medications:  Current Outpatient Medications   Medication Instructions     albuterol (PROAIR HFA/PROVENTIL HFA/VENTOLIN HFA) 108 (90 Base) MCG/ACT inhaler INHALE 2 PUFFS BY MOUTH EVERY 6 HOURS AS NEEDED FOR WHEEZING     ARIPiprazole (ABILIFY) 10 mg, Oral, DAILY     buprenorphine HCl-naloxone HCl (SUBOXONE) 8-2 MG per film 2 sl qam, 1 sl qpm     buPROPion (WELLBUTRIN XL) 150 mg, Oral, EVERY MORNING     cloNIDine (CATAPRES) 0.1 MG tablet Indications: High Blood Pressure Disorder, patient does not know dosage level and does not take this every day     docusate sodium (COLACE) 100 mg, Oral     furosemide (LASIX) 20 mg, Oral     omeprazole (PRILOSEC) 20 MG DR capsule TAKE 1 CAPSULE(20 MG) BY MOUTH TWICE DAILY BEFORE MEALS     phentermine (ADIPEX-P) 37.5 MG tablet  Take 1/2 to 1 tablet in the morning.         Objective:    Physical Exam  Constitutional:        Comments: Speaking in full sentences without difficulty   Neurological:      Mental Status: He is alert and oriented to person, place, and time.   Psychiatric:         Attention and Perception: Attention normal.         Mood and Affect: Mood and affect normal.         Speech: Speech normal.         Behavior: Behavior is cooperative.         Thought Content: Thought content normal.  Denies suicidal ideation.       Cognition and Memory: Cognition and memory normal.      Comments: Mood is sad  Affect is congruent with mood and subject matter       Labs:  6/17/21:   Ethyl Glucuronide, Urn, Quant ng/mL <100      Ethyl Sulfate, Urn, Quant ng/mL <100      Buprenorphine Qual Urine Screen Negative   Screen Positive (Confirmation available on request)Abnormal        Ref Range & Units 2 mo ago   (6/17/21) 2 mo ago   (6/17/21)      Amphetamines Urine Screen Negative Screen Negative      Benzodiazepines Urine Screen Negative Screen Negative      Opiates Urine Screen Negative Screen Negative      PCP Urine Screen Negative Screen Negative      Cannabinoids Urine Screen Negative Screen Negative      Barbiturates Urine Screen Negative Screen Negative      Cocaine Urine Screen Negative Screen Negative      Methadone Urine Screen Negative Screen Negative      Oxycodone Urine Screen Negative Screen Negative      Creatinine, Urine mg/dL 106.0                   At least 30min spent in review of medical record,  review, obtaining histories, updating medications, providing support for psychosocial stressors.     Safia Cat MD

## 2021-08-19 NOTE — PROGRESS NOTES
This video/telephone visit will be conducted via a call between you and your physician/provider. We have found that certain health care needs can be provided without the need for an in-person physical exam. This service lets us provide the care you need with a video /telephone conversation. If a prescription is necessary we can send it directly to your pharmacy. If lab work is needed we can place an order for that and you can then stop by our lab to have the test done at a later time.    Just as we bill insurance for in-person visits, we also bill insurance for video/telephone visits. If you have questions about your insurance coverage, we recommend that you speak with your insurance company.    Patient has given verbal consent for video/Telephone visit? yes  Patient would like telephone visit, please call: [phone]  JOSS/FAHEEM STEVENSON CMA  Patient verified allergies, medications and pharmacy via phone.  Patient states he is ready for visit.    ________________________________________  Medications Phoned  to Pharmacy [] yes [x]no  Name of Pharmacist:  List Medications, including dose, quantity and instructions    Medications ordered this visit were e-scribed.  Verified by order class [x] yes  [] no  Suboxone 8-2 mg    Medication changes or discontinuations were communicated to patient's pharmacy: [] yes  [x] no    Dictation completed at time of chart check: [x] yes  [] no    I have checked the documentation for today s encounters and the above information has been reviewed and completed.

## 2021-08-24 ENCOUNTER — TELEPHONE (OUTPATIENT)
Dept: INTERNAL MEDICINE | Facility: CLINIC | Age: 56
End: 2021-08-24

## 2021-08-24 NOTE — TELEPHONE ENCOUNTER
Reason for call:  Other   Patient called regarding (reason for call): pt would like to have a heart exam done and would like to have Matthew Villalta put in a order for a heart exam please    Additional comments: please give pt a callback asap to schedule heart exam at [facility]      Phone number to reach patient:  Cell number on file:    Telephone Information:   Mobile [phone]       Best Time:  anytime    Can we leave a detailed message on this number?  YES    Travel screening: Not Applicable

## 2021-08-29 NOTE — TELEPHONE ENCOUNTER
Unless there is something specific that we were going to follow up, he should see me first and then we can decide on further evaluation

## 2021-08-30 NOTE — TELEPHONE ENCOUNTER
Writer spoke to Robles.  He has no symptoms or family history of early onset cardiac disease.  He did have stress test and echocardiogram two years ago.  Patient scheduled an office visit with Dr. Castro.

## 2021-09-15 ENCOUNTER — VIRTUAL VISIT (OUTPATIENT)
Dept: SURGERY | Facility: CLINIC | Age: 56
End: 2021-09-15
Payer: COMMERCIAL

## 2021-09-15 VITALS — BODY MASS INDEX: 46.92 KG/M2 | HEIGHT: 70 IN

## 2021-09-15 DIAGNOSIS — E66.01 MORBID OBESITY WITH BMI OF 45.0-49.9, ADULT (H): Primary | ICD-10-CM

## 2021-09-15 DIAGNOSIS — E11.9 TYPE 2 DIABETES MELLITUS WITHOUT COMPLICATION, WITHOUT LONG-TERM CURRENT USE OF INSULIN (H): ICD-10-CM

## 2021-09-15 PROCEDURE — 99214 OFFICE O/P EST MOD 30 MIN: CPT | Mod: TEL | Performed by: FAMILY MEDICINE

## 2021-09-15 NOTE — LETTER
"    9/15/2021         RE: Tobin Bryant  [address]  [place] [address]        Dear Colleague,    Thank you for referring your patient, Tobin Bryant, to the [facility] [place]. Please see a copy of my visit note below.      The patient has been notified of following:     \"This telephone visit will be conducted via a call between you and your physician/provider. We have found that certain health care needs can be provided without the need for a physical exam.  This service lets us provide the care you need with a short phone conversation.  If a prescription is necessary we can send it directly to your pharmacy.  If lab work is needed we can place an order for that and you can then stop by our lab to have the test done at a later time.    Telephone visits are billed at different rates depending on your insurance coverage. During this emergency period, for some insurers they may be billed the same as an in-person visit.  Please reach out to your insurance provider with any questions.    If during the course of the call the physician/provider feels a telephone visit is not appropriate, you will not be charged for this service.\"    Patient has given verbal consent to a Telephone visit? Yes    What phone number would you like to be contacted at? [phone]    Patient would like to receive their AVS by Mail a copy    Bariatric Follow-up    56 year old  male BMI:Body mass index is 46.92 kg/m .    Weight:   Wt Readings from Last 1 Encounters:   06/15/21 148.3 kg (327 lb)    pounds    Comorbidities:  Patient Active Problem List   Diagnosis     Low back pain     History of heroin abuse (H)         Interim: In July his aunt  on his birthday. His fiance'  a week later of a brain aneurysm. Has family support. Getting outside sometime. Appetite is down since July. Feels his body is retaining water. Takinf furosemide every other week.     Plan:  DIET  Do " your best to eat 3 meals, protein first. Goal: stop soda and juice.    EXERCISE Walk outside daily for wellness, sleep and blood sugars   PHARMACOTHERAPY Take Lasix daily. Walk outside daily for wellness, sleep and blood sugar    Stay the course, continue working with the dietitian     -We reviewed his medications and whether associated with weight gain.    Current Outpatient Medications:      albuterol (PROAIR HFA/PROVENTIL HFA/VENTOLIN HFA) 108 (90 Base) MCG/ACT inhaler, INHALE 2 PUFFS BY MOUTH EVERY 6 HOURS AS NEEDED FOR WHEEZING, Disp: , Rfl:      ARIPiprazole (ABILIFY) 10 MG tablet, Take 10 mg by mouth daily, Disp: , Rfl:      buprenorphine HCl-naloxone HCl (SUBOXONE) 8-2 MG per film, 2 sl qam, 1 sl qpm, Disp: 90 Film, Rfl: 0     buPROPion (WELLBUTRIN XL) 150 MG 24 hr tablet, Take 150 mg by mouth every morning, Disp: , Rfl:      cloNIDine (CATAPRES) 0.1 MG tablet, Indications: High Blood Pressure Disorder, patient does not know dosage level and does not take this every day, Disp: , Rfl:      docusate sodium (DSS) 100 MG capsule, Take 100 mg by mouth, Disp: , Rfl:      furosemide (LASIX) 20 MG tablet, Take 20 mg by mouth, Disp: , Rfl:      omeprazole (PRILOSEC) 20 MG DR capsule, TAKE 1 CAPSULE(20 MG) BY MOUTH TWICE DAILY BEFORE MEALS, Disp: , Rfl:      phentermine (ADIPEX-P) 37.5 MG tablet, Take 1/2 to 1 tablet in the morning., Disp: , Rfl:       We discussed [facility] Bariatric Basics including:  -eating 3 meals daily  -eating protein first  -eating slowly, chewing food well  -avoiding/limiting calorie containing beverages  -choosing wheat, not white with breads, crackers, pastas, kendal, bagels, tortillas, rice  -limiting restaurant or cafeteria eating to twice a week or less  -We discussed the importance of restorative sleep and stress management in maintaining a healthy weight.  -We discussed insulin resistance and glycemic index as it relates to appetite and weight control  -We discussed the National  Weight Control Registry healthy weight maintenance strategies and ways to optimize metabolism.  -We discussed the importance of physical activity including cardiovascular and strength training in maintaining a healthier weight and explored viable options.    Most recent labs:  Lab Results   Component Value Date    WBC 7.1 07/11/2019    HGB 13.3 (L) 07/11/2019    HCT 40.2 07/11/2019    MCV 91 07/11/2019     07/11/2019     Lab Results   Component Value Date    CHOL 178 07/11/2019     Lab Results   Component Value Date    HDL 52 07/11/2019     Lab Results   Component Value Date    TRIG 99 07/11/2019     Lab Results   Component Value Date    ALT 25 09/11/2020    AST 27 09/11/2020    GGT 14 10/25/2008    ALKPHOS 82 09/11/2020       Lab Results   Component Value Date    TSH 1.72 01/25/2018         DIETARY HISTORY  Meals Per Day: 2  Eating Protein First?: sometimes  Food Diary: B:eggs and toast or honey nut cheerios L:hot dog or polish sausage (maybe) D:pork chop, mashed potatoes with frozen mixed veggies  Snacks Per Day: may have snacks with TV  Typical Snack: Pop & Raymond candy  Fluid Intake: intentional  Portion Control: improved  Calorie Containing Beverages: OJ sometimes, soda sometimes Mt. Dew  Alcohol per week: none  Typical Protein Food Choices: see above  Choosing Whole Grains: wheat  Grocery Shopping is done by: himself  Food Preparation is done by: himself  Meals at Restaurant/Cafeteria/Take Out Per Week: no  Eating at the Table:   TV is Off During Meals:     Positive Changes Since Last Visit: Maintains weight loss, eating protein first  Struggling With: grieving    Knowledgeable in Reading Food Labels:   Getting Adequate Protein: yes  Sleeping 7-8 hours/day well  Stress management doing better    PHYSICAL ACTIVITY PATTERNS:  Cardiovascular: walking sometimes outside  Strength Training: some push ups    REVIEW OF SYSTEMS  GENERAL/CONSTITUTIONAL:  Fatigue: yes  CARDIOVASCULAR:  Chest Pain with Exertion:  "no  PULMONARY:  Dyspnea on exertion: yes  CPAP Use:   Tobacco Use: never  GASTROINTESTINAL:  GERD/Heartburn: no  UROLOGIC:  Urinary Symptoms: no  NEUROLOGIC:  Headaches: yes  Paresthesias: no  PSYCHIATRIC:  Moods: grieving  MUSCULOSKELETAL/RHEUMATOLOGIC  Arthralgias: yes  Myalgias: yes  ENDOCRINE:  Monitoring Blood Sugars: no  Sugars Well Controlled: yes  DERMATOLOGIC:  Rashes: no    PHYSICAL EXAM: (most recent vitals and today's stated weight)  Vitals: Ht 1.778 m (5' 10\")   BMI 46.92 kg/m        GEN: Pleasant and in no acute distress  PSYCH: A&OX3,     I have reviewed the note as documented above.  This accurately captures the substance of my conversation with the patient.  Thank you for the opportunity to participate in the care of your patient.    Adelina Burnham MD, FAAFP  [facility]  Diplomate, American Board of Obesity Medicine    Total time spent on the date of this encounter doing: chart review, review of test results, patient visit, physical exam, education, counseling, developing plan of care, and documenting = 30 minutes.        Phone call duration: 30 minutes        Again, thank you for allowing me to participate in the care of your patient.        Sincerely,        Adelina Burnham MD    "

## 2021-09-15 NOTE — PROGRESS NOTES
"  The patient has been notified of following:     \"This telephone visit will be conducted via a call between you and your physician/provider. We have found that certain health care needs can be provided without the need for a physical exam.  This service lets us provide the care you need with a short phone conversation.  If a prescription is necessary we can send it directly to your pharmacy.  If lab work is needed we can place an order for that and you can then stop by our lab to have the test done at a later time.    Telephone visits are billed at different rates depending on your insurance coverage. During this emergency period, for some insurers they may be billed the same as an in-person visit.  Please reach out to your insurance provider with any questions.    If during the course of the call the physician/provider feels a telephone visit is not appropriate, you will not be charged for this service.\"    Patient has given verbal consent to a Telephone visit? Yes    What phone number would you like to be contacted at? [phone]    Patient would like to receive their AVS by Mail a copy    Bariatric Follow-up    56 year old  male BMI:Body mass index is 46.92 kg/m .    Weight:   Wt Readings from Last 1 Encounters:   06/15/21 148.3 kg (327 lb)    pounds    Comorbidities:  Patient Active Problem List   Diagnosis     Low back pain     History of heroin abuse (H)         Interim: In July his aunt  on his birthday. His fiance'  a week later of a brain aneurysm. Has family support. Getting outside sometime. Appetite is down since July. Feels his body is retaining water. Takinf furosemide every other week.     Plan:  DIET  Do your best to eat 3 meals, protein first. Goal: stop soda and juice.    EXERCISE Walk outside daily for wellness, sleep and blood sugars   PHARMACOTHERAPY Take Lasix daily. Walk outside daily for wellness, sleep and blood sugar    Stay the course, continue working with the " dietitian     -We reviewed his medications and whether associated with weight gain.    Current Outpatient Medications:      albuterol (PROAIR HFA/PROVENTIL HFA/VENTOLIN HFA) 108 (90 Base) MCG/ACT inhaler, INHALE 2 PUFFS BY MOUTH EVERY 6 HOURS AS NEEDED FOR WHEEZING, Disp: , Rfl:      ARIPiprazole (ABILIFY) 10 MG tablet, Take 10 mg by mouth daily, Disp: , Rfl:      buprenorphine HCl-naloxone HCl (SUBOXONE) 8-2 MG per film, 2 sl qam, 1 sl qpm, Disp: 90 Film, Rfl: 0     buPROPion (WELLBUTRIN XL) 150 MG 24 hr tablet, Take 150 mg by mouth every morning, Disp: , Rfl:      cloNIDine (CATAPRES) 0.1 MG tablet, Indications: High Blood Pressure Disorder, patient does not know dosage level and does not take this every day, Disp: , Rfl:      docusate sodium (DSS) 100 MG capsule, Take 100 mg by mouth, Disp: , Rfl:      furosemide (LASIX) 20 MG tablet, Take 20 mg by mouth, Disp: , Rfl:      omeprazole (PRILOSEC) 20 MG DR capsule, TAKE 1 CAPSULE(20 MG) BY MOUTH TWICE DAILY BEFORE MEALS, Disp: , Rfl:      phentermine (ADIPEX-P) 37.5 MG tablet, Take 1/2 to 1 tablet in the morning., Disp: , Rfl:       We discussed [facility] Bariatric Basics including:  -eating 3 meals daily  -eating protein first  -eating slowly, chewing food well  -avoiding/limiting calorie containing beverages  -choosing wheat, not white with breads, crackers, pastas, kendal, bagels, tortillas, rice  -limiting restaurant or cafeteria eating to twice a week or less  -We discussed the importance of restorative sleep and stress management in maintaining a healthy weight.  -We discussed insulin resistance and glycemic index as it relates to appetite and weight control  -We discussed the National Weight Control Registry healthy weight maintenance strategies and ways to optimize metabolism.  -We discussed the importance of physical activity including cardiovascular and strength training in maintaining a healthier weight and explored viable options.    Most recent  labs:  Lab Results   Component Value Date    WBC 7.1 07/11/2019    HGB 13.3 (L) 07/11/2019    HCT 40.2 07/11/2019    MCV 91 07/11/2019     07/11/2019     Lab Results   Component Value Date    CHOL 178 07/11/2019     Lab Results   Component Value Date    HDL 52 07/11/2019     Lab Results   Component Value Date    TRIG 99 07/11/2019     Lab Results   Component Value Date    ALT 25 09/11/2020    AST 27 09/11/2020    GGT 14 10/25/2008    ALKPHOS 82 09/11/2020       Lab Results   Component Value Date    TSH 1.72 01/25/2018         DIETARY HISTORY  Meals Per Day: 2  Eating Protein First?: sometimes  Food Diary: B:eggs and toast or honey nut cheerios L:hot dog or polish sausage (maybe) D:pork chop, mashed potatoes with frozen mixed veggies  Snacks Per Day: may have snacks with TV  Typical Snack: Pop & Raymond candy  Fluid Intake: intentional  Portion Control: improved  Calorie Containing Beverages: OJ sometimes, soda sometimes Mt. Dew  Alcohol per week: none  Typical Protein Food Choices: see above  Choosing Whole Grains: wheat  Grocery Shopping is done by: himself  Food Preparation is done by: himself  Meals at Restaurant/Cafeteria/Take Out Per Week: no  Eating at the Table:   TV is Off During Meals:     Positive Changes Since Last Visit: Maintains weight loss, eating protein first  Struggling With: grieving    Knowledgeable in Reading Food Labels:   Getting Adequate Protein: yes  Sleeping 7-8 hours/day well  Stress management doing better    PHYSICAL ACTIVITY PATTERNS:  Cardiovascular: walking sometimes outside  Strength Training: some push ups    REVIEW OF SYSTEMS  GENERAL/CONSTITUTIONAL:  Fatigue: yes  CARDIOVASCULAR:  Chest Pain with Exertion: no  PULMONARY:  Dyspnea on exertion: yes  CPAP Use:   Tobacco Use: never  GASTROINTESTINAL:  GERD/Heartburn: no  UROLOGIC:  Urinary Symptoms: no  NEUROLOGIC:  Headaches: yes  Paresthesias: no  PSYCHIATRIC:  Moods: grieving  MUSCULOSKELETAL/RHEUMATOLOGIC  Arthralgias:  "yes  Myalgias: yes  ENDOCRINE:  Monitoring Blood Sugars: no  Sugars Well Controlled: yes  DERMATOLOGIC:  Rashes: no    PHYSICAL EXAM: (most recent vitals and today's stated weight)  Vitals: Ht 1.778 m (5' 10\")   BMI 46.92 kg/m        GEN: Pleasant and in no acute distress  PSYCH: A&OX3,     I have reviewed the note as documented above.  This accurately captures the substance of my conversation with the patient.  Thank you for the opportunity to participate in the care of your patient.    Adelina Burnham MD, FAAFP  [facility]  Diplomate, American Board of Obesity Medicine    Total time spent on the date of this encounter doing: chart review, review of test results, patient visit, physical exam, education, counseling, developing plan of care, and documenting = 30 minutes.        Phone call duration: 30 minutes    "

## 2021-09-16 ENCOUNTER — OFFICE VISIT (OUTPATIENT)
Dept: INTERNAL MEDICINE | Facility: CLINIC | Age: 56
End: 2021-09-16
Payer: COMMERCIAL

## 2021-09-16 VITALS
BODY MASS INDEX: 45.1 KG/M2 | OXYGEN SATURATION: 96 % | HEIGHT: 70 IN | SYSTOLIC BLOOD PRESSURE: 126 MMHG | DIASTOLIC BLOOD PRESSURE: 68 MMHG | WEIGHT: 315 LBS | HEART RATE: 84 BPM

## 2021-09-16 DIAGNOSIS — R51.9 NONINTRACTABLE HEADACHE, UNSPECIFIED CHRONICITY PATTERN, UNSPECIFIED HEADACHE TYPE: ICD-10-CM

## 2021-09-16 DIAGNOSIS — E66.01 MORBID OBESITY (H): ICD-10-CM

## 2021-09-16 DIAGNOSIS — Z12.11 SCREENING FOR COLON CANCER: ICD-10-CM

## 2021-09-16 DIAGNOSIS — E11.9 TYPE 2 DIABETES MELLITUS WITHOUT COMPLICATION, WITHOUT LONG-TERM CURRENT USE OF INSULIN (H): Primary | ICD-10-CM

## 2021-09-16 DIAGNOSIS — I10 ESSENTIAL HYPERTENSION: ICD-10-CM

## 2021-09-16 DIAGNOSIS — F33.42 RECURRENT MAJOR DEPRESSIVE DISORDER, IN FULL REMISSION (H): ICD-10-CM

## 2021-09-16 DIAGNOSIS — F11.90 OPIOID USE DISORDER: ICD-10-CM

## 2021-09-16 DIAGNOSIS — G47.33 OSA ON CPAP: ICD-10-CM

## 2021-09-16 LAB
ALBUMIN SERPL-MCNC: 3.6 G/DL (ref 3.5–5)
ALP SERPL-CCNC: 81 U/L (ref 45–120)
ALT SERPL W P-5'-P-CCNC: 21 U/L (ref 0–45)
ANION GAP SERPL CALCULATED.3IONS-SCNC: 11 MMOL/L (ref 5–18)
AST SERPL W P-5'-P-CCNC: 22 U/L (ref 0–40)
BILIRUB SERPL-MCNC: 0.4 MG/DL (ref 0–1)
BUN SERPL-MCNC: 7 MG/DL (ref 8–22)
CALCIUM SERPL-MCNC: 9.2 MG/DL (ref 8.5–10.5)
CHLORIDE BLD-SCNC: 102 MMOL/L (ref 98–107)
CHOLEST SERPL-MCNC: 169 MG/DL
CO2 SERPL-SCNC: 27 MMOL/L (ref 22–31)
CREAT SERPL-MCNC: 0.79 MG/DL (ref 0.7–1.3)
ERYTHROCYTE [DISTWIDTH] IN BLOOD BY AUTOMATED COUNT: 14.6 % (ref 10–15)
FASTING STATUS PATIENT QL REPORTED: NORMAL
GFR SERPL CREATININE-BSD FRML MDRD: >90 ML/MIN/1.73M2
GLUCOSE BLD-MCNC: 90 MG/DL (ref 70–125)
HBA1C MFR BLD: 5.7 % (ref 0–5.6)
HCT VFR BLD AUTO: 39.7 % (ref 40–53)
HDLC SERPL-MCNC: 64 MG/DL
HGB BLD-MCNC: 12.6 G/DL (ref 13.3–17.7)
LDLC SERPL CALC-MCNC: 91 MG/DL
MCH RBC QN AUTO: 29.8 PG (ref 26.5–33)
MCHC RBC AUTO-ENTMCNC: 31.7 G/DL (ref 31.5–36.5)
MCV RBC AUTO: 94 FL (ref 78–100)
PLATELET # BLD AUTO: 244 10E3/UL (ref 150–450)
POTASSIUM BLD-SCNC: 4 MMOL/L (ref 3.5–5)
PROT SERPL-MCNC: 7.9 G/DL (ref 6–8)
RBC # BLD AUTO: 4.23 10E6/UL (ref 4.4–5.9)
SODIUM SERPL-SCNC: 140 MMOL/L (ref 136–145)
TRIGL SERPL-MCNC: 72 MG/DL
TSH SERPL DL<=0.005 MIU/L-ACNC: 1.69 UIU/ML (ref 0.3–5)
WBC # BLD AUTO: 5 10E3/UL (ref 4–11)

## 2021-09-16 PROCEDURE — 80061 LIPID PANEL: CPT | Performed by: INTERNAL MEDICINE

## 2021-09-16 PROCEDURE — 80053 COMPREHEN METABOLIC PANEL: CPT | Performed by: INTERNAL MEDICINE

## 2021-09-16 PROCEDURE — 83036 HEMOGLOBIN GLYCOSYLATED A1C: CPT | Performed by: INTERNAL MEDICINE

## 2021-09-16 PROCEDURE — 84443 ASSAY THYROID STIM HORMONE: CPT | Performed by: INTERNAL MEDICINE

## 2021-09-16 PROCEDURE — 85027 COMPLETE CBC AUTOMATED: CPT | Performed by: INTERNAL MEDICINE

## 2021-09-16 PROCEDURE — 99214 OFFICE O/P EST MOD 30 MIN: CPT | Performed by: INTERNAL MEDICINE

## 2021-09-16 PROCEDURE — 36415 COLL VENOUS BLD VENIPUNCTURE: CPT | Performed by: INTERNAL MEDICINE

## 2021-09-16 RX ORDER — ATORVASTATIN CALCIUM 20 MG/1
20 TABLET, FILM COATED ORAL DAILY
Qty: 90 TABLET | Refills: 3 | Status: SHIPPED | OUTPATIENT
Start: 2021-09-16 | End: 2022-04-21

## 2021-09-16 ASSESSMENT — MIFFLIN-ST. JEOR: SCORE: 2317.27

## 2021-09-16 NOTE — LETTER
September 17, 2021      Tobin CONCHA Annette  [address]   [place] [address]        Dear ,    We are writing to inform you of your test results.    Labs generally look okay/stable, excellent    Resulted Orders   Hemoglobin A1c   Result Value Ref Range    Hemoglobin A1C 5.7 (H) 0.0 - 5.6 %      Comment:      Normal <5.7%   Prediabetes 5.7-6.4%    Diabetes 6.5% or higher     Note: Adopted from ADA consensus guidelines.   CBC with platelets   Result Value Ref Range    WBC Count 5.0 4.0 - 11.0 10e3/uL    RBC Count 4.23 (L) 4.40 - 5.90 10e6/uL    Hemoglobin 12.6 (L) 13.3 - 17.7 g/dL    Hematocrit 39.7 (L) 40.0 - 53.0 %    MCV 94 78 - 100 fL    MCH 29.8 26.5 - 33.0 pg    MCHC 31.7 31.5 - 36.5 g/dL    RDW 14.6 10.0 - 15.0 %    Platelet Count 244 150 - 450 10e3/uL   TSH with free T4 reflex   Result Value Ref Range    TSH 1.69 0.30 - 5.00 uIU/mL   Comprehensive metabolic panel   Result Value Ref Range    Sodium 140 136 - 145 mmol/L    Potassium 4.0 3.5 - 5.0 mmol/L    Chloride 102 98 - 107 mmol/L    Carbon Dioxide (CO2) 27 22 - 31 mmol/L    Anion Gap 11 5 - 18 mmol/L    Urea Nitrogen 7 (L) 8 - 22 mg/dL    Creatinine 0.79 0.70 - 1.30 mg/dL    Calcium 9.2 8.5 - 10.5 mg/dL    Glucose 90 70 - 125 mg/dL    Alkaline Phosphatase 81 45 - 120 U/L    AST 22 0 - 40 U/L    ALT 21 0 - 45 U/L    Protein Total 7.9 6.0 - 8.0 g/dL    Albumin 3.6 3.5 - 5.0 g/dL    Bilirubin Total 0.4 0.0 - 1.0 mg/dL    GFR Estimate >90 >60 mL/min/1.73m2      Comment:      As of July 11, 2021, eGFR is calculated by the CKD-EPI creatinine equation, without race adjustment. eGFR can be influenced by muscle mass, exercise, and diet. The reported eGFR is an estimation only and is only applicable if the renal function is stable.   Lipid panel reflex to direct LDL Non-fasting   Result Value Ref Range    Cholesterol 169 <=199 mg/dL    Triglycerides 72 <=149 mg/dL    Direct Measure HDL 64 >=40 mg/dL      Comment:      HDL Cholesterol Reference  Range:     0-2 years:   No reference ranges established for patients under 2 years old  at [facility] for lipid analytes.    2-8 years:  Greater than 45 mg/dL     18 years and older:   Female: Greater than or equal to 50 mg/dL   Male:   Greater than or equal to 40 mg/dL    LDL Cholesterol Calculated 91 <=129 mg/dL    Patient Fasting > 8hrs? Unknown        If you have any questions or concerns, please call the clinic at the number listed above.       Sincerely,      Pawan Castro MD

## 2021-09-16 NOTE — PROGRESS NOTES
Office Visit - Follow Up   Tobin Bryant   56 year old male    Date of Visit: 9/16/2021    Chief Complaint   Patient presents with     RECHECK        Assessment and Plan   1. Type 2 diabetes mellitus without complication, without long-term current use of insulin (H)  Well controlled, continue same, add aspirin statin, annual diabetic eye exam excellent diabetic foot care  - Hemoglobin A1c; Future  - CBC with platelets; Future  - TSH with free T4 reflex; Future  - Comprehensive metabolic panel; Future  - Albumin Random Urine Quantitative with Creat Ratio; Future  - atorvastatin (LIPITOR) 20 MG tablet; Take 1 tablet (20 mg) by mouth daily  Dispense: 90 tablet; Refill: 3  - aspirin (ASA) 81 MG EC tablet; Take 1 tablet (81 mg) by mouth daily  Dispense: 90 tablet; Refill: 3  - Lipid panel reflex to direct LDL Non-fasting; Future  - Hemoglobin A1c  - CBC with platelets  - TSH with free T4 reflex  - Comprehensive metabolic panel  - Lipid panel reflex to direct LDL Non-fasting    2. Screening for colon cancer  - COLOGAUGUSTO([employer])    3. Opioid use disorder (H)  He is on Suboxone    4. Essential hypertension  Well-controlled    5. YANIRA on CPAP  Continue CPAP, getting replacement prior    6. Recurrent major depressive disorder, in full remission (H)  Doing well continue with psychiatry    7. Nonintractable headache, unspecified chronicity pattern, unspecified headache type  Exam fairly unremarkable, given new onset of headache with some atypical features will obtain a head CT  - CT Head w/o Contrast; Future    8. Morbid obesity (H)  The following high BMI interventions were performed this visit: encouragement to exercise and lifestyle education regarding diet    Return in about 3 months (around 12/16/2021) for Follow up.     History of Present Illness   This 56 year old man comes in for follow-up.  Overall doing well.  Continues to work with the bariatric clinic for weight loss.  Remains sober on Suboxone.  Has  "been having a right-sided headache.  Feels like a ball is inside of his head.  Very localized spot.  No head trauma.  He states his mood is stable.  Underlying obstructive sleep apnea on CPAP although his machine is not currently working but he is getting the replacement parts.  Currently denies any chest pain or worsening shortness of breath.    Review of Systems: A comprehensive review of systems was negative except as noted.     Medications, Allergies and Problem List   Reviewed, reconciled and updated  Post Discharge Medication Reconciliation Status:      Physical Exam   General Appearance:   No acute distress    /68 (BP Location: Left arm, Patient Position: Sitting, Cuff Size: Adult Large)   Pulse 84   Ht 1.778 m (5' 10\")   Wt 147.1 kg (324 lb 4.8 oz)   SpO2 96%   BMI 46.53 kg/m      HEENT exam is unremarkable  Cardiovascular regular rate and rhythm no murmur gallop or rub  Pulmonary lungs are clear to auscultation bilaterally  Gastrointestinal abdomen soft nontender nondistended no organomegaly  Neurologic exam is non focal  Psychiatric pleasant, no confusion or agitation        Additional Information   Current Outpatient Medications   Medication Sig Dispense Refill     albuterol (PROAIR HFA/PROVENTIL HFA/VENTOLIN HFA) 108 (90 Base) MCG/ACT inhaler INHALE 2 PUFFS BY MOUTH EVERY 6 HOURS AS NEEDED FOR WHEEZING       ARIPiprazole (ABILIFY) 10 MG tablet Take 10 mg by mouth daily       aspirin (ASA) 81 MG EC tablet Take 1 tablet (81 mg) by mouth daily 90 tablet 3     atorvastatin (LIPITOR) 20 MG tablet Take 1 tablet (20 mg) by mouth daily 90 tablet 3     buprenorphine HCl-naloxone HCl (SUBOXONE) 8-2 MG per film 2 sl qam, 1 sl qpm 90 Film 0     buPROPion (WELLBUTRIN XL) 150 MG 24 hr tablet Take 150 mg by mouth every morning       cloNIDine (CATAPRES) 0.1 MG tablet Indications: High Blood Pressure Disorder, patient does not know dosage level and does not take this every day       docusate sodium (DSS) 100 " MG capsule Take 100 mg by mouth       furosemide (LASIX) 20 MG tablet Take 20 mg by mouth       omeprazole (PRILOSEC) 20 MG DR capsule TAKE 1 CAPSULE(20 MG) BY MOUTH TWICE DAILY BEFORE MEALS       phentermine (ADIPEX-P) 37.5 MG tablet Take 1/2 to 1 tablet in the morning.       Allergies   Allergen Reactions     Seafood Other (See Comments)     Eyes turn yellow     Ibuprofen Nausea and Vomiting     Social History     Tobacco Use     Smoking status: Never Smoker     Smokeless tobacco: Never Used   Substance Use Topics     Alcohol use: No     Drug use: No       Review and/or order of clinical lab tests:  Review and/or order of radiology tests:  Review and/or order of medicine tests:  Discussion of test results with performing physician:  Decision to obtain old records and/or obtain history from someone other than the patient:  Review and summarization of old records and/or obtaining history from someone other than the patient and.or discussion of case with another health care provider:  Independent visualization of image, tracing or specimen itself:    Time:      Pawan Castro MD

## 2021-09-21 LAB — COLOGUARD-ABSTRACT: NEGATIVE

## 2021-10-07 ENCOUNTER — VIRTUAL VISIT (OUTPATIENT)
Dept: BEHAVIORAL HEALTH | Facility: CLINIC | Age: 56
End: 2021-10-07
Payer: COMMERCIAL

## 2021-10-07 VITALS
SYSTOLIC BLOOD PRESSURE: 118 MMHG | WEIGHT: 315 LBS | HEART RATE: 90 BPM | DIASTOLIC BLOOD PRESSURE: 77 MMHG | BODY MASS INDEX: 47.64 KG/M2

## 2021-10-07 DIAGNOSIS — F11.20 OPIOID USE DISORDER, SEVERE, DEPENDENCE (H): ICD-10-CM

## 2021-10-07 DIAGNOSIS — E66.01 MORBID OBESITY WITH BMI OF 45.0-49.9, ADULT (H): Primary | ICD-10-CM

## 2021-10-07 LAB
AMPHETAMINES UR QL SCN: NORMAL
BARBITURATES UR QL: NORMAL
BENZODIAZ UR QL: NORMAL
CANNABINOIDS UR QL SCN: NORMAL
COCAINE UR QL: NORMAL
CREAT UR-MCNC: 51 MG/DL
METHADONE UR QL SCN: NORMAL
OPIATES UR QL SCN: NORMAL
OXYCODONE UR QL: NORMAL
PCP UR QL SCN: NORMAL

## 2021-10-07 PROCEDURE — 99214 OFFICE O/P EST MOD 30 MIN: CPT | Mod: GT | Performed by: FAMILY MEDICINE

## 2021-10-07 PROCEDURE — 80307 DRUG TEST PRSMV CHEM ANLYZR: CPT | Mod: GT | Performed by: FAMILY MEDICINE

## 2021-10-07 RX ORDER — BUPRENORPHINE AND NALOXONE 8; 2 MG/1; MG/1
FILM, SOLUBLE BUCCAL; SUBLINGUAL
Qty: 90 FILM | Refills: 1 | Status: SHIPPED | OUTPATIENT
Start: 2021-10-07 | End: 2021-12-06

## 2021-10-07 ASSESSMENT — PAIN SCALES - GENERAL: PAINLEVEL: NO PAIN (0)

## 2021-10-07 NOTE — PROGRESS NOTES
[facility]  Follow up      Assessment and Plan:  1. Opioid use disorder, severe, dependence (H)  Controlled, in sustained remission  Continue buprenorphine 24mg/day  Follow-up with PCP and specialty healthcare providers as recommended  - buprenorphine HCl-naloxone HCl (SUBOXONE) 8-2 MG per film; 2 sl qam, 1 sl qpm  Dispense: 90 Film; Refill: 1  - Ethyl Glucuronide Urine; Standing  - Buprenorphine Urine, Qualitative  - Drugs of Abuse 1+ Panel, Urine ([facility] Only)  - Ethyl Glucuronide Urine  - Buprenorphine Urine, Qualitative  - Buprenorphine Urine, Qualitative    Return in about 2 months (around 12/7/2021) for Follow up, in person.  .        Subjective:  CC/HPI:   Tobin Bryant is a 56 y.o. male with PMH HTN, obesity, prediabetes, asthma, YANIRA, anxiety, depression, and OUD on buprenorphine who is evaluated on this date for ongoing treatment of GENET.  Visit was conducted using [employer] telephone platform.  Pt was located at home, I was located in [facility] office.    Start time 1205, end time 1225    Brief History:  Pt has been treated with buprenorphine for OUD through [facility] starting ~2011.  Previously had a many year history of intranasal heroin use.  Pt reports he has maintained sobriety since ~2016.  Other treatment history includes residential at [facility] 2007, inpatient detox at [facility] 2008, h/o methadone treatment.    HCV ab negative 3/11/2014  HIV ag/ab negative 3/11/2014      I last met with pt on 8/19/21.  A/P at that time was:  1. Opioid use disorder, severe, dependence (H)  Reporting continued control of symptoms with current therapy  Continue buprenorphine 24mg/day  Continue Colace prn constipation  Encouraged him to contact his sponsor for added support  Recommend he schedule with PCP as soon as able to address other chronic health conditions including HTN.   - buprenorphine HCl-naloxone HCl (SUBOXONE) 8-2 MG per film; 2 sl qam, 1 sl qpm   Dispense: 90 Film; Refill: 0        Today pt states he has  continued to take buprenorphine 24mg/day; he takes these 2 sl in AM and 1 in PM. He denies any  significant cravings for opioids or other substances.  He manages constipation with stool softeners.  He would like to continue current therapy.   review is as expected.     Regarding his mood, pt states he has been feeling stable.  He denies worsening depression symptoms.  He has been rearranging his apartment to help him move through grief of loss of his partner of 12 years recently.     Pt is working with a bariatric clinic for weight loss.  He is  taking phentermine 37.5mg daily 1 week on/1 week off.  He is not experiencing any side effects from this medication.     Pt did have appt with his PCP on 9/16/21 for management of DM and other chronic health conditions.  A CT scan was ordered to evaluate new headaches    Medications:  buprenorphine HCl-naloxone HCl (SUBOXONE) 8-2 MG per film, 2 sl qam, 1 sl qpm  cloNIDine (CATAPRES) 0.1 MG tablet, Indications: High Blood Pressure Disorder, patient does not know dosage level and does not take this every day  albuterol (PROAIR HFA/PROVENTIL HFA/VENTOLIN HFA) 108 (90 Base) MCG/ACT inhaler, INHALE 2 PUFFS BY MOUTH EVERY 6 HOURS AS NEEDED FOR WHEEZING  ARIPiprazole (ABILIFY) 10 MG tablet, Take 10 mg by mouth daily  aspirin (ASA) 81 MG EC tablet, Take 1 tablet (81 mg) by mouth daily  atorvastatin (LIPITOR) 20 MG tablet, Take 1 tablet (20 mg) by mouth daily  buPROPion (WELLBUTRIN XL) 150 MG 24 hr tablet, Take 150 mg by mouth every morning  docusate sodium (DSS) 100 MG capsule, Take 100 mg by mouth  furosemide (LASIX) 20 MG tablet, Take 20 mg by mouth  omeprazole (PRILOSEC) 20 MG DR capsule, TAKE 1 CAPSULE(20 MG) BY MOUTH TWICE DAILY BEFORE MEALS  phentermine (ADIPEX-P) 37.5 MG tablet, Take 1/2 to 1 tablet in the morning.    No current facility-administered medications on file prior to visit.        Objective:  BP  118/77 (BP Location: Right arm, Patient Position: Sitting, Cuff Size: Adult Large)   Pulse 90   Wt (!) 150.6 kg (332 lb)   BMI 47.64 kg/m      Physical Exam  Constitutional:       Appearance: Normal appearance.      Comments: Wearing a surgical mask   HENT:      Head: Normocephalic and atraumatic.   Eyes:      General: No scleral icterus.     Extraocular Movements: Extraocular movements intact.      Conjunctiva/sclera: Conjunctivae normal.   Neurological:      Mental Status: He is alert and oriented to person, place, and time.   Psychiatric:         Attention and Perception: Attention and perception normal.         Speech: Speech normal.         Behavior: Behavior is cooperative.         Thought Content: Thought content normal.      Comments: Mood is neutral, affect is restricted  Insight and judgement are good             Labs:  Recent Results (from the past 240 hour(s))   Drugs of Abuse 1+ Panel, Urine ([facility] Only)    Collection Time: 10/07/21 12:00 PM   Result Value Ref Range    Amphetamines Urine Screen Negative Screen Negative    Benzodiazepines Urine Screen Negative Screen Negative    Opiates Urine Screen Negative Screen Negative    PCP Urine Screen Negative Screen Negative    Cannabinoids Urine Screen Negative Screen Negative    Barbiturates Urine Screen Negative Screen Negative    Cocaine Urine Screen Negative Screen Negative    Methadone Urine Screen Negative Screen Negative    Oxycodone Urine Screen Negative Screen Negative    Creatinine Urine mg/dL 51 mg/dL     Urine buprenorphine and EtG pending                At least 30 min spent in review of medical record,  review, obtaining histories, updating medications, providing support for psychosocial stressors.     Safia Cat MD

## 2021-10-07 NOTE — NURSING NOTE
This video/telephone visit will be conducted via a call between you and your physician/provider. We have found that certain health care needs can be provided without the need for an in-person physical exam. This service lets us provide the care you need with a video /telephone conversation. If a prescription is necessary we can send it directly to your pharmacy. If lab work is needed we can place an order for that and you can then stop by our lab to have the test done at a later time.    Just as we bill insurance for in-person visits, we also bill insurance for video/telephone visits. If you have questions about your insurance coverage, we recommend that you speak with your insurance company.    Patient has given verbal consent for video/Telephone visit? yes  Patient would like the video visit invitation sent by:  SIP CALL to:  [email or web address]  JOSS/FAHEEM : Phillip MARISCAL LPN  Urine specimen collected. Yrn prescribing his psych meds, uses Clonidine for BP control, says he has been stable from addiction standpoint.     Patient verified allergies, medications and pharmacy.  Patient states he is ready for visit.

## 2021-10-08 RX ORDER — BLOOD SUGAR DIAGNOSTIC
STRIP MISCELLANEOUS
Qty: 200 STRIP | Refills: 1 | Status: SHIPPED | OUTPATIENT
Start: 2021-10-08 | End: 2022-05-25

## 2021-10-08 NOTE — TELEPHONE ENCOUNTER
"   Disp Refills Start End GABRIELLE   blood glucose test (ACCU-CHEK GUIDE TEST STRIPS) strips 60 strip 6 3/29/2021  No   Sig: TEST TWICE DAILY   Sent to pharmacy as: Accu-Chek Guide test strips (blood glucose test)   E-Prescribing Status: Receipt confirmed by pharmacy (3/29/2021 12:13 PM CDT)       Last Written Prescription Date:  3/29/21  Last Fill Quantity: 60,  # refills: 6   Last office visit provider:  9/16/21     Requested Prescriptions   Pending Prescriptions Disp Refills     ACCU-CHEK GUIDE test strip [Pharmacy Med Name: ACCU-CHEK GUIDE TEST STRIPS 50] 100 strip      Sig: TEST TWICE DAILY       Diabetic Supplies Protocol Failed - 10/7/2021  1:41 PM        Failed - Medication is active on med list        Passed - Patient is 18 years of age or older        Passed - Recent (6 mo) or future (30 days) visit within the authorizing provider's specialty     Patient had office visit in the last 6 months or has a visit in the next 30 days with authorizing provider.  See \"Patient Info\" tab in inbasket, or \"Choose Columns\" in Meds & Orders section of the refill encounter.                 Leonidas Kaye RN 10/08/21 11:40 AM  "

## 2021-10-09 ENCOUNTER — HOSPITAL ENCOUNTER (OUTPATIENT)
Dept: CT IMAGING | Facility: HOSPITAL | Age: 56
Discharge: HOME | End: 2021-10-09
Attending: INTERNAL MEDICINE | Admitting: INTERNAL MEDICINE
Payer: COMMERCIAL

## 2021-10-09 DIAGNOSIS — R51.9 NONINTRACTABLE HEADACHE, UNSPECIFIED CHRONICITY PATTERN, UNSPECIFIED HEADACHE TYPE: ICD-10-CM

## 2021-10-09 PROCEDURE — 70450 CT HEAD/BRAIN W/O DYE: CPT

## 2021-10-18 ENCOUNTER — TELEPHONE (OUTPATIENT)
Dept: INTERNAL MEDICINE | Facility: CLINIC | Age: 56
End: 2021-10-18

## 2021-10-18 NOTE — TELEPHONE ENCOUNTER
Reason for Call:  Request for results:    Name of test or procedure: CT scan done on 10/9 At [facility] and also looking for lab results from [facility] 9/16/21    Please call pt with results.  I did give him the Cologuard results that I saw Dr Castro had written     OK to leave the result message on voice mail or with a family member? YES    Phone number Patient can be reached at:  Cell number on file:    Telephone Information:   Mobile [phone]       Additional comments: please call patient and also mail out the results as well as a letter so he can understand them.    Call taken on 10/18/2021 at 2:39 PM by Pam J. Behr

## 2021-10-25 NOTE — TELEPHONE ENCOUNTER
Writer gave patient his imaging report and lab results as written by Dr. Castro.   [Initial Evaluation] : an initial evaluation [FreeTextEntry1] : Last colon

## 2021-11-30 ENCOUNTER — VIRTUAL VISIT (OUTPATIENT)
Dept: SURGERY | Facility: CLINIC | Age: 56
End: 2021-11-30
Payer: COMMERCIAL

## 2021-11-30 VITALS — WEIGHT: 315 LBS | BODY MASS INDEX: 45.1 KG/M2 | HEIGHT: 70 IN

## 2021-11-30 DIAGNOSIS — E66.01 MORBID OBESITY (H): Primary | ICD-10-CM

## 2021-11-30 PROCEDURE — 99214 OFFICE O/P EST MOD 30 MIN: CPT | Mod: TEL | Performed by: FAMILY MEDICINE

## 2021-11-30 RX ORDER — PHENTERMINE HYDROCHLORIDE 37.5 MG/1
TABLET ORAL
Qty: 90 TABLET | Refills: 1 | Status: SHIPPED | OUTPATIENT
Start: 2021-11-30 | End: 2022-01-12

## 2021-11-30 ASSESSMENT — MIFFLIN-ST. JEOR: SCORE: 2315.9

## 2021-11-30 NOTE — PROGRESS NOTES
"  The patient has been notified of following:     \"This telephone visit will be conducted via a call between you and your physician/provider. We have found that certain health care needs can be provided without the need for a physical exam.  This service lets us provide the care you need with a short phone conversation.  If a prescription is necessary we can send it directly to your pharmacy.  If lab work is needed we can place an order for that and you can then stop by our lab to have the test done at a later time.    Telephone visits are billed at different rates depending on your insurance coverage. During this emergency period, for some insurers they may be billed the same as an in-person visit.  Please reach out to your insurance provider with any questions.    If during the course of the call the physician/provider feels a telephone visit is not appropriate, you will not be charged for this service.\"    Patient has given verbal consent to a Telephone visit? Yes    What phone number would you like to be contacted at? [phone]    [facility]    Additional provider notes:   Bariatric Follow-up    56 year old  male BMI:Body mass index is 46.49 kg/m .    Weight:   Wt Readings from Last 1 Encounters:   11/30/21 147 kg (324 lb)    pounds    Comorbidities:  Patient Active Problem List   Diagnosis     Type 2 diabetes mellitus without complication, without long-term current use of insulin (H)     Morbid obesity (H)     Opioid use disorder     YANIRA on CPAP     Essential hypertension     Recurrent major depressive disorder, in full remission (H)         Interim: Would like to see the dietitian more often. He did well last time with that. He bought an exercise bike. Tries to get 1/2 hour in every other day. Stopped the phentermine. Needs a refill.     Plan:  DIET  3 meals, protein first   EXERCISE exercise bike in the mornings works best for him   PHARMACOTHERAPY refill " phentermine    restart CPAP, restart phentermine, bike after brushing teeth daily if even for 5 minutes. F/u Olga. You can work with Olga to decide which follow up schedule works best for you. Discussed interminttent 2 week breaks on phentermine.     -We reviewed his medications and whether associated with weight gain.    Current Outpatient Medications:      phentermine (ADIPEX-P) 37.5 MG tablet, Take 1/2 to 1 tablet in the morning., Disp: 90 tablet, Rfl: 1     ACCU-CHEK GUIDE test strip, TEST TWICE DAILY, Disp: 200 strip, Rfl: 1     albuterol (PROAIR HFA/PROVENTIL HFA/VENTOLIN HFA) 108 (90 Base) MCG/ACT inhaler, INHALE 2 PUFFS BY MOUTH EVERY 6 HOURS AS NEEDED FOR WHEEZING, Disp: , Rfl:      ARIPiprazole (ABILIFY) 10 MG tablet, Take 10 mg by mouth daily, Disp: , Rfl:      aspirin (ASA) 81 MG EC tablet, Take 1 tablet (81 mg) by mouth daily, Disp: 90 tablet, Rfl: 3     atorvastatin (LIPITOR) 20 MG tablet, Take 1 tablet (20 mg) by mouth daily, Disp: 90 tablet, Rfl: 3     buprenorphine HCl-naloxone HCl (SUBOXONE) 8-2 MG per film, 2 sl qam, 1 sl qpm, Disp: 90 Film, Rfl: 1     buPROPion (WELLBUTRIN XL) 150 MG 24 hr tablet, Take 150 mg by mouth every morning, Disp: , Rfl:      cloNIDine (CATAPRES) 0.1 MG tablet, Indications: High Blood Pressure Disorder, patient does not know dosage level and does not take this every day, Disp: , Rfl:      docusate sodium (DSS) 100 MG capsule, Take 100 mg by mouth, Disp: , Rfl:      furosemide (LASIX) 20 MG tablet, Take 20 mg by mouth, Disp: , Rfl:      omeprazole (PRILOSEC) 20 MG DR capsule, TAKE 1 CAPSULE(20 MG) BY MOUTH TWICE DAILY BEFORE MEALS, Disp: , Rfl:       We discussed [facility] Bariatric Basics including:  -eating 3 meals daily  -eating protein first  -eating slowly, chewing food well  -avoiding/limiting calorie containing beverages  -choosing wheat, not white with breads, crackers, pastas, kendal, bagels, tortillas, rice  -limiting restaurant or cafeteria eating to  twice a week or less  -We discussed the importance of restorative sleep and stress management in maintaining a healthy weight.  -We discussed insulin resistance and glycemic index as it relates to appetite and weight control  -We discussed the National Weight Control Registry healthy weight maintenance strategies and ways to optimize metabolism.  -We discussed the importance of physical activity including cardiovascular and strength training in maintaining a healthier weight and explored viable options.    Most recent labs:  Lab Results   Component Value Date    WBC 5.0 09/16/2021    HGB 12.6 (L) 09/16/2021    HCT 39.7 (L) 09/16/2021    MCV 94 09/16/2021     09/16/2021     Lab Results   Component Value Date    CHOL 169 09/16/2021     Lab Results   Component Value Date    HDL 64 09/16/2021       Lab Results   Component Value Date    TRIG 72 09/16/2021     Lab Results   Component Value Date    ALT 21 09/16/2021    AST 22 09/16/2021    GGT 14 10/25/2008    ALKPHOS 81 09/16/2021     Lab Results   Component Value Date    TSH 1.69 09/16/2021         DIETARY HISTORY  Meals Per Day: sporatdic  Eating Protein First?: maybe a hamburger,   Food Diary: B:cereal (cheerios) whole milk L:skip D:hamburger and veggies  Snacks Per Day: occ  Typical Snack: candy  Fluid Intake: water, chocolate milk  Portion Control: problematic  Calorie Containing Beverages: whole milk  Alcohol per week: no  Typical Protein Food Choices: vareiety  Choosing Whole Grains:   Grocery Shopping is done by: himself in the store  Food Preparation is done by: himself  Meals at Restaurant/Cafeteria/Take Out Per Week: no  Eating at the Table:   TV is Off During Meals:     Positive Changes Since Last Visit: bought an exercise bike  Struggling With: weight loss    Knowledgeable in Reading Food Labels:   Getting Adequate Protein: yes  Sleeping 7-8 hours/day well  Stress management moderate    PHYSICAL ACTIVITY PATTERNS:  Cardiovascular: exercise bike as  "able  Strength Training: same    REVIEW OF SYSTEMS  GENERAL/CONSTITUTIONAL:  Fatigue: yes  CARDIOVASCULAR:  Chest Pain with Exertion: no  PULMONARY:  Dyspnea on exertion: yes  CPAP Use: not using now  Tobacco Use: no  GASTROINTESTINAL:  GERD/Heartburn: on PPI  UROLOGIC:  Urinary Symptoms: no  NEUROLOGIC:  Headaches: no  Paresthesias:   PSYCHIATRIC:  Moods: euthymic  MUSCULOSKELETAL/RHEUMATOLOGIC  Arthralgias: yes  Myalgias: yes  ENDOCRINE:  Monitoring Blood Sugars: no  Sugars Well Controlled: yes  DERMATOLOGIC:  Rashes: no    PHYSICAL EXAM: (most recent vitals and today's stated weight)  Vitals: Ht 1.778 m (5' 10\")   Wt 147 kg (324 lb)   BMI 46.49 kg/m        GEN: Pleasant and in no acute distress  PSYCH: A&OX3,     I have reviewed the note as documented above.  This accurately captures the substance of my conversation with the patient.  Thank you for the opportunity to participate in the care of your patient.    Adelina Burnham MD, FAAFP  [facility]  Diplomate, American Board of Obesity Medicine    Total time spent on the date of this encounter doing: chart review, review of test results, patient visit, physical exam, education, counseling, developing plan of care, and documenting = 30 minutes.        Phone call duration: 30 minutes  "

## 2021-11-30 NOTE — LETTER
"    11/30/2021         RE: Tobin Bryant  [address]  [place] [address]        Dear Colleague,    Thank you for referring your patient, Tobin Bryant, to the [facility] [place]. Please see a copy of my visit note below.      The patient has been notified of following:     \"This telephone visit will be conducted via a call between you and your physician/provider. We have found that certain health care needs can be provided without the need for a physical exam.  This service lets us provide the care you need with a short phone conversation.  If a prescription is necessary we can send it directly to your pharmacy.  If lab work is needed we can place an order for that and you can then stop by our lab to have the test done at a later time.    Telephone visits are billed at different rates depending on your insurance coverage. During this emergency period, for some insurers they may be billed the same as an in-person visit.  Please reach out to your insurance provider with any questions.    If during the course of the call the physician/provider feels a telephone visit is not appropriate, you will not be charged for this service.\"    Patient has given verbal consent to a Telephone visit? Yes    What phone number would you like to be contacted at? [phone]    Patient would like to receive their AVS by Sherri    Additional provider notes:   Bariatric Follow-up    56 year old  male BMI:Body mass index is 46.49 kg/m .    Weight:   Wt Readings from Last 1 Encounters:   11/30/21 147 kg (324 lb)    pounds    Comorbidities:  Patient Active Problem List   Diagnosis     Type 2 diabetes mellitus without complication, without long-term current use of insulin (H)     Morbid obesity (H)     Opioid use disorder     YANIRA on CPAP     Essential hypertension     Recurrent major depressive disorder, in full remission (H)         Interim: Would like to see the dietitian more " often. He did well last time with that. He bought an exercise bike. Tries to get 1/2 hour in every other day. Stopped the phentermine. Needs a refill.     Plan:  DIET  3 meals, protein first   EXERCISE exercise bike in the mornings works best for him   PHARMACOTHERAPY refill phentermine    restart CPAP, restart phentermine, bike after brushing teeth daily if even for 5 minutes. F/u Olga. You can work with Olga to decide which follow up schedule works best for you. Discussed interminttent 2 week breaks on phentermine.     -We reviewed his medications and whether associated with weight gain.    Current Outpatient Medications:      phentermine (ADIPEX-P) 37.5 MG tablet, Take 1/2 to 1 tablet in the morning., Disp: 90 tablet, Rfl: 1     ACCU-CHEK GUIDE test strip, TEST TWICE DAILY, Disp: 200 strip, Rfl: 1     albuterol (PROAIR HFA/PROVENTIL HFA/VENTOLIN HFA) 108 (90 Base) MCG/ACT inhaler, INHALE 2 PUFFS BY MOUTH EVERY 6 HOURS AS NEEDED FOR WHEEZING, Disp: , Rfl:      ARIPiprazole (ABILIFY) 10 MG tablet, Take 10 mg by mouth daily, Disp: , Rfl:      aspirin (ASA) 81 MG EC tablet, Take 1 tablet (81 mg) by mouth daily, Disp: 90 tablet, Rfl: 3     atorvastatin (LIPITOR) 20 MG tablet, Take 1 tablet (20 mg) by mouth daily, Disp: 90 tablet, Rfl: 3     buprenorphine HCl-naloxone HCl (SUBOXONE) 8-2 MG per film, 2 sl qam, 1 sl qpm, Disp: 90 Film, Rfl: 1     buPROPion (WELLBUTRIN XL) 150 MG 24 hr tablet, Take 150 mg by mouth every morning, Disp: , Rfl:      cloNIDine (CATAPRES) 0.1 MG tablet, Indications: High Blood Pressure Disorder, patient does not know dosage level and does not take this every day, Disp: , Rfl:      docusate sodium (DSS) 100 MG capsule, Take 100 mg by mouth, Disp: , Rfl:      furosemide (LASIX) 20 MG tablet, Take 20 mg by mouth, Disp: , Rfl:      omeprazole (PRILOSEC) 20 MG DR capsule, TAKE 1 CAPSULE(20 MG) BY MOUTH TWICE DAILY BEFORE MEALS, Disp: , Rfl:       We discussed [facility] Bariatric Basics  including:  -eating 3 meals daily  -eating protein first  -eating slowly, chewing food well  -avoiding/limiting calorie containing beverages  -choosing wheat, not white with breads, crackers, pastas, kendal, bagels, tortillas, rice  -limiting restaurant or cafeteria eating to twice a week or less  -We discussed the importance of restorative sleep and stress management in maintaining a healthy weight.  -We discussed insulin resistance and glycemic index as it relates to appetite and weight control  -We discussed the National Weight Control Registry healthy weight maintenance strategies and ways to optimize metabolism.  -We discussed the importance of physical activity including cardiovascular and strength training in maintaining a healthier weight and explored viable options.    Most recent labs:  Lab Results   Component Value Date    WBC 5.0 09/16/2021    HGB 12.6 (L) 09/16/2021    HCT 39.7 (L) 09/16/2021    MCV 94 09/16/2021     09/16/2021     Lab Results   Component Value Date    CHOL 169 09/16/2021     Lab Results   Component Value Date    HDL 64 09/16/2021       Lab Results   Component Value Date    TRIG 72 09/16/2021     Lab Results   Component Value Date    ALT 21 09/16/2021    AST 22 09/16/2021    GGT 14 10/25/2008    ALKPHOS 81 09/16/2021     Lab Results   Component Value Date    TSH 1.69 09/16/2021         DIETARY HISTORY  Meals Per Day: sporatdic  Eating Protein First?: maybe a hamburger,   Food Diary: B:cereal (cheerios) whole milk L:skip D:hamburger and veggies  Snacks Per Day: occ  Typical Snack: candy  Fluid Intake: water, chocolate milk  Portion Control: problematic  Calorie Containing Beverages: whole milk  Alcohol per week: no  Typical Protein Food Choices: vareiety  Choosing Whole Grains:   Grocery Shopping is done by: himself in the store  Food Preparation is done by: himself  Meals at Restaurant/Cafeteria/Take Out Per Week: no  Eating at the Table:   TV is Off During Meals:     Positive  "Changes Since Last Visit: bought an exercise bike  Struggling With: weight loss    Knowledgeable in Reading Food Labels:   Getting Adequate Protein: yes  Sleeping 7-8 hours/day well  Stress management moderate    PHYSICAL ACTIVITY PATTERNS:  Cardiovascular: exercise bike as able  Strength Training: same    REVIEW OF SYSTEMS  GENERAL/CONSTITUTIONAL:  Fatigue: yes  CARDIOVASCULAR:  Chest Pain with Exertion: no  PULMONARY:  Dyspnea on exertion: yes  CPAP Use: not using now  Tobacco Use: no  GASTROINTESTINAL:  GERD/Heartburn: on PPI  UROLOGIC:  Urinary Symptoms: no  NEUROLOGIC:  Headaches: no  Paresthesias:   PSYCHIATRIC:  Moods: euthymic  MUSCULOSKELETAL/RHEUMATOLOGIC  Arthralgias: yes  Myalgias: yes  ENDOCRINE:  Monitoring Blood Sugars: no  Sugars Well Controlled: yes  DERMATOLOGIC:  Rashes: no    PHYSICAL EXAM: (most recent vitals and today's stated weight)  Vitals: Ht 1.778 m (5' 10\")   Wt 147 kg (324 lb)   BMI 46.49 kg/m        GEN: Pleasant and in no acute distress  PSYCH: A&OX3,     I have reviewed the note as documented above.  This accurately captures the substance of my conversation with the patient.  Thank you for the opportunity to participate in the care of your patient.    Adelina Burnham MD, FAAFP  [facility]  Diplomate, American Board of Obesity Medicine    Total time spent on the date of this encounter doing: chart review, review of test results, patient visit, physical exam, education, counseling, developing plan of care, and documenting = 30 minutes.        Phone call duration: 30 minutes      Again, thank you for allowing me to participate in the care of your patient.        Sincerely,        Adelina Burnham MD    "

## 2021-12-06 DIAGNOSIS — F11.20 OPIOID USE DISORDER, SEVERE, DEPENDENCE (H): ICD-10-CM

## 2021-12-06 RX ORDER — BUPRENORPHINE AND NALOXONE 8; 2 MG/1; MG/1
FILM, SOLUBLE BUCCAL; SUBLINGUAL
Qty: 90 FILM | Refills: 0 | Status: SHIPPED | OUTPATIENT
Start: 2021-12-06 | End: 2022-01-04

## 2021-12-06 NOTE — TELEPHONE ENCOUNTER
This appears to be a refill request rather than a PA processing as pt was prescribed this med 10/07/2021.    Date of Last Office Visit: 10/07/2021  Date of Next Office Visit: none-will route to scheduling  No shows since last visit: 0  Cancellations since last visit: 1 for 12/07/2021    Medication requested: buprenorphine HCl-naloxone HCl (SUBOXONE) 8-2 MG  Date last ordered: 10/07/2021 Qty: 90 Refills: 1     Review of [place] ?: no - request for delegate pending    Lapse in medication adherence greater than 5 days?: no  If yes, call patient and gather details: n/a  Medication refill request verified as identical to current order?: no - removed qty & refill for provider to determine bridge  Result of Last DAM, VPA, Li+ Level, CBC, or Carbamazepine Level (at or since last visit): N/A    Last visit treatment plan:   Cannot access - Some sensitive notes are blocked    []Medication refilled per  Medication Refill in Ambulatory Care  policy.  [x]Medication unable to be refilled by RN due to criteria not met as indicated below:    []Eligibility - not seen in the last year   [x]Supervision - no future appointment   []Compliance - no shows, cancellations or lapse in therapy   []Verification - order discrepancy   [x]Controlled medication   []Medication not included in policy   []90-day supply request   []Other    Routing to provider pool dt new provider out of office.

## 2021-12-06 NOTE — TELEPHONE ENCOUNTER
Script Eprescribed to pharmacy  [place] Prescription Monitoring Program checked      Signed Prescriptions:                        Disp   Refills    buprenorphine HCl-naloxone HCl (SUBOXONE) *90 Film0        Si sl qam, 1 sl qpm  Authorizing Provider: MILIND BEAL MD

## 2021-12-06 NOTE — TELEPHONE ENCOUNTER
Robles is a patient of Dr. Tripp's at [facility]. Routing refill request to [facility] support staff.    Laura Tabor RN on 12/6/2021 at 3:19 PM

## 2021-12-06 NOTE — TELEPHONE ENCOUNTER
Suboxone Prior Authorization Request    Medication/Dose: buprenorphine HCl-naloxone HCl (SUBOXONE) 8-2mg    Pharmacy Information  Name [id number] - [place], [place] - [address] AT [address]    Has pharmacy tried to run brand-name only through insurance? Yes    Routing to [facility] RN ([phone])    Temitope Beltran  12/06/21  3:01 PM

## 2021-12-07 ENCOUNTER — VIRTUAL VISIT (OUTPATIENT)
Dept: SURGERY | Facility: CLINIC | Age: 56
End: 2021-12-07
Payer: COMMERCIAL

## 2021-12-07 VITALS — WEIGHT: 315 LBS | BODY MASS INDEX: 46.49 KG/M2

## 2021-12-07 DIAGNOSIS — E66.01 OBESITY, CLASS III, BMI 40-49.9 (MORBID OBESITY) (H): ICD-10-CM

## 2021-12-07 DIAGNOSIS — Z71.3 NUTRITIONAL COUNSELING: ICD-10-CM

## 2021-12-07 DIAGNOSIS — E11.9 TYPE 2 DIABETES MELLITUS WITHOUT COMPLICATION, WITHOUT LONG-TERM CURRENT USE OF INSULIN (H): Primary | ICD-10-CM

## 2021-12-07 PROCEDURE — 97803 MED NUTRITION INDIV SUBSEQ: CPT | Mod: GT | Performed by: DIETITIAN, REGISTERED

## 2021-12-07 NOTE — LETTER
12/7/2021         RE: Tobin Bryant  [address]  [place] [address]        Dear Colleague,    Thank you for referring your patient, Tobin Bryant, to the [facility] [place]. Please see a copy of my visit note below.    Tobin Bryant is a 56 year old who is being evaluated via a billable telephone visit.      What phone number would you like to be contacted at? [phone]  How would you like to obtain your AVS? [facility]    Medical  Weight Loss Follow-Up Diet Evaluation  Assessment:  Tobin is presenting today for a follow up weight management nutrition consultation. Pt has had an initial appointment with Dr. Burnham  Weight loss medication: Phentermine.   Pt's weight is 324 lbs 0 oz  BMI: Body mass index is 46.49 kg/m .  Ideal body weight: 73 kg (160 lb 15 oz)  Adjusted ideal body weight: 102.6 kg (226 lb 2.6 oz)    Estimated RMR ([place]-St Jeor equation):   2320 kcals x 1.2 (sedentary) = 2784 kcals (for weight maintenance)  Recommended Protein Intake: 60-80 grams of protein/day  Patient Active Problem List:  Patient Active Problem List   Diagnosis     Type 2 diabetes mellitus without complication, without long-term current use of insulin (H)     Morbid obesity (H)     Opioid use disorder     YANIRA on CPAP     Essential hypertension     Recurrent major depressive disorder, in full remission (H)     Diabetes: yes- has not been testing blood glucose- discussed ways to keep blood glucose under control with carbs and protein     Progress on goals from last visit: states that he was doing better when we were seeing each other in person- will start doing that next month  +just got an exercise bike about a week ago    Dietary Recall:  Breakfast: honey nut cheerios- might have 2 scrambled eggs, 2 pieces of toast and 3 sausages (make a sandwich)  Lunch:will skip  Dinner: mixed veggies with white rice- sometimes will have fish sticks and a slice of cheese  with bread  Beverages: drinking about 52oz+ per day of water  Exercise: none    Nutrition Diagnosis:  1. Overweight/Obesity (NC 3.3) related to overeating and poor lifestyle habits as evidenced by patient's report of infrequent meals, lack of activity, snacking on sweets and BMI 46.49    Intervention:  1. Food and/or nutrient delivery: discussed the importance of eating three meals per day- discussed adding in a protein shake at lunch to boost protein, reviewed a balanced meal and encouraged patient to aim to get protein with every meal  2. Nutrition counseling: goal setting  Monitoring/Evaluation:    Goals:  1. 30 min per day on exercise bike  2. Lose 5lb in a month  3. Add protein shake in at lunch    Patient to follow up in 1 month(s) with RD      Phone call duration: 24 minutes    TONE CORCORAN RD        Again, thank you for allowing me to participate in the care of your patient.        Sincerely,        TONE CORCORAN RD

## 2021-12-07 NOTE — PATIENT INSTRUCTIONS
Protein Supplements  *Avoid protein supplements with caffeine for first 3 months post op     Type Serving Calories Protein Grams Sugar Grams Where Available   Muscle Milk Pro Series Shake 1 bottle  170 32 1 [employer], Target   Premier Protein Shake 1 bottle 160  1 [employer]  [employer], Target, Cub   Equate High Performance Shake 1 bottle  160 30 1 [employer]   [facility] Nutrition Plan Shake 1 bottle 150 30 2 [employer], [employer]   Ensure Max Protein  Shake 1 bottle 150 30 1 [employer], Target   [facility] Core Power Shake 1 bottle 170 26 5 [employer]   Pure Protein Shake 1 bottle 110-170 23 1  [employer], [employer], Target   Slim Fast   High Protein Shake 1 bottle 180 20 1 [place], Target, Grocery/Pharmacy   100kcal Muscle Milk Shake 1 bottle 100 20 0 [employer], Target, [facility],Grocery/Pharmacy   Kathleen's Lift Water 1 bottle 90 20 0 [employer], Target   Isopure Zero Carb Water   bottle 80 20 0 [employer], Vitamin Shoppe, online   Premier Protein Clear Water 1 bottle 90 20 0 [employer]    Unjury Protein+ Powder 1 scoop 90 20 0 www.myTomorrows   Vital Protein Collagen Powder 1 scoop 70 18 0 Target, [employer], [employer]   Advantage Shake  1 bottle 160 15-17 1 [employer], Target  Grocery/Pharmacy   100kcal Muscle Milk Powder 1 scoop 100 15 0 [employer], musclemisofatutor.com   Protein 2-0 Water 1 bottle 60-70 15 0 [facility]         Plant-Based Protein Supplements     Type Serving  Calories Protein   Grams Sugar Grams  Where Available    Garden of Life Raw Protein Powder Powder 1 scoop 110 22 0 Whole Foods, Vitamin Shoppe, wwwWindPipe   Orgain Organic Protein Powder Powder 2 scoops 150 21 0 Target, [employer], [employer]   Planted by Unjury Powder 1 scoop 130 20 3 www.unjury.com  www.amazon.com   Protein and Greens by Sierra Powder  1 scoop 120 20 0 [employer], Target, Grocery/Pharmacy   Essentials Shake by Sierra Powder 1 scoop 130 20 0 [employer], Target, Grocery/Pharmacy     Plant Fusion Orgain Plant Protein Powder 1 scoop 120 20 0  www.plantfusion.net   Orgain Grass Fed Shake Shake 1 bottle 140 20 4 Target, [employer] [employer]   Evolve Shake 1 bottle 150 20 4 Target, [place], [employer]   Sun Warrior Powder 1 scoop 100 17-18 0 [employer], Whole Foods   Whole Foods Fit Protein  Powder 1 scoop 110 17 0 Whole Foods   Garden of Life Plant Protein  Powder 1 scoop 90 15 0 Whole Foods, Vitamin Shoppe,   www.amazon.com     Look for (per serving):  -15-30 grams protein   -Less than 10 grams total carbohydrate  -10/10 Rule

## 2021-12-07 NOTE — PROGRESS NOTES
Tobin Bryant is a 56 year old who is being evaluated via a billable telephone visit.      What phone number would you like to be contacted at? [phone]  How would you like to obtain your AVS? [facility]    Medical  Weight Loss Follow-Up Diet Evaluation  Assessment:  Tobin is presenting today for a follow up weight management nutrition consultation. Pt has had an initial appointment with Dr. Burnham  Weight loss medication: Phentermine.   Pt's weight is 324 lbs 0 oz  BMI: Body mass index is 46.49 kg/m .  Ideal body weight: 73 kg (160 lb 15 oz)  Adjusted ideal body weight: 102.6 kg (226 lb 2.6 oz)    Estimated RMR ([place]-St Jeor equation):   2320 kcals x 1.2 (sedentary) = 2784 kcals (for weight maintenance)  Recommended Protein Intake: 60-80 grams of protein/day  Patient Active Problem List:  Patient Active Problem List   Diagnosis     Type 2 diabetes mellitus without complication, without long-term current use of insulin (H)     Morbid obesity (H)     Opioid use disorder     YANIRA on CPAP     Essential hypertension     Recurrent major depressive disorder, in full remission (H)     Diabetes: yes- has not been testing blood glucose- discussed ways to keep blood glucose under control with carbs and protein     Progress on goals from last visit: states that he was doing better when we were seeing each other in person- will start doing that next month  +just got an exercise bike about a week ago    Dietary Recall:  Breakfast: honey nut cheerios- might have 2 scrambled eggs, 2 pieces of toast and 3 sausages (make a sandwich)  Lunch:will skip  Dinner: mixed veggies with white rice- sometimes will have fish sticks and a slice of cheese with bread  Beverages: drinking about 52oz+ per day of water  Exercise: none    Nutrition Diagnosis:  1. Overweight/Obesity (NC 3.3) related to overeating and poor lifestyle habits as evidenced by patient's report of infrequent meals, lack of activity, snacking on sweets and BMI  46.49    Intervention:  1. Food and/or nutrient delivery: discussed the importance of eating three meals per day- discussed adding in a protein shake at lunch to boost protein, reviewed a balanced meal and encouraged patient to aim to get protein with every meal  2. Nutrition counseling: goal setting  Monitoring/Evaluation:    Goals:  1. 30 min per day on exercise bike  2. Lose 5lb in a month  3. Add protein shake in at lunch    Patient to follow up in 1 month(s) with RD      Phone call duration: 24 minutes    TONE CORCORAN RD

## 2022-01-04 ENCOUNTER — OFFICE VISIT (OUTPATIENT)
Dept: BEHAVIORAL HEALTH | Facility: CLINIC | Age: 57
End: 2022-01-04
Payer: COMMERCIAL

## 2022-01-04 ENCOUNTER — TELEPHONE (OUTPATIENT)
Dept: FAMILY MEDICINE | Facility: CLINIC | Age: 57
End: 2022-01-04
Payer: COMMERCIAL

## 2022-01-04 VITALS
WEIGHT: 315 LBS | SYSTOLIC BLOOD PRESSURE: 111 MMHG | DIASTOLIC BLOOD PRESSURE: 70 MMHG | HEART RATE: 74 BPM | BODY MASS INDEX: 46.35 KG/M2

## 2022-01-04 DIAGNOSIS — N52.9 ERECTILE DYSFUNCTION, UNSPECIFIED ERECTILE DYSFUNCTION TYPE: ICD-10-CM

## 2022-01-04 DIAGNOSIS — F11.20 OPIOID USE DISORDER, SEVERE, DEPENDENCE (H): Primary | ICD-10-CM

## 2022-01-04 LAB
AMPHETAMINES UR QL SCN: NORMAL
BARBITURATES UR QL: NORMAL
BENZODIAZ UR QL: NORMAL
BUPRENORPHINE QUAL URINE LHE: ABNORMAL
CANNABINOIDS UR QL SCN: NORMAL
COCAINE UR QL: NORMAL
CREAT UR-MCNC: 127 MG/DL
CREAT UR-MCNC: 127 MG/DL
METHADONE UR QL SCN: NORMAL
OPIATES UR QL SCN: NORMAL
OXYCODONE UR QL: NORMAL
PCP UR QL SCN: NORMAL

## 2022-01-04 PROCEDURE — 80307 DRUG TEST PRSMV CHEM ANLYZR: CPT | Performed by: FAMILY MEDICINE

## 2022-01-04 PROCEDURE — 99214 OFFICE O/P EST MOD 30 MIN: CPT | Performed by: FAMILY MEDICINE

## 2022-01-04 PROCEDURE — G0463 HOSPITAL OUTPT CLINIC VISIT: HCPCS

## 2022-01-04 RX ORDER — BUPRENORPHINE AND NALOXONE 8; 2 MG/1; MG/1
FILM, SOLUBLE BUCCAL; SUBLINGUAL
Qty: 90 FILM | Refills: 1 | Status: SHIPPED | OUTPATIENT
Start: 2022-01-04 | End: 2022-03-01

## 2022-01-04 ASSESSMENT — PATIENT HEALTH QUESTIONNAIRE - PHQ9
5. POOR APPETITE OR OVEREATING: NOT AT ALL
SUM OF ALL RESPONSES TO PHQ QUESTIONS 1-9: 0

## 2022-01-04 ASSESSMENT — ANXIETY QUESTIONNAIRES
IF YOU CHECKED OFF ANY PROBLEMS ON THIS QUESTIONNAIRE, HOW DIFFICULT HAVE THESE PROBLEMS MADE IT FOR YOU TO DO YOUR WORK, TAKE CARE OF THINGS AT HOME, OR GET ALONG WITH OTHER PEOPLE: NOT DIFFICULT AT ALL
GAD7 TOTAL SCORE: 0
3. WORRYING TOO MUCH ABOUT DIFFERENT THINGS: NOT AT ALL
7. FEELING AFRAID AS IF SOMETHING AWFUL MIGHT HAPPEN: NOT AT ALL
5. BEING SO RESTLESS THAT IT IS HARD TO SIT STILL: NOT AT ALL
1. FEELING NERVOUS, ANXIOUS, OR ON EDGE: NOT AT ALL
6. BECOMING EASILY ANNOYED OR IRRITABLE: NOT AT ALL
2. NOT BEING ABLE TO STOP OR CONTROL WORRYING: NOT AT ALL

## 2022-01-04 NOTE — PATIENT INSTRUCTIONS
1.  Follow-up with Dr Castro regarding your sexual concerns    2.  Continue Suboxone as prescribed.    3.  Follow-up with me in 2 months, sooner if needed.

## 2022-01-04 NOTE — NURSING NOTE
Medications Phoned  to Pharmacy [] yes [x]no  Name of Pharmacist:  List Medications, including dose, quantity and instructions     Medications ordered this visit were e-scribed.  Verified by order class [x] yes  [] no  Suboxone 8-2mg    Medication changes or discontinuations were communicated to patient's pharmacy: [] yes  [x] no     Dictation completed at time of chart check: [x] yes  [] no     I have checked the documentation for today s encounters and the above information has been reviewed and completed.        Pal Arboleda MA January 4, 2022 12:33 PM

## 2022-01-04 NOTE — PROGRESS NOTES
[facility] Addiction Medicine    A/P                                                    ASSESSMENT/PLAN    1. Opioid use disorder, severe, dependence (H)  Stable.  Side effects minimal and no cravings.  Continue daily dose of 24mg buprenorphine.    - Ethyl Glucuronide Urine  - Buprenorphine Urine, Qualitative  - Drugs of Abuse 1+ Panel, Urine ([facility] Only)  - buprenorphine HCl-naloxone HCl (SUBOXONE) 8-2 MG per film; 2 sl qam, 1 sl qpm  Dispense: 90 Film; Refill: 1    2. Erectile dysfunction, unspecified erectile dysfunction type  We discussed the ED can have many causes.  Discussed that chronic opioid use can certainly cause low testosterone so worth discussing with PCP and consider possible urology referral.  Appears he has had low testosterone in the past.  We also discussed that erections are caused by increased blood flow so ensuring that chronic conditions that can lessen blood flow should be his focus - encouraged continued efforts at weight loss, controlling blood pressure, ensuring blood sugars are normal, and using CPAP to treat YANIRA.      Orders Placed This Encounter   Medications     buprenorphine HCl-naloxone HCl (SUBOXONE) 8-2 MG per film     Si sl qam, 1 sl qpm     Dispense:  90 Film     Refill:  1     NADEAN: [id number], please substitute for covered alternative per insurance request.       PDMP Review       Value Time User    State PDMP site checked  Yes 2021 11:28 AM Safia Cat          RTC  Return in about 2 months (around 3/4/2022) for Follow up, with me, in person.      Counseled the patient on the importance of having a recovery program in addition to medication to manage recovery.  Components include avoiding isolating, having willingness to change, avoiding triggers and managing cravings. Encouraged having some type of sober network and practicing honesty with trusted support person(s). Encouraged other services such as counseling, 12 step or other self-help  organizations.      Opioid warning reviewed.  Risk of overdose following a period of abstinence due to decrease tolerance was discussed including risk of death.  Strongly recommended abstain from alcohol, benzodiazepines, THC, opioids and other drugs of abuse.  Increased risk of return to opioid use after use of these substances discussed.  Increased risk of overdose/death with use of other substances particularly benzodiazepines/alcohol reviewed.        YOSELIN Bryant is a 56 year old male with PMHx of HTN, morbid obesity, pre-diabetes, COPD/ashtma, YANIRA, anxiety, depression, and OUD who presents to clinic today for follow up    Visit performed In Person, face-to-face    Brief history of use:    Last use of illicit opioids was in approximately 2019.  Has been on buprenorphine for OUD since at least 2014.      Plan from most recent office visit (10/7/21 w/ Dr Cat):  No changes made to Suboxone dosing.      TODAY'S VISIT  HPI Jan 4, 2022  - He reports taking Suboxone as prescribed  - Some concerns about ED   - Constipation well managed  - No opioid cravings  - Denies any substance use, including EtOH  - Family members and friend are good supports for his recovery  - Has not attended meetings for years  - Continues to follow-up with [facility] for psychiatry - reports medication has not changed and he is feeling stable    PHQ 11/4/2020 1/14/2021 1/4/2022   PHQ-9 Total Score 0 2 0   Q9: Thoughts of better off dead/self-harm past 2 weeks Not at all Not at all Not at all     SHABBIR-7 SCORE 11/4/2020 1/14/2021 1/4/2022   Total Score 0 0 0       OBJECTIVE                                                    PHYSICAL EXAM:  /70 (BP Location: Right arm, Patient Position: Chair, Cuff Size: Adult Large)   Pulse 74   Wt 146.5 kg (323 lb)   BMI 46.35 kg/m      GENERAL:  alert and no distress, obese  EYES: Eyes grossly normal to inspection, conjunctivae and  sclerae normal  RESP: No respiratory distress, no coughing or wheezing  SKIN: no suspicious lesions or rashes visible  NEURO: grossly normal, normal gait  MENTAL STATUS EXAM  Appearance/Behavior: No appearant distress  Speech: Normal  Mood/Affect: flat  Insight: Fair    LAB  No results found for any visits on 01/04/22.      HISTORY                                                    Problem list reviewed & adjusted, as indicated.  Patient Active Problem List   Diagnosis     Type 2 diabetes mellitus without complication, without long-term current use of insulin (H)     Morbid obesity (H)     Opioid use disorder     YANIRA on CPAP     Essential hypertension     Recurrent major depressive disorder, in full remission (H)         MEDICATION LIST (prior to visit)  ACCU-CHEK GUIDE test strip, TEST TWICE DAILY  albuterol (PROAIR HFA/PROVENTIL HFA/VENTOLIN HFA) 108 (90 Base) MCG/ACT inhaler, INHALE 2 PUFFS BY MOUTH EVERY 6 HOURS AS NEEDED FOR WHEEZING  ARIPiprazole (ABILIFY) 10 MG tablet, Take 10 mg by mouth daily  aspirin (ASA) 81 MG EC tablet, Take 1 tablet (81 mg) by mouth daily  atorvastatin (LIPITOR) 20 MG tablet, Take 1 tablet (20 mg) by mouth daily  buPROPion (WELLBUTRIN XL) 150 MG 24 hr tablet, Take 150 mg by mouth every morning  cloNIDine (CATAPRES) 0.1 MG tablet, Indications: High Blood Pressure Disorder, patient does not know dosage level and does not take this every day  docusate sodium (DSS) 100 MG capsule, Take 100 mg by mouth  furosemide (LASIX) 20 MG tablet, Take 20 mg by mouth  omeprazole (PRILOSEC) 20 MG DR capsule, TAKE 1 CAPSULE(20 MG) BY MOUTH TWICE DAILY BEFORE MEALS  phentermine (ADIPEX-P) 37.5 MG tablet, Take 1/2 to 1 tablet in the morning.    No current facility-administered medications on file prior to visit.      MEDICATION LIST (after visit)  Current Outpatient Medications   Medication     ACCU-CHEK GUIDE test strip     albuterol (PROAIR HFA/PROVENTIL HFA/VENTOLIN HFA) 108 (90 Base) MCG/ACT inhaler      ARIPiprazole (ABILIFY) 10 MG tablet     aspirin (ASA) 81 MG EC tablet     atorvastatin (LIPITOR) 20 MG tablet     buprenorphine HCl-naloxone HCl (SUBOXONE) 8-2 MG per film     buPROPion (WELLBUTRIN XL) 150 MG 24 hr tablet     cloNIDine (CATAPRES) 0.1 MG tablet     docusate sodium (DSS) 100 MG capsule     furosemide (LASIX) 20 MG tablet     omeprazole (PRILOSEC) 20 MG DR capsule     phentermine (ADIPEX-P) 37.5 MG tablet     No current facility-administered medications for this visit.         Allergies   Allergen Reactions     Seafood Other (See Comments)     Eyes turn yellow     Ibuprofen Nausea and Vomiting       Ernestina Tripp, [facility] Medicine  [facility]  [phone]

## 2022-01-04 NOTE — TELEPHONE ENCOUNTER
Returned call to patient. He requested weight from last visit at  Clinic (332 lb). Today's weight (323 lb).

## 2022-01-05 ASSESSMENT — ANXIETY QUESTIONNAIRES: GAD7 TOTAL SCORE: 0

## 2022-01-06 LAB
ETHANOL UR QL CFM: NEGATIVE
ETHYL GLUCURONIDE UR QL SCN: NOT DETECTED NG/MG CREAT
ETHYL SULFATE/CREAT UR: NOT DETECTED NG/MG CREAT
LEVEL OF DETECTION (ETHANOL): NORMAL

## 2022-01-12 ENCOUNTER — VIRTUAL VISIT (OUTPATIENT)
Dept: SURGERY | Facility: CLINIC | Age: 57
End: 2022-01-12
Payer: COMMERCIAL

## 2022-01-12 VITALS — BODY MASS INDEX: 46.35 KG/M2 | WEIGHT: 315 LBS

## 2022-01-12 DIAGNOSIS — E66.01 OBESITY, CLASS III, BMI 40-49.9 (MORBID OBESITY) (H): ICD-10-CM

## 2022-01-12 DIAGNOSIS — E66.01 MORBID OBESITY (H): ICD-10-CM

## 2022-01-12 DIAGNOSIS — Z71.3 NUTRITIONAL COUNSELING: ICD-10-CM

## 2022-01-12 DIAGNOSIS — E11.9 TYPE 2 DIABETES MELLITUS WITHOUT COMPLICATION, WITHOUT LONG-TERM CURRENT USE OF INSULIN (H): Primary | ICD-10-CM

## 2022-01-12 PROCEDURE — 97803 MED NUTRITION INDIV SUBSEQ: CPT | Mod: TEL | Performed by: DIETITIAN, REGISTERED

## 2022-01-12 RX ORDER — PHENTERMINE HYDROCHLORIDE 37.5 MG/1
TABLET ORAL
Qty: 90 TABLET | Refills: 1 | Status: SHIPPED | OUTPATIENT
Start: 2022-01-12 | End: 2022-04-20

## 2022-01-12 NOTE — PROGRESS NOTES
Tobin Bryant is a 56 year old who is being evaluated via a billable telephone visit.      What phone number would you like to be contacted at? [phone]  How would you like to obtain your AVS? [facility]      Medical  Weight Loss Follow-Up Diet Evaluation  Assessment:  Tobin is presenting today for a follow up weight management nutrition consultation. Pt has had an initial appointment with Dr. Burnham  Weight loss medication: Phentermine- has not started that yet  Pt's weight is 323 lbs 0 oz  Initial weight: 324 lb  Weight change: down 1lb- patient reports a 9lb loss between primary care visits    BMI: Body mass index is 46.35 kg/m .  Ideal body weight: 73 kg (160 lb 15 oz)  Adjusted ideal body weight: 102.4 kg (225 lb 12.2 oz)    Estimated RMR ([place]-St Jeor equation):   2320 kcals x 1.2 (sedentary) = 2784 kcals (for weight maintenance)  Recommended Protein Intake: 60-80 grams of protein/day  Patient Active Problem List:  Patient Active Problem List   Diagnosis     Type 2 diabetes mellitus without complication, without long-term current use of insulin (H)     Morbid obesity (H)     Opioid use disorder     YANIRA on CPAP     Essential hypertension     Recurrent major depressive disorder, in full remission (H)     Opioid use disorder, severe, dependence (H)     Diabetes: yes- has not been checking     Progress on goals from last visit: has been getting on his exercise bike  +weight is down a bit and patient is happy about that    Dietary Recall:  Breakfast: honey nut cheerios  Lunch: none  Dinner: baked pork chop with mixed veggies and white rice  Typical snacks: couple of cookies  Beverages: states his water is pretty good- 64oz per day  Exercise: trying to get on bike 3 times per week- did 20 min this morning- states the seat is uncomfortable  Nutrition Diagnosis:    Overweight/Obesity (NC 3.3) related to overeating and poor lifestyle habits as evidenced by patient's report of infrequent meals, lack of activity,  snacking on sweets and BMI 46.35    Intervention:  1. Food and/or nutrient delivery: continued to encourage eating three meals per day, getting a protein shake in the middle of the day  2. Nutrition counseling: goal setting and support for continued success    Monitoring/Evaluation:    Goals:  1. 1 protein shake   2. Get on exercise bike 4 days per week  3. Start taking phentermine    Patient to follow up in 1 month(s) with RD        Phone call duration: 20 minutes    TONE CORCORAN RD

## 2022-01-12 NOTE — LETTER
1/12/2022         RE: Tobin Bryant  [address]  [place] [address]        Dear Colleague,    Thank you for referring your patient, Tobin Bryant, to the [facility] [place]. Please see a copy of my visit note below.    Tobin Bryant is a 56 year old who is being evaluated via a billable telephone visit.      What phone number would you like to be contacted at? [phone]  How would you like to obtain your AVS? [facility]      Medical  Weight Loss Follow-Up Diet Evaluation  Assessment:  Tobin is presenting today for a follow up weight management nutrition consultation. Pt has had an initial appointment with Dr. Burnham  Weight loss medication: Phentermine- has not started that yet  Pt's weight is 323 lbs 0 oz  Initial weight: 324 lb  Weight change: down 1lb- patient reports a 9lb loss between primary care visits    BMI: Body mass index is 46.35 kg/m .  Ideal body weight: 73 kg (160 lb 15 oz)  Adjusted ideal body weight: 102.4 kg (225 lb 12.2 oz)    Estimated RMR ([place]-St Jeor equation):   2320 kcals x 1.2 (sedentary) = 2784 kcals (for weight maintenance)  Recommended Protein Intake: 60-80 grams of protein/day  Patient Active Problem List:  Patient Active Problem List   Diagnosis     Type 2 diabetes mellitus without complication, without long-term current use of insulin (H)     Morbid obesity (H)     Opioid use disorder     YANIRA on CPAP     Essential hypertension     Recurrent major depressive disorder, in full remission (H)     Opioid use disorder, severe, dependence (H)     Diabetes: yes- has not been checking     Progress on goals from last visit: has been getting on his exercise bike  +weight is down a bit and patient is happy about that    Dietary Recall:  Breakfast: honey nut cheerios  Lunch: none  Dinner: baked pork chop with mixed veggies and white rice  Typical snacks: couple of cookies  Beverages: states his water is pretty good- 64oz per  day  Exercise: trying to get on bike 3 times per week- did 20 min this morning- states the seat is uncomfortable  Nutrition Diagnosis:    Overweight/Obesity (NC 3.3) related to overeating and poor lifestyle habits as evidenced by patient's report of infrequent meals, lack of activity, snacking on sweets and BMI 46.35    Intervention:  1. Food and/or nutrient delivery: continued to encourage eating three meals per day, getting a protein shake in the middle of the day  2. Nutrition counseling: goal setting and support for continued success    Monitoring/Evaluation:    Goals:  1. 1 protein shake   2. Get on exercise bike 4 days per week  3. Start taking phentermine    Patient to follow up in 1 month(s) with MALGORZATA        Phone call duration: 20 minutes    TONE CORCORAN RD        Again, thank you for allowing me to participate in the care of your patient.        Sincerely,        TONE CORCORAN RD

## 2022-02-02 ENCOUNTER — OFFICE VISIT (OUTPATIENT)
Dept: SURGERY | Facility: CLINIC | Age: 57
End: 2022-02-02
Payer: COMMERCIAL

## 2022-02-02 VITALS — HEIGHT: 70 IN | WEIGHT: 311 LBS | BODY MASS INDEX: 44.52 KG/M2

## 2022-02-02 DIAGNOSIS — Z71.3 NUTRITIONAL COUNSELING: ICD-10-CM

## 2022-02-02 DIAGNOSIS — E11.9 TYPE 2 DIABETES MELLITUS WITHOUT COMPLICATION, WITHOUT LONG-TERM CURRENT USE OF INSULIN (H): Primary | ICD-10-CM

## 2022-02-02 DIAGNOSIS — E66.01 OBESITY, CLASS III, BMI 40-49.9 (MORBID OBESITY) (H): ICD-10-CM

## 2022-02-02 PROCEDURE — 97803 MED NUTRITION INDIV SUBSEQ: CPT | Performed by: DIETITIAN, REGISTERED

## 2022-02-02 ASSESSMENT — MIFFLIN-ST. JEOR: SCORE: 2256.94

## 2022-02-02 NOTE — LETTER
2/2/2022         RE: Tobin Bryant  [address]  [place] [address]        Dear Colleague,    Thank you for referring your patient, Tobin Bryant, to the [facility] AND [facility] [place]. Please see a copy of my visit note below.      Medical  Weight Loss Follow-Up Diet Evaluation  Assessment:  Tobin is presenting today for a follow up weight management nutrition consultation. Pt has had an initial appointment with Dr. Burnham  Weight loss medication: Phentermine- taking   Pt's weight is 311 lbs 0 oz  Initial weight: 324lb  Weight change: down 13lb    BMI: Body mass index is 44.62 kg/m .  Ideal body weight: 73 kg (160 lb 15 oz)  Adjusted ideal body weight: 102.4 kg (225 lb 12.2 oz)    Estimated RMR ([place] equation):   2261 kcals x 1.2 (sedentary) = 2713 kcals (for weight maintenance)  Recommended Protein Intake: 60-80 grams of protein/day  Patient Active Problem List:  Patient Active Problem List   Diagnosis     Type 2 diabetes mellitus without complication, without long-term current use of insulin (H)     Morbid obesity (H)     Opioid use disorder     YANIRA on CPAP     Essential hypertension     Recurrent major depressive disorder, in full remission (H)     Opioid use disorder, severe, dependence (H)     Diabetes: yes- reports not checking blood glucose    Progress on goals from last visit: eating smaller portions  +eating about 2 meals per day    Dietary Recall:  Breakfast: bowl of cereal with whole milk and honey nut cheerios  Lunch:none- will usually skip this meal  Dinner:sometimes cereal again- last night had a small plate of nachos with ground beef and cheese and a can of soda (regular)  Beverages: drinking a lot of water- 64ozx2  Exercise: getting on exercise bike occasionally    Nutrition Diagnosis:    Overweight/Obesity (NC 3.3) related to overeating and poor lifestyle habits as evidenced by patient's report of infrequent meals, lack of activity,  snacking on sweets and BMI 44.62    Intervention:  1. Food and/or nutrient delivery: encouraged consistency with portion control- mindful of choices for dinner meal- adding in veggies and low fat when able  2. Nutrition education: educated on protein supplements- provided with a list and encouraged patient to drink one for lunch     Monitoring/Evaluation:    Goals:  1. Protein shake at lunch  2. Movement 3-4 days per week    Patient to follow up in 2 month(s) with RD    Time spent with patient: 30 min    TONE CORCORAN RD        Again, thank you for allowing me to participate in the care of your patient.        Sincerely,        TONE CORCORAN RD

## 2022-02-02 NOTE — PATIENT INSTRUCTIONS
Protein Supplements  *Avoid protein supplements with caffeine for first 3 months post op     Type Serving Calories Protein Grams Sugar Grams Where Available   Muscle Milk Pro Series Shake 1 bottle  170 32 1 [employer], Target   Premier Protein Shake 1 bottle 160  1 [employer]  [employer], Target, Cub   Equate High Performance Shake 1 bottle  160 30 1 [employer]   [facility] Nutrition Plan Shake 1 bottle 150 30 2 [employer], [employer]   Ensure Max Protein  Shake 1 bottle 150 30 1 [employer], Target   [facility] Core Power Shake 1 bottle 170 26 5 [employer]   Pure Protein Shake 1 bottle 110-170 23 1  [employer], [employer], Target   Slim Fast   High Protein Shake 1 bottle 180 20 1 [employer], Target, Grocery/Pharmacy   100kcal Muscle Milk Shake 1 bottle 100 20 0 [employer], Target, [facility],Grocery/Pharmacy   Kathleen's Lift Water 1 bottle 90 20 0 [employer], Target   Isopure Zero Carb Water   bottle 80 20 0 [employer], Vitamin Shoppe, online   Premier Protein Clear Water 1 bottle 90 20 0 [employer]    Unjury Protein+ Powder 1 scoop 90 20 0 [email or web address].Sandag   Vital Protein Collagen Powder 1 scoop 70 18 0 Target, G[employer], Eros[employer]   Advantage Shake  1 bottle 160 15-17 1 [employer], Target  Grocery/Pharmacy   100kcal Muscle Milk Powder 1 scoop 100 15 0 [employer], [email or web address]   Protein 2-0 Water 1 bottle 60-70 15 0 [facility]         Plant-Based Protein Supplements     Type Serving  Calories Protein   Grams Sugar Grams  Where Available    Garden of Life Raw Protein Powder Powder 1 scoop 110 22 0 Whole Foods, Vitamin Shoppe, wwwDNsolution   Orgain Organic Protein Powder Powder 2 scoops 150 21 0 Target, [employer], [employer]   Planted by Unjury Powder 1 scoop 130 20 3 www.unjury.com  www.amazon.com   Protein and Greens by Sierra Powder  1 scoop 120 20 0 [employer], Target, Grocery/Pharmacy   Essentials Shake by Sierra Powder 1 scoop 130 20 0 [employer], Target, Grocery/Pharmacy     Plant Fusion Orgain Plant Protein Powder 1 scoop 120 20 0  www.plantfusion.net   Orgain Grass Fed Shake Shake 1 bottle 140 20 4 Target, [employer]   Evolve Shake 1 bottle 150 20 4 Target, [place], Wal[employer]Mart   Sun Warrior Powder 1 scoop 100 17-18 0 [employer]NC, Whole Foods   Whole Foods Fit Protein  Powder 1 scoop 110 17 0 Whole Foods   Garden of Life Plant Protein  Powder 1 scoop 90 15 0 Whole Foods, Vitamin Shoppe,   www.amazon.com     Look for (per serving):  -15-30 grams protein   -Less than 10 grams total carbohydrate  -10/10 Rule

## 2022-02-02 NOTE — PROGRESS NOTES
Medical  Weight Loss Follow-Up Diet Evaluation  Assessment:  Tobin is presenting today for a follow up weight management nutrition consultation. Pt has had an initial appointment with Dr. Burnham  Weight loss medication: Phentermine- taking   Pt's weight is 311 lbs 0 oz  Initial weight: 324lb  Weight change: down 13lb    BMI: Body mass index is 44.62 kg/m .  Ideal body weight: 73 kg (160 lb 15 oz)  Adjusted ideal body weight: 102.4 kg (225 lb 12.2 oz)    Estimated RMR ([place] equation):   2261 kcals x 1.2 (sedentary) = 2713 kcals (for weight maintenance)  Recommended Protein Intake: 60-80 grams of protein/day  Patient Active Problem List:  Patient Active Problem List   Diagnosis     Type 2 diabetes mellitus without complication, without long-term current use of insulin (H)     Morbid obesity (H)     Opioid use disorder     YANIRA on CPAP     Essential hypertension     Recurrent major depressive disorder, in full remission (H)     Opioid use disorder, severe, dependence (H)     Diabetes: yes- reports not checking blood glucose    Progress on goals from last visit: eating smaller portions  +eating about 2 meals per day    Dietary Recall:  Breakfast: bowl of cereal with whole milk and honey nut cheerios  Lunch:none- will usually skip this meal  Dinner:sometimes cereal again- last night had a small plate of nachos with ground beef and cheese and a can of soda (regular)  Beverages: drinking a lot of water- 64ozx2  Exercise: getting on exercise bike occasionally    Nutrition Diagnosis:    Overweight/Obesity (NC 3.3) related to overeating and poor lifestyle habits as evidenced by patient's report of infrequent meals, lack of activity, snacking on sweets and BMI 44.62    Intervention:  1. Food and/or nutrient delivery: encouraged consistency with portion control- mindful of choices for dinner meal- adding in veggies and low fat when able  2. Nutrition education: educated on protein supplements- provided with a list  and encouraged patient to drink one for lunch     Monitoring/Evaluation:    Goals:  1. Protein shake at lunch  2. Movement 3-4 days per week    Patient to follow up in 2 month(s) with RD    Time spent with patient: 30 min    TONE CORCORAN RD

## 2022-02-03 ENCOUNTER — TELEPHONE (OUTPATIENT)
Dept: INTERNAL MEDICINE | Facility: CLINIC | Age: 57
End: 2022-02-03

## 2022-02-03 NOTE — TELEPHONE ENCOUNTER
Reason for Call:  Request for results:    Name of test or procedure:   Stool Card    Date of test of procedure:   2 months back    Location of the test or procedure:   midway    OK to leave the result message on voice mail or with a family member? YES    Phone number Patient can be reached at:  Cell number on file:    Telephone Information:   Mobile [phone]       Additional comments: n/a    Call taken on 2/3/2022 at 9:17 AM by Shelia Stephen

## 2022-02-10 ENCOUNTER — OFFICE VISIT (OUTPATIENT)
Dept: INTERNAL MEDICINE | Facility: CLINIC | Age: 57
End: 2022-02-10
Payer: COMMERCIAL

## 2022-02-10 VITALS
SYSTOLIC BLOOD PRESSURE: 124 MMHG | OXYGEN SATURATION: 98 % | WEIGHT: 315 LBS | HEIGHT: 70 IN | TEMPERATURE: 78 F | BODY MASS INDEX: 45.1 KG/M2 | DIASTOLIC BLOOD PRESSURE: 74 MMHG

## 2022-02-10 DIAGNOSIS — E11.9 TYPE 2 DIABETES MELLITUS WITHOUT COMPLICATION, WITHOUT LONG-TERM CURRENT USE OF INSULIN (H): Primary | ICD-10-CM

## 2022-02-10 DIAGNOSIS — I10 ESSENTIAL HYPERTENSION: ICD-10-CM

## 2022-02-10 DIAGNOSIS — G47.33 OSA ON CPAP: ICD-10-CM

## 2022-02-10 DIAGNOSIS — N52.9 ERECTILE DYSFUNCTION, UNSPECIFIED ERECTILE DYSFUNCTION TYPE: ICD-10-CM

## 2022-02-10 DIAGNOSIS — Z12.5 SCREENING FOR PROSTATE CANCER: ICD-10-CM

## 2022-02-10 DIAGNOSIS — E66.01 MORBID OBESITY (H): ICD-10-CM

## 2022-02-10 DIAGNOSIS — Z23 HIGH PRIORITY FOR 2019-NCOV VACCINE: ICD-10-CM

## 2022-02-10 LAB
HBA1C MFR BLD: 5.9 % (ref 0–5.6)
PSA SERPL-MCNC: 0.99 UG/L (ref 0–3.5)

## 2022-02-10 PROCEDURE — 36415 COLL VENOUS BLD VENIPUNCTURE: CPT | Performed by: INTERNAL MEDICINE

## 2022-02-10 PROCEDURE — 83036 HEMOGLOBIN GLYCOSYLATED A1C: CPT | Performed by: INTERNAL MEDICINE

## 2022-02-10 PROCEDURE — 0064A COVID-19,PF,MODERNA (18+ YRS BOOSTER .25ML): CPT | Performed by: INTERNAL MEDICINE

## 2022-02-10 PROCEDURE — G0103 PSA SCREENING: HCPCS | Performed by: INTERNAL MEDICINE

## 2022-02-10 PROCEDURE — 99214 OFFICE O/P EST MOD 30 MIN: CPT | Performed by: INTERNAL MEDICINE

## 2022-02-10 PROCEDURE — 91306 COVID-19,PF,MODERNA (18+ YRS BOOSTER .25ML): CPT | Performed by: INTERNAL MEDICINE

## 2022-02-10 RX ORDER — SILDENAFIL 100 MG/1
50-100 TABLET, FILM COATED ORAL DAILY PRN
Qty: 30 TABLET | Refills: 1 | Status: SHIPPED | OUTPATIENT
Start: 2022-02-10 | End: 2022-08-03

## 2022-02-10 ASSESSMENT — ANXIETY QUESTIONNAIRES
3. WORRYING TOO MUCH ABOUT DIFFERENT THINGS: SEVERAL DAYS
5. BEING SO RESTLESS THAT IT IS HARD TO SIT STILL: NOT AT ALL
IF YOU CHECKED OFF ANY PROBLEMS ON THIS QUESTIONNAIRE, HOW DIFFICULT HAVE THESE PROBLEMS MADE IT FOR YOU TO DO YOUR WORK, TAKE CARE OF THINGS AT HOME, OR GET ALONG WITH OTHER PEOPLE: NOT DIFFICULT AT ALL
GAD7 TOTAL SCORE: 1
1. FEELING NERVOUS, ANXIOUS, OR ON EDGE: NOT AT ALL
2. NOT BEING ABLE TO STOP OR CONTROL WORRYING: NOT AT ALL
4. TROUBLE RELAXING: NOT AT ALL
6. BECOMING EASILY ANNOYED OR IRRITABLE: NOT AT ALL
7. FEELING AFRAID AS IF SOMETHING AWFUL MIGHT HAPPEN: NOT AT ALL

## 2022-02-10 ASSESSMENT — MIFFLIN-ST. JEOR: SCORE: 2353.55

## 2022-02-10 NOTE — LETTER
February 11, 2022       Robles Bryant  [address]   [place] [address]        Dear ,    We are writing to inform you of your test results.    Your labs look ok, excellent    Resulted Orders   Hemoglobin A1c   Result Value Ref Range    Hemoglobin A1C 5.9 (H) 0.0 - 5.6 %      Comment:      Normal <5.7%   Prediabetes 5.7-6.4%    Diabetes 6.5% or higher     Note: Adopted from ADA consensus guidelines.   PSA, screen   Result Value Ref Range    Prostate Specific Antigen Screen 0.99 0.00 - 3.50 ug/L    Narrative    Assay Method is Abbott Prostate-Specific Antigen (PSA)  Standard-WHO 1st International (90:10)       If you have any questions or concerns, please call the clinic at the number listed above.       Sincerely,      Pawan Castro MD

## 2022-02-10 NOTE — PROGRESS NOTES
Office Visit - Follow Up   Tobin Bryant   56 year old male    Date of Visit: 2/10/2022    Chief Complaint   Patient presents with     RECHECK     Imm/Inj     COVID-19 VACCINE        Assessment and Plan   1. Type 2 diabetes mellitus without complication, without long-term current use of insulin (H)  Generally well controlled, continue diet control, aspirin, statin, excellent diabetic foot care and annual diabetic eye exam  - Hemoglobin A1c; Future  - Albumin Random Urine Quantitative with Creat Ratio; Future  - Hemoglobin A1c  - Albumin Random Urine Quantitative with Creat Ratio; Future    2. Screening for prostate cancer  - PSA, screen; Future  - PSA, screen    3. High priority for 2019-nCoV vaccine  - COVID-19,PF,MODERNA (18+ Yrs BOOSTER .25mL)    4. YANIRA on CPAP    5. Essential hypertension  Controlled continue same    6. Erectile dysfunction, unspecified erectile dysfunction type  Trial Viagra, discussed side effects  - sildenafil (VIAGRA) 100 MG tablet; Take 0.5-1 tablets ( mg) by mouth daily as needed (ED)  Dispense: 30 tablet; Refill: 1    7. Morbid obesity (H)  The following high BMI interventions were performed this visit: encouragement to exercise and lifestyle education regarding diet    Return in about 3 months (around 5/10/2022) for Follow up.     History of Present Illness   This 56 year old man comes in for follow-up.  Overall doing well.  Blood sugars have generally been well controlled.  He has been working with the weight loss clinic.  Blood pressures have been controlled, no lightheadedness or dizziness.  He has struggled a bit with erectile dysfunction and wonders if there is any treatment options available.    Review of Systems: A comprehensive review of systems was negative except as noted.     Medications, Allergies and Problem List   Reviewed, reconciled and updated  Post Discharge Medication Reconciliation Status:      Physical Exam   General Appearance:   No acute distress    BP  "124/74 (BP Location: Left arm, Patient Position: Sitting, Cuff Size: Adult Regular)   Temp (!) 78  F (25.6  C)   Ht 1.778 m (5' 10\")   Wt (!) 150.7 kg (332 lb 4.8 oz)   SpO2 98%   BMI 47.68 kg/m      Cardiovascular regular rate and rhythm no murmur gallop or rub  Pulmonary lungs are clear to auscultation bilaterally  Neurologic exam is non focal  Psychiatric pleasant, no confusion or agitation        Additional Information   Current Outpatient Medications   Medication Sig Dispense Refill     ACCU-CHEK GUIDE test strip TEST TWICE DAILY 200 strip 1     albuterol (PROAIR HFA/PROVENTIL HFA/VENTOLIN HFA) 108 (90 Base) MCG/ACT inhaler INHALE 2 PUFFS BY MOUTH EVERY 6 HOURS AS NEEDED FOR WHEEZING       ARIPiprazole (ABILIFY) 10 MG tablet Take 10 mg by mouth daily       aspirin (ASA) 81 MG EC tablet Take 1 tablet (81 mg) by mouth daily 90 tablet 3     atorvastatin (LIPITOR) 20 MG tablet Take 1 tablet (20 mg) by mouth daily 90 tablet 3     buprenorphine HCl-naloxone HCl (SUBOXONE) 8-2 MG per film 2 sl qam, 1 sl qpm 90 Film 1     buPROPion (WELLBUTRIN XL) 150 MG 24 hr tablet Take 150 mg by mouth every morning       cloNIDine (CATAPRES) 0.1 MG tablet Indications: High Blood Pressure Disorder, patient does not know dosage level and does not take this every day       docusate sodium (DSS) 100 MG capsule Take 100 mg by mouth       furosemide (LASIX) 20 MG tablet Take 20 mg by mouth       omeprazole (PRILOSEC) 20 MG DR capsule TAKE 1 CAPSULE(20 MG) BY MOUTH TWICE DAILY BEFORE MEALS       phentermine (ADIPEX-P) 37.5 MG tablet Take 1/2 to 1 tablet in the morning. 90 tablet 1     sildenafil (VIAGRA) 100 MG tablet Take 0.5-1 tablets ( mg) by mouth daily as needed (ED) 30 tablet 1     Allergies   Allergen Reactions     Seafood Other (See Comments)     Eyes turn yellow     Ibuprofen Nausea and Vomiting     Social History     Tobacco Use     Smoking status: Never Smoker     Smokeless tobacco: Never Used   Substance Use Topics "     Alcohol use: No     Drug use: No       Review and/or order of clinical lab tests:  Review and/or order of radiology tests:  Review and/or order of medicine tests:  Discussion of test results with performing physician:  Decision to obtain old records and/or obtain history from someone other than the patient:  Review and summarization of old records and/or obtaining history from someone other than the patient and.or discussion of case with another health care provider:  Independent visualization of image, tracing or specimen itself:    Time:      Pawan Castro MD

## 2022-02-11 ASSESSMENT — ANXIETY QUESTIONNAIRES: GAD7 TOTAL SCORE: 1

## 2022-03-01 ENCOUNTER — OFFICE VISIT (OUTPATIENT)
Dept: BEHAVIORAL HEALTH | Facility: CLINIC | Age: 57
End: 2022-03-01
Payer: COMMERCIAL

## 2022-03-01 VITALS
HEIGHT: 70 IN | WEIGHT: 315 LBS | DIASTOLIC BLOOD PRESSURE: 74 MMHG | HEART RATE: 94 BPM | SYSTOLIC BLOOD PRESSURE: 131 MMHG | BODY MASS INDEX: 45.1 KG/M2

## 2022-03-01 DIAGNOSIS — F11.20 OPIOID USE DISORDER, SEVERE, DEPENDENCE (H): ICD-10-CM

## 2022-03-01 LAB
AMPHETAMINES UR QL SCN: NORMAL
BARBITURATES UR QL: NORMAL
BENZODIAZ UR QL: NORMAL
CANNABINOIDS UR QL SCN: NORMAL
COCAINE UR QL: NORMAL
CREAT UR-MCNC: 184 MG/DL
METHADONE UR QL SCN: NORMAL
OPIATES UR QL SCN: NORMAL
OXYCODONE UR QL: NORMAL
PCP UR QL SCN: NORMAL

## 2022-03-01 PROCEDURE — G0463 HOSPITAL OUTPT CLINIC VISIT: HCPCS

## 2022-03-01 PROCEDURE — 80307 DRUG TEST PRSMV CHEM ANLYZR: CPT | Performed by: FAMILY MEDICINE

## 2022-03-01 PROCEDURE — 99213 OFFICE O/P EST LOW 20 MIN: CPT | Performed by: FAMILY MEDICINE

## 2022-03-01 RX ORDER — BUPRENORPHINE AND NALOXONE 8; 2 MG/1; MG/1
FILM, SOLUBLE BUCCAL; SUBLINGUAL
Qty: 90 FILM | Refills: 1 | Status: SHIPPED | OUTPATIENT
Start: 2022-03-01 | End: 2022-05-05

## 2022-03-01 NOTE — PROGRESS NOTES
[facility] Addiction Medicine    A/P                                                    ASSESSMENT/PLAN    1. Opioid use disorder, severe, dependence (H)  Symptoms well controlled on current dose of buprenorphine.  Denies substance use.  We will continue current dose, refills provided.  He will consider transition to Sublocade.    - Drugs of Abuse 1+ Panel, Urine ([facility] Only); Future  - Ethyl Glucuronide Screen with Reflex to Confirmation, Urine; Standing  - buprenorphine HCl-naloxone HCl (SUBOXONE) 8-2 MG per film; 2 sl qam, 1 sl qpm  Dispense: 90 Film; Refill: 1  - naloxone (NARCAN) 4 MG/0.1ML nasal spray; Spray 1 spray (4 mg) into one nostril alternating nostrils once as needed for opioid reversal every 2-3 minutes until assistance arrives  Dispense: 0.2 mL; Refill: 11  - Drugs of Abuse 1+ Panel, Urine ([facility] Only)      PDMP Review       Value Time User    State PDMP site checked  Yes 3/1/2022 11:39 AM Ernestina Tripp DO            RTC  Return in about 2 months (around 5/1/2022) for Follow up, with me, in person.      Counseled the patient on the importance of having a recovery program in addition to medication to manage recovery.  Components include avoiding isolating, having willingness to change, avoiding triggers and managing cravings. Encouraged having some type of sober network and practicing honesty with trusted support person(s). Encouraged other services such as counseling, 12 step or other self-help organizations.      Opioid warning reviewed.  Risk of overdose following a period of abstinence due to decrease tolerance was discussed including risk of death.  Strongly recommended abstain from alcohol, benzodiazepines, THC, opioids and other drugs of abuse.  Increased risk of return to opioid use after use of these substances discussed.  Increased risk of overdose/death with use of other substances particularly benzodiazepines/alcohol reviewed.    SUBJECTIVE                                                     Tobin Bryant is a 56 year old male with PMHx of HTN, morbid obesity, pre-diabetes, COPD/ashtma, YANIRA, anxiety, depression, and OUD who presents to clinic today for follow up    Visit performed In Person, face-to-face    Brief history of use:    Last use of illicit opioids was in approximately 2019.  Has been on buprenorphine for OUD since at least 2014.       Plan from most recent office visit (01/04/2022):  1. Opioid use disorder, severe, dependence (H)  Stable.  Side effects minimal and no cravings.  Continue daily dose of 24mg buprenorphine.    - Ethyl Glucuronide Urine  - Buprenorphine Urine, Qualitative  - Drugs of Abuse 1+ Panel, Urine ([facility] Only)  - buprenorphine HCl-naloxone HCl (SUBOXONE) 8-2 MG per film; 2 sl qam, 1 sl qpm  Dispense: 90 Film; Refill: 1     2. Erectile dysfunction, unspecified erectile dysfunction type  We discussed the ED can have many causes.  Discussed that chronic opioid use can certainly cause low testosterone so worth discussing with PCP and consider possible urology referral.  Appears he has had low testosterone in the past.  We also discussed that erections are caused by increased blood flow so ensuring that chronic conditions that can lessen blood flow should be his focus - encouraged continued efforts at weight loss, controlling blood pressure, ensuring blood sugars are normal, and using CPAP to treat YANIRA.      TODAY'S VISIT  HPI Mar 1, 2022  - Has been watching diet and exercising, has lost some weight and feels good about this   - Taking Suboxone as prescribed (2 films in the morning and 1 film in the evening), no side effects reported, no opioid cravings   - Denies any substance use  - Reports mood has been stable, no change     PHQ 11/4/2020 1/14/2021 1/4/2022   PHQ-9 Total Score 0 2 0   Q9: Thoughts of better off dead/self-harm past 2 weeks Not at all Not at all Not at all     SHABBIR-7 SCORE 1/14/2021 1/4/2022 2/10/2022   Total Score 0 0 1  "      OBJECTIVE                                                    PHYSICAL EXAM:  /74 (BP Location: Right arm, Patient Position: Sitting, Cuff Size: Adult Large)   Pulse 94   Ht 1.778 m (5' 10\")   Wt 147 kg (324 lb)   BMI 46.49 kg/m      GENERAL: healthy, alert and no distress  EYES: Eyes grossly normal to inspection, conjunctivae and sclerae normal  RESP: No respiratory distress, no coughing or wheezing  NEURO: No focal defects, normal gait, mentation intact and speech normal  MENTAL STATUS EXAM  Appearance/Behavior: No appearant distress  Speech: Normal  Mood/Affect: flat  Insight: Adequate    LAB  Results for orders placed or performed in visit on 03/01/22   Drugs of Abuse 1+ Panel, Urine ([facility] Only)     Status: None   Result Value Ref Range    Amphetamines Urine Screen Negative Screen Negative    Benzodiazepines Urine Screen Negative Screen Negative    Opiates Urine Screen Negative Screen Negative    PCP Urine Screen Negative Screen Negative    Cannabinoids Urine Screen Negative Screen Negative    Barbiturates Urine Screen Negative Screen Negative    Cocaine Urine Screen Negative Screen Negative    Methadone Urine Screen Negative Screen Negative    Oxycodone Urine Screen Negative Screen Negative    Creatinine Urine mg/dL 184 mg/dL    Narrative    Drug                           Screening Threshold    Amphetamines                    1000 ng/mL  Benzodiazepine                   200 ng/mL  Opiates                          300 ng/mL  Phencyclidine                     25 ng/mL  THC Metabolite                    50 ng/mL  Barbiturates                     200 ng/mL  Cocaine Metabolite               150 ng/mL  Methadone                        300 ng/mL  Oxycodone                        100 ng/mL    Screening results are to be used only for medical purposes.  Unconfirmed screening results are not to be used for non-  medical purposes.         HISTORY                                                    Problem " list reviewed & adjusted, as indicated.  Patient Active Problem List   Diagnosis     Type 2 diabetes mellitus without complication, without long-term current use of insulin (H)     Morbid obesity (H)     Opioid use disorder     YANIRA on CPAP     Essential hypertension     Recurrent major depressive disorder, in full remission (H)     Opioid use disorder, severe, dependence (H)         MEDICATION LIST (prior to visit)  ACCU-CHEK GUIDE test strip, TEST TWICE DAILY  albuterol (PROAIR HFA/PROVENTIL HFA/VENTOLIN HFA) 108 (90 Base) MCG/ACT inhaler, INHALE 2 PUFFS BY MOUTH EVERY 6 HOURS AS NEEDED FOR WHEEZING  ARIPiprazole (ABILIFY) 10 MG tablet, Take 10 mg by mouth daily  aspirin (ASA) 81 MG EC tablet, Take 1 tablet (81 mg) by mouth daily  atorvastatin (LIPITOR) 20 MG tablet, Take 1 tablet (20 mg) by mouth daily  buPROPion (WELLBUTRIN XL) 150 MG 24 hr tablet, Take 150 mg by mouth every morning  cloNIDine (CATAPRES) 0.1 MG tablet, Indications: High Blood Pressure Disorder, patient does not know dosage level and does not take this every day  docusate sodium (DSS) 100 MG capsule, Take 100 mg by mouth  furosemide (LASIX) 20 MG tablet, Take 20 mg by mouth  omeprazole (PRILOSEC) 20 MG DR capsule, TAKE 1 CAPSULE(20 MG) BY MOUTH TWICE DAILY BEFORE MEALS  phentermine (ADIPEX-P) 37.5 MG tablet, Take 1/2 to 1 tablet in the morning.  sildenafil (VIAGRA) 100 MG tablet, Take 0.5-1 tablets ( mg) by mouth daily as needed (ED)    No current facility-administered medications on file prior to visit.      MEDICATION LIST (after visit)  Current Outpatient Medications   Medication     ACCU-CHEK GUIDE test strip     albuterol (PROAIR HFA/PROVENTIL HFA/VENTOLIN HFA) 108 (90 Base) MCG/ACT inhaler     ARIPiprazole (ABILIFY) 10 MG tablet     aspirin (ASA) 81 MG EC tablet     atorvastatin (LIPITOR) 20 MG tablet     buprenorphine HCl-naloxone HCl (SUBOXONE) 8-2 MG per film     buPROPion (WELLBUTRIN XL) 150 MG 24 hr tablet     cloNIDine (CATAPRES)  0.1 MG tablet     docusate sodium (DSS) 100 MG capsule     furosemide (LASIX) 20 MG tablet     naloxone (NARCAN) 4 MG/0.1ML nasal spray     omeprazole (PRILOSEC) 20 MG DR capsule     phentermine (ADIPEX-P) 37.5 MG tablet     sildenafil (VIAGRA) 100 MG tablet     No current facility-administered medications for this visit.         Allergies   Allergen Reactions     Seafood Other (See Comments)     Eyes turn yellow     Ibuprofen Nausea and Vomiting       [facility]  [facility]  [phone]

## 2022-03-01 NOTE — PATIENT INSTRUCTIONS
We discussed option of Sublocade (monthly buprenorphine injection).  Check out their website for good information.  Injection is given monthly at the Infusion Center at [facility] ([address] in [place]).  If you decide this is something you are interested in, please call and let me know.    For now continue Suboxone, no dose change.  Refills sent to pharmacy.    Follow-up with me in 2 months.      A prescription has been sent to your pharmacy of choice.    If a prior authorization is required it may take several days to get your medication.    Please make sure your pharmacy had your contact information so they can contact you when it is ready to .  Please contact your pharmacy to see if your medication is ready to be picked up.     You are encouraged to have some type of recovery program in addition to medication treatment.    Medication alone is generally not enough to lead to long term recovery.    This may include having some type of sober network, avoiding isolating, avoiding triggers (people, places, things you associate with using substances and help with managing cravings)    Options for support include :         [employer] service office in [facility]:    www.[employer].org    [phone]  Narcotics Anonymous   www.[employer].org    9-538-129-7711   Smart Recovery   www.[place]  Women for Sobriety   www.womenforsobriety.org  Celebrate Recovery   www.[employer]   (Mosque centered recovery).   [place] [facility] ([facility])  [facility] connects people seeking recovery to resources that help foster and sustain long-term recovery.  Whether you are seeking resources for treatment, transportation, housing, job training, education, health care or other pathways to recovery, [facility] is a great place to start.  (www.[facility]org)   [phone]  [employer] and local community support groups  Mental health counseling   -we can assist with  referral     You are strongly recommended abstaining from alcohol, benzodiazepines, cannabis, opioids and other drugs of abuse.     Using these in combination with Buprenorphine can raise your risk of overdose and death    Contact Us:   [facility] [phone] or by [employer]      If you cannot make your appointment please call the office and reschedule immediately.     If a refill or bridge is needed it is important to call in advance so you do not run out of medications.   We will make every effort to manage your request promptly but please be aware that it may take up to one business day for your refill request to be processed.   If there is a concern with your request we will contact you.  Otherwise, please contact your pharmacy directly to see if your prescription is ready for .     Our clinic is open from Monday-Friday 8:00am-4:30pm and there is not an On Call after hours service.   If medical care is needed after hours or on the weekend you will need to contact your primary care physician or go to an Urgent Care or ER.

## 2022-03-01 NOTE — PROGRESS NOTES
[facility] Post-Appointment Cart -check      Correct pharmacy verified with patient and updated in chart? [x] yes []no    Charge captured ? [x] yes  [] no [] n/a-virtual     Medications ordered this visit were e-scribed.  Verified by order class [x] yes  [] no    List Medications: Narcan 4 mg Nasal spray; Suboxone 8-2 mg     Medication changes or discontinuations were communicated to patient's pharmacy: [] yes  [x] no    UA collected [x] yes  [] no  [] n/a-virtual     Outside referrals / labs, etc support staff to follow up: [] yes  [x] no    Future appointment was made: [x] yes  [] no  [] n/a   05/05/2022   Dictation completed at time of chart check: [] yes  [x] no    I have checked the documentation for today s encounters and the above information has been reviewed and completed.      Catherine iFsher on March 1, 2022 at 12:43 PM

## 2022-03-01 NOTE — PROGRESS NOTES
Patient arrived 15 minutes late for appointment today.  Reason for visit:  Medication follow up  Patient verified allergies, medications and pharmacy verbally with writer  Medication refill is pended for review and approval by provider.  UA obtained: Yes  Lab order's pended for provider review and approval.   SHABBIR-7 scores:  Declined to complete today   SHABBIR-7 SCORE 1/4/2022 2/10/2022   Total Score 0 1     PHQ-9 scores: Declined to complete today   PHQ-9 SCORE 1/14/2021 1/4/2022   PHQ-9 Total Score 2 0     Patient states he is ready for visit.  [place]  to be reviewed by provider.   Catherine Fisher March 1, 2022 10:27 AM

## 2022-03-07 DIAGNOSIS — F11.90 OPIOID USE DISORDER: Primary | ICD-10-CM

## 2022-03-08 NOTE — TELEPHONE ENCOUNTER
"Routing refill request to provider for review/approval because:  Drug (Vit D) not active on patient's medication list.  Also break in medication (Docusate sodium).    cholecalciferol, vitamin D3, (VITAMIN D3) 5,000 unit Tab 90 tablet 3 9/16/2020  --   Sig - Route: Take 1 tablet (5,000 Units total) by mouth daily. - Oral   Sent to pharmacy as: cholecalciferol (vitamin D3) 125 mcg (5,000 unit) tablet (Vitamin D3)   Cosign for Ordering: Accepted by Adelina Cabello MD on 9/16/2020  6:11 PM   E-Prescribing Status: Receipt confirmed by pharmacy (9/16/2020  3:27 PM CDT)     Last Written Vit D3 Date: 9/16/2020  Last Fill Quantity: 90,  # refills: 3     docusate sodium (COLACE) 100 MG capsule 60 capsule 1 3/23/2021  No   Sig - Route: Take 1 capsule (100 mg total) by mouth 2 (two) times a day. - Oral   Sent to pharmacy as: docusate sodium 100 mg capsule (COLACE)   E-Prescribing Status: Receipt confirmed by pharmacy (3/23/2021  1:26 PM CDT)     Last written DOCUSATE Date:  3/23/2021  Last Fill Quantity:  60   # Ref:  1    Last office visit provider:  2/10/2022     Requested Prescriptions   Pending Prescriptions Disp Refills     D-5000 125 MCG (5000 UT) tablet [Pharmacy Med Name: VITAMIN D3 5,000 IU ([place]) TAB] 90 tablet      Sig: TAKE 1 TABLET BY MOUTH DAILY       Vitamin Supplements (Adult) Protocol Failed - 3/7/2022  8:18 AM        Failed - Medication is active on med list        Passed - High dose Vitamin D not ordered        Passed - Recent (12 mo) or future (30 days) visit within the authorizing provider's specialty     Patient has had an office visit with the authorizing provider or a provider within the authorizing providers department within the previous 12 mos or has a future within next 30 days. See \"Patient Info\" tab in inbasket, or \"Choose Columns\" in Meds & Orders section of the refill encounter.                 docusate sodium (COLACE) 100 MG capsule [Pharmacy Med Name: DOCUSATE SOD 100MG CAPSULES] 60 " "capsule      Sig: TAKE 1 CAPSULE(100 MG) BY MOUTH TWICE DAILY       Laxatives Protocol Passed - 3/7/2022  8:18 AM        Passed - Patient is age 6 or older        Passed - Recent (12 mo) or future (30 days) visit within the authorizing provider's specialty     Patient has had an office visit with the authorizing provider or a provider within the authorizing providers department within the previous 12 mos or has a future within next 30 days. See \"Patient Info\" tab in inbasket, or \"Choose Columns\" in Meds & Orders section of the refill encounter.              Passed - Medication is active on med list             Heena Richardson RN 03/08/22 12:43 PM  "

## 2022-03-09 RX ORDER — RESVER/WINE/BFL/GRPSD/PC/C/POM 200MG-60MG
CAPSULE ORAL
Qty: 90 TABLET | Refills: 3 | Status: SHIPPED | OUTPATIENT
Start: 2022-03-09 | End: 2022-04-21

## 2022-03-09 RX ORDER — DOCUSATE SODIUM 100 MG/1
CAPSULE, LIQUID FILLED ORAL
Qty: 60 CAPSULE | Refills: 3 | Status: SHIPPED | OUTPATIENT
Start: 2022-03-09 | End: 2022-06-30

## 2022-03-17 ENCOUNTER — TRANSFERRED RECORDS (OUTPATIENT)
Dept: HEALTH INFORMATION MANAGEMENT | Facility: CLINIC | Age: 57
End: 2022-03-17
Payer: COMMERCIAL

## 2022-03-17 LAB — RETINOPATHY: NEGATIVE

## 2022-04-20 ENCOUNTER — OFFICE VISIT (OUTPATIENT)
Dept: SURGERY | Facility: CLINIC | Age: 57
End: 2022-04-20
Payer: COMMERCIAL

## 2022-04-20 VITALS — WEIGHT: 315 LBS | BODY MASS INDEX: 45.1 KG/M2 | HEIGHT: 70 IN

## 2022-04-20 DIAGNOSIS — E66.01 OBESITY, CLASS III, BMI 40-49.9 (MORBID OBESITY) (H): ICD-10-CM

## 2022-04-20 DIAGNOSIS — E11.9 TYPE 2 DIABETES MELLITUS WITHOUT COMPLICATION, WITHOUT LONG-TERM CURRENT USE OF INSULIN (H): Primary | ICD-10-CM

## 2022-04-20 DIAGNOSIS — E66.01 MORBID OBESITY (H): ICD-10-CM

## 2022-04-20 PROCEDURE — 97803 MED NUTRITION INDIV SUBSEQ: CPT | Performed by: DIETITIAN, REGISTERED

## 2022-04-20 RX ORDER — PHENTERMINE HYDROCHLORIDE 37.5 MG/1
TABLET ORAL
Qty: 90 TABLET | Refills: 1 | Status: SHIPPED | OUTPATIENT
Start: 2022-04-20 | End: 2022-04-21

## 2022-04-20 NOTE — Clinical Note
Robles would like a refill on his phentermine. He never went to pick it up in January, but states he will be able to pick it up in early May.

## 2022-04-20 NOTE — LETTER
4/20/2022         RE: Tobin Bryant  [address]  [place] [address]        Dear Colleague,    Thank you for referring your patient, Tobin Bryant, to the [facility] [place]. Please see a copy of my visit note below.      Medical  Weight Loss Follow-Up Diet Evaluation  Assessment:  Tobin is presenting today for a follow up weight management nutrition consultation. Pt has had an initial appointment with Dr. Burnham  Weight loss medication: Phentermine- not taking  Pt's weight is 325 lbs 0 oz  Initial weight: 324lb  Weight change: up 1lb- up about 15lb from Feb visit  BMI: Body mass index is 46.63 kg/m .  Ideal body weight: 73 kg (160 lb 15 oz)  Adjusted ideal body weight: 102.8 kg (226 lb 9 oz)    Estimated RMR ([place]-St Jeor equation):   2320 kcals x 1.2 (sedentary) = 2784 kcals (for weight maintenance)  Recommended Protein Intake: 60-80 grams of protein/day  Patient Active Problem List:  Patient Active Problem List   Diagnosis     Type 2 diabetes mellitus without complication, without long-term current use of insulin (H)     Morbid obesity (H)     Opioid use disorder     YANIRA on CPAP     Essential hypertension     Recurrent major depressive disorder, in full remission (H)     Opioid use disorder, severe, dependence (H)     Diabetes: not testing blood glucose- A1c 5.9    Progress on goals from last visit: patient states he hasn't been doing well with movement and has been eating more sweets lately- drinking regular soda    Dietary Recall:  Breakfast: cereal- honey nut cheerios  Lunch: none  Dinner: nachos- with meat and cheese  Typical snacks: candy- Pop and Ikes  Beverages: drinking water, couple regular soda  Exercise: got a new seat for his bike-     Nutrition Diagnosis:    1. Overweight/Obesity (NC 3.3) related to overeating and poor lifestyle habits as evidenced by patient's report of infrequent meals, lack of activity, snacking on sweets and  BMI 46.63      Intervention:  1. Food and/or nutrient delivery: encouraged protein with every meal, reviewed protein sources handout and provided patient with protein supplement resources  2. Nutrition counseling: goal setting, support for continued success    Monitoring/Evaluation:    Goals:  1. Eliminate candy and soda  2. Increase activity 3-4 times per week    Patient to follow up in 2 month(s) with RD    Time spent with patient: 30 min    TONE CORCORAN RD        Again, thank you for allowing me to participate in the care of your patient.        Sincerely,        TONE CORCORAN RD

## 2022-04-20 NOTE — PROGRESS NOTES
Medical  Weight Loss Follow-Up Diet Evaluation  Assessment:  Tobin is presenting today for a follow up weight management nutrition consultation. Pt has had an initial appointment with Dr. Burnham  Weight loss medication: Phentermine- not taking  Pt's weight is 325 lbs 0 oz  Initial weight: 324lb  Weight change: up 1lb- up about 15lb from Feb visit  BMI: Body mass index is 46.63 kg/m .  Ideal body weight: 73 kg (160 lb 15 oz)  Adjusted ideal body weight: 102.8 kg (226 lb 9 oz)    Estimated RMR ([place]-St Jeor equation):   2320 kcals x 1.2 (sedentary) = 2784 kcals (for weight maintenance)  Recommended Protein Intake: 60-80 grams of protein/day  Patient Active Problem List:  Patient Active Problem List   Diagnosis     Type 2 diabetes mellitus without complication, without long-term current use of insulin (H)     Morbid obesity (H)     Opioid use disorder     YANIRA on CPAP     Essential hypertension     Recurrent major depressive disorder, in full remission (H)     Opioid use disorder, severe, dependence (H)     Diabetes: not testing blood glucose- A1c 5.9    Progress on goals from last visit: patient states he hasn't been doing well with movement and has been eating more sweets lately- drinking regular soda    Dietary Recall:  Breakfast: cereal- honey nut cheerios  Lunch: none  Dinner: nachos- with meat and cheese  Typical snacks: candy- Pop and Ikes  Beverages: drinking water, couple regular soda  Exercise: got a new seat for his bike-     Nutrition Diagnosis:    1. Overweight/Obesity (NC 3.3) related to overeating and poor lifestyle habits as evidenced by patient's report of infrequent meals, lack of activity, snacking on sweets and BMI 46.63      Intervention:  1. Food and/or nutrient delivery: encouraged protein with every meal, reviewed protein sources handout and provided patient with protein supplement resources  2. Nutrition counseling: goal setting, support for continued  success    Monitoring/Evaluation:    Goals:  1. Eliminate candy and soda  2. Increase activity 3-4 times per week    Patient to follow up in 2 month(s) with RD    Time spent with patient: 30 min    TONE CORCORAN RD

## 2022-04-21 ENCOUNTER — OFFICE VISIT (OUTPATIENT)
Dept: INTERNAL MEDICINE | Facility: CLINIC | Age: 57
End: 2022-04-21
Payer: COMMERCIAL

## 2022-04-21 VITALS
RESPIRATION RATE: 17 BRPM | OXYGEN SATURATION: 93 % | BODY MASS INDEX: 48.35 KG/M2 | HEART RATE: 79 BPM | SYSTOLIC BLOOD PRESSURE: 124 MMHG | WEIGHT: 315 LBS | DIASTOLIC BLOOD PRESSURE: 66 MMHG

## 2022-04-21 DIAGNOSIS — F11.20 OPIOID USE DISORDER, SEVERE, DEPENDENCE (H): ICD-10-CM

## 2022-04-21 DIAGNOSIS — E66.01 MORBID OBESITY (H): ICD-10-CM

## 2022-04-21 DIAGNOSIS — F33.42 RECURRENT MAJOR DEPRESSIVE DISORDER, IN FULL REMISSION (H): ICD-10-CM

## 2022-04-21 DIAGNOSIS — G89.29 CHRONIC RIGHT SHOULDER PAIN: ICD-10-CM

## 2022-04-21 DIAGNOSIS — G47.33 OSA ON CPAP: ICD-10-CM

## 2022-04-21 DIAGNOSIS — E11.9 TYPE 2 DIABETES MELLITUS WITHOUT COMPLICATION, WITHOUT LONG-TERM CURRENT USE OF INSULIN (H): Primary | ICD-10-CM

## 2022-04-21 DIAGNOSIS — M25.511 CHRONIC RIGHT SHOULDER PAIN: ICD-10-CM

## 2022-04-21 DIAGNOSIS — I10 ESSENTIAL HYPERTENSION: ICD-10-CM

## 2022-04-21 PROBLEM — F11.90 OPIOID USE DISORDER: Status: RESOLVED | Noted: 2021-09-16 | Resolved: 2022-04-21

## 2022-04-21 PROCEDURE — 99214 OFFICE O/P EST MOD 30 MIN: CPT | Performed by: INTERNAL MEDICINE

## 2022-04-21 RX ORDER — FUROSEMIDE 20 MG
20 TABLET ORAL DAILY
Start: 2022-04-21 | End: 2022-09-02

## 2022-04-21 RX ORDER — ATORVASTATIN CALCIUM 20 MG/1
20 TABLET, FILM COATED ORAL DAILY
Qty: 90 TABLET | Refills: 3 | Status: SHIPPED | OUTPATIENT
Start: 2022-04-21 | End: 2022-08-03

## 2022-04-21 RX ORDER — ARIPIPRAZOLE 10 MG/1
10 TABLET ORAL DAILY
Start: 2022-04-21

## 2022-04-21 RX ORDER — BUPROPION HYDROCHLORIDE 150 MG/1
150 TABLET ORAL EVERY MORNING
Start: 2022-04-21

## 2022-04-21 ASSESSMENT — ANXIETY QUESTIONNAIRES
7. FEELING AFRAID AS IF SOMETHING AWFUL MIGHT HAPPEN: NOT AT ALL
4. TROUBLE RELAXING: NOT AT ALL
6. BECOMING EASILY ANNOYED OR IRRITABLE: NOT AT ALL
2. NOT BEING ABLE TO STOP OR CONTROL WORRYING: NOT AT ALL
GAD7 TOTAL SCORE: 0
5. BEING SO RESTLESS THAT IT IS HARD TO SIT STILL: NOT AT ALL
3. WORRYING TOO MUCH ABOUT DIFFERENT THINGS: NOT AT ALL
1. FEELING NERVOUS, ANXIOUS, OR ON EDGE: NOT AT ALL

## 2022-04-21 ASSESSMENT — PAIN SCALES - GENERAL: PAINLEVEL: NO PAIN (0)

## 2022-04-21 NOTE — PROGRESS NOTES
Office Visit - Follow Up   Tobin Bryant   56 year old male    Date of Visit: 4/21/2022    Chief Complaint   Patient presents with     office visit     Right arm soreness for over a week        Assessment and Plan   1. Type 2 diabetes mellitus without complication, without long-term current use of insulin (H)  Stable continue same  - atorvastatin (LIPITOR) 20 MG tablet; Take 1 tablet (20 mg) by mouth daily  Dispense: 90 tablet; Refill: 3  - aspirin (ASA) 81 MG EC tablet; Take 1 tablet (81 mg) by mouth daily    2. Essential hypertension  Control continues to  - furosemide (LASIX) 20 MG tablet; Take 1 tablet (20 mg) by mouth daily    3. Opioid use disorder, severe, dependence (H)  Is on Suboxone    4. Recurrent major depressive disorder, in full remission (H)  Stable  - ARIPiprazole (ABILIFY) 10 MG tablet; Take 1 tablet (10 mg) by mouth daily  - buPROPion (WELLBUTRIN XL) 150 MG 24 hr tablet; Take 1 tablet (150 mg) by mouth every morning    5. YANIRA on CPAP    6. Chronic right shoulder pain  - XR Shoulder Right G/E 3 Views; Future  - Physical Therapy Referral; Future    7. Morbid obesity (H)  The following high BMI interventions were performed this visit: encouragement to exercise and lifestyle education regarding diet    Return in about 4 weeks (around 5/19/2022) for Follow up.     History of Present Illness   This 56 year old man comes in for follow-up.  Overall he is doing well.  He has well-controlled diabetes.  Underlying hypertension which is well controlled.  He does take long acting opioid for opioid use disorder.  Mood is generally stable.  His main issue is that his right shoulder hurts especially when he is lying on it at night.  Some radiation down into his bicep.  Occasional numbness.    Review of Systems: A comprehensive review of systems was negative except as noted.     Medications, Allergies and Problem List   Reviewed, reconciled and updated  Post Discharge Medication Reconciliation Status:    Social History     Social History Narrative    Lives alone.  Not working (last worked 2006).  Originally from [place], some college.  2 grown children        Physical Exam   General Appearance:   No acute distress    /66 (BP Location: Right arm, Patient Position: Sitting, Cuff Size: Adult Large)   Pulse 79   Resp 17   Wt (!) 152.9 kg (337 lb)   SpO2 93%   BMI 48.35 kg/m      Cardiovascular regular rate and rhythm no murmur gallop or rub  Pulmonary lungs are clear to auscultation bilaterally  Neurologic exam is non focal  Psychiatric pleasant, no confusion or agitation   Shoulder exam is unremarkable     Additional Information   Current Outpatient Medications   Medication Sig Dispense Refill     ACCU-CHEK GUIDE test strip TEST TWICE DAILY 200 strip 1     albuterol (PROAIR HFA/PROVENTIL HFA/VENTOLIN HFA) 108 (90 Base) MCG/ACT inhaler INHALE 2 PUFFS BY MOUTH EVERY 6 HOURS AS NEEDED FOR WHEEZING       ARIPiprazole (ABILIFY) 10 MG tablet Take 1 tablet (10 mg) by mouth daily       aspirin (ASA) 81 MG EC tablet Take 1 tablet (81 mg) by mouth daily       aspirin (ASA) 81 MG EC tablet Take 1 tablet (81 mg) by mouth daily 90 tablet 3     atorvastatin (LIPITOR) 20 MG tablet Take 1 tablet (20 mg) by mouth daily 90 tablet 3     buprenorphine HCl-naloxone HCl (SUBOXONE) 8-2 MG per film 2 sl qam, 1 sl qpm 90 Film 1     buPROPion (WELLBUTRIN XL) 150 MG 24 hr tablet Take 1 tablet (150 mg) by mouth every morning       docusate sodium (COLACE) 100 MG capsule TAKE 1 CAPSULE(100 MG) BY MOUTH TWICE DAILY (Patient taking differently: 2 times daily as needed) 60 capsule 3     furosemide (LASIX) 20 MG tablet Take 1 tablet (20 mg) by mouth daily       naloxone (NARCAN) 4 MG/0.1ML nasal spray Spray 1 spray (4 mg) into one nostril alternating nostrils once as needed for opioid reversal every 2-3 minutes until assistance arrives 0.2 mL 11     sildenafil (VIAGRA) 100 MG tablet Take 0.5-1 tablets ( mg) by mouth daily as needed  (ED) 30 tablet 1     Allergies   Allergen Reactions     Seafood Other (See Comments)     Eyes turn yellow     Ibuprofen Nausea and Vomiting     Social History     Tobacco Use     Smoking status: Never Smoker     Smokeless tobacco: Never Used   Substance Use Topics     Alcohol use: No     Drug use: No       Review and/or order of clinical lab tests:  Review and/or order of radiology tests:  Review and/or order of medicine tests:  Discussion of test results with performing physician:  Decision to obtain old records and/or obtain history from someone other than the patient:  Review and summarization of old records and/or obtaining history from someone other than the patient and.or discussion of case with another health care provider:  Independent visualization of image, tracing or specimen itself:    Time:      Pawan Castro MD

## 2022-04-22 ASSESSMENT — ANXIETY QUESTIONNAIRES: GAD7 TOTAL SCORE: 0

## 2022-04-24 NOTE — PROGRESS NOTES
Correct pharmacy verified with patient and confirmed in snapshot? [x] yes []no    Charge captured ? [x] yes  [] no    Medications Phoned  to Pharmacy [] yes [x]no  Name of Pharmacist:  List Medications, including dose, quantity and instructions      Medication Prescriptions given to patient   [] yes  [x] no   List the name of the drug the prescription was written for.       Medications ordered this visit were e-scribed.  Verified by order class [x] yes  [] no  Suboxone 8-2 mg    Medication changes or discontinuations were communicated to patient's pharmacy: [] yes  [x] no    UA collected [x] yes  [] no    [place] Prescription Monitoring Program Reviewed? [x] yes  [] no    Referrals were made to: none     Future appointment was made: [x] yes  [] no    Dictation completed at time of chart check: [] yes  [x] no    I have checked the documentation for today s encounters and the above information has been reviewed and completed.      
no chills/no decreased eating/drinking/no dizziness/no nausea/no tingling/no vomiting/no weakness

## 2022-05-02 ENCOUNTER — TELEPHONE (OUTPATIENT)
Dept: SURGERY | Facility: CLINIC | Age: 57
End: 2022-05-02
Payer: COMMERCIAL

## 2022-05-02 NOTE — TELEPHONE ENCOUNTER
Patient called and left a message saying that he has some nutritions questions and would like a call back from Olga.     Phone number he left was [phone].

## 2022-05-05 ENCOUNTER — OFFICE VISIT (OUTPATIENT)
Dept: BEHAVIORAL HEALTH | Facility: CLINIC | Age: 57
End: 2022-05-05
Payer: COMMERCIAL

## 2022-05-05 VITALS
WEIGHT: 315 LBS | HEART RATE: 91 BPM | DIASTOLIC BLOOD PRESSURE: 76 MMHG | BODY MASS INDEX: 45.1 KG/M2 | HEIGHT: 70 IN | SYSTOLIC BLOOD PRESSURE: 131 MMHG

## 2022-05-05 DIAGNOSIS — F11.20 OPIOID USE DISORDER, SEVERE, DEPENDENCE (H): ICD-10-CM

## 2022-05-05 LAB
AMPHETAMINES UR QL SCN: NORMAL
BARBITURATES UR QL: NORMAL
BENZODIAZ UR QL: NORMAL
BUPRENORPHINE QUAL URINE LHE: ABNORMAL
CANNABINOIDS UR QL SCN: NORMAL
COCAINE UR QL: NORMAL
CREAT UR-MCNC: 159 MG/DL
CREAT UR-MCNC: 164 MG/DL
METHADONE UR QL SCN: NORMAL
OPIATES UR QL SCN: NORMAL
OXYCODONE UR QL: NORMAL
PCP UR QL SCN: NORMAL

## 2022-05-05 PROCEDURE — G0463 HOSPITAL OUTPT CLINIC VISIT: HCPCS

## 2022-05-05 PROCEDURE — 80307 DRUG TEST PRSMV CHEM ANLYZR: CPT | Performed by: FAMILY MEDICINE

## 2022-05-05 PROCEDURE — 99213 OFFICE O/P EST LOW 20 MIN: CPT | Performed by: FAMILY MEDICINE

## 2022-05-05 RX ORDER — BUPRENORPHINE AND NALOXONE 8; 2 MG/1; MG/1
FILM, SOLUBLE BUCCAL; SUBLINGUAL
Qty: 90 FILM | Refills: 1 | Status: SHIPPED | OUTPATIENT
Start: 2022-05-05 | End: 2022-06-30

## 2022-05-05 NOTE — PATIENT INSTRUCTIONS
Continue Suboxone, no change.      You have prescription refills for Narcan at the pharmacy.      Resources for opioid use:    [facility] Reduction (can provided free Narcan):  [phone]    [facility] - can help get someone started on Suboxone  [address], [address]   [place], [place], [address]  Phone: [phone]  Fax: [phone]    Open Monday-Friday  9:00am-4:00pm  Walk in hours: 9am-3pm

## 2022-05-05 NOTE — PROGRESS NOTES
[facility]    A/P                                                    ASSESSMENT/PLAN    1. Opioid use disorder, severe, dependence (H)  Stable.  Symptoms well controlled.  He will continue Suboxone without change.  He was inquiring about Narcan for a friend who is struggling with opioid addiction.  Reviewed [facility] as good resources for friend.    - buprenorphine HCl-naloxone HCl (SUBOXONE) 8-2 MG per film; 2 sl qam, 1 sl qpm  Dispense: 90 Film; Refill: 1  - Drugs of Abuse 1+ Panel, Urine ([facility] Only); Standing      PDMP Review       Value Time User    State PDMP site checked  Yes 5/5/2022  1:20 PM Ernestina Tripp,           RTC  Return in about 2 months (around 7/5/2022) for Follow up, with me, using a video visit.      Counseled the patient on the importance of having a recovery program in addition to medication to manage recovery.  Components include avoiding isolating, having willingness to change, avoiding triggers and managing cravings. Encouraged having some type of sober network and practicing honesty with trusted support person(s). Encouraged other services such as counseling, 12 step or other self-help organizations.      Opioid warning reviewed.  Risk of overdose following a period of abstinence due to decrease tolerance was discussed including risk of death.  Strongly recommended abstain from alcohol, benzodiazepines, THC, opioids and other drugs of abuse.  Increased risk of return to opioid use after use of these substances discussed.  Increased risk of overdose/death with use of other substances particularly benzodiazepines/alcohol reviewed.        YOSELIN Bryant is a 56 year old male with PMHx of HTN, morbid obesity, pre-diabetes, COPD/ashtma, YANIRA, anxiety, depression, and OUD who presents to clinic today for follow up     Visit performed In Person,  "face-to-face     Brief history of use:    Last use of illicit opioids was in approximately 2019.  Has been on buprenorphine for OUD since at least 2014.       Plan from most recent office visit (03/01/2022):  1. Opioid use disorder, severe, dependence (H)  Symptoms well controlled on current dose of buprenorphine.  Denies substance use.  We will continue current dose, refills provided.  He will consider transition to Sublocade.    - Drugs of Abuse 1+ Panel, Urine ([facility] Only); Future  - Ethyl Glucuronide Screen with Reflex to Confirmation, Urine; Standing  - buprenorphine HCl-naloxone HCl (SUBOXONE) 8-2 MG per film; 2 sl qam, 1 sl qpm  Dispense: 90 Film; Refill: 1  - naloxone (NARCAN) 4 MG/0.1ML nasal spray; Spray 1 spray (4 mg) into one nostril alternating nostrils once as needed for opioid reversal every 2-3 minutes until assistance arrives  Dispense: 0.2 mL; Refill: 11  - Drugs of Abuse 1+ Panel, Urine ([facility] Only)    TODAY'S VISIT  HPI May 5, 2022  - No concerns today, no changes in health   - Taking Suboxone as prescribed, no side effects, no opioid cravings  - No substance use  - Mood has isiah stable, no SI  - Has been doing some traveling - up to [place], [place] (visiting a friend)  - Planning trip to [place] in June/July  - Continues working on diet and exercising   - Sleeping well   - Ran out of Suboxone a few days ago, last dose of buprenorphine was 2 days ago - thinks friend (who is struggling with addiction) may have taken it when he was out of town    ROS:  No fevers or chills.  No SOB/trouble breathing.  No chest pains.  No edema.      OBJECTIVE                                                    PHYSICAL EXAM:  /76 (BP Location: Right arm, Patient Position: Sitting, Cuff Size: Adult Large)   Pulse 91   Ht 1.778 m (5' 10\")   Wt 149.2 kg (329 lb)   BMI 47.21 kg/m      GENERAL: healthy, alert and no distress  EYES: Eyes grossly normal to inspection, conjunctivae and sclerae normal  RESP: No " respiratory distress, no coughing or wheezing  MS: no gross musculoskeletal defects noted  SKIN: no visible lesions or rashes  NEURO: normal gait, no tremor, mentation intact and speech normal  MENTAL STATUS EXAM  Appearance/Behavior: No appearant distress  Speech: Normal  Mood/Affect: flat  Insight: Adequate    LAB  No results found for any visits on 05/05/22.      HISTORY                                                    Problem list reviewed & adjusted, as indicated.  Patient Active Problem List   Diagnosis     Type 2 diabetes mellitus without complication, without long-term current use of insulin (H)     Morbid obesity (H)     YANIRA on CPAP     Essential hypertension     Recurrent major depressive disorder, in full remission (H)     Opioid use disorder, severe, dependence (H)         MEDICATION LIST (prior to visit)  ACCU-CHEK GUIDE test strip, TEST TWICE DAILY  albuterol (PROAIR HFA/PROVENTIL HFA/VENTOLIN HFA) 108 (90 Base) MCG/ACT inhaler, INHALE 2 PUFFS BY MOUTH EVERY 6 HOURS AS NEEDED FOR WHEEZING  ARIPiprazole (ABILIFY) 10 MG tablet, Take 1 tablet (10 mg) by mouth daily  buPROPion (WELLBUTRIN XL) 150 MG 24 hr tablet, Take 1 tablet (150 mg) by mouth every morning  docusate sodium (COLACE) 100 MG capsule, TAKE 1 CAPSULE(100 MG) BY MOUTH TWICE DAILY  furosemide (LASIX) 20 MG tablet, Take 1 tablet (20 mg) by mouth daily  sildenafil (VIAGRA) 100 MG tablet, Take 0.5-1 tablets ( mg) by mouth daily as needed (ED)  aspirin (ASA) 81 MG EC tablet, Take 1 tablet (81 mg) by mouth daily (Patient not taking: Reported on 5/5/2022)  aspirin (ASA) 81 MG EC tablet, Take 1 tablet (81 mg) by mouth daily (Patient not taking: Reported on 5/5/2022)  atorvastatin (LIPITOR) 20 MG tablet, Take 1 tablet (20 mg) by mouth daily (Patient not taking: Reported on 5/5/2022)  naloxone (NARCAN) 4 MG/0.1ML nasal spray, Spray 1 spray (4 mg) into one nostril alternating nostrils once as needed for opioid reversal every 2-3 minutes until  assistance arrives (Patient not taking: Reported on 5/5/2022)    No current facility-administered medications on file prior to visit.      MEDICATION LIST (after visit)  Current Outpatient Medications   Medication     ACCU-CHEK GUIDE test strip     albuterol (PROAIR HFA/PROVENTIL HFA/VENTOLIN HFA) 108 (90 Base) MCG/ACT inhaler     ARIPiprazole (ABILIFY) 10 MG tablet     buprenorphine HCl-naloxone HCl (SUBOXONE) 8-2 MG per film     buPROPion (WELLBUTRIN XL) 150 MG 24 hr tablet     docusate sodium (COLACE) 100 MG capsule     furosemide (LASIX) 20 MG tablet     sildenafil (VIAGRA) 100 MG tablet     aspirin (ASA) 81 MG EC tablet     aspirin (ASA) 81 MG EC tablet     atorvastatin (LIPITOR) 20 MG tablet     naloxone (NARCAN) 4 MG/0.1ML nasal spray     No current facility-administered medications for this visit.         Allergies   Allergen Reactions     Seafood Other (See Comments)     Eyes turn yellow     Ibuprofen Nausea and Vomiting     [facility]  [facility]  [phone]

## 2022-05-05 NOTE — PROGRESS NOTES
Reason for visit: Follow up in person   UA Obtained: Yes  Pt is very tired he states.   Patient verified allergies, medications and pharmacy verbally with writer   Medication refill is pended for review and approval by provider.  SHABBIR-7 scores:  declined  SHABBIR-7 SCORE 2/10/2022 4/21/2022   Total Score 1 0   PHQ-9 scores: declined  PHQ-9 SCORE 1/14/2021 1/4/2022   PHQ-9 Total Score 2 0   Patient states he is ready for visit.  [place]  to be reviewed by provider.   Catherine Fisher May 5, 2022 1:10  PM

## 2022-05-06 NOTE — PROGRESS NOTES
MH&A Post-Appointment Cart -check      Correct pharmacy verified with patient and updated in chart? [x] yes []no    Charge captured ? [x] yes  [] no [] n/a-virtual     Medications ordered this visit were e-scribed.  Verified by order class [x] yes  [] no    List Medications: buprenorphine HCl-naloxone HCl (SUBOXONE) 8-2 MG per film     Medication changes or discontinuations were communicated to patient's pharmacy: [] yes  [x] no    UA collected [x] yes  [] no  [] n/a-virtual     Outside referrals / labs, etc support staff to follow up: [] yes  [x] no    Future appointment was made: [x] yes  [] no  [] n/a   06/30/2022  Dictation completed at time of chart check: [x] yes  [] no    I have checked the documentation for today s encounters and the above information has been reviewed and completed.      Catherine Fisher on May 6, 2022 at 2:07 PM

## 2022-05-24 ENCOUNTER — HOSPITAL ENCOUNTER (OUTPATIENT)
Dept: PHYSICAL THERAPY | Facility: REHABILITATION | Age: 57
Discharge: HOME | End: 2022-05-24
Attending: INTERNAL MEDICINE
Payer: COMMERCIAL

## 2022-05-24 DIAGNOSIS — M25.511 CHRONIC RIGHT SHOULDER PAIN: ICD-10-CM

## 2022-05-24 DIAGNOSIS — G89.29 CHRONIC RIGHT SHOULDER PAIN: ICD-10-CM

## 2022-05-24 PROCEDURE — 97161 PT EVAL LOW COMPLEX 20 MIN: CPT | Mod: GP | Performed by: PHYSICAL THERAPIST

## 2022-05-24 PROCEDURE — 97110 THERAPEUTIC EXERCISES: CPT | Mod: GP | Performed by: PHYSICAL THERAPIST

## 2022-05-24 NOTE — PROGRESS NOTES
[facility]    OUTPATIENT PHYSICAL THERAPY ORTHOPEDIC EVALUATION  PLAN OF TREATMENT FOR OUTPATIENT REHABILITATION  (COMPLETE FOR INITIAL CLAIMS ONLY)  Patient's Last Name, First Name, M.I.  YOB: 1965  Tobin Bryant    Provider s Name:  [facility]   Medical Record No.  [id number]   Start of Care Date:  05/24/22   Onset Date:   order date 4/21/22   Type:     _X__PT   ___OT   ___SLP Medical Diagnosis:  Chronic right shoulder pain     PT Diagnosis:  right shoulder pain, decreased right shoulder ROM, poor posture   Visits from SOC:  1      _________________________________________________________________________________  Plan of Treatment/Functional Goals:  joint mobilization, manual therapy, ROM, strengthening, stretching     TENS     Goals     Goal Description: Patient will be able to sleep through the night, waking without pain in his shoulder in 6 weeks:          Goal Description: Patient will report regular exercise routine, to improve health of his shoulder in 6 weeks:          Goal Description: Patient will improve shoulder ER/IR to equivalent of right side in 6 weeks:         Therapy Frequency:  1 time/week  Predicted Duration of Therapy Intervention:  4 visits total over 8 weeks due to scheduling availability    Georges Wan, PT                 I CERTIFY THE NEED FOR THESE SERVICES FURNISHED UNDER        THIS PLAN OF TREATMENT AND WHILE UNDER MY CARE     (Physician co-signature of this document indicates review and certification of the therapy plan).                     Certification Date From:  05/24/22   Certification Date To:  07/19/22    Referring Provider:  Pawan Castro MD    Initial Assessment        See Epic Evaluation Start of Care Date: 05/24/22 05/24/22 0800   General Information   Type of Visit Initial OP Ortho PT Evaluation    Start of Care Date 05/24/22   Referring Physician Pawan Castro MD   Orders Evaluate and Treat   Certification Required? Yes   Medical Diagnosis Chronic right shoulder pain   Surgical/Medical history reviewed Yes   Precautions/Limitations no known precautions/limitations   Body Part(s)   Body Part(s) Shoulder   Presentation and Etiology   Pertinent history of current problem (include personal factors and/or comorbidities that impact the POC) Pain started about a couple of weeks ago, wondered if it as the way he was sleeping. Did have some mild AC arthrosis on x-ra. Not sure that he was had this pain before. Has had some neck pinch with sleeping before as well. Pain Described as front and back of the shoulder, more in front, mild pain that is constant, but not throbbing, pain does improve over the course of the morning. On and off will get a little down the arm, maybe some tingling/numbness on occasion. No weakness. Worse with sleeping, laying on back and his right side. Better with as day goes on, no heat or ice.   Fall Risk Screen   Fall screen completed by PT   Have you fallen 2 or more times in the past year? No   Have you fallen and had an injury in the past year? No   Is patient a fall risk? No   Abuse Screen (yes response referral indicated)   Feels Unsafe at Home or Work/School no   Feels Threatened by Someone no   Does Anyone Try to Keep You From Having Contact with Others or Doing Things Outside Your Home? no   Physical Signs of Abuse Present no   Patient needs abuse support services and resources No   Shoulder Objective Findings   Side (if bilateral, select both right and left) Right   Cervical Screen (ROM, quadrant) normal ROM, painfree   Pec Minor (supine) Flexibility tightness on right   Neer's Test -   Laas-Magdaleno Test -   Coracoid Test -   Rosebud's Test -   Crossover Test -   Shoulder Special Tests Comments AC shear -   Palpation non-tender to palpation throughout shoulder joint   Accessory  Motion/Joint Mobility mild hypomobility posterior capsule   Posture forward shoulder posture   Right Shoulder Flexion PROM 150 (left 150)   Right Shoulder Abduction PROM 150 (left 150)   Right Shoulder ER AROM 85 deg (left 90)   Right Shoulder IR AROM 65 deg (left 75)   Right Shoulder Flexion Strength 5   Right Shoulder Abduction Strength 5   Right Shoulder ER Strength 5   Right Shoulder IR Strength 5   Planned Therapy Interventions   Planned Therapy Interventions joint mobilization;manual therapy;ROM;strengthening;stretching   Planned Modality Interventions   Planned Modality Interventions TENS   Clinical Impression   Criteria for Skilled Therapeutic Interventions Met yes, treatment indicated   PT Diagnosis right shoulder pain, decreased right shoulder ROM, poor posture   Clinical Presentation Stable/Uncomplicated   Clinical Decision Making (Complexity) Low complexity   Therapy Frequency 1 time/week   Predicted Duration of Therapy Intervention (days/wks) 4 visits total over 8 weeks due to scheduling availability   Risk & Benefits of therapy have been explained Yes   Patient, Family & other staff in agreement with plan of care Yes   Clinical Impression Comments Patient is a 56 year old male seen in physical therapy for initial evaluation of one month history right shoulder pain. The patient's only difficulty is pain when he wakes up in the morning after sleeping on his side or back. Pain improves as the day goes on. The patient is not functionally limited in any other way. With examination, the patient demonstrates mild limitations in shoulder internal and external range of motion on the right. He has no positive special tests for impingement, labrum, AC joint, or strength issues. His pain is likely biomechanical and related to tightness. The patient may benefit from skilled PT to improve his shoulder mobility and in turn helped with his pain.   Education Assessment   Preferred Learning Style  Listening;Reading;Demonstration;Pictures/video   Barriers to Learning No barriers   ORTHO GOALS   PT Ortho Eval Goals 1;2;3   Ortho Goal 1   Goal Description Patient will be able to sleep through the night, waking without pain in his shoulder in 6 weeks:   Ortho Goal 2   Goal Description Patient will report regular exercise routine, to improve health of his shoulder in 6 weeks:   Ortho Goal 3   Goal Description Patient will improve shoulder ER/IR to equivalent of right side in 6 weeks:   Total Evaluation Time   PT Eval, Low Complexity Minutes (43296) 15   Therapy Certification   Certification date from 05/24/22   Certification date to 07/19/22   Medical Diagnosis Chronic right shoulder pain     Georges Wan, PT

## 2022-05-25 DIAGNOSIS — E66.01 MORBID OBESITY WITH BMI OF 45.0-49.9, ADULT (H): ICD-10-CM

## 2022-05-25 RX ORDER — BLOOD SUGAR DIAGNOSTIC
STRIP MISCELLANEOUS
Qty: 200 STRIP | Refills: 3 | Status: SHIPPED | OUTPATIENT
Start: 2022-05-25 | End: 2023-06-16

## 2022-05-26 NOTE — TELEPHONE ENCOUNTER
"Last Written Prescription Date:  10/8/21  Last Fill Quantity: 200,  # refills: 1   Last office visit provider:  4/21/22     Requested Prescriptions   Pending Prescriptions Disp Refills     ACCU-CHEK GUIDE test strip [Pharmacy Med Name: ACCU-CHEK GUIDE TEST STRIPS 50] 200 strip 1     Sig: USE TO TEST TWICE DAILY       Diabetic Supplies Protocol Passed - 5/25/2022  3:52 AM        Passed - Medication is active on med list        Passed - Patient is 18 years of age or older        Passed - Recent (6 mo) or future (30 days) visit within the authorizing provider's specialty     Patient had office visit in the last 6 months or has a visit in the next 30 days with authorizing provider.  See \"Patient Info\" tab in inbasket, or \"Choose Columns\" in Meds & Orders section of the refill encounter.                 Shabnam Peterson RN 05/25/22 10:21 PM  "

## 2022-06-24 ENCOUNTER — TELEPHONE (OUTPATIENT)
Dept: BEHAVIORAL HEALTH | Facility: CLINIC | Age: 57
End: 2022-06-24

## 2022-06-24 NOTE — TELEPHONE ENCOUNTER
Reason for call:  Other   Patient called regarding (reason for call): appointment    Additional comments:   Pt calls because he has a phone appt with Qasim next week.  Pt reports the last few times she has written his suboxone refill she has included a rx for narcan.  Pt likes this and hopes she will do this again when he meets with her by phone next week    Phone number to reach patient:  [phone]    Best Time:  anytime    Can we leave a detailed message on this number?  YES    Travel screening: Not Applicable

## 2022-06-24 NOTE — TELEPHONE ENCOUNTER
Pt called intake and requested to speak with Dr. Strauss's nursing team. Pt would not say what he needed to talk about. Writer confirmed future appointment details with pt. Pt can be reached back at [phone]. Ok to leave a detailed message.

## 2022-06-30 ENCOUNTER — VIRTUAL VISIT (OUTPATIENT)
Dept: BEHAVIORAL HEALTH | Facility: CLINIC | Age: 57
End: 2022-06-30
Attending: PSYCHIATRY & NEUROLOGY
Payer: COMMERCIAL

## 2022-06-30 DIAGNOSIS — T40.2X5A THERAPEUTIC OPIOID INDUCED CONSTIPATION: ICD-10-CM

## 2022-06-30 DIAGNOSIS — K59.03 THERAPEUTIC OPIOID INDUCED CONSTIPATION: ICD-10-CM

## 2022-06-30 DIAGNOSIS — F11.21 OPIOID USE DISORDER, SEVERE, IN SUSTAINED REMISSION (H): Primary | ICD-10-CM

## 2022-06-30 PROCEDURE — 99214 OFFICE O/P EST MOD 30 MIN: CPT | Mod: TEL | Performed by: FAMILY MEDICINE

## 2022-06-30 RX ORDER — BUPRENORPHINE AND NALOXONE 8; 2 MG/1; MG/1
FILM, SOLUBLE BUCCAL; SUBLINGUAL
Qty: 90 FILM | Refills: 1 | Status: SHIPPED | OUTPATIENT
Start: 2022-06-30 | End: 2022-07-14

## 2022-06-30 RX ORDER — DOCUSATE SODIUM 100 MG/1
CAPSULE, LIQUID FILLED ORAL
Qty: 60 CAPSULE | Refills: 3 | Status: SHIPPED | OUTPATIENT
Start: 2022-06-30 | End: 2023-04-20

## 2022-06-30 RX ORDER — PHENTERMINE HYDROCHLORIDE 37.5 MG/1
TABLET ORAL
COMMUNITY
Start: 2022-05-17 | End: 2022-08-17

## 2022-06-30 ASSESSMENT — ANXIETY QUESTIONNAIRES
1. FEELING NERVOUS, ANXIOUS, OR ON EDGE: NOT AT ALL
6. BECOMING EASILY ANNOYED OR IRRITABLE: NOT AT ALL
4. TROUBLE RELAXING: NOT AT ALL
2. NOT BEING ABLE TO STOP OR CONTROL WORRYING: NOT AT ALL
7. FEELING AFRAID AS IF SOMETHING AWFUL MIGHT HAPPEN: NOT AT ALL
GAD7 TOTAL SCORE: 0
GAD7 TOTAL SCORE: 0
3. WORRYING TOO MUCH ABOUT DIFFERENT THINGS: NOT AT ALL
5. BEING SO RESTLESS THAT IT IS HARD TO SIT STILL: NOT AT ALL

## 2022-06-30 ASSESSMENT — PATIENT HEALTH QUESTIONNAIRE - PHQ9: SUM OF ALL RESPONSES TO PHQ QUESTIONS 1-9: 0

## 2022-06-30 NOTE — PROGRESS NOTES
[facility]    A/P                                                    ASSESSMENT/PLAN    1. Opioid use disorder, severe, in sustained remission (H)  Stable.  Continue Suboxone.  Confirmed he has Narcan.   - buprenorphine HCl-naloxone HCl (SUBOXONE) 8-2 MG per film; 2 sl qam, 1 sl qpm  Dispense: 90 Film; Refill: 1    2. Therapeutic opioid induced constipation  Continue Colace as needed.    - docusate sodium (COLACE) 100 MG capsule; TAKE 1 CAPSULE(100 MG) BY MOUTH TWICE DAILY as needed for constipation  Dispense: 60 capsule; Refill: 3      PDMP Review       Value Time User    State PDMP site checked  Yes 6/30/2022  1:03 PM Ernestina Tripp DO            RTC  Return in about 2 months (around 8/30/2022) for Follow up, with me, in person.        SUBJECTIVE                                                    Tobin Bryant is a 56 year old male with PMHx of HTN, morbid obesity, pre-diabetes, COPD/ashtma, YANIRA, anxiety, depression, and OUD who presents to clinic today for follow up.    Visit performed Virtual, via telephone - Phone call duration 8 minutes        Brief history of use:    Last use of illicit opioids was in approximately 2019.  Has been on buprenorphine for OUD since at least 2014.       Plan from most recent office visit (5/5/2022):  1. Opioid use disorder, severe, dependence (H)  Stable.  Symptoms well controlled.  He will continue Suboxone without change.  He was inquiring about Narcan for a friend who is struggling with opioid addiction.  Reviewed [facility] as good resources for friend.    - buprenorphine HCl-naloxone HCl (SUBOXONE) 8-2 MG per film; 2 sl qam, 1 sl qpm  Dispense: 90 Film; Refill: 1  - Drugs of Abuse 1+ Panel, Urine ([facility] Only); Standing    TODAY'S VISIT  HPI Jun 30, 2022  - Doing fine  - No concerns  - Taking Suboxone as prescribed, no side effects other than constipation which is well managed by colace  - Needs to get  new CPAP - rust in water container, plans to contact his clinic  - Leaving for [place] to visit family/friends for a few days  - No other changes to health    PHQ 1/14/2021 1/4/2022 6/30/2022   PHQ-9 Total Score 2 0 0   Q9: Thoughts of better off dead/self-harm past 2 weeks Not at all Not at all Not at all     SHABBIR-7 SCORE 2/10/2022 4/21/2022 6/30/2022   Total Score 1 0 0       OBJECTIVE                                                    PHYSICAL EXAM:  There were no vitals taken for this visit.    Speech is clear and coherent.  Thought process and content normal.  Mood is normal.  Affect is flat.  Future oriented.  No coughing or wheezing, speaking in full sentences.      LAB  No results found for any visits on 06/30/22.      HISTORY                                                    Problem list reviewed & adjusted, as indicated.  Patient Active Problem List   Diagnosis     Type 2 diabetes mellitus without complication, without long-term current use of insulin (H)     Morbid obesity (H)     YANIRA on CPAP     Essential hypertension     Recurrent major depressive disorder, in full remission (H)     Opioid use disorder, severe, dependence (H)         MEDICATION LIST (prior to visit)  ACCU-CHEK GUIDE test strip, USE TO TEST TWICE DAILY  albuterol (PROAIR HFA/PROVENTIL HFA/VENTOLIN HFA) 108 (90 Base) MCG/ACT inhaler, INHALE 2 PUFFS BY MOUTH EVERY 6 HOURS AS NEEDED FOR WHEEZING  ARIPiprazole (ABILIFY) 10 MG tablet, Take 1 tablet (10 mg) by mouth daily  buPROPion (WELLBUTRIN XL) 150 MG 24 hr tablet, Take 1 tablet (150 mg) by mouth every morning  furosemide (LASIX) 20 MG tablet, Take 1 tablet (20 mg) by mouth daily  naloxone (NARCAN) 4 MG/0.1ML nasal spray, Spray 1 spray (4 mg) into one nostril alternating nostrils once as needed for opioid reversal every 2-3 minutes until assistance arrives  phentermine (ADIPEX-P) 37.5 MG tablet,   sildenafil (VIAGRA) 100 MG tablet, Take 0.5-1 tablets ( mg) by mouth daily as needed  (ED)  aspirin (ASA) 81 MG EC tablet, Take 1 tablet (81 mg) by mouth daily (Patient not taking: Reported on 6/30/2022)  atorvastatin (LIPITOR) 20 MG tablet, Take 1 tablet (20 mg) by mouth daily (Patient not taking: Reported on 6/30/2022)    No current facility-administered medications on file prior to visit.      MEDICATION LIST (after visit)  Current Outpatient Medications   Medication     ACCU-CHEK GUIDE test strip     albuterol (PROAIR HFA/PROVENTIL HFA/VENTOLIN HFA) 108 (90 Base) MCG/ACT inhaler     ARIPiprazole (ABILIFY) 10 MG tablet     buprenorphine HCl-naloxone HCl (SUBOXONE) 8-2 MG per film     buPROPion (WELLBUTRIN XL) 150 MG 24 hr tablet     docusate sodium (COLACE) 100 MG capsule     furosemide (LASIX) 20 MG tablet     naloxone (NARCAN) 4 MG/0.1ML nasal spray     phentermine (ADIPEX-P) 37.5 MG tablet     sildenafil (VIAGRA) 100 MG tablet     aspirin (ASA) 81 MG EC tablet     atorvastatin (LIPITOR) 20 MG tablet     No current facility-administered medications for this visit.         Allergies   Allergen Reactions     Seafood Other (See Comments)     Eyes turn yellow     Ibuprofen Nausea and Vomiting       Ernestina Tripp, [facility]  [facility]  [phone]

## 2022-06-30 NOTE — PROGRESS NOTES
Medications Phoned  to Pharmacy [] yes [x]no     Medications ordered this visit were e-scribed.  Verified by order class [x] yes  [] no  List medications sent to pharmacy: Suboxone 8-2mg, docusate sodium 100mg  Medication changes or discontinuations were communicated to patient's pharmacy: [] yes  [x] no     Dictation completed at time of chart check: [x] yes  [] no     I have checked the documentation for today s encounters and the above information has been reviewed and completed.        Tamra Calderon, JOSS June 30, 2022 4:07 PM

## 2022-06-30 NOTE — PROGRESS NOTES
This video/telephone visit will be conducted via a call between you and your physician/provider. We have found that certain health care needs can be provided without the need for an in-person physical exam. This service lets us provide the care you need with a video /telephone conversation. If a prescription is necessary we can send it directly to your pharmacy. If lab work is needed we can place an order for that and you can then stop by our lab to have the test done at a later time.    Just as we bill insurance for in-person visits, we also bill insurance for video/telephone visits. If you have questions about your insurance coverage, we recommend that you speak with your insurance company.    Patient has given verbal consent for video/Telephone visit? Yes    Patient would like telephone visit, please connect : [phone].   Reason for visit: Follow up  Patient verified allergies, medications and pharmacy via telephone    SHABBIR-7 scores:    SHABBIR-7 SCORE 4/21/2022 6/30/2022   Total Score 0 0     PHQ-9 scores:   PHQ-9 SCORE 1/4/2022 6/30/2022   PHQ-9 Total Score 0 0     Patient states he is ready for visit.  Medication refill is pended for review and approval by provider.  [place]  to be reviewed by provider.   Tamra Calderon CMA June 30, 2022 12:47 PM

## 2022-07-14 ENCOUNTER — TELEPHONE (OUTPATIENT)
Dept: ADDICTION MEDICINE | Facility: CLINIC | Age: 57
End: 2022-07-14

## 2022-07-14 DIAGNOSIS — F11.21 OPIOID USE DISORDER, SEVERE, IN SUSTAINED REMISSION (H): ICD-10-CM

## 2022-07-14 RX ORDER — BUPRENORPHINE AND NALOXONE 8; 2 MG/1; MG/1
FILM, SOLUBLE BUCCAL; SUBLINGUAL
Qty: 90 FILM | Refills: 1 | Status: SHIPPED | OUTPATIENT
Start: 2022-07-14 | End: 2022-08-30

## 2022-07-14 NOTE — TELEPHONE ENCOUNTER
Reason for call:  Other   Patient called regarding (reason for call): prescription  Additional comments: Pt stated that the [facility] that his Suboxone Prescription was sent to is closed. He wants it to be sent to another pharmacy.    Phone number to reach patient:  Cell number on file:    Telephone Information:   Mobile [phone]       Best Time:  Any    Can we leave a detailed message on this number?  YES    Travel screening: Not Applicable

## 2022-07-14 NOTE — TELEPHONE ENCOUNTER
Reason for call:  Other   Patient called regarding (reason for call): call back  Additional comments: pt called back wants re-fills sent to   [address], [place] [address] - [facility] Phone: [phone]    Phone number to reach patient:  Home number on file [phone] (home)    Best Time:  any    Can we leave a detailed message on this number?  YES    Travel screening: Not Applicable

## 2022-07-14 NOTE — TELEPHONE ENCOUNTER
RN called [facility] [address] and confirmed the pharmacy is currently temporarily closed.     RN to pend medication to preferred pharmacy        Adry Tsai RN on 7/14/2022 at 3:17 PM

## 2022-07-18 ENCOUNTER — TELEPHONE (OUTPATIENT)
Dept: BEHAVIORAL HEALTH | Facility: CLINIC | Age: 57
End: 2022-07-18

## 2022-07-18 NOTE — TELEPHONE ENCOUNTER
Left message for patient to return call with updated pharmacy. Also asked if patient needs any refills. Will wait for patient to return call.  Tamra Calderon CMA on 7/18/2022 at 11:29 AM

## 2022-07-18 NOTE — TELEPHONE ENCOUNTER
Reason for Call:  New Pharmacy    Detailed comments: Received call from patient, stated his previous Pharmacy location is closed, and he would like to give the information for his new Pharmacy.     Phone Number Patient can be reached at: [phone]    Best Time: ASAP    Can we leave a detailed message on this number? Yes    Call taken on 7/18/2022 at 8:19 AM by Jesse Knapp

## 2022-07-19 NOTE — TELEPHONE ENCOUNTER
RN called [facility]'s pharmacy on [address] to clarify patients buprenorphine prescription. They confirm that a refill remains for a 30 day supply that can be filled as early as 7/27/2022. They report that his other [facility]'s pharmacy on [address] is having construction issues but is not closed. Pharmacy reports patient last filled a 30 day supply of buprenorphine on 7/1/2022.    RN called to update patient that his new pharmacy choice is entered into the computer and that there is a refill that will be available on 7/27/2022. Patient had no further questions or concerns.     Carrie Joyce RN on 7/19/2022 at 11:02 AM

## 2022-07-19 NOTE — TELEPHONE ENCOUNTER
Patient called to request we send his refill of suboxone to:      [id number] - [place], [place] - [address] [place] [address] AT [facility] [address]   [phone]      Next appt 8/30.

## 2022-08-03 ENCOUNTER — OFFICE VISIT (OUTPATIENT)
Dept: INTERNAL MEDICINE | Facility: CLINIC | Age: 57
End: 2022-08-03
Payer: COMMERCIAL

## 2022-08-03 VITALS
HEART RATE: 70 BPM | BODY MASS INDEX: 45.2 KG/M2 | DIASTOLIC BLOOD PRESSURE: 74 MMHG | WEIGHT: 315 LBS | SYSTOLIC BLOOD PRESSURE: 126 MMHG | OXYGEN SATURATION: 93 %

## 2022-08-03 DIAGNOSIS — E11.9 TYPE 2 DIABETES MELLITUS WITHOUT COMPLICATION, WITHOUT LONG-TERM CURRENT USE OF INSULIN (H): Primary | ICD-10-CM

## 2022-08-03 DIAGNOSIS — J44.1 CHRONIC OBSTRUCTIVE PULMONARY DISEASE WITH ACUTE EXACERBATION (H): ICD-10-CM

## 2022-08-03 DIAGNOSIS — I10 ESSENTIAL HYPERTENSION: ICD-10-CM

## 2022-08-03 DIAGNOSIS — E66.01 MORBID OBESITY (H): ICD-10-CM

## 2022-08-03 DIAGNOSIS — F11.20 OPIOID USE DISORDER, SEVERE, DEPENDENCE (H): ICD-10-CM

## 2022-08-03 DIAGNOSIS — G47.33 OSA ON CPAP: ICD-10-CM

## 2022-08-03 LAB — HBA1C MFR BLD: 5.8 % (ref 0–5.6)

## 2022-08-03 PROCEDURE — 36416 COLLJ CAPILLARY BLOOD SPEC: CPT | Performed by: INTERNAL MEDICINE

## 2022-08-03 PROCEDURE — 83036 HEMOGLOBIN GLYCOSYLATED A1C: CPT | Performed by: INTERNAL MEDICINE

## 2022-08-03 PROCEDURE — 99214 OFFICE O/P EST MOD 30 MIN: CPT | Performed by: INTERNAL MEDICINE

## 2022-08-03 RX ORDER — ATORVASTATIN CALCIUM 20 MG/1
20 TABLET, FILM COATED ORAL DAILY
Qty: 90 TABLET | Refills: 3 | Status: SHIPPED | OUTPATIENT
Start: 2022-08-03 | End: 2022-09-02

## 2022-08-03 RX ORDER — ALBUTEROL SULFATE 90 UG/1
AEROSOL, METERED RESPIRATORY (INHALATION)
Qty: 18 G | Refills: 3 | Status: SHIPPED | OUTPATIENT
Start: 2022-08-03 | End: 2023-10-06

## 2022-08-03 ASSESSMENT — ANXIETY QUESTIONNAIRES
GAD7 TOTAL SCORE: 0
8. IF YOU CHECKED OFF ANY PROBLEMS, HOW DIFFICULT HAVE THESE MADE IT FOR YOU TO DO YOUR WORK, TAKE CARE OF THINGS AT HOME, OR GET ALONG WITH OTHER PEOPLE?: NOT DIFFICULT AT ALL
1. FEELING NERVOUS, ANXIOUS, OR ON EDGE: NOT AT ALL
3. WORRYING TOO MUCH ABOUT DIFFERENT THINGS: NOT AT ALL
7. FEELING AFRAID AS IF SOMETHING AWFUL MIGHT HAPPEN: NOT AT ALL
5. BEING SO RESTLESS THAT IT IS HARD TO SIT STILL: NOT AT ALL
2. NOT BEING ABLE TO STOP OR CONTROL WORRYING: NOT AT ALL
4. TROUBLE RELAXING: NOT AT ALL
GAD7 TOTAL SCORE: 0
6. BECOMING EASILY ANNOYED OR IRRITABLE: NOT AT ALL
GAD7 TOTAL SCORE: 0
IF YOU CHECKED OFF ANY PROBLEMS ON THIS QUESTIONNAIRE, HOW DIFFICULT HAVE THESE PROBLEMS MADE IT FOR YOU TO DO YOUR WORK, TAKE CARE OF THINGS AT HOME, OR GET ALONG WITH OTHER PEOPLE: NOT DIFFICULT AT ALL

## 2022-08-03 NOTE — PROGRESS NOTES
Office Visit - Follow Up   Tobin Bryant   57 year old male    Date of Visit: 8/3/2022    Chief Complaint   Patient presents with     Follow Up        Assessment and Plan   1. Type 2 diabetes mellitus without complication, without long-term current use of insulin (H)  Has been well controlled through diet and exercise.  Recommend he continue with atorvastatin and aspirin.  He is no longer smoking.  Recommend excellent diabetic foot care and annual diabetic eye exam  - blood glucose monitoring (NO BRAND SPECIFIED) meter device kit; Use to test blood sugar two times daily or as directed.  Dispense: 1 kit; Refill: 3  - atorvastatin (LIPITOR) 20 MG tablet; Take 1 tablet (20 mg) by mouth daily  Dispense: 90 tablet; Refill: 3  - aspirin (ASA) 81 MG EC tablet; Take 1 tablet (81 mg) by mouth daily  Dispense: 90 tablet; Refill: 3  - Hemoglobin A1c; Future    2. Essential hypertension  Blood pressures controlled, continue same    3. YANIRA on CPAP    4. Opioid use disorder, severe, dependence (H)  He is on Suboxone for the opioid clinic    5. Chronic obstructive pulmonary disease with acute exacerbation (H)  Stable not smoking  - albuterol (PROAIR HFA/PROVENTIL HFA/VENTOLIN HFA) 108 (90 Base) MCG/ACT inhaler; INHALE 2 PUFFS BY MOUTH EVERY 6 HOURS AS NEEDED FOR WHEEZING  Dispense: 18 g; Refill: 3    6. Morbid obesity (H)  The following high BMI interventions were performed this visit: encouragement to exercise and lifestyle education regarding diet    Return in about 3 months (around 11/3/2022) for Follow up.     History of Present Illness   This 57 year old man comes in for follow-up.  Overall he is stable.  He would like a prescription for a glucometer.  He is no longer taking atorvastatin.  He thought this was a short-term medication.  He is also not on aspirin.  His mood is generally been stable.  Continues to try and lose weight.  No chest pain or shortness of breath.    Review of Systems: A comprehensive review of  systems was negative except as noted.     Medications, Allergies and Problem List   Reviewed, reconciled and updated  Post Discharge Medication Reconciliation Status:      Physical Exam   General Appearance:   No acute distress    /74 (BP Location: Left arm, Patient Position: Sitting, Cuff Size: Adult Large)   Pulse 70   Wt 142.9 kg (315 lb)   SpO2 93%   BMI 45.20 kg/m           Additional Information   Current Outpatient Medications   Medication Sig Dispense Refill     ACCU-CHEK GUIDE test strip USE TO TEST TWICE DAILY 200 strip 3     albuterol (PROAIR HFA/PROVENTIL HFA/VENTOLIN HFA) 108 (90 Base) MCG/ACT inhaler INHALE 2 PUFFS BY MOUTH EVERY 6 HOURS AS NEEDED FOR WHEEZING 18 g 3     ARIPiprazole (ABILIFY) 10 MG tablet Take 1 tablet (10 mg) by mouth daily       aspirin (ASA) 81 MG EC tablet Take 1 tablet (81 mg) by mouth daily 90 tablet 3     atorvastatin (LIPITOR) 20 MG tablet Take 1 tablet (20 mg) by mouth daily 90 tablet 3     blood glucose monitoring (NO BRAND SPECIFIED) meter device kit Use to test blood sugar two times daily or as directed. 1 kit 3     buprenorphine HCl-naloxone HCl (SUBOXONE) 8-2 MG per film 2 sl qam, 1 sl qpm 90 Film 1     buPROPion (WELLBUTRIN XL) 150 MG 24 hr tablet Take 1 tablet (150 mg) by mouth every morning       docusate sodium (COLACE) 100 MG capsule TAKE 1 CAPSULE(100 MG) BY MOUTH TWICE DAILY as needed for constipation 60 capsule 3     furosemide (LASIX) 20 MG tablet Take 1 tablet (20 mg) by mouth daily       naloxone (NARCAN) 4 MG/0.1ML nasal spray Spray 1 spray (4 mg) into one nostril alternating nostrils once as needed for opioid reversal every 2-3 minutes until assistance arrives 0.2 mL 11     phentermine (ADIPEX-P) 37.5 MG tablet        Allergies   Allergen Reactions     Seafood Other (See Comments)     Eyes turn yellow     Ibuprofen Nausea and Vomiting     Social History     Tobacco Use     Smoking status: Never Smoker     Smokeless tobacco: Never Used   Substance  Use Topics     Alcohol use: No     Drug use: Not Currently       Time:      Pawan Castro MD  Answers for HPI/ROS submitted by the patient on 8/3/2022  SHABBIR 7 TOTAL SCORE: 0  Frequency of checking blood sugars:: not at all  Diabetic concerns:: none  Paraesthesia present:: numbness in feet  How many servings of fruits and vegetables do you eat daily?: 0-1  On average, how many sweetened beverages do you drink each day (Examples: soda, juice, sweet tea, etc.  Do NOT count diet or artificially sweetened beverages)?: 1  How many minutes a day do you exercise enough to make your heart beat faster?: 10 to 19  How many days a week do you exercise enough to make your heart beat faster?: 3 or less  How many days per week do you miss taking your medication?: 0

## 2022-08-03 NOTE — LETTER
August 3, 2022      Robles Bryant  [address]  [place] [address]        Dear ArabellaAnnette,    We are writing to inform you of your test results.    Your diabetes marker looks okay, excellent    Resulted Orders   Hemoglobin A1c   Result Value Ref Range    Hemoglobin A1C 5.8 (H) 0.0 - 5.6 %      Comment:      Normal <5.7%   Prediabetes 5.7-6.4%    Diabetes 6.5% or higher     Note: Adopted from ADA consensus guidelines.       If you have any questions or concerns, please call the clinic at the number listed above.       Sincerely,      Pawan Castro MD

## 2022-08-04 ENCOUNTER — NURSE TRIAGE (OUTPATIENT)
Dept: NURSING | Facility: CLINIC | Age: 57
End: 2022-08-04

## 2022-08-04 ENCOUNTER — TELEPHONE (OUTPATIENT)
Dept: INTERNAL MEDICINE | Facility: CLINIC | Age: 57
End: 2022-08-04

## 2022-08-04 NOTE — TELEPHONE ENCOUNTER
"Patient is calling and is having pain in his feet.  Pain is present when he wakes up in the morning.  Denies diabetes.  Pain has been present for a few weeks.  Patient denies fall and injury. Patient states that he was seen yesterday but forgot to ask MD about the pain in his feet.  Patient is able to walk.  Pain occurs in morning upon awakening.  Patient states that he has slight numbness.  Patient is requesting a message be sent to MD Castro as he wants to know what is causing the pain.  Denies feet are cool or blue and denies purple or black skin.  Denies feet are swollen.  Pain in feet is not severe.  Denies boil and denies ulcer.  Patient states that in am pain interferes with activities as he feels that he is \"walking on ice\".  Please phone patient.      Reason for Disposition    MODERATE pain (e.g., interferes with normal activities, limping) and present > 3 days    Additional Information    Negative: Entire foot is cool or blue in comparison to other foot    Negative: Purple or black skin on foot or toe    Negative: Red area or streak and fever    Negative: Swollen foot and fever    Negative: Patient sounds very sick or weak to the triager    Negative: SEVERE pain (e.g., excruciating, unable to do any normal activities)    Negative: Looks like a boil, infected sore, deep ulcer, or other infected rash (spreading redness, pus)    Negative: Swollen foot (Exceptions: localized bump from bunions, calluses, insect bite, sting)    Negative: Numbness in one foot (i.e., loss of sensation)    Protocols used: FOOT PAIN-A-OH      "

## 2022-08-04 NOTE — TELEPHONE ENCOUNTER
08/04 I called and left a message for pt to call and schedule an appt with Matthew Levine [phone] hit option 2 to talk to care team they will fit in

## 2022-08-17 DIAGNOSIS — E66.01 MORBID OBESITY (H): Primary | ICD-10-CM

## 2022-08-17 RX ORDER — PHENTERMINE HYDROCHLORIDE 37.5 MG/1
18.75-37.5 TABLET ORAL
Qty: 90 TABLET | Refills: 1 | Status: SHIPPED | OUTPATIENT
Start: 2022-08-17 | End: 2022-08-18

## 2022-08-18 DIAGNOSIS — E66.01 MORBID OBESITY (H): ICD-10-CM

## 2022-08-18 RX ORDER — PHENTERMINE HYDROCHLORIDE 37.5 MG/1
18.75-37.5 TABLET ORAL
Qty: 90 TABLET | Refills: 1 | Status: SHIPPED | OUTPATIENT
Start: 2022-08-18 | End: 2022-10-26

## 2022-08-19 ENCOUNTER — VIRTUAL VISIT (OUTPATIENT)
Dept: SURGERY | Facility: CLINIC | Age: 57
End: 2022-08-19
Payer: COMMERCIAL

## 2022-08-19 VITALS — WEIGHT: 315 LBS | BODY MASS INDEX: 45.2 KG/M2

## 2022-08-19 DIAGNOSIS — E66.01 OBESITY, CLASS III, BMI 40-49.9 (MORBID OBESITY) (H): ICD-10-CM

## 2022-08-19 DIAGNOSIS — E11.9 TYPE 2 DIABETES MELLITUS WITHOUT COMPLICATION, WITHOUT LONG-TERM CURRENT USE OF INSULIN (H): Primary | ICD-10-CM

## 2022-08-19 PROCEDURE — 97803 MED NUTRITION INDIV SUBSEQ: CPT | Mod: TEL | Performed by: DIETITIAN, REGISTERED

## 2022-08-19 NOTE — PATIENT INSTRUCTIONS
Clinic number is [phone]     Keys for Success for Managing Weight    Eat three meals per day- first meal within an hour of waking and other meals 4-6 after  Protein first, aim for about 20-30g protein per meal focusing on lean proteins with 10g total fat or less per serving  Follow with fiber- non-starchy veggies are free, aim for at least 2 servings per day. One serving is 1/2 cup cooked or 1 cup raw (see list below)  Choose slow digesting carbohydrates- carbs are important for your brain and energy level, so aim for a carb at every meal. Higher fiber carbs include fruit, sweet potato, corn, peas, whole grain/wheat bread and pasta, beans, quinoa, brown rice.  Drink plenty of water- aim for at least 64oz per day  Intentional movement daily- this could be a walk, going to the gym, doing a [employer] video, stretching, yoga, weights, marching in place, etc.    LEAN PROTEIN SOURCES    Protein Source Portion Calories Grams of Protein                           Nonfat, plain Greek yogurt    (10 grams sugar or less) 3/4 cup (6 oz)  12-17   Light Yogurt (10 grams sugar or less) 3/4 cup (6 oz)  6-8   Protein Shake 1 shake 110-180 15-30   Skim/1% Milk or lactose-free milk 1 cup ( 8 oz)  8   Plain or light, flavored soymilk 1 cup  7-8   Plain or light, hemp milk 1 cup 110 6   Fat Free or 1% Cottage Cheese 1/2 cup 90 15   Part skim ricotta cheese 1/2 cup 100 14   Part skim or reduced fat cheese slices 1/4cup, 3 dice 65-80 8     Mozzarella String Cheese 1 80 8   Canned tuna, chicken, crab or salmon  (canned in water)  1/2 cup 100 15-20   White fish (broiled, grilled, baked) 3 ounces 100 21   [place]/Tuna (broiled, grilled, baked) 3 ounces 150-180 21   Shrimp, Scallops, Lobster, Crab 3 ounces 100 21   Pork loin, Pork Tenderloin 3 ounces 150 21   Boneless, skinless chicken /turkey breast                          (broiled, grilled, baked) 3 ounces 120 21   [place], [place], [place], and Venison 3 ounces 120 21    Lean cuts of red meat and pork (sirloin,   round, tenderloin, flank, ground 93%-96%) 3 ounces 170 21   Lean or Extra Lean Ground Turkey 1/2 cup 150 20   90-95% Lean Brandon Burger 1 rubén 140-180 21   Low-fat casserole with lean meat 3/4 cup 200 17   Luncheon Meats                                                        (turkey, lean ham, roast beef, chicken) 3 ounces 100 21   Egg (boiled, poached, scrambled) 1 Egg 60 7   Egg Substitute 1/2 cup 70 10   Nuts (limit to 1 serving per day)  3 Tbsp. 150 7   Nut [place] (peanut, almond)  Limit to 1 serving or less daily 1 Tbsp. 90 4   Soy Burger (varies) 1  10-15   Garbanzo, Black, Donaldson Beans/Edamame 1/2 cup 110 7-9   Refried Beans 1/2 cup 100 7   Kidney and Lima beans 1/2 cup 110 7   Tempeh 3 oz 175 18   Vegan crumbles 1/2 cup 100 14   Tofu 1/2 cup 110 14   Chili (beans and extra lean beef or turkey) 1 cup 200 23   Lentils 1/2 cup 115 9   Black Bean Soup 1 cup 175 12     Non-Starchy Veggies    Carrots  Broccoli  Lettuce   Spinach  Tomatoes  Onions  Peppers  Cauliflower  Green Beans  Sugar Snap Peas  Beets  Celery  Cucumbers  And many more!    Tips to Eat More Fiber    Why is fiber important?  Fiber: is found in fruits, vegetables, whole grains, legumes (dried beans and peas), nuts and seeds.    What can fiber do to help me?   Reduce high bloodcholesterol  Reduce high blood pressure  Manage body weight  Prevent constipation and relieve hemorrhoids  Improve blood sugar control  Reduce colon cancer risk    How can I increase the fiber inmy diet?  Eat more fruits and vegetables - at least 2 cups of fruit and 2-1/2 cups of vegetables each day.  Choose whole (fresh, frozen or dried) vegetables andfruits over juices, which have most of the fiber removed.   Include legumes (i.e., dried beans and peas) with your meals regularly; increase your intake of these foods gradually to limit the gaseous side effects.nuts and seeds several times a week, which also contain  monounsaturated fats and can help control blood cholesterol levels.  Increase the amount of fiber in your diet gradually, using a variety of food sources.Try to include one fiber-rich food in every meal.  Drink plenty of water to enhance fiber's effectiveness and to prevent constipation.    What are some other ways to  sneak fiber into my diet?  Sprinkle flax meal, wheat germ, nuts and/ or seeds onto cold or hot cereal, yogurt, cottage cheese or Greek yogurt.  Serve entrees like steak, chicken orfish on a  bed  or grilled zucchini and peppers, sautéed spinach or kale, sautéed onions and mushrooms, or grated carrots and slivered beets.  Try different types of whole grains for variety- like barley, kasha, bulgur, quinoa, wild rice, and couscous. Also try other pasta varieties - like whole wheat, brown rice or quinoa pastas.  Choose whole-grain breads, pastas, and rice instead of whitevarieties of the same foods  Select high-fiber breakfast cereals--read the Nutrition Facts label to find cereals that have at least 5 g dietary fiber/serving    Try oatmeal for breakfast topped with Greek yogurt and fruit    Quick and Easy Meals for Rotational Menus    Salad kit with rotisserie chicken, eggs, lunch meat, beans or tuna    Lunch meat sandwich or wrap with veggies (sugar snap peas, bell pepper slices, carrot sticks, celery, sliced cucumber, cherry tomatoes, etc.)    Greek or light yogurt with a handful of nuts and fruit    Cottage cheese, cherry tomatoes and Triscuit crackers    Refried bean and cheese quesadilla on whole wheat tortilla    Can of Progresso Light or Cambells Well Yes soup    Hemet cakes pancake or waffles with peanut butter or berries    Baked sweet potato with black beans and salsa and a side salad    Adult lunchable: lunch meat, string cheese, crackers and veggies    Protein pasta salad: whole wheat or bean pasta with chicken sausage, broccoli and italian dressing    Egg sandwich on english muffin: egg,  slice of cheese and piece of ham    Grilled cheese and turkey sandwich with a side salad or cup of soup    BBQ Flatbread: Flat Out high protein flatbread with rotisserie chicken, BBQ sauce and red onion, topped with mozzarella cheese and baked in the oven

## 2022-08-19 NOTE — LETTER
8/19/2022         RE: Tobin Bryant  [address]  [place] [address]        Dear Colleague,    Thank you for referring your patient, Tobin Bryant, to the [facility] [place]. Please see a copy of my visit note below.    Tobin Bryant is a 57 year old who is being evaluated via a billable telephone visit.      What phone number would you like to be contacted at? [phone]  How would you like to obtain your AVS? [facility]  Weight Loss Follow-Up Diet Evaluation  Assessment:  Tobin is presenting today for a follow up weight management nutrition consultation. Pt has had an initial appointment with Dr. Burnham  Weight loss medication: Phentermine.  Pt's weight is 315 lbs 0 oz  Initial weight: 324lb  Weight change: down 9lb- down 10 from previous visit in April    BMI: Body mass index is 45.2 kg/m .  Ideal body weight: 73 kg (160 lb 15 oz)  Adjusted ideal body weight: 101 kg (222 lb 9 oz)    Estimated RMR (Cedar-St Jeor equation):   2274 kcals x 1.2 (sedentary) = 2729 kcals (for weight maintenance)  Recommended Protein Intake: 60-80 grams of protein/day  Patient Active Problem List:  Patient Active Problem List   Diagnosis     Type 2 diabetes mellitus without complication, without long-term current use of insulin (H)     Morbid obesity (H)     YANIRA on CPAP     Essential hypertension     Recurrent major depressive disorder, in full remission (H)     Opioid use disorder, severe, dependence (H)     Diabetes: most recent A1c 5.8     Progress on goals from last visit: states he hasn't been eating, very much, reduced sweet  Goals:  1. Activity 3-4 days per week- walking around    Beverages: water is ok, drinking regular soda- not daily  Exercise: got on his stationery bike a few times this summer, hopes to get on in more this fall  Nutrition Diagnosis:    1. Overweight/Obesity (NC 3.3) related to overeating and poor lifestyle habits as evidenced  by patient's report of infrequent meals, lack of activity, snacking on sweets and BMI 45.2      Intervention:  1. Food and/or nutrient delivery: consistency with diet- reducing sweets, sugars and portion sizes  2. Nutrition counseling: move when able    Monitoring/Evaluation:    Goals:  1. Continue with current plan    Patient to follow up in 2 month(s) with RD        Phone call duration: 15 minutes    TONE CORCORAN RD        Again, thank you for allowing me to participate in the care of your patient.        Sincerely,        TONE CORCORAN RD

## 2022-08-19 NOTE — PROGRESS NOTES
Tobin Bryant is a 57 year old who is being evaluated via a billable telephone visit.      What phone number would you like to be contacted at? [phone]  How would you like to obtain your AVS? [facility]  Weight Loss Follow-Up Diet Evaluation  Assessment:  Tobin is presenting today for a follow up weight management nutrition consultation. Pt has had an initial appointment with Dr. Burnham  Weight loss medication: Phentermine.  Pt's weight is 315 lbs 0 oz  Initial weight: 324lb  Weight change: down 9lb- down 10 from previous visit in April    BMI: Body mass index is 45.2 kg/m .  Ideal body weight: 73 kg (160 lb 15 oz)  Adjusted ideal body weight: 101 kg (222 lb 9 oz)    Estimated RMR (Bartelso-St Jeor equation):   2274 kcals x 1.2 (sedentary) = 2729 kcals (for weight maintenance)  Recommended Protein Intake: 60-80 grams of protein/day  Patient Active Problem List:  Patient Active Problem List   Diagnosis     Type 2 diabetes mellitus without complication, without long-term current use of insulin (H)     Morbid obesity (H)     YANIRA on CPAP     Essential hypertension     Recurrent major depressive disorder, in full remission (H)     Opioid use disorder, severe, dependence (H)     Diabetes: most recent A1c 5.8     Progress on goals from last visit: states he hasn't been eating, very much, reduced sweet  Goals:  1. Activity 3-4 days per week- walking around    Beverages: water is ok, drinking regular soda- not daily  Exercise: got on his stationery bike a few times this summer, hopes to get on in more this fall  Nutrition Diagnosis:    1. Overweight/Obesity (NC 3.3) related to overeating and poor lifestyle habits as evidenced by patient's report of infrequent meals, lack of activity, snacking on sweets and BMI 45.2      Intervention:  1. Food and/or nutrient delivery: consistency with diet- reducing sweets, sugars and portion sizes  2. Nutrition counseling: move when  able    Monitoring/Evaluation:    Goals:  1. Continue with current plan    Patient to follow up in 2 month(s) with RD        Phone call duration: 15 minutes    TONE CORCORAN RD

## 2022-08-23 NOTE — TELEPHONE ENCOUNTER
8/23/2022       RE: Alvaro Partida  [address]  [place] [address]     Dear Colleague,    Thank you for referring your patient, Alvaro Partida, to the [facility] DERMATOLOGY CLINIC [place] at [facility]. Please see a copy of my visit note below.    [facility] Dermatology Note  Encounter Date: Aug 23, 2022  Office Visit     Dermatology Problem List:  1. Actinic Keratosis of left temporal region              -s/p cryotherapy 4/3/2019   2. Ruptured folliculitis, right anterior shoulder, biopsied 3/5/18  3. Jxnl nevus, left forearm, biopsy 4/3/2019  4. Dermatofibroma, right dorsal arm, bx 5/6/20   5. Seborrheic keratosis, irritated. On right forearm x3.               -s/p cryotherapy on 6/30/21  6. Possible porokeratosis of the L post ankle               - continue to use at home clobetasol as needed.   Social: wife in treatment for stage 4 melanoma (dx 10 yrs ago)  ____________________________________________    Assessment & Plan:    # Inflamed seborrheic keratoses, R shin x 2, R anterior shoulder x 1.  - Cryotherapy as below    # Corn. Acute, uncomplicated.  # Callus. Acute, uncomplicated.  - Recommend OTC salicyclic acid pads; recommended avoiding tight-fitting clothes.     # Hx NMSC.   - Sun precaution was advised including the use of sun screens of SPF 30 or higher, sun protective clothing, and avoidance of tanning beds.  - Follow-up in May 2023 for FBSE with Dr. Retana    Procedures Performed:   - Cryotherapy procedure note, location(s): as above. After verbal consent and discussion of risks and benefits including, but not limited to, dyspigmentation/scar, blister, and pain, 3 inflamed SK lesion(s) was(were) treated with 1-2 mm freeze border for 1-2 cycles with liquid nitrogen. Post cryotherapy instructions were provided.    Follow-up: 9 month(s) in-person, or earlier for new or changing lesions    Staff and Scribe:     Scribe  Who is calling:  Patient   Reason for Call:  Asking for a list of foods he can eat with his Diabetes? Please advise and mail to his home address.  Date of last appointment with primary care: 7/11/2019  Okay to leave a detailed message: Yes       "Disclosure:  I, RAMAN FELIX, am serving as a scribe to document services personally performed by Maximiliano Laughlin MD based on data collection and the provider's statements to me.     Provider Disclosure:   The documentation recorded by the scribe accurately reflects the services I personally performed and the decisions made by me.    Maximiliano Laughlin MD    Department of Dermatology  [facility]: Phone: [phone], Fax:[phone]  [facility]: Phone: [phone] Fax: [phone]  ____________________________________________    CC: Derm Problem (Don is here for a few spots of concern.  Right ankle, left foot, and right shoulder.  )    HPI:  Mr. Alvaro Partida is a(n) 62 year old male who presents today as a return patient for spot checks. Last seen by Dr. Retana on 22, at which time patient underwent cryotherapy for treatment of AKs on the left cheek and right ear helix.     Today, he reports a few areas of concern includin. Right shin- he reports two \"warty\" spot that were treated with cryotherapy by his PCP but not resolved completed. No pain, tenderness or bleeding. Mildly itchy.   2. Left foot. Reports area of skin thickening and tenderness on the lateral left lateral side of the plantar foot.   3. Right shoulder. Itchy brown spot. No pain, tenderness.     The patient otherwise denies any new or concerning lesions. No bleeding, painful, pruritic, or changing lesions. Health otherwise stable. No other skin concerns.     Labs Reviewed:  N/A    Physical Exam:  Vitals: There were no vitals taken for this visit.  SKIN: Focused examination of right shoulder, distal lower extremities was performed.  - Well-demarcated light brown, verrucous appearing papules on the right shin x 2, and right anterior shoulder x 1.  - Hyperkeratotic plaque with small central keratinous " plug/invagination on the left lateral plantar foot  - No other lesions of concern on areas examined.     Medications:  Current Outpatient Medications   Medication     cetirizine (ZYRTEC) 10 MG tablet     fluticasone (FLONASE) 50 MCG/ACT nasal spray     Naproxen Sodium (ALEVE PO)     rosuvastatin (CRESTOR) 5 MG tablet     ASPIRIN PO     loratadine (CLARITIN) 10 MG tablet     multivitamin w/minerals (THERA-VIT-M) tablet     Omega-3 Fatty Acids (OMEGA-3 FISH OIL PO)     No current facility-administered medications for this visit.      Past Medical History:   Patient Active Problem List   Diagnosis     Lichen simplex     Tubular adenoma of colon, 0.2cm [facility].     Seasonal allergic rhinitis due to pollen     Vasculogenic erectile dysfunction     Past Medical History:   Diagnosis Date     Elevated cholesterol      Seasonal allergies      Syncopal episodes 2006    EKG, CXR, CT, labs nl

## 2022-08-30 ENCOUNTER — OFFICE VISIT (OUTPATIENT)
Dept: ADDICTION MEDICINE | Facility: CLINIC | Age: 57
End: 2022-08-30
Payer: COMMERCIAL

## 2022-08-30 VITALS
DIASTOLIC BLOOD PRESSURE: 77 MMHG | SYSTOLIC BLOOD PRESSURE: 120 MMHG | WEIGHT: 313 LBS | BODY MASS INDEX: 44.91 KG/M2 | HEART RATE: 77 BPM

## 2022-08-30 DIAGNOSIS — F11.21 OPIOID USE DISORDER, SEVERE, IN SUSTAINED REMISSION (H): ICD-10-CM

## 2022-08-30 PROCEDURE — 99213 OFFICE O/P EST LOW 20 MIN: CPT | Performed by: FAMILY MEDICINE

## 2022-08-30 RX ORDER — BUPRENORPHINE AND NALOXONE 8; 2 MG/1; MG/1
FILM, SOLUBLE BUCCAL; SUBLINGUAL
Qty: 90 FILM | Refills: 1 | Status: SHIPPED | OUTPATIENT
Start: 2022-08-30 | End: 2022-10-25

## 2022-08-30 NOTE — PROGRESS NOTES
[facility] - Addiction Medicine       Rooming information:    Point of care urine drug screen positive for: Buprenorphine   ]  *POC urine drug screen does not screen for Fentanyl    PHQ-9 Scores:   PHQ 1/14/2021 1/4/2022 6/30/2022   PHQ-9 Total Score 2 0 0   Q9: Thoughts of better off dead/self-harm past 2 weeks Not at all Not at all Not at all     SHABBIR-7 Scores:  SHABBIR-7 SCORE 4/21/2022 6/30/2022 8/3/2022   Total Score - - 0 (minimal anxiety)   Total Score 0 0 0       Recent substance use:     Denies    NICOTINE-No  If using nicotine, ready to quit?N/A    Side effects related to medications pt would like to discuss with provider (constipation, dry mouth, HA, GI upset, sedation?) No   Narcan currently available: Yes    Primary care provider: Pawan Castro MD     Mental health provider: none (follow up on MH referral if needed)    Any housing, insurance deficits?: no    Contact information up to date? Yes    3rd Party Involvement none (please obtain ANAYELI if pt would like to include)        Tamra Calderon CMA  August 30, 2022  12:39 PM

## 2022-08-30 NOTE — PROGRESS NOTES
[facility]    A/P                                                    ASSESSMENT/PLAN    1. Opioid use disorder, severe, in sustained remission (H)  Stable/symptoms well controlled.  Continue Suboxone, no change.   Plan for follow-up in person in 2 months and then next visit will be virtual (easier to do virtual in December).  - buprenorphine HCl-naloxone HCl (SUBOXONE) 8-2 MG per film; 2 sl qam, 1 sl qpm  Dispense: 90 Film; Refill: 1  - naloxone (NARCAN) 4 MG/0.1ML nasal spray; Spray 1 spray (4 mg) into one nostril alternating nostrils once as needed for opioid reversal every 2-3 minutes until assistance arrives  Dispense: 0.2 mL; Refill: 11      PDMP Review       Value Time User    State PDMP site checked  Yes 8/30/2022  1:13 PM Ernestina Tripp DO            RTC  Return in about 2 months (around 10/30/2022) for Follow up, with me, in person.        YOSELIN Bryant is a 57 year old male with PMHx of HTN, morbid obesity, pre-diabetes, COPD/ashtma, YANIRA, anxiety, depression, and OUD who presents to clinic today for follow up.    Brief history of use:    Last use of illicit opioids was in approximately 2019.  Has been on buprenorphine for OUD since at least 2014.       Plan from most recent office visit (6/30/2022):  1. Opioid use disorder, severe, in sustained remission (H)  Stable.  Continue Suboxone.  Confirmed he has Narcan.   - buprenorphine HCl-naloxone HCl (SUBOXONE) 8-2 MG per film; 2 sl qam, 1 sl qpm  Dispense: 90 Film; Refill: 1     2. Therapeutic opioid induced constipation  Continue Colace as needed.    - docusate sodium (COLACE) 100 MG capsule; TAKE 1 CAPSULE(100 MG) BY MOUTH TWICE DAILY as needed for constipation  Dispense: 60 capsule; Refill: 3    TODAY'S VISIT  HPI Aug 30, 2022  - No concerns about Suboxone, no side effects (other than constipation, which is well managed with colace), no opioid cravings  -  Has lost 16 lbs since May, working with weight loss clinic   - Working on keeping blood sugars well controlled - recent visit with PCP reviewed  - Spending time with a new female friend  - No other health concerns or complaints    SHABBIR-7 SCORE 4/21/2022 6/30/2022 8/3/2022   Total Score - - 0 (minimal anxiety)   Total Score 0 0 0       PHQ 1/14/2021 1/4/2022 6/30/2022   PHQ-9 Total Score 2 0 0   Q9: Thoughts of better off dead/self-harm past 2 weeks Not at all Not at all Not at all       OBJECTIVE                                                    PHYSICAL EXAM:  /77 (BP Location: Right arm, Patient Position: Sitting, Cuff Size: Adult Large)   Pulse 77   Wt 142 kg (313 lb)   BMI 44.91 kg/m      GENERAL: healthy, alert and no distress  EYES: Eyes grossly normal to inspection, conjunctivae and sclerae normal  RESP: No respiratory distress, no coughing or wheezing  MS: no gross musculoskeletal defects noted  SKIN: no pallor or jaundice  NEURO: mentation intact and speech normal, normal gait  MENTAL STATUS EXAM  Appearance/Behavior: No appearant distress  Speech: Normal  Mood/Affect: flat affect  Insight: Adequate    LAB  No results found for any visits on 08/30/22.      HISTORY                                                    Problem list reviewed & adjusted, as indicated.  Patient Active Problem List   Diagnosis     Type 2 diabetes mellitus without complication, without long-term current use of insulin (H)     Morbid obesity (H)     YANIRA on CPAP     Essential hypertension     Recurrent major depressive disorder, in full remission (H)     Opioid use disorder, severe, dependence (H)         MEDICATION LIST (prior to visit)  ACCU-CHEK GUIDE test strip, USE TO TEST TWICE DAILY  albuterol (PROAIR HFA/PROVENTIL HFA/VENTOLIN HFA) 108 (90 Base) MCG/ACT inhaler, INHALE 2 PUFFS BY MOUTH EVERY 6 HOURS AS NEEDED FOR WHEEZING  ARIPiprazole (ABILIFY) 10 MG tablet, Take 1 tablet (10 mg) by mouth daily  aspirin (ASA) 81 MG EC  tablet, Take 1 tablet (81 mg) by mouth daily  atorvastatin (LIPITOR) 20 MG tablet, Take 1 tablet (20 mg) by mouth daily  blood glucose monitoring (NO BRAND SPECIFIED) meter device kit, Use to test blood sugar two times daily or as directed.  buPROPion (WELLBUTRIN XL) 150 MG 24 hr tablet, Take 1 tablet (150 mg) by mouth every morning  docusate sodium (COLACE) 100 MG capsule, TAKE 1 CAPSULE(100 MG) BY MOUTH TWICE DAILY as needed for constipation  furosemide (LASIX) 20 MG tablet, Take 1 tablet (20 mg) by mouth daily  phentermine (ADIPEX-P) 37.5 MG tablet, Take 0.5-1 tablets (18.75-37.5 mg) by mouth every morning (before breakfast)    No current facility-administered medications on file prior to visit.      MEDICATION LIST (after visit)  Current Outpatient Medications   Medication     ACCU-CHEK GUIDE test strip     albuterol (PROAIR HFA/PROVENTIL HFA/VENTOLIN HFA) 108 (90 Base) MCG/ACT inhaler     ARIPiprazole (ABILIFY) 10 MG tablet     aspirin (ASA) 81 MG EC tablet     atorvastatin (LIPITOR) 20 MG tablet     blood glucose monitoring (NO BRAND SPECIFIED) meter device kit     buprenorphine HCl-naloxone HCl (SUBOXONE) 8-2 MG per film     buPROPion (WELLBUTRIN XL) 150 MG 24 hr tablet     docusate sodium (COLACE) 100 MG capsule     furosemide (LASIX) 20 MG tablet     naloxone (NARCAN) 4 MG/0.1ML nasal spray     phentermine (ADIPEX-P) 37.5 MG tablet     No current facility-administered medications for this visit.       Allergies   Allergen Reactions     Seafood Other (See Comments)     Eyes turn yellow     Ibuprofen Nausea and Vomiting       Ernestina Tripp, [facility]  [facility]  [phone]

## 2022-08-30 NOTE — PROGRESS NOTES
Medications ordered this visit were e-scribed.  Verified by order class [x] yes  [] no    List Medications: Suboxone 8-2mg, narcan nasal spray  Medication changes or discontinuations were communicated to patient's pharmacy: [] yes  [x] no    UA collected [x] yes  [] no  [] n/a-virtual     Outside referrals / labs, etc support staff to follow up: [] yes  [x] no    Future appointment was made: [x] yes  [] no  [] n/a  Future Appointments 8/30/2022 - 2/26/2023              Date Visit Type Length Department Provider     9/2/2022  9:40 AM OFFICE VISIT 20 min [facility] INTERNAL MEDICINE Pawan Castro MD              10/25/2022 11:00 AM ADDICTION MED RETURN 30 min [facility] ADDICTION MEDICINE Ernestina Tripp DO              10/26/2022 11:00 AM RETURN BARIATRIC NUTRITION 30 min [facility] GENERAL SURG BARIATRIC [address]    Location Instructions:     Our clinic is located in the [facility] located at [address] [place] [address], [address], [place], [place], across the street from [facility].                   Dictation completed at time of chart check: [x] yes  [] no    I have checked the documentation for today s encounters and the above information has been reviewed and completed.      Tamra Calderon CMA on August 30, 2022 at 4:11 PM

## 2022-08-30 NOTE — PATIENT INSTRUCTIONS
Continue Suboxone, no change.    Follow-up in clinic in 2 months.    Call with questions or concerns.      A prescription has been sent to your pharmacy of choice.    If a prior authorization is required it may take several days to get your medication.    Please make sure your pharmacy had your contact information so they can contact you when it is ready to .  Please contact your pharmacy to see if your medication is ready to be picked up.     You are encouraged to have some type of recovery program in addition to medication treatment.    Medication alone is generally not enough to lead to long term recovery.    This may include having some type of sober network, avoiding isolating, avoiding triggers (people, places, things you associate with using substances and help with managing cravings)    Options for support include :         [place] service office in [facility]:    www.[employer].org    [phone]  Narcotics Anonymous   www.[employer]    [phone]   Smart Recovery   www.Amazon  Women for Sobriety   www.womenforsoAircrm.org  Celebrate Recovery   www.[employer].Intio   ([facility] centered recovery).   [facility] ([facility])  [facility] connects people seeking recovery to resources that help foster and sustain long-term recovery.  Whether you are seeking resources for treatment, transportation, housing, job training, education, health care or other pathways to recovery, [facility] is a great place to start.  (www.[facility].org)   [phone]  Taoist and local community support groups  Mental health counseling   -we can assist with referral     You are strongly recommended abstaining from alcohol, benzodiazepines, cannabis, opioids and other drugs of abuse.     Using these in combination with Buprenorphine can raise your risk of overdose and death    Contact Us:   [facility] [phone] or by [employer]      If you cannot make your  appointment please call the office and reschedule immediately.     If a refill or bridge is needed it is important to call in advance so you do not run out of medications.   We will make every effort to manage your request promptly but please be aware that it may take up to one business day for your refill request to be processed.   If there is a concern with your request we will contact you.  Otherwise, please contact your pharmacy directly to see if your prescription is ready for .     Our clinic is open from Monday-Friday 8:00am-4:30pm and there is not an On Call after hours service.   If medical care is needed after hours or on the weekend you will need to contact your primary care physician or go to an Urgent Care or ER.

## 2022-09-02 ENCOUNTER — OFFICE VISIT (OUTPATIENT)
Dept: INTERNAL MEDICINE | Facility: CLINIC | Age: 57
End: 2022-09-02
Payer: COMMERCIAL

## 2022-09-02 VITALS
HEART RATE: 80 BPM | DIASTOLIC BLOOD PRESSURE: 76 MMHG | SYSTOLIC BLOOD PRESSURE: 120 MMHG | OXYGEN SATURATION: 93 % | HEIGHT: 70 IN | WEIGHT: 313.4 LBS | BODY MASS INDEX: 44.87 KG/M2

## 2022-09-02 DIAGNOSIS — Z12.5 SCREENING FOR PROSTATE CANCER: ICD-10-CM

## 2022-09-02 DIAGNOSIS — F11.20 OPIOID USE DISORDER, SEVERE, DEPENDENCE (H): ICD-10-CM

## 2022-09-02 DIAGNOSIS — E66.01 MORBID OBESITY (H): ICD-10-CM

## 2022-09-02 DIAGNOSIS — Z00.00 ANNUAL PHYSICAL EXAM: Primary | ICD-10-CM

## 2022-09-02 DIAGNOSIS — E11.42 TYPE 2 DIABETES MELLITUS WITH DIABETIC POLYNEUROPATHY, WITHOUT LONG-TERM CURRENT USE OF INSULIN (H): ICD-10-CM

## 2022-09-02 DIAGNOSIS — G47.33 OSA ON CPAP: ICD-10-CM

## 2022-09-02 DIAGNOSIS — F11.21 OPIOID USE DISORDER, SEVERE, IN SUSTAINED REMISSION (H): ICD-10-CM

## 2022-09-02 DIAGNOSIS — I10 ESSENTIAL HYPERTENSION: ICD-10-CM

## 2022-09-02 DIAGNOSIS — F33.42 RECURRENT MAJOR DEPRESSIVE DISORDER, IN FULL REMISSION (H): ICD-10-CM

## 2022-09-02 DIAGNOSIS — Z12.11 SCREENING FOR COLON CANCER: ICD-10-CM

## 2022-09-02 LAB
ALBUMIN SERPL BCG-MCNC: 3.9 G/DL (ref 3.5–5.2)
ALP SERPL-CCNC: 83 U/L (ref 40–129)
ALT SERPL W P-5'-P-CCNC: 13 U/L (ref 10–50)
ANION GAP SERPL CALCULATED.3IONS-SCNC: 4 MMOL/L (ref 7–15)
AST SERPL W P-5'-P-CCNC: 26 U/L (ref 10–50)
BILIRUB SERPL-MCNC: 0.5 MG/DL
BUN SERPL-MCNC: 7.3 MG/DL (ref 6–20)
CALCIUM SERPL-MCNC: 9.3 MG/DL (ref 8.6–10)
CHLORIDE SERPL-SCNC: 103 MMOL/L (ref 98–107)
CHOLEST SERPL-MCNC: 147 MG/DL
CREAT SERPL-MCNC: 0.81 MG/DL (ref 0.67–1.17)
DEPRECATED HCO3 PLAS-SCNC: 34 MMOL/L (ref 22–29)
ERYTHROCYTE [DISTWIDTH] IN BLOOD BY AUTOMATED COUNT: 15.2 % (ref 10–15)
GFR SERPL CREATININE-BSD FRML MDRD: >90 ML/MIN/1.73M2
GLUCOSE SERPL-MCNC: 84 MG/DL (ref 70–99)
HCT VFR BLD AUTO: 42.4 % (ref 40–53)
HDLC SERPL-MCNC: 60 MG/DL
HGB BLD-MCNC: 13.2 G/DL (ref 13.3–17.7)
LDLC SERPL CALC-MCNC: 70 MG/DL
MCH RBC QN AUTO: 29.1 PG (ref 26.5–33)
MCHC RBC AUTO-ENTMCNC: 31.1 G/DL (ref 31.5–36.5)
MCV RBC AUTO: 93 FL (ref 78–100)
NONHDLC SERPL-MCNC: 87 MG/DL
PLATELET # BLD AUTO: 266 10E3/UL (ref 150–450)
POTASSIUM SERPL-SCNC: 4.4 MMOL/L (ref 3.4–5.3)
PROT SERPL-MCNC: 7.6 G/DL (ref 6.4–8.3)
RBC # BLD AUTO: 4.54 10E6/UL (ref 4.4–5.9)
SODIUM SERPL-SCNC: 141 MMOL/L (ref 136–145)
TRIGL SERPL-MCNC: 83 MG/DL
WBC # BLD AUTO: 6.8 10E3/UL (ref 4–11)

## 2022-09-02 PROCEDURE — 80061 LIPID PANEL: CPT | Performed by: INTERNAL MEDICINE

## 2022-09-02 PROCEDURE — 36415 COLL VENOUS BLD VENIPUNCTURE: CPT | Performed by: INTERNAL MEDICINE

## 2022-09-02 PROCEDURE — 80053 COMPREHEN METABOLIC PANEL: CPT | Performed by: INTERNAL MEDICINE

## 2022-09-02 PROCEDURE — 85027 COMPLETE CBC AUTOMATED: CPT | Performed by: INTERNAL MEDICINE

## 2022-09-02 PROCEDURE — 99396 PREV VISIT EST AGE 40-64: CPT | Performed by: INTERNAL MEDICINE

## 2022-09-02 PROCEDURE — 99213 OFFICE O/P EST LOW 20 MIN: CPT | Mod: 25 | Performed by: INTERNAL MEDICINE

## 2022-09-02 RX ORDER — ATORVASTATIN CALCIUM 20 MG/1
20 TABLET, FILM COATED ORAL DAILY
Qty: 90 TABLET | Refills: 3
Start: 2022-09-02

## 2022-09-02 RX ORDER — FUROSEMIDE 20 MG
20 TABLET ORAL DAILY PRN
Start: 2022-09-02

## 2022-09-02 NOTE — PROGRESS NOTES
Office Visit - Physical   Tobin Bryant   57 year old  male    Date of visit: 9/2/2022  Physician: Pawan Castro MD     Assessment and Plan   1. Annual physical exam  This is a 57-year-old man with issues as discussed below, declined numerous vaccination    2. Screening for prostate cancer  Recent prostate cancer screening done in February, looked okay    3. Screening for colon cancer  Patient is up-to-date on colon cancer screening    4. Type 2 diabetes mellitus with diabetic polyneuropathy, without long-term current use of insulin (H)  Well controlled through diet and exercise.  Encouraged and discussed use of atorvastatin and aspirin he is now in agreement to use these.  We discussed side effects and benefit.  We discussed monitoring  - atorvastatin (LIPITOR) 20 MG tablet; Take 1 tablet (20 mg) by mouth daily  Dispense: 90 tablet; Refill: 3  - aspirin (ASA) 81 MG EC tablet; Take 1 tablet (81 mg) by mouth daily  Dispense: 90 tablet; Refill: 3  - Comprehensive metabolic panel; Future  - CBC with platelets; Future  - Albumin Random Urine Quantitative with Creat Ratio; Future  - Comprehensive metabolic panel  - CBC with platelets    5. Essential hypertension  Blood pressure okay continue same  - furosemide (LASIX) 20 MG tablet; Take 1 tablet (20 mg) by mouth daily as needed (swelling)    6. Opioid use disorder, severe, in sustained remission (H)  Patient continues on Suboxone under the guidance chemical dependency clinic  - naloxone (NARCAN) 4 MG/0.1ML nasal spray; Spray 1 spray (4 mg) into one nostril alternating nostrils once as needed for opioid reversal every 2-3 minutes until assistance arrives  Dispense: 0.2 mL; Refill: 11    7. Recurrent major depressive disorder, in full remission (H)  Stable continue current medications per psychiatry    8. Opioid use disorder, severe, dependence (H)  As above    9. YANIRA on CPAP    10. Morbid obesity (H)  The following high BMI interventions were performed this visit:  encouragement to exercise and lifestyle education regarding diet    Return in about 3 months (around 12/2/2022).     Chief Complaint   Chief Complaint   Patient presents with     Follow Up     From last Visit- DM, B/P, and BMI         Patient Profile   Social History     Social History Narrative    Lives alone.  Not working (last worked 2006).  Originally from [place], some college.  2 grown children        Past Medical History   Patient Active Problem List   Diagnosis     Type 2 diabetes mellitus with diabetic polyneuropathy, without long-term current use of insulin (H)     Morbid obesity (H)     Opioid use disorder, severe, in sustained remission (H)     YANIRA on CPAP     Essential hypertension     Recurrent major depressive disorder, in full remission (H)     Opioid use disorder, severe, dependence (H)       Past Surgical History  He has a past surgical history that includes Keloid Excision and Cataract Extraction (Right).     History of Present Illness   This 57 year old man comes in for follow-up and annual physical.  Overall he is doing okay.  He would like to review his blood work which showed a mildly elevated A1c but not in the diabetic range.  He continues to work with the bariatric clinic and weight loss.  Opioid use disorder well controlled and he is on long-acting opioid derivative.  He is also on medication to suppress appetite.  His mood is generally been stable.  He has not been taking aspirin or atorvastatin.  Continues to use CPAP for obstructive sleep apnea.    Review of Systems: A comprehensive review of systems was negative except as noted.     Medications and Allergies   Current Outpatient Medications   Medication Sig Dispense Refill     ACCU-CHEK GUIDE test strip USE TO TEST TWICE DAILY 200 strip 3     albuterol (PROAIR HFA/PROVENTIL HFA/VENTOLIN HFA) 108 (90 Base) MCG/ACT inhaler INHALE 2 PUFFS BY MOUTH EVERY 6 HOURS AS NEEDED FOR WHEEZING 18 g 3     ARIPiprazole (ABILIFY) 10 MG tablet Take 1  "tablet (10 mg) by mouth daily       aspirin (ASA) 81 MG EC tablet Take 1 tablet (81 mg) by mouth daily 90 tablet 3     atorvastatin (LIPITOR) 20 MG tablet Take 1 tablet (20 mg) by mouth daily 90 tablet 3     blood glucose monitoring (NO BRAND SPECIFIED) meter device kit Use to test blood sugar two times daily or as directed. 1 kit 3     buprenorphine HCl-naloxone HCl (SUBOXONE) 8-2 MG per film 2 sl qam, 1 sl qpm 90 Film 1     buPROPion (WELLBUTRIN XL) 150 MG 24 hr tablet Take 1 tablet (150 mg) by mouth every morning       docusate sodium (COLACE) 100 MG capsule TAKE 1 CAPSULE(100 MG) BY MOUTH TWICE DAILY as needed for constipation 60 capsule 3     furosemide (LASIX) 20 MG tablet Take 1 tablet (20 mg) by mouth daily as needed (swelling)       naloxone (NARCAN) 4 MG/0.1ML nasal spray Spray 1 spray (4 mg) into one nostril alternating nostrils once as needed for opioid reversal every 2-3 minutes until assistance arrives 0.2 mL 11     phentermine (ADIPEX-P) 37.5 MG tablet Take 0.5-1 tablets (18.75-37.5 mg) by mouth every morning (before breakfast) 90 tablet 1     Allergies   Allergen Reactions     Seafood Other (See Comments)     Eyes turn yellow     Ibuprofen Nausea and Vomiting        Family and Social History   Family History   Problem Relation Age of Onset     No Known Problems Mother      No Known Problems Father      No Known Problems Sister      No Known Problems Brother      No Known Problems Sister      No Known Problems Sister      No Known Problems Sister      No Known Problems Brother      No Known Problems Daughter      No Known Problems Son         Social History     Tobacco Use     Smoking status: Never Smoker     Smokeless tobacco: Never Used   Substance Use Topics     Alcohol use: No     Drug use: Not Currently        Physical Exam   General Appearance:   No acute distress    /76 (BP Location: Left arm, Patient Position: Sitting, Cuff Size: Adult Regular)   Pulse 80   Ht 1.78 m (5' 10.08\")   Wt " 142.2 kg (313 lb 6.4 oz)   SpO2 93%   BMI 44.87 kg/m      EYES: Eyelids, conjunctiva, and sclera were normal. Pupils were normal. Cornea, iris, and lens were normal bilaterally.  HEAD, EARS, NOSE, MOUTH, AND THROAT: Head and face were normal. Hearing was normal to voice and the ears were normal to external exam.   NECK: Neck appearance was normal. There were no neck masses and the thyroid was not enlarged.  RESPIRATORY: Breathing pattern was normal and the chest moved symmetrically.  Percussion/auscultatory percussion was normal.  Lung sounds were normal and there were no abnormal sounds.  CARDIOVASCULAR: Heart rate and rhythm were normal.  S1 and S2 were normal and there were no extra sounds or murmurs. Peripheral pulses in arms and legs were normal.  Jugular venous pressure was normal.  There was no peripheral edema.  GASTROINTESTINAL: The abdomen was normal in contour.  Bowel sounds were present.  Percussion detected no organ enlargement or tenderness.  Palpation detected no tenderness, mass, or enlarged organs.   MUSCULOSKELETAL: Skeletal configuration was normal and muscle mass was normal for age. Joint appearance was overall normal.  LYMPHATIC: There were no enlarged nodes.  SKIN/HAIR/NAILS: Skin color was normal.  There were no skin lesions.  Hair and nails were normal.  NEUROLOGIC: The patient was alert and oriented to person, place, time, and circumstance. Speech was normal. Cranial nerves were normal. Motor strength was normal for age. The patient was normally coordinated.  PSYCHIATRIC:  Mood and affect were normal and the patient had normal recent and remote memory. The patient's judgment and insight were normal.       Additional Information        Pawan Castro MD  Internal Medicine  Contact me at [phone]

## 2022-09-02 NOTE — LETTER
September 6, 2022      Robles KERN Annette  [address] [place] [address]   [place] [address]        Dear ,    We are writing to inform you of your test results.    Your labs are stable, excellent    Resulted Orders   Lipid panel reflex to direct LDL Non-fasting   Result Value Ref Range    Cholesterol 147 <200 mg/dL    Triglycerides 83 <150 mg/dL    Direct Measure HDL 60 >=40 mg/dL    LDL Cholesterol Calculated 70 <=100 mg/dL    Non HDL Cholesterol 87 <130 mg/dL    Narrative    Cholesterol  Desirable:  <200 mg/dL    Triglycerides  Normal:  Less than 150 mg/dL  Borderline High:  150-199 mg/dL  High:  200-499 mg/dL  Very High:  Greater than or equal to 500 mg/dL    Direct Measure HDL  Female:  Greater than or equal to 50 mg/dL   Male:  Greater than or equal to 40 mg/dL    LDL Cholesterol  Desirable:  <100mg/dL  Above Desirable:  100-129 mg/dL   Borderline High:  130-159 mg/dL   High:  160-189 mg/dL   Very High:  >= 190 mg/dL    Non HDL Cholesterol  Desirable:  130 mg/dL  Above Desirable:  130-159 mg/dL  Borderline High:  160-189 mg/dL  High:  190-219 mg/dL  Very High:  Greater than or equal to 220 mg/dL   Comprehensive metabolic panel   Result Value Ref Range    Sodium 141 136 - 145 mmol/L    Potassium 4.4 3.4 - 5.3 mmol/L    Creatinine 0.81 0.67 - 1.17 mg/dL    Urea Nitrogen 7.3 6.0 - 20.0 mg/dL    Chloride 103 98 - 107 mmol/L    Carbon Dioxide (CO2) 34 (H) 22 - 29 mmol/L    Anion Gap 4 (L) 7 - 15 mmol/L    Glucose 84 70 - 99 mg/dL    Calcium 9.3 8.6 - 10.0 mg/dL    Protein Total 7.6 6.4 - 8.3 g/dL    Albumin 3.9 3.5 - 5.2 g/dL    Bilirubin Total 0.5 <=1.2 mg/dL    Alkaline Phosphatase 83 40 - 129 U/L    AST 26 10 - 50 U/L    ALT 13 10 - 50 U/L    GFR Estimate >90 >60 mL/min/1.73m2      Comment:      Effective December 21, 2021 eGFRcr in adults is calculated using the 2021 CKD-EPI creatinine equation which includes age and gender (Li et al., NEJM, DOI: 10.1056/THBCpa3922416)   CBC with platelets   Result Value Ref  Range    WBC Count 6.8 4.0 - 11.0 10e3/uL    RBC Count 4.54 4.40 - 5.90 10e6/uL    Hemoglobin 13.2 (L) 13.3 - 17.7 g/dL    Hematocrit 42.4 40.0 - 53.0 %    MCV 93 78 - 100 fL    MCH 29.1 26.5 - 33.0 pg    MCHC 31.1 (L) 31.5 - 36.5 g/dL    RDW 15.2 (H) 10.0 - 15.0 %    Platelet Count 266 150 - 450 10e3/uL       If you have any questions or concerns, please call the clinic at the number listed above.       Sincerely,      Pawan Castro MD

## 2022-09-26 ENCOUNTER — LAB (OUTPATIENT)
Dept: FAMILY MEDICINE | Facility: CLINIC | Age: 57
End: 2022-09-26
Payer: COMMERCIAL

## 2022-09-26 DIAGNOSIS — Z20.822 SUSPECTED COVID-19 VIRUS INFECTION: ICD-10-CM

## 2022-09-26 LAB — SARS-COV-2 RNA RESP QL NAA+PROBE: NEGATIVE

## 2022-09-26 PROCEDURE — U0003 INFECTIOUS AGENT DETECTION BY NUCLEIC ACID (DNA OR RNA); SEVERE ACUTE RESPIRATORY SYNDROME CORONAVIRUS 2 (SARS-COV-2) (CORONAVIRUS DISEASE [COVID-19]), AMPLIFIED PROBE TECHNIQUE, MAKING USE OF HIGH THROUGHPUT TECHNOLOGIES AS DESCRIBED BY CMS-2020-01-R: HCPCS

## 2022-09-26 PROCEDURE — U0005 INFEC AGEN DETEC AMPLI PROBE: HCPCS

## 2022-09-27 ENCOUNTER — LAB (OUTPATIENT)
Dept: LAB | Facility: CLINIC | Age: 57
End: 2022-09-27
Payer: COMMERCIAL

## 2022-09-27 ENCOUNTER — VIRTUAL VISIT (OUTPATIENT)
Dept: INTERNAL MEDICINE | Facility: CLINIC | Age: 57
End: 2022-09-27

## 2022-09-27 DIAGNOSIS — R68.83 CHILLS: ICD-10-CM

## 2022-09-27 DIAGNOSIS — R19.7 DIARRHEA, UNSPECIFIED TYPE: ICD-10-CM

## 2022-09-27 DIAGNOSIS — E11.42 TYPE 2 DIABETES MELLITUS WITH DIABETIC POLYNEUROPATHY, WITHOUT LONG-TERM CURRENT USE OF INSULIN (H): ICD-10-CM

## 2022-09-27 DIAGNOSIS — J02.9 SORE THROAT: Primary | ICD-10-CM

## 2022-09-27 LAB
ALBUMIN SERPL BCG-MCNC: 4 G/DL (ref 3.5–5.2)
ALBUMIN UR-MCNC: ABNORMAL MG/DL
ALP SERPL-CCNC: 76 U/L (ref 40–129)
ALT SERPL W P-5'-P-CCNC: 16 U/L (ref 10–50)
ANION GAP SERPL CALCULATED.3IONS-SCNC: 12 MMOL/L (ref 7–15)
APPEARANCE UR: CLEAR
AST SERPL W P-5'-P-CCNC: 26 U/L (ref 10–50)
BACTERIA #/AREA URNS HPF: ABNORMAL /HPF
BILIRUB SERPL-MCNC: 0.4 MG/DL
BILIRUB UR QL STRIP: ABNORMAL
BUN SERPL-MCNC: 9.5 MG/DL (ref 6–20)
CALCIUM SERPL-MCNC: 9.3 MG/DL (ref 8.6–10)
CHLORIDE SERPL-SCNC: 102 MMOL/L (ref 98–107)
COLOR UR AUTO: YELLOW
CREAT SERPL-MCNC: 0.81 MG/DL (ref 0.67–1.17)
CREAT UR-MCNC: 295 MG/DL
DEPRECATED HCO3 PLAS-SCNC: 26 MMOL/L (ref 22–29)
ERYTHROCYTE [DISTWIDTH] IN BLOOD BY AUTOMATED COUNT: 15.1 % (ref 10–15)
GFR SERPL CREATININE-BSD FRML MDRD: >90 ML/MIN/1.73M2
GLUCOSE SERPL-MCNC: 85 MG/DL (ref 70–99)
GLUCOSE UR STRIP-MCNC: NEGATIVE MG/DL
HCT VFR BLD AUTO: 42.6 % (ref 40–53)
HGB BLD-MCNC: 13.9 G/DL (ref 13.3–17.7)
HGB UR QL STRIP: NEGATIVE
KETONES UR STRIP-MCNC: NEGATIVE MG/DL
LEUKOCYTE ESTERASE UR QL STRIP: NEGATIVE
MCH RBC QN AUTO: 29.6 PG (ref 26.5–33)
MCHC RBC AUTO-ENTMCNC: 32.6 G/DL (ref 31.5–36.5)
MCV RBC AUTO: 91 FL (ref 78–100)
MICROALBUMIN UR-MCNC: <12 MG/L
MICROALBUMIN/CREAT UR: NORMAL MG/G{CREAT}
MUCOUS THREADS #/AREA URNS LPF: PRESENT /LPF
NITRATE UR QL: NEGATIVE
PH UR STRIP: 7 [PH] (ref 5–8)
PLATELET # BLD AUTO: 299 10E3/UL (ref 150–450)
POTASSIUM SERPL-SCNC: 3.3 MMOL/L (ref 3.4–5.3)
PROT SERPL-MCNC: 7.8 G/DL (ref 6.4–8.3)
RBC # BLD AUTO: 4.7 10E6/UL (ref 4.4–5.9)
RBC #/AREA URNS AUTO: ABNORMAL /HPF
SODIUM SERPL-SCNC: 140 MMOL/L (ref 136–145)
SP GR UR STRIP: 1.02 (ref 1–1.03)
SQUAMOUS #/AREA URNS AUTO: ABNORMAL /LPF
UROBILINOGEN UR STRIP-ACNC: 4 E.U./DL
WBC # BLD AUTO: 7.3 10E3/UL (ref 4–11)
WBC #/AREA URNS AUTO: ABNORMAL /HPF

## 2022-09-27 PROCEDURE — 85027 COMPLETE CBC AUTOMATED: CPT

## 2022-09-27 PROCEDURE — 36415 COLL VENOUS BLD VENIPUNCTURE: CPT

## 2022-09-27 PROCEDURE — 99214 OFFICE O/P EST MOD 30 MIN: CPT | Mod: TEL | Performed by: INTERNAL MEDICINE

## 2022-09-27 PROCEDURE — 82043 UR ALBUMIN QUANTITATIVE: CPT

## 2022-09-27 PROCEDURE — 80053 COMPREHEN METABOLIC PANEL: CPT

## 2022-09-27 PROCEDURE — 81001 URINALYSIS AUTO W/SCOPE: CPT

## 2022-09-27 NOTE — PROGRESS NOTES
"Robles is a 57 year old who is being evaluated via a billable telephone visit.      What phone number would you like to be contacted at? [phone]  How would you like to obtain your AVS? MyChart    Assessment & Plan     1. Sore throat  - Streptococcus A Rapid Screen w/Reflex to PCR - Clinic Collect    2. Chills  - CBC with platelets; Future  - Comprehensive metabolic panel; Future  - UA reflex to Microscopic and Culture; Future  - XR Chest 2 Views; Future    3. Diarrhea, unspecified type  - CBC with platelets; Future  - Comprehensive metabolic panel; Future  - UA reflex to Microscopic and Culture; Future      BMI:   Estimated body mass index is 44.87 kg/m  as calculated from the following:    Height as of 9/2/22: 1.78 m (5' 10.08\").    Weight as of 9/2/22: 142.2 kg (313 lb 6.4 oz).       Return in about 1 week (around 10/4/2022) for video visit, if symptoms worsen/fail to improve.    Pawan Castro MD  [facility]    Subjective   Robles is a 57 year old accompanied by his alone, presenting for the following health issues:  Recheck Medication, sweaty, and chills (Pt reports taken Covid test 9/26/22 have yet to receive results.)      HPI     This 57-year-old man is seen telephonically with some chills.  This been going on for about 3 days.  He had a loose stool at onset but nothing since.  No abdominal pain no nausea or vomiting.  No blood in the stool.  Little bit of a sore throat.  No significant cough.  May begin a little bit better.  Had a COVID test yesterday which was negative.      Review of Systems         Objective           Vitals:  No vitals were obtained today due to virtual visit.    Physical Exam           Phone call duration: 21 minutes    "

## 2022-10-25 ENCOUNTER — OFFICE VISIT (OUTPATIENT)
Dept: ADDICTION MEDICINE | Facility: CLINIC | Age: 57
End: 2022-10-25
Payer: COMMERCIAL

## 2022-10-25 VITALS — HEART RATE: 80 BPM | DIASTOLIC BLOOD PRESSURE: 91 MMHG | SYSTOLIC BLOOD PRESSURE: 142 MMHG

## 2022-10-25 DIAGNOSIS — F11.21 OPIOID USE DISORDER, SEVERE, IN SUSTAINED REMISSION (H): ICD-10-CM

## 2022-10-25 PROCEDURE — 99213 OFFICE O/P EST LOW 20 MIN: CPT | Performed by: FAMILY MEDICINE

## 2022-10-25 RX ORDER — BUPRENORPHINE AND NALOXONE 8; 2 MG/1; MG/1
FILM, SOLUBLE BUCCAL; SUBLINGUAL
Qty: 90 FILM | Refills: 1 | Status: SHIPPED | OUTPATIENT
Start: 2022-10-25 | End: 2022-12-15

## 2022-10-25 NOTE — PATIENT INSTRUCTIONS
Continue Suboxone, no change.  Pharmacy has Narcan prescription on file for you.    Follow-up mid-December (before holidays) so we can ensure you have your Suboxone if you go out of town over holidays.

## 2022-10-25 NOTE — PROGRESS NOTES
[facility]    A/P                                                    ASSESSMENT/PLAN    1. Opioid use disorder, severe, in sustained remission (H)  Stable.  Symptoms well controlled.  Continue Suboxone no change.  Continue colace as needed for constipation, can add Miralax (which he also has) if needed.  Discussed goal of soft BM daily.  We will follow-up in about 6-7 weeks since he may be going to [place] for holidays and will need refill prior to leaving.    - buprenorphine HCl-naloxone HCl (SUBOXONE) 8-2 MG per film; 2 sl qam, 1 sl qpm  Dispense: 90 Film; Refill: 1      PDMP Review       Value Time User    State PDMP site checked  Yes 10/25/2022 10:18 AM Ernestina Tripp,             RTC  Return in about 7 weeks (around 12/13/2022) for Follow up, with me, in person.      Counseled the patient on the importance of having a recovery program in addition to medication to manage recovery.  Components include avoiding isolating, having willingness to change, avoiding triggers and managing cravings. Encouraged having some type of sober network and practicing honesty with trusted support person(s). Encouraged other services such as counseling, 12 step or other self-help organizations.      Opioid warning reviewed.  Risk of overdose following a period of abstinence due to decrease tolerance was discussed including risk of death.  Strongly recommended abstain from alcohol, benzodiazepines, THC, opioids and other drugs of abuse.  Increased risk of return to opioid use after use of these substances discussed.  Increased risk of overdose/death with use of other substances particularly benzodiazepines/alcohol reviewed.        YOSELIN Arboledaadelaanca Bryant is a 57 year old male with PMHx of HTN, morbid obesity, pre-diabetes, COPD/ashtma, YANIRA, anxiety, depression, and OUD who presents to clinic today for follow up.     Brief history of use:     Last use of illicit opioids was in approximately 2019.  Has been on buprenorphine for OUD since at least 2014.       Plan from most recent office visit (8/30/2022):  1. Opioid use disorder, severe, in sustained remission (H)  Stable/symptoms well controlled.  Continue Suboxone, no change.   Plan for follow-up in person in 2 months and then next visit will be virtual (easier to do virtual in December).  - buprenorphine HCl-naloxone HCl (SUBOXONE) 8-2 MG per film; 2 sl qam, 1 sl qpm  Dispense: 90 Film; Refill: 1  - naloxone (NARCAN) 4 MG/0.1ML nasal spray; Spray 1 spray (4 mg) into one nostril alternating nostrils once as needed for opioid reversal every 2-3 minutes until assistance arrives  Dispense: 0.2 mL; Refill: 11    TODAY'S VISIT  HPI Oct 25, 2022  - Continues to work on weight loss, thinks he is down about 18 pounds  - No other clark issues, continues to work on diabetes control  - Taking Suboxone as prescribed, no opioid cravings, no side effects other than constipation which he takes colace for  - Has sponsor that he connects with when needed  - May be going to [employer] to visit family over holidays  - Working with Yrn regarding mental health meds, no changes, mood stable      OBJECTIVE                                                    PHYSICAL EXAM:  BP (!) 142/91 (BP Location: Right arm, Patient Position: Sitting, Cuff Size: Adult Large)   Pulse 80     GENERAL: healthy, alert and no distress  EYES: Eyes grossly normal to inspection, conjunctivae and sclerae normal  RESP: No respiratory distress, no coughing or wheezing  NEURO: Normal gait, no tremor, mentation intact and speech normal  MENTAL STATUS EXAM  Appearance/Behavior: No appearant distress  Speech: Normal  Mood/Affect: flat  Insight: Adequate    LAB  No results found for any visits on 10/25/22.     UDS: buprenorphine  *POC urine drug screen does not screen for Fentanyl    HISTORY                                                    Problem  list reviewed & adjusted, as indicated.  Patient Active Problem List   Diagnosis     Type 2 diabetes mellitus with diabetic polyneuropathy, without long-term current use of insulin (H)     Morbid obesity (H)     Opioid use disorder, severe, in sustained remission (H)     YANIRA on CPAP     Essential hypertension     Recurrent major depressive disorder, in full remission (H)     Opioid use disorder, severe, dependence (H)         MEDICATION LIST (prior to visit)  ARIPiprazole (ABILIFY) 10 MG tablet, Take 1 tablet (10 mg) by mouth daily  buPROPion (WELLBUTRIN XL) 150 MG 24 hr tablet, Take 1 tablet (150 mg) by mouth every morning  ACCU-CHEK GUIDE test strip, USE TO TEST TWICE DAILY  albuterol (PROAIR HFA/PROVENTIL HFA/VENTOLIN HFA) 108 (90 Base) MCG/ACT inhaler, INHALE 2 PUFFS BY MOUTH EVERY 6 HOURS AS NEEDED FOR WHEEZING  aspirin (ASA) 81 MG EC tablet, Take 1 tablet (81 mg) by mouth daily  atorvastatin (LIPITOR) 20 MG tablet, Take 1 tablet (20 mg) by mouth daily  blood glucose monitoring (NO BRAND SPECIFIED) meter device kit, Use to test blood sugar two times daily or as directed.  docusate sodium (COLACE) 100 MG capsule, TAKE 1 CAPSULE(100 MG) BY MOUTH TWICE DAILY as needed for constipation  furosemide (LASIX) 20 MG tablet, Take 1 tablet (20 mg) by mouth daily as needed (swelling)  naloxone (NARCAN) 4 MG/0.1ML nasal spray, Spray 1 spray (4 mg) into one nostril alternating nostrils once as needed for opioid reversal every 2-3 minutes until assistance arrives  phentermine (ADIPEX-P) 37.5 MG tablet, Take 0.5-1 tablets (18.75-37.5 mg) by mouth every morning (before breakfast)    No current facility-administered medications on file prior to visit.      MEDICATION LIST (after visit)  Current Outpatient Medications   Medication     ARIPiprazole (ABILIFY) 10 MG tablet     buprenorphine HCl-naloxone HCl (SUBOXONE) 8-2 MG per film     buPROPion (WELLBUTRIN XL) 150 MG 24 hr tablet     ACCU-CHEK GUIDE test strip     albuterol  (PROAIR HFA/PROVENTIL HFA/VENTOLIN HFA) 108 (90 Base) MCG/ACT inhaler     aspirin (ASA) 81 MG EC tablet     atorvastatin (LIPITOR) 20 MG tablet     blood glucose monitoring (NO BRAND SPECIFIED) meter device kit     docusate sodium (COLACE) 100 MG capsule     furosemide (LASIX) 20 MG tablet     naloxone (NARCAN) 4 MG/0.1ML nasal spray     phentermine (ADIPEX-P) 37.5 MG tablet     No current facility-administered medications for this visit.         Allergies   Allergen Reactions     Seafood Other (See Comments)     Eyes turn yellow     Ibuprofen Nausea and Vomiting       Ernestina Tripp, [facility]  [place] [facility]  [phone]

## 2022-10-25 NOTE — PROGRESS NOTES
[facility] - [facility]       Rooming information:    Point of care urine drug screen positive for: BUP  Urine Drug Screen Results  AMP: Negative  BAR: Negative  BUP: Positive  BZO: Negative  MERCEDEZ: Negative  mAMP: Negative  MDMA : Negative  MTD: Negative  MDB396: Negative  OXY: Negative  PCP : Negative  THC : Negative  Comment: only provided 2-3cc of urine  *POC urine drug screen does not screen for Fentanyl    PHQ-9 Scores: declined  PHQ 1/14/2021 1/4/2022 6/30/2022   PHQ-9 Total Score 2 0 0   Q9: Thoughts of better off dead/self-harm past 2 weeks Not at all Not at all Not at all     SHABBIR-7 Scores: declined  SHABBIR-7 SCORE 4/21/2022 6/30/2022 8/3/2022   Total Score - - 0 (minimal anxiety)   Total Score 0 0 0       Any other recent substance use:     Denies    NICOTINE-No  If using nicotine, ready to quit? NA    Side effects related to medications pt would like to discuss with provider (constipation, dry mouth, HA, GI upset, sedation?) Mod constipation, takes laxitives    Narcan currently available: Yes    Primary care provider: Pawan Castro MD     Mental health provider: yes, going to  [facility](follow up on MH referral if needed)    Any housing, insurance deficits?: denies    Contact information up to date? yes    3rd Party Involvement NA (please obtain ANAYELI if pt would like to include)        Vanessa Parada  October 25, 2022  9:39 AM

## 2022-10-26 ENCOUNTER — VIRTUAL VISIT (OUTPATIENT)
Dept: SURGERY | Facility: CLINIC | Age: 57
End: 2022-10-26
Payer: COMMERCIAL

## 2022-10-26 DIAGNOSIS — E66.01 MORBID OBESITY WITH BMI OF 40.0-44.9, ADULT (H): Primary | ICD-10-CM

## 2022-10-26 DIAGNOSIS — E66.01 MORBID OBESITY (H): ICD-10-CM

## 2022-10-26 PROCEDURE — 97803 MED NUTRITION INDIV SUBSEQ: CPT | Mod: TEL | Performed by: DIETITIAN, REGISTERED

## 2022-10-26 RX ORDER — PHENTERMINE HYDROCHLORIDE 37.5 MG/1
18.75-37.5 TABLET ORAL
Qty: 90 TABLET | Refills: 1 | Status: SHIPPED | OUTPATIENT
Start: 2022-10-26 | End: 2023-04-18

## 2022-10-26 NOTE — PROGRESS NOTES
Tobin Bryant is a 57 year old who is being evaluated via a billable telephone visit.      What phone number would you like to be contacted at? [phone]  How would you like to obtain your AVS? [facility]      Medical  Weight Loss Follow-Up Diet Evaluation  Assessment:  Tobin is presenting today for a follow up weight management nutrition consultation. Pt has had an initial appointment with Dr. Burnham  Weight loss medication: Phentermine.  Pt's weight is 313 lb  Initial weight: 324lb  Weight change: down 11 lb    BMI: There is no height or weight on file to calculate BMI.  Ideal body weight: 73 kg (160 lb 15 oz)  Adjusted ideal body weight: 101 kg (222 lb 9 oz)    Estimated RMR (Trego-St Jeor equation):   2274 kcals x 1.2 (sedentary) = 2729 kcals (for weight maintenance)  Recommended Protein Intake:  grams of protein/day  Patient Active Problem List:  Patient Active Problem List   Diagnosis     Type 2 diabetes mellitus with diabetic polyneuropathy, without long-term current use of insulin (H)     Morbid obesity (H)     Opioid use disorder, severe, in sustained remission (H)     YANIRA on CPAP     Essential hypertension     Recurrent major depressive disorder, in full remission (H)     Opioid use disorder, severe, dependence (H)     Diabetes: most recent A1c 5.8     Progress on goals from last visit: eating less, and walking a little more.     Food recall:  Breakfast: bowl of cereal  Lunch: sandwich for lunch  Dinner: none  Usually eats 2-3 meals - depends on the weather    Snack: soda or slice of pie    Beverages: regular soda  Exercise: walking more during the day, using his stationary bike 2x per week   Nutrition Diagnosis:    1. Overweight/Obesity (NC 3.3) related to overeating and poor lifestyle habits as evidenced by patient's report of infrequent meals, lack of activity, snacking on sweets and BMI 44.87    Intervention:  1. Food and/or nutrient delivery: consistency with diet- reducing sweets, sugars  and portion sizes  2. Nutrition counseling: move when able, reviewed goals    Monitoring/Evaluation:    Goals:  1. Keep small portions at meals, reduce sweets.    Patient to follow up in 3 month(s) with RD    Phone call duration: 12 minutes    Originating Location (pt. Location): Home        Distant Location (provider location):  On-site    Xochitl Griggs

## 2022-10-26 NOTE — LETTER
10/26/2022         RE: Tobin Bryant  [address]  [place] [address]        Dear Colleague,    Thank you for referring your patient, Tobin Bryant, to the [facility] [place]. Please see a copy of my visit note below.    Tobin Bryant is a 57 year old who is being evaluated via a billable telephone visit.      What phone number would you like to be contacted at? [phone]  How would you like to obtain your AVS? [facility]      Medical  Weight Loss Follow-Up Diet Evaluation  Assessment:  Tobin is presenting today for a follow up weight management nutrition consultation. Pt has had an initial appointment with Dr. Burnham  Weight loss medication: Phentermine.  Pt's weight is 313 lb  Initial weight: 324lb  Weight change: down 11 lb    BMI: There is no height or weight on file to calculate BMI.  Ideal body weight: 73 kg (160 lb 15 oz)  Adjusted ideal body weight: 101 kg (222 lb 9 oz)    Estimated RMR ([place]-St Jeor equation):   2274 kcals x 1.2 (sedentary) = 2729 kcals (for weight maintenance)  Recommended Protein Intake:  grams of protein/day  Patient Active Problem List:  Patient Active Problem List   Diagnosis     Type 2 diabetes mellitus with diabetic polyneuropathy, without long-term current use of insulin (H)     Morbid obesity (H)     Opioid use disorder, severe, in sustained remission (H)     YANIRA on CPAP     Essential hypertension     Recurrent major depressive disorder, in full remission (H)     Opioid use disorder, severe, dependence (H)     Diabetes: most recent A1c 5.8     Progress on goals from last visit: eating less, and walking a little more.     Food recall:  Breakfast: bowl of cereal  Lunch: sandwich for lunch  Dinner: none  Usually eats 2-3 meals - depends on the weather    Snack: soda or slice of pie    Beverages: regular soda  Exercise: walking more during the day, using his stationary bike 2x per week   Nutrition  Diagnosis:    1. Overweight/Obesity (NC 3.3) related to overeating and poor lifestyle habits as evidenced by patient's report of infrequent meals, lack of activity, snacking on sweets and BMI 44.87    Intervention:  1. Food and/or nutrient delivery: consistency with diet- reducing sweets, sugars and portion sizes  2. Nutrition counseling: move when able, reviewed goals    Monitoring/Evaluation:    Goals:  1. Keep small portions at meals, reduce sweets.    Patient to follow up in 3 month(s) with RD    Phone call duration: 12 minutes    Originating Location (pt. Location): Home        Distant Location (provider location):  On-site    Xochitl Griggs        Again, thank you for allowing me to participate in the care of your patient.        Sincerely,        Xochitl Griggs

## 2022-11-15 DIAGNOSIS — J98.8 REVERSIBLE AIRWAYS DISEASE: Primary | ICD-10-CM

## 2022-11-16 NOTE — TELEPHONE ENCOUNTER
"Routing refill request to provider for review/approval because:  Drug not active on patient's medication list    Last Written Prescription Date:    Last Fill Quantity: ,  # refills:    Last office visit provider:  9/27/22     Requested Prescriptions   Pending Prescriptions Disp Refills     budesonide-formoterol (SYMBICORT) 80-4.5 MCG/ACT Inhaler [Pharmacy Med Name: BUDESONIDE/FORM 80/4.5MCG (120 INH)] 10.2 g      Sig: INHALE 2 PUFFS BY MOUTH TWICE DAILY       Inhaled Steroids Protocol Failed - 11/15/2022  5:03 PM        Failed - Medication is active on med list        Passed - Patient is age 12 or older        Passed - Recent (12 mo) or future (30 days) visit within the authorizing provider's specialty     Patient has had an office visit with the authorizing provider or a provider within the authorizing providers department within the previous 12 mos or has a future within next 30 days. See \"Patient Info\" tab in inbasket, or \"Choose Columns\" in Meds & Orders section of the refill encounter.             Long-Acting Beta Agonist Inhalers Protocol  Failed - 11/15/2022  5:03 PM        Failed - Order for Serevent, Striverdi, or Foradil and pt has steroid inhaler        Failed - Medication is active on med list        Passed - Patient is age 12 or older        Passed - Recent (12 mo) or future (30 days) visit within the authorizing provider's specialty     Patient has had an office visit with the authorizing provider or a provider within the authorizing providers department within the previous 12 mos or has a future within next 30 days. See \"Patient Info\" tab in inbasket, or \"Choose Columns\" in Meds & Orders section of the refill encounter.                   Shabnam Peterson RN 11/16/22 3:51 PM  "

## 2022-11-17 RX ORDER — BUDESONIDE AND FORMOTEROL FUMARATE DIHYDRATE 80; 4.5 UG/1; UG/1
AEROSOL RESPIRATORY (INHALATION)
Qty: 10.2 G | Refills: 11 | Status: SHIPPED | OUTPATIENT
Start: 2022-11-17

## 2022-12-12 NOTE — ADDENDUM NOTE
Encounter addended by: Georges Wan, PT on: 12/12/2022 10:01 AM   Actions taken: Episode resolved, Clinical Note Signed

## 2022-12-12 NOTE — PROGRESS NOTES
[facility]    Outpatient Physical Therapy Discharge Note  Patient: Tobin Bryant  : 1965    Beginning/End Dates of Reporting Period:  22    Referring Provider: Dr. Castro    Therapy Diagnosis: right shoulder pain, decreased right shoulder ROM, poor posture     Client Self Report:  See Eval    Objective Measurements:  See Eval    Outcome Measures (most recent score):  See Eval    Goals: Not Met, Eval Only  Goal Identifier     Goal Description Patient will be able to sleep through the night, waking without pain in his shoulder in 6 weeks:   Target Date     Date Met      Progress (detail required for progress note):       Goal Identifier     Goal Description Patient will report regular exercise routine, to improve health of his shoulder in 6 weeks:   Target Date     Date Met      Progress (detail required for progress note):       Goal Identifier     Goal Description Patient will improve shoulder ER/IR to equivalent of right side in 6 weeks:   Target Date     Date Met      Progress (detail required for progress note):       Goal Identifier     Goal Description     Target Date     Date Met      Progress (detail required for progress note):       Goal Identifier     Goal Description     Target Date     Date Met      Progress (detail required for progress note):       Goal Identifier     Goal Description     Target Date     Date Met      Progress (detail required for progress note):       Goal Identifier     Goal Description     Target Date     Date Met      Progress (detail required for progress note):       Goal Identifier     Goal Description     Target Date     Date Met      Progress (detail required for progress note):             Plan:  Discharge from therapy.    Discharge:    Reason for Discharge: Patient chooses to discontinue therapy.    Equipment Issued: NA    Discharge Plan: Patient to continue  home program.    Georges Wan, PT

## 2022-12-15 ENCOUNTER — TELEPHONE (OUTPATIENT)
Dept: BEHAVIORAL HEALTH | Facility: CLINIC | Age: 57
End: 2022-12-15

## 2022-12-15 ENCOUNTER — TELEPHONE (OUTPATIENT)
Dept: INTERNAL MEDICINE | Facility: CLINIC | Age: 57
End: 2022-12-15

## 2022-12-15 ENCOUNTER — OFFICE VISIT (OUTPATIENT)
Dept: ADDICTION MEDICINE | Facility: CLINIC | Age: 57
End: 2022-12-15
Payer: COMMERCIAL

## 2022-12-15 VITALS
HEIGHT: 70 IN | WEIGHT: 315 LBS | BODY MASS INDEX: 45.1 KG/M2 | HEART RATE: 86 BPM | SYSTOLIC BLOOD PRESSURE: 129 MMHG | DIASTOLIC BLOOD PRESSURE: 77 MMHG

## 2022-12-15 DIAGNOSIS — F11.21 OPIOID USE DISORDER, SEVERE, IN SUSTAINED REMISSION (H): ICD-10-CM

## 2022-12-15 PROCEDURE — 99214 OFFICE O/P EST MOD 30 MIN: CPT | Performed by: FAMILY MEDICINE

## 2022-12-15 RX ORDER — BUPRENORPHINE AND NALOXONE 8; 2 MG/1; MG/1
FILM, SOLUBLE BUCCAL; SUBLINGUAL
Qty: 90 FILM | Refills: 1 | Status: SHIPPED | OUTPATIENT
Start: 2022-12-15 | End: 2023-02-16

## 2022-12-15 RX ORDER — DIGITAL THERAPEUTICS, OUD
MISCELLANEOUS MISCELLANEOUS
Qty: 1 EACH | Refills: 3 | Status: SHIPPED | OUTPATIENT
Start: 2022-12-15 | End: 2023-09-14

## 2022-12-15 NOTE — PROGRESS NOTES
[facility]    A/P                                                    ASSESSMENT/PLAN      1. Opioid use disorder, severe, in sustained remission (H)  Stable, symptoms well controlled.  Continue Suboxone, no change.  Has Narcan.  He is interested in RESET-O this was prescribed today.  Advised patient that [facility] will reach out to help him set up.  - buprenorphine HCl-naloxone HCl (SUBOXONE) 8-2 MG per film; 2 sl qam, 1 sl qpm  Dispense: 90 Film; Refill: 1  - DTx Sharon - Subst Use Disorder (RESET-O); Use as directed for 84 days.  Dispense: 1 each; Refill: 3    Patient may be traveling to [place] prior to holidays.  Advised him if he needs to fill Suboxone more than 2 or 3 days early in order to have medication while traveling he should reach out to my office and we will approve early refill.    Patient complaining of several weeks of productive cough and wheezing, shortness of breath has improved.  He has significant bilateral lower extremity edema.  Vitals are stable.  Encouraged him to reach out to PCP, I have also sent a note to.  If symptoms are worsening or he develops any chest pain or shortness of breath he will go to emergency room.      PDMP Review       Value Time User    State PDMP site checked  Yes 12/15/2022 11:17 AM Ernestina Tripp,             RTC  Return in about 2 months (around 2/15/2023) for Follow up, with me, in person.      Counseled the patient on the importance of having a recovery program in addition to medication to manage recovery.  Components include avoiding isolating, having willingness to change, avoiding triggers and managing cravings. Encouraged having some type of sober network and practicing honesty with trusted support person(s). Encouraged other services such as counseling, 12 step or other self-help organizations.      Opioid warning reviewed.  Risk of overdose following a period of abstinence due to decrease tolerance was discussed including  risk of death.  Strongly recommended abstain from alcohol, benzodiazepines, THC, opioids and other drugs of abuse.  Increased risk of return to opioid use after use of these substances discussed.  Increased risk of overdose/death with use of other substances particularly benzodiazepines/alcohol reviewed.        SUBJECTIVE                                                    Tobin Bryant is a 57 year old male with PMHx of HTN, morbid obesity, pre-diabetes, COPD/ashtma, YANIRA, anxiety, depression, and OUD who presents to clinic today for follow up.     Brief history of use:    Last use of illicit opioids was in approximately 2019.  Has been on buprenorphine for OUD since at least 2014.       Plan from most recent office visit (10/25/2022):  1. Opioid use disorder, severe, in sustained remission (H)  Stable.  Symptoms well controlled.  Continue Suboxone no change.  Continue colace as needed for constipation, can add Miralax (which he also has) if needed.  Discussed goal of soft BM daily.  We will follow-up in about 6-7 weeks since he may be going to [place] for holidays and will need refill prior to leaving.    - buprenorphine HCl-naloxone HCl (SUBOXONE) 8-2 MG per film; 2 sl qam, 1 sl qpm  Dispense: 90 Film; Refill: 1    TODAY'S VISIT  HPI Dec 15, 2022  -  Still considering [place] for holidays  - Changing pharamcy due to remodeling project  - Taking Suboxone as prescribed, no opioid cravings, no side effects (constipation well managed)  - Taking phentermine for weight loss (working with weight loss clinic)  - Mood stable, no SI  - Sleeping ok, needs new oxygen tubing for CPAP    Coughing (productive) and wheezing x 3 weeks, was initially having some SOB but this is better.  No fevers.  Using inhalers which is helpful.  Swelling in BLE x 2 weeks despite diuretics, not really changing.  Had some chest pressure last week.      No other concerns today.    Last filled Suboxone 11/21/22 x 30 days.    OBJECTIVE               "                                      PHYSICAL EXAM:  /77 (BP Location: Right arm, Patient Position: Sitting, Cuff Size: Adult Large)   Pulse 86   Ht 1.78 m (5' 10.08\")   Wt 147.9 kg (326 lb)   BMI 46.67 kg/m      GENERAL: healthy, alert and no distress  EYES: Eyes grossly normal to inspection, conjunctivae and sclerae normal  RESP: No respiratory distress, occasional audible wheeze, no coughing  MS: no gross musculoskeletal defects noted, bilateral lower extremity edema to knees  SKIN: no suspicious lesions or rashes, no pallor or jaundice  NEURO: Normal gait, mentation intact and speech normal  MENTAL STATUS EXAM  Appearance/Behavior: No appearant distress  Speech: Normal  Mood/Affect: normal affect  Insight: Adequate    LAB  No results found for any visits on 12/15/22.      HISTORY                                                    Problem list reviewed & adjusted, as indicated.  Patient Active Problem List   Diagnosis     Type 2 diabetes mellitus with diabetic polyneuropathy, without long-term current use of insulin (H)     Morbid obesity (H)     Opioid use disorder, severe, in sustained remission (H)     YANIRA on CPAP     Essential hypertension     Recurrent major depressive disorder, in full remission (H)     Opioid use disorder, severe, dependence (H)         MEDICATION LIST (prior to visit)  ACCU-CHEK GUIDE test strip, USE TO TEST TWICE DAILY  albuterol (PROAIR HFA/PROVENTIL HFA/VENTOLIN HFA) 108 (90 Base) MCG/ACT inhaler, INHALE 2 PUFFS BY MOUTH EVERY 6 HOURS AS NEEDED FOR WHEEZING  ARIPiprazole (ABILIFY) 10 MG tablet, Take 1 tablet (10 mg) by mouth daily  aspirin (ASA) 81 MG EC tablet, Take 1 tablet (81 mg) by mouth daily  atorvastatin (LIPITOR) 20 MG tablet, Take 1 tablet (20 mg) by mouth daily  blood glucose monitoring (NO BRAND SPECIFIED) meter device kit, Use to test blood sugar two times daily or as directed.  budesonide-formoterol (SYMBICORT) 80-4.5 MCG/ACT Inhaler, INHALE 2 PUFFS BY MOUTH " TWICE DAILY  buPROPion (WELLBUTRIN XL) 150 MG 24 hr tablet, Take 1 tablet (150 mg) by mouth every morning  docusate sodium (COLACE) 100 MG capsule, TAKE 1 CAPSULE(100 MG) BY MOUTH TWICE DAILY as needed for constipation  furosemide (LASIX) 20 MG tablet, Take 1 tablet (20 mg) by mouth daily as needed (swelling)  naloxone (NARCAN) 4 MG/0.1ML nasal spray, Spray 1 spray (4 mg) into one nostril alternating nostrils once as needed for opioid reversal every 2-3 minutes until assistance arrives  phentermine (ADIPEX-P) 37.5 MG tablet, Take 0.5-1 tablets (18.75-37.5 mg) by mouth every morning (before breakfast)    No current facility-administered medications on file prior to visit.      MEDICATION LIST (after visit)  Current Outpatient Medications   Medication     ACCU-CHEK GUIDE test strip     albuterol (PROAIR HFA/PROVENTIL HFA/VENTOLIN HFA) 108 (90 Base) MCG/ACT inhaler     ARIPiprazole (ABILIFY) 10 MG tablet     aspirin (ASA) 81 MG EC tablet     atorvastatin (LIPITOR) 20 MG tablet     blood glucose monitoring (NO BRAND SPECIFIED) meter device kit     budesonide-formoterol (SYMBICORT) 80-4.5 MCG/ACT Inhaler     buprenorphine HCl-naloxone HCl (SUBOXONE) 8-2 MG per film     buPROPion (WELLBUTRIN XL) 150 MG 24 hr tablet     docusate sodium (COLACE) 100 MG capsule     DTx Sharon - Subst Use Disorder (RESET-O)     furosemide (LASIX) 20 MG tablet     naloxone (NARCAN) 4 MG/0.1ML nasal spray     phentermine (ADIPEX-P) 37.5 MG tablet     No current facility-administered medications for this visit.         Allergies   Allergen Reactions     Seafood Other (See Comments)     Eyes turn yellow     Ibuprofen Nausea and Vomiting       Ernestina Tripp, [facility]  [facility]  [phone]

## 2022-12-15 NOTE — TELEPHONE ENCOUNTER
Spoke with patient, he states that the leg swelling has been on/off for a while. And that he thinks he is dealing with a cold. He was asking for some medication for his cold, he was told that he needs an appointment for assessment or to go to a [facility] for provider assessment. Pt was advised to go to ED for worsening symptoms.     Patient was assisted with scheduling an appointment with Dr. Castro on Monday as there are no openings at [facility] tomorrow.     Will route to PCP for further advisement if needed.

## 2022-12-15 NOTE — TELEPHONE ENCOUNTER
RN phoned this patient as requested by the Care Coordinators.      1.  The patient indicated that he will be unable to  his prescriptions at [facility] on [address] because they are closing tomorrow.      2.  The patient asked that the prescriptions written by Dr. Tripp today be transferred be sent to [facility] on [address].      3.  RN noted that Dr. Tripp had already sent his prescription to [facility] on [address]      4.  RN informed the patient that his medication was sent to this pharmacy.  The patient thanked [facility] Team.

## 2022-12-15 NOTE — PROGRESS NOTES
[facility]       Rooming information:    Point of care urine drug screen positive for: AMP  and BUP   Urine Drug Screen Results  AMP: Positive  BAR: Negative  BUP: Positive  BZO: Negative  MERCEDEZ: Negative  mAMP: Negative  MDMA : Negative  MTD: Negative  [id number]: Negative  OXY: Negative  PCP : Negative  THC : Negative  *POC urine drug screen does not screen for Fentanyl    PHQ-9 Scores:   PHQ 1/14/2021 1/4/2022 6/30/2022   PHQ-9 Total Score 2 0 0   Q9: Thoughts of better off dead/self-harm past 2 weeks Not at all Not at all Not at all     SHABBIR-7 Scores:  SHABBIR-7 SCORE 4/21/2022 6/30/2022 8/3/2022   Total Score - - 0 (minimal anxiety)   Total Score 0 0 0       Any other recent substance use:     Denies    NICOTINE-No  If using nicotine, ready to quit? No    Side effects related to medications pt would like to discuss with provider (constipation, dry mouth, HA, GI upset, sedation?)   Mod constipation, takes laxities    Narcan currently available: Yes    Primary care provider: Pawan Castor MD     Mental health provider: yes, going to  [facility] [place]   (follow up on MH referral if needed)    Any housing, insurance deficits?: Denies     Contact information up to date? Yes     3rd Party Involvement NA (please obtain ANAYELI if pt would like to include)        Catherine Fisher CMA  December 15, 2022  11:20 AM

## 2022-12-15 NOTE — TELEPHONE ENCOUNTER
Pawan Castro MD Parker, Katharine R, DO; P [facility] [place]  Thanks for the heads up!       [facility] Staff:  Please call patient and assess and assist with appt.  Thanks!     Al Castro           Previous Messages     ----- Message -----   From: Ernestina Tripp DO   Sent: 12/15/2022  11:44 AM CST   To: Pawan Castro MD     Hi Dr Castro,     I just saw Robles - he has a productive cough x  few weeks and some wheezing, initially had some SOB but states that is better.  He has edema of BLE to knees as well.  I asked him to call your office or go to ER if worsening.  Just wanted to give you a heads up.  Thanks!       Felicia

## 2022-12-15 NOTE — Clinical Note
Hi Dr Castro,  I just saw Robles - he has a productive cough x  few weeks and some wheezing, initially had some SOB but states that is better.  He has edema of BLE to knees as well.  I asked him to call your office or go to ER if worsening.  Just wanted to give you a heads up.  Thanks!    Felicia

## 2022-12-16 ENCOUNTER — TELEPHONE (OUTPATIENT)
Dept: BEHAVIORAL HEALTH | Facility: CLINIC | Age: 57
End: 2022-12-16
Payer: COMMERCIAL

## 2022-12-16 DIAGNOSIS — F11.20 OPIOID USE DISORDER, SEVERE, DEPENDENCE (H): Primary | ICD-10-CM

## 2022-12-16 PROCEDURE — 99207 PR SELF-HELP/PEER SVC PER 15 MIN: CPT

## 2022-12-16 NOTE — TELEPHONE ENCOUNTER
Spoke with patient and confirmed an appointment time with him for Monday. Patient agreed that he would go to urgent care if he worsens over the weekend.

## 2022-12-16 NOTE — TELEPHONE ENCOUNTER
Per provider referral, PRC placed telephone recovery support call to pt to assist with enrollment into the RESET-O program application on his mobile phone.  Pt reports not yet having the opportunity to review if a text message has been received on his mobile phone from [employer] with the access code information to enroll in the RESET-O program.  Pt requested PRC call him next week to assist in enrollment.     Janusz Flowers  Peer   [facility]  [phone]    2:00 pm

## 2022-12-16 NOTE — TELEPHONE ENCOUNTER
It sounds like he is generally doing okay and I be happy to see him next week, or he could go to urgent care

## 2022-12-19 ENCOUNTER — OFFICE VISIT (OUTPATIENT)
Dept: INTERNAL MEDICINE | Facility: CLINIC | Age: 57
End: 2022-12-19
Payer: COMMERCIAL

## 2022-12-19 VITALS
TEMPERATURE: 97.9 F | HEIGHT: 70 IN | HEART RATE: 91 BPM | BODY MASS INDEX: 45.1 KG/M2 | OXYGEN SATURATION: 98 % | SYSTOLIC BLOOD PRESSURE: 110 MMHG | WEIGHT: 315 LBS | DIASTOLIC BLOOD PRESSURE: 68 MMHG

## 2022-12-19 DIAGNOSIS — I10 ESSENTIAL HYPERTENSION: ICD-10-CM

## 2022-12-19 DIAGNOSIS — R22.43 LOCALIZED SWELLING OF BOTH LOWER LEGS: ICD-10-CM

## 2022-12-19 DIAGNOSIS — E11.42 TYPE 2 DIABETES MELLITUS WITH DIABETIC POLYNEUROPATHY, WITHOUT LONG-TERM CURRENT USE OF INSULIN (H): ICD-10-CM

## 2022-12-19 DIAGNOSIS — G47.33 OSA ON CPAP: ICD-10-CM

## 2022-12-19 DIAGNOSIS — I87.2 VENOUS INSUFFICIENCY OF BOTH LOWER EXTREMITIES: Primary | ICD-10-CM

## 2022-12-19 DIAGNOSIS — F11.21 OPIOID USE DISORDER, SEVERE, IN SUSTAINED REMISSION (H): ICD-10-CM

## 2022-12-19 LAB
ALBUMIN SERPL BCG-MCNC: 4.1 G/DL (ref 3.5–5.2)
ALBUMIN UR-MCNC: NEGATIVE MG/DL
ALP SERPL-CCNC: 88 U/L (ref 40–129)
ALT SERPL W P-5'-P-CCNC: 16 U/L (ref 10–50)
ANION GAP SERPL CALCULATED.3IONS-SCNC: 13 MMOL/L (ref 7–15)
APPEARANCE UR: ABNORMAL
AST SERPL W P-5'-P-CCNC: 29 U/L (ref 10–50)
BACTERIA #/AREA URNS HPF: ABNORMAL /HPF
BILIRUB SERPL-MCNC: 0.2 MG/DL
BILIRUB UR QL STRIP: NEGATIVE
BNP SERPL-MCNC: <10 PG/ML (ref 0–46)
BUN SERPL-MCNC: 12.9 MG/DL (ref 6–20)
CALCIUM SERPL-MCNC: 9.7 MG/DL (ref 8.6–10)
CHLORIDE SERPL-SCNC: 101 MMOL/L (ref 98–107)
COLOR UR AUTO: YELLOW
CREAT SERPL-MCNC: 0.92 MG/DL (ref 0.67–1.17)
D DIMER PPP FEU-MCNC: 0.42 UG/ML FEU (ref 0–0.5)
DEPRECATED HCO3 PLAS-SCNC: 26 MMOL/L (ref 22–29)
ERYTHROCYTE [DISTWIDTH] IN BLOOD BY AUTOMATED COUNT: 14.4 % (ref 10–15)
GFR SERPL CREATININE-BSD FRML MDRD: >90 ML/MIN/1.73M2
GLUCOSE SERPL-MCNC: 101 MG/DL (ref 70–99)
GLUCOSE UR STRIP-MCNC: NEGATIVE MG/DL
HBA1C MFR BLD: 5.8 % (ref 0–5.6)
HCT VFR BLD AUTO: 40.9 % (ref 40–53)
HGB BLD-MCNC: 13 G/DL (ref 13.3–17.7)
HGB UR QL STRIP: NEGATIVE
KETONES UR STRIP-MCNC: NEGATIVE MG/DL
LEUKOCYTE ESTERASE UR QL STRIP: ABNORMAL
MCH RBC QN AUTO: 29.7 PG (ref 26.5–33)
MCHC RBC AUTO-ENTMCNC: 31.8 G/DL (ref 31.5–36.5)
MCV RBC AUTO: 93 FL (ref 78–100)
NITRATE UR QL: NEGATIVE
PH UR STRIP: 5.5 [PH] (ref 5–8)
PLATELET # BLD AUTO: 300 10E3/UL (ref 150–450)
POTASSIUM SERPL-SCNC: 4.3 MMOL/L (ref 3.4–5.3)
PROT SERPL-MCNC: 8.4 G/DL (ref 6.4–8.3)
RBC # BLD AUTO: 4.38 10E6/UL (ref 4.4–5.9)
RBC #/AREA URNS AUTO: ABNORMAL /HPF
SODIUM SERPL-SCNC: 140 MMOL/L (ref 136–145)
SP GR UR STRIP: >=1.03 (ref 1–1.03)
SQUAMOUS #/AREA URNS AUTO: ABNORMAL /LPF
TSH SERPL DL<=0.005 MIU/L-ACNC: 2.57 UIU/ML (ref 0.3–4.2)
UROBILINOGEN UR STRIP-ACNC: 1 E.U./DL
WBC # BLD AUTO: 6.4 10E3/UL (ref 4–11)
WBC #/AREA URNS AUTO: ABNORMAL /HPF

## 2022-12-19 PROCEDURE — 36415 COLL VENOUS BLD VENIPUNCTURE: CPT | Performed by: INTERNAL MEDICINE

## 2022-12-19 PROCEDURE — 84443 ASSAY THYROID STIM HORMONE: CPT | Performed by: INTERNAL MEDICINE

## 2022-12-19 PROCEDURE — 80053 COMPREHEN METABOLIC PANEL: CPT | Performed by: INTERNAL MEDICINE

## 2022-12-19 PROCEDURE — 99214 OFFICE O/P EST MOD 30 MIN: CPT | Performed by: INTERNAL MEDICINE

## 2022-12-19 PROCEDURE — 81001 URINALYSIS AUTO W/SCOPE: CPT | Performed by: INTERNAL MEDICINE

## 2022-12-19 PROCEDURE — 83880 ASSAY OF NATRIURETIC PEPTIDE: CPT | Performed by: INTERNAL MEDICINE

## 2022-12-19 PROCEDURE — 83036 HEMOGLOBIN GLYCOSYLATED A1C: CPT | Performed by: INTERNAL MEDICINE

## 2022-12-19 PROCEDURE — 85027 COMPLETE CBC AUTOMATED: CPT | Performed by: INTERNAL MEDICINE

## 2022-12-19 PROCEDURE — 85379 FIBRIN DEGRADATION QUANT: CPT | Performed by: INTERNAL MEDICINE

## 2022-12-19 PROCEDURE — 87086 URINE CULTURE/COLONY COUNT: CPT | Performed by: INTERNAL MEDICINE

## 2022-12-19 ASSESSMENT — PATIENT HEALTH QUESTIONNAIRE - PHQ9
SUM OF ALL RESPONSES TO PHQ QUESTIONS 1-9: 0
10. IF YOU CHECKED OFF ANY PROBLEMS, HOW DIFFICULT HAVE THESE PROBLEMS MADE IT FOR YOU TO DO YOUR WORK, TAKE CARE OF THINGS AT HOME, OR GET ALONG WITH OTHER PEOPLE: NOT DIFFICULT AT ALL
SUM OF ALL RESPONSES TO PHQ QUESTIONS 1-9: 0

## 2022-12-19 NOTE — PROGRESS NOTES
Office Visit - Follow Up   Tobin Bryant   57 year old male    Date of Visit: 12/19/2022    Chief Complaint   Patient presents with     Recheck Medication     RECHECK        Assessment and Plan   1. Venous insufficiency of both lower extremities  I suspect his lower extremity edema is venous stasis, exacerbated by prolonged sitting as well as not using his CPAP machine as well as medications including Suboxone.  I recommended by compression, elevation, checking labs as below.  If D-dimer and BNP are negative may benefit from a vascular medicine referral.  If any of these tests are abnormal would proceed with appropriate imaging studies    - Compression Sleeve/Stocking Order for DME - ONLY FOR DME    2. Type 2 diabetes mellitus with diabetic polyneuropathy, without long-term current use of insulin (H)  - Comprehensive metabolic panel; Future  - CBC with platelets; Future  - TSH with free T4 reflex; Future  - UA Macro with Reflex to Micro and Culture - lab collect; Future  - Hemoglobin A1c; Future  - Comprehensive metabolic panel  - CBC with platelets  - TSH with free T4 reflex  - UA Macro with Reflex to Micro and Culture - lab collect  - Hemoglobin A1c  - Urine Microscopic Exam  - Urine Culture    3. Opioid use disorder, severe, in sustained remission (H)  As above discussed with him that Suboxone can contribute to edema    4. YANIRA on CPAP  Encouraged him strongly to resume CPAP use    5. Essential hypertension  Blood pressure controlled continue same    6. Localized swelling of both lower legs  - D dimer, quantitative; Future  - B-Type Natriuretic Peptide ([facility] Only); Future  - D dimer, quantitative  - B-Type Natriuretic Peptide ([facility] Only)    Return in about 4 weeks (around 1/16/2023) for Follow up.     History of Present Illness   This 57 year old man comes in for evaluation of leg swelling.  About 2 months ago he stopped using the CPAP machine as he is waiting for prior to arrival.  Shortly thereafter  "his leg started to swell.  He has been sitting down a lot more with his legs down.  Both legs are swollen.  Neck pain and A little bit of a skin rash.  No chest pain orthopnea.       Physical Exam   General Appearance:   No acute distress    /68 (BP Location: Left arm, Patient Position: Sitting, Cuff Size: Adult Large)   Pulse 91   Temp 97.9  F (36.6  C) (Tympanic)   Ht 1.765 m (5' 9.5\")   Wt 146.8 kg (323 lb 9.6 oz)   SpO2 98%   BMI 47.10 kg/m      Bilateral lower extremity edema with stasis dermatitis changes, heart rate is controlled rhythm regular lungs clear to auscultation bilaterally abdomen is obese     Additional Information   Current Outpatient Medications   Medication Sig Dispense Refill     ACCU-CHEK GUIDE test strip USE TO TEST TWICE DAILY 200 strip 3     albuterol (PROAIR HFA/PROVENTIL HFA/VENTOLIN HFA) 108 (90 Base) MCG/ACT inhaler INHALE 2 PUFFS BY MOUTH EVERY 6 HOURS AS NEEDED FOR WHEEZING 18 g 3     ARIPiprazole (ABILIFY) 10 MG tablet Take 1 tablet (10 mg) by mouth daily       aspirin (ASA) 81 MG EC tablet Take 1 tablet (81 mg) by mouth daily 90 tablet 3     atorvastatin (LIPITOR) 20 MG tablet Take 1 tablet (20 mg) by mouth daily 90 tablet 3     blood glucose monitoring (NO BRAND SPECIFIED) meter device kit Use to test blood sugar two times daily or as directed. 1 kit 3     budesonide-formoterol (SYMBICORT) 80-4.5 MCG/ACT Inhaler INHALE 2 PUFFS BY MOUTH TWICE DAILY 10.2 g 11     buprenorphine HCl-naloxone HCl (SUBOXONE) 8-2 MG per film 2 sl qam, 1 sl qpm 90 Film 1     buPROPion (WELLBUTRIN XL) 150 MG 24 hr tablet Take 1 tablet (150 mg) by mouth every morning       docusate sodium (COLACE) 100 MG capsule TAKE 1 CAPSULE(100 MG) BY MOUTH TWICE DAILY as needed for constipation 60 capsule 3     DTx Hsaron - Subst Use Disorder (RESET-O) Use as directed for 84 days. 1 each 3     furosemide (LASIX) 20 MG tablet Take 1 tablet (20 mg) by mouth daily as needed (swelling)       naloxone (NARCAN) 4 " MG/0.1ML nasal spray Spray 1 spray (4 mg) into one nostril alternating nostrils once as needed for opioid reversal every 2-3 minutes until assistance arrives 0.2 mL 11     phentermine (ADIPEX-P) 37.5 MG tablet Take 0.5-1 tablets (18.75-37.5 mg) by mouth every morning (before breakfast) 90 tablet 1     Allergies   Allergen Reactions     Seafood Other (See Comments)     Eyes turn yellow     Ibuprofen Nausea and Vomiting       Time:      Pawan Castro MD    Answers for HPI/ROS submitted by the patient on 12/19/2022  If you checked off any problems, how difficult have these problems made it for you to do your work, take care of things at home, or get along with other people?: Not difficult at all  PHQ9 TOTAL SCORE: 0  Frequency of checking blood sugars:: not at all  Diabetic concerns:: other  Paraesthesia present:: none of these symptoms  How many servings of fruits and vegetables do you eat daily?: 0-1  On average, how many sweetened beverages do you drink each day (Examples: soda, juice, sweet tea, etc.  Do NOT count diet or artificially sweetened beverages)?: 0  How many minutes a day do you exercise enough to make your heart beat faster?: 60 or more  How many days a week do you exercise enough to make your heart beat faster?: 5  How many days per week do you miss taking your medication?: 0

## 2022-12-19 NOTE — LETTER
December 21, 2022      Robles KERN Bryant  [address]   [place] [address]        Dear ,    We are writing to inform you of your test results.    Your labs all look okay, excellent.  No evidence of heart disease or blood clots.  Continue with leg elevation and compression to help with swelling    Resulted Orders   D dimer, quantitative   Result Value Ref Range    D-Dimer Quantitative 0.42 0.00 - 0.50 ug/mL FEU    Narrative    This D-dimer assay is intended for use in conjunction with a clinical pretest probability assessment model to exclude pulmonary embolism (PE) and deep venous thrombosis (DVT) in outpatients suspected of PE or DVT. The cut-off value is 0.50 ug/mL FEU.   B-Type Natriuretic Peptide ([facility] Only)   Result Value Ref Range    BNP <10 0 - 46 pg/mL   Comprehensive metabolic panel   Result Value Ref Range    Sodium 140 136 - 145 mmol/L    Potassium 4.3 3.4 - 5.3 mmol/L    Chloride 101 98 - 107 mmol/L    Carbon Dioxide (CO2) 26 22 - 29 mmol/L    Anion Gap 13 7 - 15 mmol/L    Urea Nitrogen 12.9 6.0 - 20.0 mg/dL    Creatinine 0.92 0.67 - 1.17 mg/dL    Calcium 9.7 8.6 - 10.0 mg/dL    Glucose 101 (H) 70 - 99 mg/dL    Alkaline Phosphatase 88 40 - 129 U/L    AST 29 10 - 50 U/L    ALT 16 10 - 50 U/L    Protein Total 8.4 (H) 6.4 - 8.3 g/dL    Albumin 4.1 3.5 - 5.2 g/dL    Bilirubin Total 0.2 <=1.2 mg/dL    GFR Estimate >90 >60 mL/min/1.73m2      Comment:      Effective December 21, 2021 eGFRcr in adults is calculated using the 2021 CKD-EPI creatinine equation which includes age and gender (Li et al., NEJM, DOI: 10.1056/MPPLxp7667211)   CBC with platelets   Result Value Ref Range    WBC Count 6.4 4.0 - 11.0 10e3/uL    RBC Count 4.38 (L) 4.40 - 5.90 10e6/uL    Hemoglobin 13.0 (L) 13.3 - 17.7 g/dL    Hematocrit 40.9 40.0 - 53.0 %    MCV 93 78 - 100 fL    MCH 29.7 26.5 - 33.0 pg    MCHC 31.8 31.5 - 36.5 g/dL    RDW 14.4 10.0 - 15.0 %    Platelet Count 300 150 - 450 10e3/uL   TSH with free T4  reflex   Result Value Ref Range    TSH 2.57 0.30 - 4.20 uIU/mL   UA Macro with Reflex to Micro and Culture - lab collect   Result Value Ref Range    Color Urine Yellow Colorless, Straw, Light Yellow, Yellow    Appearance Urine Slightly Cloudy (A) Clear    Glucose Urine Negative Negative mg/dL    Bilirubin Urine Negative Negative    Ketones Urine Negative Negative mg/dL    Specific Gravity Urine >=1.030 1.005 - 1.030    Blood Urine Negative Negative    pH Urine 5.5 5.0 - 8.0    Protein Albumin Urine Negative Negative mg/dL    Urobilinogen Urine 1.0 0.2, 1.0 E.U./dL    Nitrite Urine Negative Negative    Leukocyte Esterase Urine Large (A) Negative   Hemoglobin A1c   Result Value Ref Range    Hemoglobin A1C 5.8 (H) 0.0 - 5.6 %      Comment:      Normal <5.7%   Prediabetes 5.7-6.4%    Diabetes 6.5% or higher     Note: Adopted from ADA consensus guidelines.   Urine Microscopic Exam   Result Value Ref Range    Bacteria Urine Few (A) None Seen /HPF    RBC Urine 0-2 0-2 /HPF /HPF    WBC Urine 25-50 (A) 0-5 /HPF /HPF    Squamous Epithelials Urine Few (A) None Seen /LPF   Urine Culture   Result Value Ref Range    Culture No Growth        If you have any questions or concerns, please call the clinic at the number listed above.       Sincerely,      Pawan Castro MD

## 2022-12-19 NOTE — PROGRESS NOTES
[facility] Post-Appointment Chart -check      Medications ordered this visit were e-scribed.  Verified by order class [x] yes  [] no    List Medications:  buprenorphine HCl-naloxone HCl (SUBOXONE) 8-2 MG per film    Medication changes or discontinuations were communicated to patient's pharmacy: [] yes  [x] no    UA collected [x] yes  [] no  [] n/a-virtual     Outside referrals / labs, etc support staff to follow up: [x] yes  [] no Rest-[facility]     Future appointment was made: [x] yes  [] no  [] n/a  02/16/2023  Future Appointments 12/19/2022 - 6/17/2023      Date Visit Type Length Department Provider     12/19/2022  1:40 PM [facility] V[facility]IT [facility] Pawan Castro MD    Location Instructions:     [facility] is located at [address]  in [place], just east of the [facility] exit off of [address] [address] and the [employer] [place]. Access the free parking lot directly from [address] if traveling east; if traveling west on [address], turn south on [address]. [address] and the [place] light rail also stop at the intersection of [address] and [address].               1/9/2023 10:30 AM TELEPHONE VISIT RETURN [facility] min [facility] Xochitl Griggs    Location Instructions:     Our clinic is located in the [facility] located at [address], [address], [place], [place], across the street from [facility].              2/16/2023 11:30 AM ADDICTION MED RETURN 30 min [facility] ADDICTION MEDICINE Ernestina Tripp DO                   Dictation completed at time of chart check: [x] yes  [] no    I have checked the documentation for today s encounters and the above information has been reviewed and completed.      Catherine Fisher CMA on December 19, 2022 at 12:05 PM

## 2022-12-21 ENCOUNTER — TELEPHONE (OUTPATIENT)
Dept: INTERNAL MEDICINE | Facility: CLINIC | Age: 57
End: 2022-12-21

## 2022-12-21 LAB — BACTERIA UR CULT: NO GROWTH

## 2022-12-21 NOTE — TELEPHONE ENCOUNTER
New Medication Request        What medication are you requesting?: Cough Syrup     Reason for medication request: Wheezing    Have you taken this medication before?: No    Controlled Substance Agreement on file:   CSA -- Patient Level:    CSA: None found at the patient level.         Patient offered an appointment? No Patient saw provider yesterday.     Preferred Pharmacy:     [id number] - [place], [place] - [address] AT [address]  [address]  [place] [address]  Phone: [phone] Fax: [phone]      Okay to leave a detailed message?: Yes at Home number on file [phone] (home) or Cell number on file:    Telephone Information:   Mobile [phone]

## 2023-01-09 ENCOUNTER — VIRTUAL VISIT (OUTPATIENT)
Dept: SURGERY | Facility: CLINIC | Age: 58
End: 2023-01-09
Payer: COMMERCIAL

## 2023-01-09 DIAGNOSIS — E66.01 MORBID OBESITY (H): ICD-10-CM

## 2023-01-09 DIAGNOSIS — E66.01 MORBID OBESITY WITH BMI OF 45.0-49.9, ADULT (H): Primary | ICD-10-CM

## 2023-01-09 PROCEDURE — 97803 MED NUTRITION INDIV SUBSEQ: CPT | Mod: TEL | Performed by: DIETITIAN, REGISTERED

## 2023-01-09 NOTE — LETTER
1/9/2023         RE: Tobin Bryant  [address]  [place] [address]        Dear Colleague,    Thank you for referring your patient, Tobin Bryant, to the [facility] [place]. Please see a copy of my visit note below.    Tobin Bryant is a 57 year old who is being evaluated via a billable telephone visit.      What phone number would you like to be contacted at? [phone]  How would you like to obtain your AVS? [facility]      Medical  Weight Loss Follow-Up Diet Evaluation  Assessment:  Tobin is presenting today for a follow up weight management nutrition consultation. Pt has had an initial appointment with Dr. Burnham  Weight loss medication: Phentermine.  Pt's weight is 323 lb - he has had some swelling in his legs recently  Initial weight: 324lb  Weight change: none    BMI: There is no height or weight on file to calculate BMI.  Ideal body weight: 73 kg (160 lb 15 oz)  Adjusted ideal body weight: 101 kg (222 lb 9 oz)    Estimated RMR ([place]-St Jeor equation):   2274 kcals x 1.2 (sedentary) = 2729 kcals (for weight maintenance)  Recommended Protein Intake:  grams of protein/day  Patient Active Problem List:  Patient Active Problem List   Diagnosis     Type 2 diabetes mellitus with diabetic polyneuropathy, without long-term current use of insulin (H)     Morbid obesity (H)     Opioid use disorder, severe, in sustained remission (H)     YANIRA on CPAP     Essential hypertension     Recurrent major depressive disorder, in full remission (H)     Opioid use disorder, severe, dependence (H)     Diabetes: most recent A1c 5.8 (12/19/22)     Progress on goals from last visit: He states his nutrition has been good, but hasn't had any weight loss since last visit. He states that he is going to try to increase his vegetable intake more to help reach his weight loss goal.    Food recall:  Breakfast: breakfast sandwich (2 slices toast 1 egg, 3 rubén  sausages  Lunch: none  Dinner: greens, macaroni, and baked chicken  Usually eats 2 meals    Beverages:   Soda: 1 can soda daily  Water    No coffee  Exercise: no routine, trying to be active but is avoiding walking outside due to winter weather.  Nutrition Diagnosis:    1. Overweight/Obesity (NC 3.3) related to overeating and poor lifestyle habits as evidenced by patient's report of infrequent meals, lack of activity, snacking on sweets and BMI 44.87    Intervention:  1. Food and/or nutrient delivery: increase vegetable intake at dinner meal - discussed aiming for plate to be 75% lean protein and vegetables.  2. Nutrition counseling: reviewed goals/plan    Monitoring/Evaluation:    Goals:  1. Increase veggies, make lean protein and veggies 75% of dinner meal  2. Find some alternative treats that are lower in calories    Patient to follow up in 3 month(s) with RD    Phone call duration: 25 minutes    Originating Location (pt. Location): Home        Distant Location (provider location):  Off-site    Xochitl Griggs      Again, thank you for allowing me to participate in the care of your patient.        Sincerely,        Xochitl Griggs

## 2023-01-09 NOTE — PROGRESS NOTES
Tobin Bryant is a 57 year old who is being evaluated via a billable telephone visit.      What phone number would you like to be contacted at? [phone]  How would you like to obtain your AVS? [facility]  Weight Loss Follow-Up Diet Evaluation  Assessment:  Tobin is presenting today for a follow up weight management nutrition consultation. Pt has had an initial appointment with Dr. Burnham  Weight loss medication: Phentermine.  Pt's weight is 323 lb - he has had some swelling in his legs recently  Initial weight: 324lb  Weight change: none    BMI: There is no height or weight on file to calculate BMI.  Ideal body weight: 73 kg (160 lb 15 oz)  Adjusted ideal body weight: 101 kg (222 lb 9 oz)    Estimated RMR ([place]-St Jeor equation):   2274 kcals x 1.2 (sedentary) = 2729 kcals (for weight maintenance)  Recommended Protein Intake:  grams of protein/day  Patient Active Problem List:  Patient Active Problem List   Diagnosis     Type 2 diabetes mellitus with diabetic polyneuropathy, without long-term current use of insulin (H)     Morbid obesity (H)     Opioid use disorder, severe, in sustained remission (H)     YANIRA on CPAP     Essential hypertension     Recurrent major depressive disorder, in full remission (H)     Opioid use disorder, severe, dependence (H)     Diabetes: most recent A1c 5.8 (12/19/22)     Progress on goals from last visit: He states his nutrition has been good, but hasn't had any weight loss since last visit. He states that he is going to try to increase his vegetable intake more to help reach his weight loss goal.    Food recall:  Breakfast: breakfast sandwich (2 slices toast 1 egg, 3 rubén sausages  Lunch: none  Dinner: greens, macaroni, and baked chicken  Usually eats 2 meals    Beverages:   Soda: 1 can soda daily  Water    No coffee  Exercise: no routine, trying to be active but is avoiding walking outside due to winter weather.  Nutrition Diagnosis:    1. Overweight/Obesity  (NC 3.3) related to overeating and poor lifestyle habits as evidenced by patient's report of infrequent meals, lack of activity, snacking on sweets and BMI 44.87    Intervention:  1. Food and/or nutrient delivery: increase vegetable intake at dinner meal - discussed aiming for plate to be 75% lean protein and vegetables.  2. Nutrition counseling: reviewed goals/plan    Monitoring/Evaluation:    Goals:  1. Increase veggies, make lean protein and veggies 75% of dinner meal  2. Find some alternative treats that are lower in calories    Patient to follow up in 3 month(s) with RD    Phone call duration: 25 minutes    Originating Location (pt. Location): Home        Distant Location (provider location):  Off-site    Xochitl Griggs

## 2023-02-16 ENCOUNTER — OFFICE VISIT (OUTPATIENT)
Dept: ADDICTION MEDICINE | Facility: CLINIC | Age: 58
End: 2023-02-16
Payer: COMMERCIAL

## 2023-02-16 VITALS
BODY MASS INDEX: 45.56 KG/M2 | SYSTOLIC BLOOD PRESSURE: 115 MMHG | DIASTOLIC BLOOD PRESSURE: 69 MMHG | HEART RATE: 88 BPM | WEIGHT: 313 LBS | TEMPERATURE: 98.7 F

## 2023-02-16 DIAGNOSIS — F11.21 OPIOID USE DISORDER, SEVERE, IN SUSTAINED REMISSION (H): ICD-10-CM

## 2023-02-16 PROCEDURE — 99213 OFFICE O/P EST LOW 20 MIN: CPT | Performed by: FAMILY MEDICINE

## 2023-02-16 RX ORDER — BUPRENORPHINE AND NALOXONE 8; 2 MG/1; MG/1
FILM, SOLUBLE BUCCAL; SUBLINGUAL
Qty: 90 FILM | Refills: 1 | Status: SHIPPED | OUTPATIENT
Start: 2023-02-16 | End: 2023-04-20

## 2023-02-16 ASSESSMENT — PAIN SCALES - GENERAL: PAINLEVEL: NO PAIN (0)

## 2023-02-16 NOTE — PROGRESS NOTES
[facility]    A/P                                                    ASSESSMENT/PLAN    1. Opioid use disorder, severe, in sustained remission (H)  Stable.  Continue Suboxone, no change.  Encouraged recovery activities and advised him peer  may be good resource to help find a meeting that is a better fit for him.  - buprenorphine HCl-naloxone HCl (SUBOXONE) 8-2 MG per film; 2 sl qam, 1 sl qpm  Dispense: 90 Film; Refill: 1  - naloxone (NARCAN) 4 MG/0.1ML nasal spray; Spray 1 spray (4 mg) into one nostril alternating nostrils once as needed for opioid reversal every 2-3 minutes until assistance arrives  Dispense: 0.2 mL; Refill: 11      PDMP Review       Value Time User    State PDMP site checked  Yes 2/16/2023 11:38 AM Ernestina Tripp,             RTC  Return in about 2 months (around 4/16/2023) for Follow up, in person.      Counseled the patient on the importance of having a recovery program in addition to medication to manage recovery.  Components include avoiding isolating, having willingness to change, avoiding triggers and managing cravings. Encouraged having some type of sober network and practicing honesty with trusted support person(s). Encouraged other services such as counseling, 12 step or other self-help organizations.      Opioid warning reviewed.  Risk of overdose following a period of abstinence due to decrease tolerance was discussed including risk of death.  Strongly recommended abstain from alcohol, benzodiazepines, THC, opioids and other drugs of abuse.  Increased risk of return to opioid use after use of these substances discussed.  Increased risk of overdose/death with use of other substances particularly benzodiazepines/alcohol reviewed.        YOSELIN Bryant is a 57 year old male with PMHx of HTN, morbid obesity, pre-diabetes, COPD/ashtma, YANIRA, anxiety, depression, and OUD who presents to  clinic today for follow up.     Brief history of use:    Last use of illicit opioids was in approximately 2019.  Has been on buprenorphine for OUD since at least 2014.       Plan from most recent office visit (12/15/2022):  1. Opioid use disorder, severe, in sustained remission (H)  Stable, symptoms well controlled.  Continue Suboxone, no change.  Has Narcan.  He is interested in RESET-O this was prescribed today.  Advised patient that [facility] will reach out to help him set up.  - buprenorphine HCl-naloxone HCl (SUBOXONE) 8-2 MG per film; 2 sl qam, 1 sl qpm  Dispense: 90 Film; Refill: 1  - DTx Sharon - Subst Use Disorder (RESET-O); Use as directed for 84 days.  Dispense: 1 each; Refill: 3    TODAY'S VISIT  HPI Feb 16, 2023  - Doing well, no concerns  - Taking Suboxone as prescribed, no side effects (constipation well managed with colace prn), no opioid cravings  - Health stable, recent visit with PCP in December noted  - Mood is stable, continues with psychiatry  - No current recovery activities - went to meeting recently and did not feel like good fit    PHQ 1/4/2022 6/30/2022 12/19/2022   PHQ-9 Total Score 0 0 0   Q9: Thoughts of better off dead/self-harm past 2 weeks Not at all Not at all Not at all     SHABBIR-7 SCORE 4/21/2022 6/30/2022 8/3/2022   Total Score - - 0 (minimal anxiety)   Total Score 0 0 0       OBJECTIVE                                                    PHYSICAL EXAM:  /69   Pulse 88   Temp 98.7  F (37.1  C)   Wt 142 kg (313 lb)   BMI 45.56 kg/m      GENERAL: healthy, alert and no distress  EYES: Eyes grossly normal to inspection, conjunctivae and sclerae normal  RESP: No respiratory distress  SKIN: no pallor or jaundice  NEURO: normal gait, no tremor, mentation intact and speech normal  MENTAL STATUS EXAM  Appearance/Behavior: No appearant distress  Speech: Normal  Mood/Affect: flat  Insight: Adequate    LAB  No results found for any visits on 02/16/23.      HISTORY                                                     Problem list reviewed & adjusted, as indicated.  Patient Active Problem List   Diagnosis     Type 2 diabetes mellitus with diabetic polyneuropathy, without long-term current use of insulin (H)     Morbid obesity (H)     Opioid use disorder, severe, in sustained remission (H)     YANIRA on CPAP     Essential hypertension     Recurrent major depressive disorder, in full remission (H)     Opioid use disorder, severe, dependence (H)         MEDICATION LIST (prior to visit)  ARIPiprazole (ABILIFY) 10 MG tablet, Take 1 tablet (10 mg) by mouth daily  aspirin (ASA) 81 MG EC tablet, Take 1 tablet (81 mg) by mouth daily  atorvastatin (LIPITOR) 20 MG tablet, Take 1 tablet (20 mg) by mouth daily  docusate sodium (COLACE) 100 MG capsule, TAKE 1 CAPSULE(100 MG) BY MOUTH TWICE DAILY as needed for constipation  furosemide (LASIX) 20 MG tablet, Take 1 tablet (20 mg) by mouth daily as needed (swelling)  phentermine (ADIPEX-P) 37.5 MG tablet, Take 0.5-1 tablets (18.75-37.5 mg) by mouth every morning (before breakfast)  ACCU-CHEK GUIDE test strip, USE TO TEST TWICE DAILY  albuterol (PROAIR HFA/PROVENTIL HFA/VENTOLIN HFA) 108 (90 Base) MCG/ACT inhaler, INHALE 2 PUFFS BY MOUTH EVERY 6 HOURS AS NEEDED FOR WHEEZING  blood glucose monitoring (NO BRAND SPECIFIED) meter device kit, Use to test blood sugar two times daily or as directed.  budesonide-formoterol (SYMBICORT) 80-4.5 MCG/ACT Inhaler, INHALE 2 PUFFS BY MOUTH TWICE DAILY  buPROPion (WELLBUTRIN XL) 150 MG 24 hr tablet, Take 1 tablet (150 mg) by mouth every morning  DTx Sharon - Subst Use Disorder (RESET-O), Use as directed for 84 days.    No current facility-administered medications on file prior to visit.      MEDICATION LIST (after visit)  Current Outpatient Medications   Medication     ARIPiprazole (ABILIFY) 10 MG tablet     aspirin (ASA) 81 MG EC tablet     atorvastatin (LIPITOR) 20 MG tablet     buprenorphine HCl-naloxone HCl (SUBOXONE) 8-2 MG per film     docusate  sodium (COLACE) 100 MG capsule     furosemide (LASIX) 20 MG tablet     naloxone (NARCAN) 4 MG/0.1ML nasal spray     phentermine (ADIPEX-P) 37.5 MG tablet     ACCU-CHEK GUIDE test strip     albuterol (PROAIR HFA/PROVENTIL HFA/VENTOLIN HFA) 108 (90 Base) MCG/ACT inhaler     blood glucose monitoring (NO BRAND SPECIFIED) meter device kit     budesonide-formoterol (SYMBICORT) 80-4.5 MCG/ACT Inhaler     buPROPion (WELLBUTRIN XL) 150 MG 24 hr tablet     DTx Sharon - Subst Use Disorder (RESET-O)     No current facility-administered medications for this visit.         Allergies   Allergen Reactions     Seafood Other (See Comments)     Eyes turn yellow     Ibuprofen Nausea and Vomiting       Ernestina Tripp [facility]  [place] [facility]  [phone]

## 2023-02-16 NOTE — PROGRESS NOTES
[facility] - [facility]       Rooming information:    Got back in [facility], traveled to [place]. Pt says his mental health is table, denies depression or anxiety.     Point of care urine drug screen positive for:   Urine Drug Screen Results  AMP: Negative  BAR: Negative  BUP: Positive  BZO: Negative  MERCEDEZ: Negative  mAMP: Negative  MDMA : Negative  MTD: Negative  RVI773: Negative  OXY: Negative  PCP : Negative  THC : Negative  Comment: very faint like for BZO  *POC urine drug screen does not screen for Fentanyl    PHQ-9 Scores:   PHQ 1/4/2022 6/30/2022 12/19/2022   PHQ-9 Total Score 0 0 0   Q9: Thoughts of better off dead/self-harm past 2 weeks Not at all Not at all Not at all     SHABBIR-7 Scores:  SHABBIR-7 SCORE 4/21/2022 6/30/2022 8/3/2022   Total Score - - 0 (minimal anxiety)   Total Score 0 0 0       Any other recent substance use:     Denies    NICOTINE-No  If using nicotine, ready to quit? NA    Side effects related to medications pt would like to discuss with provider (constipation, dry mouth, HA, GI upset, sedation?) moderate constipation, using colase PRN    Narcan currently available: Wants to have some more    Primary care provider: Pawan Castro MD     Mental health provider: @ [facility]  (follow up on MH referral if needed)    Any housing, insurance deficits?: denies    Contact information up to date? yes    3rd Party Involvement NA (please obtain ANAYELI if pt would like to include)        Vanessa Parada  February 16, 2023  11:13 AM

## 2023-03-22 ENCOUNTER — TELEPHONE (OUTPATIENT)
Dept: INTERNAL MEDICINE | Facility: CLINIC | Age: 58
End: 2023-03-22
Payer: COMMERCIAL

## 2023-04-18 ENCOUNTER — VIRTUAL VISIT (OUTPATIENT)
Dept: SURGERY | Facility: CLINIC | Age: 58
End: 2023-04-18
Payer: COMMERCIAL

## 2023-04-18 DIAGNOSIS — E66.01 MORBID OBESITY WITH BMI OF 45.0-49.9, ADULT (H): Primary | ICD-10-CM

## 2023-04-18 DIAGNOSIS — E66.01 MORBID OBESITY (H): ICD-10-CM

## 2023-04-18 PROCEDURE — 97803 MED NUTRITION INDIV SUBSEQ: CPT | Mod: 93 | Performed by: DIETITIAN, REGISTERED

## 2023-04-18 RX ORDER — PHENTERMINE HYDROCHLORIDE 37.5 MG/1
18.75-37.5 TABLET ORAL
Qty: 90 TABLET | Refills: 1 | Status: SHIPPED | OUTPATIENT
Start: 2023-04-18 | End: 2023-07-21

## 2023-04-18 NOTE — PROGRESS NOTES
Tobin Bryant is a 57 year old who is being evaluated via a billable telephone visit.      What phone number would you like to be contacted at? [phone]  How would you like to obtain your AVS? [facility]  Weight Loss Follow-Up Diet Evaluation  Assessment:  Tobin is presenting today for a follow up weight management nutrition consultation. Pt has had an initial appointment with Dr. Burnham  Weight loss medication: Phentermine.  Pt's weight is unknown - 313 lb on 2/16/23  Initial weight: 324lb  Weight change: -11 lb    BMI: There is no height or weight on file to calculate BMI.  Ideal body weight: 73 kg (160 lb 15 oz)  Adjusted ideal body weight: 101 kg (222 lb 9 oz)    Estimated RMR ([place]-St Jeor equation):   2274 kcals x 1.2 (sedentary) = 2729 kcals (for weight maintenance)  Recommended Protein Intake:  grams of protein/day  Patient Active Problem List:  Patient Active Problem List   Diagnosis     Type 2 diabetes mellitus with diabetic polyneuropathy, without long-term current use of insulin (H)     Morbid obesity (H)     Opioid use disorder, severe, in sustained remission (H)     YANIRA on CPAP     Essential hypertension     Recurrent major depressive disorder, in full remission (H)     Opioid use disorder, severe, dependence (H)     Diabetes: most recent A1c 5.8 (12/19/22)     Progress on goals from last visit: He states his nutrition has been good, and requested the Phentermine to be refilled. He was not interested in talking about his nutrition intake and progress with weight loss.   1. Increase veggies, make lean protein and veggies 75% of dinner meal   2. Find some alternative treats that are lower in calories    Food recall:  Breakfast: hoeney nut cheerios, scrambled egg sandwich with grimaldo  Lunch: none  Dinner: sandwich  Snack: candy  Usually eats 2 meals    Beverages:   Soda: 1 can soda daily  Water    No coffee  Exercise: not discussed this visit  Nutrition Diagnosis:    1. Morbid  Obesity related to overeating and poor lifestyle habits as evidenced by patient's report of infrequent meals, lack of activity, snacking on sweets and BMI 44.87    Intervention:  3. Food and/or nutrient delivery: increase vegetable intake at dinner meal - discussed aiming for plate to be 75% lean protein and vegetables.    Monitoring/Evaluation:    Goals:  Not discussed this visit as patient wanted to end the visit    Phone call duration: 11 minutes    Originating Location (pt. Location): Home        Distant Location (provider location):  Off-site    Xochitl Griggs

## 2023-04-18 NOTE — LETTER
4/18/2023         RE: Tobin Bryant  [address]  [place] [address]        Dear Colleague,    Thank you for referring your patient, Tobin Bryant, to the [facility] [place]. Please see a copy of my visit note below.    Tobin Bryant is a 57 year old who is being evaluated via a billable telephone visit.      What phone number would you like to be contacted at? [phone]  How would you like to obtain your AVS? [facility]  Weight Loss Follow-Up Diet Evaluation  Assessment:  Tobin is presenting today for a follow up weight management nutrition consultation. Pt has had an initial appointment with Dr. Burnham  Weight loss medication: Phentermine.  Pt's weight is unknown - 313 lb on 2/16/23  Initial weight: 324lb  Weight change: -11 lb    BMI: There is no height or weight on file to calculate BMI.  Ideal body weight: 73 kg (160 lb 15 oz)  Adjusted ideal body weight: 101 kg (222 lb 9 oz)    Estimated RMR ([place]-St Jeor equation):   2274 kcals x 1.2 (sedentary) = 2729 kcals (for weight maintenance)  Recommended Protein Intake:  grams of protein/day  Patient Active Problem List:  Patient Active Problem List   Diagnosis     Type 2 diabetes mellitus with diabetic polyneuropathy, without long-term current use of insulin (H)     Morbid obesity (H)     Opioid use disorder, severe, in sustained remission (H)     YANIRA on CPAP     Essential hypertension     Recurrent major depressive disorder, in full remission (H)     Opioid use disorder, severe, dependence (H)     Diabetes: most recent A1c 5.8 (12/19/22)     Progress on goals from last visit: He states his nutrition has been good, and requested the Phentermine to be refilled. He was not interested in talking about his nutrition intake and progress with weight loss.   1. Increase veggies, make lean protein and veggies 75% of dinner meal   2. Find some alternative treats that are lower in  calories    Food recall:  Breakfast: hoeney nut cheerios, scrambled egg sandwich with grimaldo  Lunch: none  Dinner: sandwich  Snack: candy  Usually eats 2 meals    Beverages:   Soda: 1 can soda daily  Water    No coffee  Exercise: not discussed this visit  Nutrition Diagnosis:    1. Morbid Obesity related to overeating and poor lifestyle habits as evidenced by patient's report of infrequent meals, lack of activity, snacking on sweets and BMI 44.87    Intervention:  3. Food and/or nutrient delivery: increase vegetable intake at dinner meal - discussed aiming for plate to be 75% lean protein and vegetables.    Monitoring/Evaluation:    Goals:  Not discussed this visit as patient wanted to end the visit    Phone call duration: 11 minutes    Originating Location (pt. Location): Home        Distant Location (provider location):  Off-site    Xochitl Griggs      Again, thank you for allowing me to participate in the care of your patient.        Sincerely,        Xochitl Griggs RD

## 2023-04-20 ENCOUNTER — OFFICE VISIT (OUTPATIENT)
Dept: ADDICTION MEDICINE | Facility: CLINIC | Age: 58
End: 2023-04-20
Payer: COMMERCIAL

## 2023-04-20 VITALS
WEIGHT: 315 LBS | BODY MASS INDEX: 46.43 KG/M2 | HEART RATE: 73 BPM | DIASTOLIC BLOOD PRESSURE: 91 MMHG | SYSTOLIC BLOOD PRESSURE: 135 MMHG

## 2023-04-20 DIAGNOSIS — F11.21 OPIOID USE DISORDER, SEVERE, IN SUSTAINED REMISSION (H): Primary | ICD-10-CM

## 2023-04-20 DIAGNOSIS — T40.2X5A THERAPEUTIC OPIOID INDUCED CONSTIPATION: ICD-10-CM

## 2023-04-20 DIAGNOSIS — K59.03 THERAPEUTIC OPIOID INDUCED CONSTIPATION: ICD-10-CM

## 2023-04-20 LAB
AMPHETAMINE QUAL URINE POCT: NEGATIVE
BARBITURATE QUAL URINE POCT: NEGATIVE
BENZODIAZEPINE QUAL URINE POCT: NEGATIVE
BUPRENORPHINE QUAL URINE POCT: ABNORMAL
COCAINE QUAL URINE POCT: NEGATIVE
CREATININE QUAL URINE POCT: ABNORMAL
INTERNAL QC QUAL URINE POCT: ABNORMAL
MDMA QUAL URINE POCT: NEGATIVE
METHADONE QUAL URINE POCT: NEGATIVE
METHAMPHETAMINE QUAL URINE POCT: NEGATIVE
OPIATE QUAL URINE POCT: NEGATIVE
OXYCODONE QUAL URINE POCT: NEGATIVE
PH QUAL URINE POCT: ABNORMAL
PHENCYCLIDINE QUAL URINE POCT: NEGATIVE
POCT KIT EXPIRATION DATE: ABNORMAL
POCT KIT LOT NUMBER: ABNORMAL
SPECIFIC GRAVITY POCT: 1.01
TEMPERATURE URINE POCT: ABNORMAL
THC QUAL URINE POCT: NEGATIVE

## 2023-04-20 PROCEDURE — 99214 OFFICE O/P EST MOD 30 MIN: CPT | Performed by: FAMILY MEDICINE

## 2023-04-20 PROCEDURE — 80305 DRUG TEST PRSMV DIR OPT OBS: CPT | Performed by: FAMILY MEDICINE

## 2023-04-20 RX ORDER — DOCUSATE SODIUM 100 MG/1
CAPSULE, LIQUID FILLED ORAL
Qty: 60 CAPSULE | Refills: 3 | Status: SHIPPED | OUTPATIENT
Start: 2023-04-20 | End: 2023-09-14

## 2023-04-20 RX ORDER — BUPRENORPHINE AND NALOXONE 8; 2 MG/1; MG/1
FILM, SOLUBLE BUCCAL; SUBLINGUAL
Qty: 90 FILM | Refills: 1 | Status: SHIPPED | OUTPATIENT
Start: 2023-04-20 | End: 2023-06-15

## 2023-04-20 ASSESSMENT — PAIN SCALES - GENERAL: PAINLEVEL: NO PAIN (0)

## 2023-04-20 NOTE — PROGRESS NOTES
[facility] Addiction Medicine    A/P                                                    ASSESSMENT/PLAN    1. Opioid use disorder, severe, in sustained remission (H)  Stable/symptoms are well controlled.  Continue Suboxone as ordered, confirmed received by pharmacy already (he has had issues with it taking a while in past).  Has Narcan.  - Drugs of Abuse Screen Urine (POC CUPS) POCT; Standing  - Drugs of Abuse Screen Urine (POC CUPS) POCT  - buprenorphine HCl-naloxone HCl (SUBOXONE) 8-2 MG per film; 2 sl qam, 1 sl qpm  Dispense: 90 Film; Refill: 1    2. Therapeutic opioid induced constipation  Well managed with Colace.    - docusate sodium (COLACE) 100 MG capsule; TAKE 1 CAPSULE(100 MG) BY MOUTH TWICE DAILY as needed for constipation  Dispense: 60 capsule; Refill: 3    We discussed role of exercise such as walking and including fat and protein with carbohydrates as means of preventing blood sugar spikes.  He was receptive and appreciative of discussion and he plans to follow-up with PCP.    PDMP Review       Value Time User    State PDMP site checked  Yes 4/20/2023 12:48 PM Ernestina Tripp, DO            RTC  Return in about 2 months (around 6/20/2023) for Follow up, in person.      Counseled the patient on the importance of having a recovery program in addition to medication to manage recovery.  Components include avoiding isolating, having willingness to change, avoiding triggers and managing cravings. Encouraged having some type of sober network and practicing honesty with trusted support person(s). Encouraged other services such as counseling, 12 step or other self-help organizations.      Opioid warning reviewed.  Risk of overdose following a period of abstinence due to decrease tolerance was discussed including risk of death.  Strongly recommended abstain from alcohol, benzodiazepines, THC, opioids and other drugs of abuse.  Increased risk of return to opioid use after use of these substances  discussed.  Increased risk of overdose/death with use of other substances particularly benzodiazepines/alcohol reviewed.        SUBJECTIVE                                                    Tobin Bryant is a 57 year old male with PMHx of HTN, morbid obesity, pre-diabetes, COPD/ashtma, YANIRA, anxiety, depression, and OUD who presents to clinic today for follow up.     Brief history of use:    Last use of illicit opioids was in approximately 2019.  Has been on buprenorphine for OUD since at least 2014.       Plan from most recent office visit (2/16/23):  1. Opioid use disorder, severe, in sustained remission (H)  Stable.  Continue Suboxone, no change.  Encouraged recovery activities and advised him peer  may be good resource to help find a meeting that is a better fit for him.  - buprenorphine HCl-naloxone HCl (SUBOXONE) 8-2 MG per film; 2 sl qam, 1 sl qpm  Dispense: 90 Film; Refill: 1  - naloxone (NARCAN) 4 MG/0.1ML nasal spray; Spray 1 spray (4 mg) into one nostril alternating nostrils once as needed for opioid reversal every 2-3 minutes until assistance arrives  Dispense: 0.2 mL; Refill: 11    TODAY'S VISIT  HPI Apr 20, 2023  - Needs refill of Suboxone, completely out - concerns about medications being delayed getting to pharmacy, advised him I would call pharmacy to ensure it goes through  - Taking colace for OIC, takes about 2x/week, works well  - No other side effects from Suboxone  - No opioid cravings  - No current recovery activities  - Working on getting Internet set up at home, no upcoming travel plans known  - No medication changes, needs to get new glucometer  - Mood has been stable  - Has some questions about diabetes/blood sugars        1/4/2022     9:12 AM 6/30/2022    12:00 PM 12/19/2022     1:21 PM   PHQ   PHQ-9 Total Score 0 0 0   Q9: Thoughts of better off dead/self-harm past 2 weeks Not at all Not at all Not at all         4/21/2022    11:41 AM 6/30/2022    12:00 PM 8/3/2022     7:12 AM    SHABBIR-7 SCORE   Total Score   0 (minimal anxiety)   Total Score 0 0 0       OBJECTIVE                                                    PHYSICAL EXAM:  BP (!) 135/91 (BP Location: Right arm, Patient Position: Sitting, Cuff Size: Adult Large)   Pulse 73   Wt 144.7 kg (319 lb)   BMI 46.43 kg/m      GENERAL: healthy, alert and no distress  EYES: Eyes grossly normal to inspection, conjunctivae and sclerae normal  RESP: No respiratory distress, no coughing or wheezing  SKIN: no pallor or jaundice  NEURO: Normal gait, no tremor, mentation intact and speech normal  MENTAL STATUS EXAM  Appearance/Behavior: No appearant distress  Speech: Normal  Mood/Affect: flat  Insight: Adequate    LAB  Results for orders placed or performed in visit on 04/20/23   Drugs of Abuse Screen Urine (POC CUPS) POCT     Status: Abnormal   Result Value Ref Range    POCT Kit Lot Number [id number]     POCT Kit Expiration Date 2024-06-08     Temperature Urine POCT 92 F 90 F, 92 F, 94 F, 96 F, 98 F, 100 F    Specific [place] POCT 1.015 1.005, 1.015, 1.025    pH Qual Urine POCT 7 pH 4 pH, 5 pH, 7 pH, 9 pH    Creatinine Qual Urine POCT 100 mg/dL 20 mg/dL, 50 mg/dL, 100 mg/dL, 200 mg/dL    Internal QC Qual Urine POCT Valid Valid    Amphetamine Qual Urine POCT Negative Negative    Barbiturate Qual Urine POCT Negative Negative    Buprenorphine Qual Urine POCT Screen Positive (A) Negative    Benzodiazepine Qual Urine POCT Negative Negative    Cocaine Qual Urine POCT Negative Negative    Methamphetamine Qual Urine POCT Negative Negative    MDMA Qual Urine POCT Negative Negative    Methadone Qual Urine POCT Negative Negative    Opiate Qual Urine POCT Negative Negative    Oxycodone Qual Urine POCT Negative Negative    Phencyclidine Qual Urine POCT Negative Negative    THC Qual Urine POCT Negative Negative         HISTORY                                                    Problem list reviewed & adjusted, as indicated.  Patient Active Problem List   Diagnosis      Type 2 diabetes mellitus with diabetic polyneuropathy, without long-term current use of insulin (H)     Morbid obesity (H)     Opioid use disorder, severe, in sustained remission (H)     YANIRA on CPAP     Essential hypertension     Recurrent major depressive disorder, in full remission (H)     Opioid use disorder, severe, dependence (H)         MEDICATION LIST (prior to visit)  ARIPiprazole (ABILIFY) 10 MG tablet, Take 1 tablet (10 mg) by mouth daily  budesonide-formoterol (SYMBICORT) 80-4.5 MCG/ACT Inhaler, INHALE 2 PUFFS BY MOUTH TWICE DAILY  buprenorphine HCl-naloxone HCl (SUBOXONE) 8-2 MG per film, 2 sl qam, 1 sl qpm  buPROPion (WELLBUTRIN XL) 150 MG 24 hr tablet, Take 1 tablet (150 mg) by mouth every morning  ACCU-CHEK GUIDE test strip, USE TO TEST TWICE DAILY  albuterol (PROAIR HFA/PROVENTIL HFA/VENTOLIN HFA) 108 (90 Base) MCG/ACT inhaler, INHALE 2 PUFFS BY MOUTH EVERY 6 HOURS AS NEEDED FOR WHEEZING  aspirin (ASA) 81 MG EC tablet, Take 1 tablet (81 mg) by mouth daily  atorvastatin (LIPITOR) 20 MG tablet, Take 1 tablet (20 mg) by mouth daily  blood glucose monitoring (NO BRAND SPECIFIED) meter device kit, Use to test blood sugar two times daily or as directed.  docusate sodium (COLACE) 100 MG capsule, TAKE 1 CAPSULE(100 MG) BY MOUTH TWICE DAILY as needed for constipation  DTx Sharon - Subst Use Disorder (RESET-O), Use as directed for 84 days.  furosemide (LASIX) 20 MG tablet, Take 1 tablet (20 mg) by mouth daily as needed (swelling)  naloxone (NARCAN) 4 MG/0.1ML nasal spray, Spray 1 spray (4 mg) into one nostril alternating nostrils once as needed for opioid reversal every 2-3 minutes until assistance arrives  phentermine (ADIPEX-P) 37.5 MG tablet, Take 0.5-1 tablets (18.75-37.5 mg) by mouth every morning (before breakfast)    No current facility-administered medications on file prior to visit.      MEDICATION LIST (after visit)  Current Outpatient Medications   Medication     ARIPiprazole (ABILIFY) 10 MG tablet      budesonide-formoterol (SYMBICORT) 80-4.5 MCG/ACT Inhaler     buprenorphine HCl-naloxone HCl (SUBOXONE) 8-2 MG per film     buPROPion (WELLBUTRIN XL) 150 MG 24 hr tablet     ACCU-CHEK GUIDE test strip     albuterol (PROAIR HFA/PROVENTIL HFA/VENTOLIN HFA) 108 (90 Base) MCG/ACT inhaler     aspirin (ASA) 81 MG EC tablet     atorvastatin (LIPITOR) 20 MG tablet     blood glucose monitoring (NO BRAND SPECIFIED) meter device kit     docusate sodium (COLACE) 100 MG capsule     DTx Sharon - Subst Use Disorder (RESET-O)     furosemide (LASIX) 20 MG tablet     naloxone (NARCAN) 4 MG/0.1ML nasal spray     phentermine (ADIPEX-P) 37.5 MG tablet     No current facility-administered medications for this visit.         Allergies   Allergen Reactions     Seafood Other (See Comments)     Eyes turn yellow     Ibuprofen Nausea and Vomiting       Ernestina Tripp, [facility] Addiction Medicine  [place] [facility]  [phone]

## 2023-04-20 NOTE — PATIENT INSTRUCTIONS
A prescription has been sent to your pharmacy of choice.    If a prior authorization is required it may take several days to get your medication.    Please make sure your pharmacy had your contact information so they can contact you when it is ready to .  Please contact your pharmacy to see if your medication is ready to be picked up.     You are encouraged to have some type of recovery program in addition to medication treatment.    Medication alone is generally not enough to lead to long term recovery.    This may include having some type of sober network, avoiding isolating, avoiding triggers (people, places, things you associate with using substances and help with managing cravings)    Options for support include :         [employer] service office in [facility]:    www..[employer].org    [phone]  Narcotics Anonymous   www.Frequency    3-692-203-3311   Smart Recovery   www.[employer]  Women for Sobriety   www.womenforsobriety.org  Celebrate Recovery   www.[employer]   (Holiness centered recovery).   [facility] ([facility])  [facility] connects people seeking recovery to resources that help foster and sustain long-term recovery.  Whether you are seeking resources for treatment, transportation, housing, job training, education, health care or other pathways to recovery, [facility] is a great place to start.  (www.[facility]"[employer].org)   [phone]  Spiritism and local community support groups  Mental health counseling   -we can assist with referral     You are strongly recommended abstaining from alcohol, benzodiazepines, cannabis, opioids and other drugs of abuse.     Using these in combination with Buprenorphine can raise your risk of overdose and death    Contact Us:   [facility] [phone] or by [employer]      If you cannot make your appointment please call the office and reschedule immediately.     If a refill or bridge is needed it is important  to call in advance so you do not run out of medications.   We will make every effort to manage your request promptly but please be aware that it may take up to one business day for your refill request to be processed.   If there is a concern with your request we will contact you.  Otherwise, please contact your pharmacy directly to see if your prescription is ready for .     Our clinic is open from Monday-Friday 8:00am-4:30pm and there is not an On Call after hours service.   If medical care is needed after hours or on the weekend you will need to contact your primary care physician or go to an Urgent Care or ER.

## 2023-04-20 NOTE — PROGRESS NOTES
Care team has reviewed attendance agreement with patient. Patient advised that two failed appointments within 6 months may lead to termination of current episode of care.        [facility] - Addiction Medicine       Rooming information:    Point of care urine drug screen positive for:  Lab Results   Component Value Date    BUP Screen Positive (A) 04/20/2023    BZO Negative 04/20/2023    BAR Negative 04/20/2023    MERCEDEZ Negative 04/20/2023    MAMP Negative 04/20/2023    AMP Negative 04/20/2023    MDMA Negative 04/20/2023    MTD Negative 04/20/2023    TDY609 Negative 04/20/2023    OXY Negative 04/20/2023    PCP Negative 04/20/2023    THC Negative 04/20/2023    TEMP 92 F 04/20/2023    SGPOCT 1.015 04/20/2023       *POC urine drug screen does not screen for Fentanyl    PHQ-9 Scores:       1/4/2022     9:12 AM 6/30/2022    12:00 PM 12/19/2022     1:21 PM   PHQ   PHQ-9 Total Score 0 0 0   Q9: Thoughts of better off dead/self-harm past 2 weeks Not at all Not at all Not at all     SHABBIR-7 Scores:      4/21/2022    11:41 AM 6/30/2022    12:00 PM 8/3/2022     7:12 AM   SHABBIR-7 SCORE   Total Score   0 (minimal anxiety)   Total Score 0 0 0       Any other recent substance use:     Denies    NICOTINE-No  If using nicotine, ready to quit? NA    Side effects related to medications pt would like to discuss with provider (constipation, dry mouth, HA, GI upset, sedation?) No     Narcan currently available: Yes    Primary care provider: Pawan Castro MD     Mental health provider: [place] (follow up on MH referral if needed)    Any housing, insurance deficits?: denies    Contact information up to date? yes    [employer] NA (please obtain ANAYELI if pt would like to include)    Vanessa Parada LPN  April 20, 2023  9:33 AM

## 2023-04-25 ENCOUNTER — TELEPHONE (OUTPATIENT)
Dept: SURGERY | Facility: CLINIC | Age: 58
End: 2023-04-25
Payer: COMMERCIAL

## 2023-04-25 NOTE — TELEPHONE ENCOUNTER
Patient would like to talk to the provider regarding the phentermine medication. Call patient at [phone] ok to leave detailed message

## 2023-04-25 NOTE — TELEPHONE ENCOUNTER
Asked pt to call the nurse line to discuss his questions. I let him know that he can talk to Nayana or myself.     Yaneli Gibbons RN, CBN  [facility]  P [phone]  F [phone]

## 2023-04-26 ENCOUNTER — TELEPHONE (OUTPATIENT)
Dept: SURGERY | Facility: CLINIC | Age: 58
End: 2023-04-26
Payer: COMMERCIAL

## 2023-04-26 NOTE — TELEPHONE ENCOUNTER
Incoming call from patient wanting to know the name of the weight loss powder drink that he can purchase at the grocery store. Call [phone] ok to leave detailed message.

## 2023-04-27 NOTE — TELEPHONE ENCOUNTER
Called pt back to let him know that I talked to  who does not know what he is referring to with this powder drink. Pt was insistent that he heard that from our clinic but neither  nor Xochitl the dietitian told him that. He would like to make an appointment with  so that was scheduled for next week.    Yaneli Gibbons RN, CBN  [facility]  P [phone]  F [phone]

## 2023-04-27 NOTE — TELEPHONE ENCOUNTER
"Called pt again and he said he \"figured it out\". He also wanted to see  in clinic so an appt was made for next week.    Yaneli Gibbons RN, CBN  [facility]  P [phone]  F [phone]    "

## 2023-06-08 ENCOUNTER — TELEPHONE (OUTPATIENT)
Dept: INTERNAL MEDICINE | Facility: CLINIC | Age: 58
End: 2023-06-08
Payer: COMMERCIAL

## 2023-06-08 NOTE — TELEPHONE ENCOUNTER
Order/Referral Request    Who is requesting: Patient    Orders being requested: Patient would like a referral to have a test to make sure that his arteries are not clogged, and heart is ok and not enlarged or have fluid developing around it.  He says he hasn't had an exam in a while.    Please call patient.     Reason service is needed/diagnosis: n/a    When are orders needed by: ASAP    Has this been discussed with Provider: Yes    Does patient have a preference on a Group/Provider/Facility? ?    Does patient have an appointment scheduled?: No    Where to send orders: Place orders within Epic    Okay to leave a detailed message?: Yes at Cell number on file:    Telephone Information:   Mobile [phone]

## 2023-06-12 NOTE — TELEPHONE ENCOUNTER
Spoke with patient to gather more information about what he is requesting. Patient denies having any symptoms at all. Patient is unsure of the name of the test but after discussion is requesting another echocardiogram.

## 2023-06-15 ENCOUNTER — OFFICE VISIT (OUTPATIENT)
Dept: ADDICTION MEDICINE | Facility: CLINIC | Age: 58
End: 2023-06-15
Payer: COMMERCIAL

## 2023-06-15 VITALS
HEART RATE: 78 BPM | OXYGEN SATURATION: 99 % | DIASTOLIC BLOOD PRESSURE: 90 MMHG | RESPIRATION RATE: 20 BRPM | SYSTOLIC BLOOD PRESSURE: 129 MMHG

## 2023-06-15 DIAGNOSIS — F11.21 OPIOID USE DISORDER, SEVERE, IN SUSTAINED REMISSION (H): ICD-10-CM

## 2023-06-15 DIAGNOSIS — F11.21 OPIOID USE DISORDER, SEVERE, IN SUSTAINED REMISSION (H): Primary | ICD-10-CM

## 2023-06-15 LAB
AMPHETAMINE QUAL URINE POCT: NEGATIVE
BARBITURATE QUAL URINE POCT: NEGATIVE
BENZODIAZEPINE QUAL URINE POCT: NEGATIVE
BUPRENORPHINE QUAL URINE POCT: ABNORMAL
COCAINE QUAL URINE POCT: NEGATIVE
CREATININE QUAL URINE POCT: ABNORMAL
INTERNAL QC QUAL URINE POCT: ABNORMAL
MDMA QUAL URINE POCT: NEGATIVE
METHADONE QUAL URINE POCT: NEGATIVE
METHAMPHETAMINE QUAL URINE POCT: NEGATIVE
OPIATE QUAL URINE POCT: NEGATIVE
OXYCODONE QUAL URINE POCT: NEGATIVE
PH QUAL URINE POCT: ABNORMAL
PHENCYCLIDINE QUAL URINE POCT: NEGATIVE
POCT KIT EXPIRATION DATE: ABNORMAL
POCT KIT LOT NUMBER: ABNORMAL
SPECIFIC GRAVITY POCT: 1.02
TEMPERATURE URINE POCT: ABNORMAL
THC QUAL URINE POCT: NEGATIVE

## 2023-06-15 PROCEDURE — 80305 DRUG TEST PRSMV DIR OPT OBS: CPT | Performed by: FAMILY MEDICINE

## 2023-06-15 PROCEDURE — 99213 OFFICE O/P EST LOW 20 MIN: CPT | Performed by: FAMILY MEDICINE

## 2023-06-15 PROCEDURE — H0038 SELF-HELP/PEER SVC PER 15MIN: HCPCS

## 2023-06-15 RX ORDER — BUPRENORPHINE AND NALOXONE 8; 2 MG/1; MG/1
FILM, SOLUBLE BUCCAL; SUBLINGUAL
Qty: 90 FILM | Refills: 2 | Status: SHIPPED | OUTPATIENT
Start: 2023-06-15 | End: 2023-09-14

## 2023-06-15 ASSESSMENT — ANXIETY QUESTIONNAIRES
7. FEELING AFRAID AS IF SOMETHING AWFUL MIGHT HAPPEN: NOT AT ALL
1. FEELING NERVOUS, ANXIOUS, OR ON EDGE: NOT AT ALL
4. TROUBLE RELAXING: NOT AT ALL
2. NOT BEING ABLE TO STOP OR CONTROL WORRYING: NOT AT ALL
6. BECOMING EASILY ANNOYED OR IRRITABLE: NOT AT ALL
3. WORRYING TOO MUCH ABOUT DIFFERENT THINGS: NOT AT ALL
5. BEING SO RESTLESS THAT IT IS HARD TO SIT STILL: NOT AT ALL
GAD7 TOTAL SCORE: 0
GAD7 TOTAL SCORE: 0

## 2023-06-15 ASSESSMENT — PATIENT HEALTH QUESTIONNAIRE - PHQ9: SUM OF ALL RESPONSES TO PHQ QUESTIONS 1-9: 0

## 2023-06-15 ASSESSMENT — PAIN SCALES - GENERAL: PAINLEVEL: NO PAIN (0)

## 2023-06-15 NOTE — PATIENT INSTRUCTIONS
Patient Education   Addiction Medicine  What to Expect  Here's what to expect from our Addiction Medicine program.  About Addiction Medicine  Addiction Medicine clinics help you with substance use problems. You set your own goals. We try to help you reach your goals. Your care plan can include:  Medicine  Creating a recovery plan  Helping you find local resources  Helping with treatment options  Clinic phone number and addresses  Clinic Phone: [phone]  Mental Health and Addiction Clinic  [facility]  [address], [address]  [place] [address]  [facility]  [address], [address]  [place] [address]  Walk-in services  We offer walk-in care for patients at the Recovery Clinic. This is only for patients with Opioid Use Disorder (OUD). Anyone with OUD is welcome. Our providers will refer you to the Recovery Clinic if you're struggling to keep up with your medicines or appointments.  Recovery Clinic ([facility])  [address], [address]  [place] [address]  Phone: [phone]  The Recovery Clinic is open for walk-ins Monday to Friday 9 a.m. to 11:30 a.m. and 12:30 p.m. to 3 p.m.  How it works  Come to your visits every time. The treatment works better when you do.   You can have as many visits as you need. When you're better, we'll refer you back to being cared for by your family doctor.   If you need it, we'll send you to doctors, psychiatrists, therapists, and other providers. We focus on treating addiction. We don't treat other problems, like managing other medicines or non-addiction issues.  About visits  Urine drug testing  We'll often test your pee (urine) for drugs. This is the only way we can know for sure whether or not you're using drugs. It helps us treat you without judgement.   Suboxone (buprenorphine)  If you're taking buprenorphine, you'll have a lot of visits at first. If your problem is getting worse, or you're  "using substances, we may schedule you for extra visits.   Cancelling visits  If you can't come to your visit, please call us right away at [phone]. If you don't cancel at least 24 hours (1 full day) before your visit, that's \"late cancellation.\"  Being late to visits  If you come late, you may not be seen. This will count as a \"no-show.\"  Please call the clinic if you're running late. This will help us plan, but it doesn't mean you'll be seen.   Being late is:  More than 15 minutes late for a return visit.  More than 30 minutes late for your first visit.  If you cancel late or don't show up 2 times within 6 months, we may transfer you to another clinic.   Getting help between visits  If you need help between visits, you can call us Monday to Friday from 8 a.m. to 4:30 p.m. at [phone]. You can also send us a message on [employer].  Medicine refills  If you miss or cancel a visit, you can still ask for a refill. But we can only refill your medicines if you've made a new appointment.  Please call your pharmacy for medicine refills. If you have a question about your refill, call us at [phone].  It takes up to 2 business days to refill your drugs. Let us know 2 to 3 days before you run out. Don't call more than 1 week before you run out. That's too early.   Please make sure we have your right phone number.  If we have a problem with your refill, we'll call you. If we call you, please call us back right away. If you don't, you may not get your medicines quickly.   Call your pharmacy to find out if your medicines are ready.   Keep your medicines in a safe place. Keep them away from pets and children. If your medicines are lost or stolen, we usually don't replace them. We recommend you file a police report if your medicines are stolen. Your insurance may not pay for early refills, even if you have a prescription.  Forms  Please give us at least 3 business days to fill out any forms. Bring the forms to your " visits if you can. We may refer you to other members of your care team to complete the forms.   Emergency care   Call 911 or go to the nearest emergency room if your life or someone else's life is in danger.  Call 988 anytime for the Suicide and Crisis Lifeline.  If you need care when we're closed, call your family doctor to see if they can help. You can also go to urgent care or an emergency room. [facility] emergency rooms may be able to give you buprenorphine or other medicine refills.  Thank you for choosing us for your care.  For informational purposes only. Not to replace the advice of your health care provider. Copyright   2023 [facility]. All rights reserved. [employer] [id number] - REV 05/23.

## 2023-06-15 NOTE — PROGRESS NOTES
[facility] Addiction Medicine    A/P                                                    ASSESSMENT/PLAN    1. Opioid use disorder, severe, in sustained remission (H)  Stable, symptoms well controlled.  Continue Suboxone, no change.  Has Narcan.  He may be traveling a bit this summer so extending follow-up by additional month.  He is interested in attending some meetings as a way to get out of the house, he was able to meet with peer  today regarding this.  - buprenorphine HCl-naloxone HCl (SUBOXONE) 8-2 MG per film; 2 sl qam, 1 sl qpm  Dispense: 90 Film; Refill: 2  - Drugs of Abuse Screen Urine (POC CUPS) POCT    Encouraged continued efforts at weight loss and healthy lifestyle.      PDMP Review       Value Time User    State PDMP site checked  Yes 6/15/2023 11:51 AM Ernestina Tripp DO            RTC  Return in about 3 months (around 9/15/2023) for Follow up, in person.      Counseled the patient on the importance of having a recovery program in addition to medication to manage recovery.  Components include avoiding isolating, having willingness to change, avoiding triggers and managing cravings. Encouraged having some type of sober network and practicing honesty with trusted support person(s). Encouraged other services such as counseling, 12 step or other self-help organizations.      Opioid warning reviewed.  Risk of overdose following a period of abstinence due to decrease tolerance was discussed including risk of death.  Strongly recommended abstain from alcohol, benzodiazepines, THC, opioids and other drugs of abuse.  Increased risk of return to opioid use after use of these substances discussed.  Increased risk of overdose/death with use of other substances particularly benzodiazepines/alcohol reviewed.        YOSELIN Rudd CONCHA Bryant is a 57 year old male with PMHx of HTN, morbid obesity, pre-diabetes, COPD/ashtma, YANIRA, anxiety,  depression, and OUD who presents to clinic today for follow up.     Brief history of use:    Last use of illicit opioids was in approximately 2019.  Has been on buprenorphine for OUD since at least 2014.      Plan from most recent office visit (4/20/23):  1. Opioid use disorder, severe, in sustained remission (H)  Stable/symptoms are well controlled.  Continue Suboxone as ordered, confirmed received by pharmacy already (he has had issues with it taking a while in past).  Has Narcan.  - Drugs of Abuse Screen Urine (POC CUPS) POCT; Standing  - Drugs of Abuse Screen Urine (POC CUPS) POCT  - buprenorphine HCl-naloxone HCl (SUBOXONE) 8-2 MG per film; 2 sl qam, 1 sl qpm  Dispense: 90 Film; Refill: 1     2. Therapeutic opioid induced constipation  Well managed with Colace.    - docusate sodium (COLACE) 100 MG capsule; TAKE 1 CAPSULE(100 MG) BY MOUTH TWICE DAILY as needed for constipation  Dispense: 60 capsule; Refill: 3    TODAY'S VISIT  HPI Ranulfo 15, 2023  - Interested in finding a meeting - wants something to do  - Grandmother is 102 (lives in [place]), wants to spend some time with her, planning to visit a few times this summer  - Taking Suboxone as prescribed, no side effects (constipation well managed), no opioid cravings  - Was riding his stationary bike twice daily in the winter, hasn't been doing as much, would like to resume (goal for next visit 3x/week)  - not using CPAP consistently, needs follow-up with pulm     No temp on UDS - was small sample, patient states he had set cup on floor while he finished used restroom.  No previous issues with UDS.        6/30/2022    12:00 PM 12/19/2022     1:21 PM 6/15/2023    12:00 PM   PHQ   PHQ-9 Total Score 0 0 0   Q9: Thoughts of better off dead/self-harm past 2 weeks Not at all Not at all Not at all           6/30/2022    12:00 PM 8/3/2022     7:12 AM 6/15/2023    12:00 PM   SHABBIR-7 SCORE   Total Score  0 (minimal anxiety)    Total Score 0 0 0       OBJECTIVE                                                     PHYSICAL EXAM:  BP (!) 129/90 (BP Location: Right arm, Patient Position: Sitting, Cuff Size: Adult Large)   Pulse 78   Resp 20   SpO2 99%     GENERAL: healthy, alert and no distress  EYES: Eyes grossly normal to inspection,  sclerae normal  RESP: No respiratory distress, no coughing or wheezing  MS: no gross musculoskeletal defects noted  SKIN: no pallor or jaundice  NEURO: Normal gait, no tremor, mentation intact and speech normal  MENTAL STATUS EXAM  Appearance/Behavior: No appearant distress  Speech: Normal  Mood/Affect: flat  Insight: Adequate    LAB  Results for orders placed or performed in visit on 06/15/23   Drugs of Abuse Screen Urine (POC CUPS) POCT     Status: Abnormal   Result Value Ref Range    POCT Kit Lot Number [id number]     POCT Kit Expiration Date 06/08/2024     Temperature Urine POCT Invalid (A) 90 F, 92 F, 94 F, 96 F, 98 F, 100 F    Specific [place] POCT 1.025 1.005, 1.015, 1.025    pH Qual Urine POCT 4 pH 4 pH, 5 pH, 7 pH, 9 pH    Creatinine Qual Urine POCT 100 mg/dL 20 mg/dL, 50 mg/dL, 100 mg/dL, 200 mg/dL    Internal QC Qual Urine POCT Valid Valid    Amphetamine Qual Urine POCT Negative Negative    Barbiturate Qual Urine POCT Negative Negative    Buprenorphine Qual Urine POCT Screen Positive (A) Negative    Benzodiazepine Qual Urine POCT Negative Negative    Cocaine Qual Urine POCT Negative Negative    Methamphetamine Qual Urine POCT Negative Negative    MDMA Qual Urine POCT Negative Negative    Methadone Qual Urine POCT Negative Negative    Opiate Qual Urine POCT Negative Negative    Oxycodone Qual Urine POCT Negative Negative    Phencyclidine Qual Urine POCT Negative Negative    THC Qual Urine POCT Negative Negative         HISTORY                                                    Problem list reviewed & adjusted, as indicated.  Patient Active Problem List   Diagnosis     Type 2 diabetes mellitus with diabetic polyneuropathy, without long-term current  use of insulin (H)     Morbid obesity (H)     Opioid use disorder, severe, in sustained remission (H)     YANIRA on CPAP     Essential hypertension     Recurrent major depressive disorder, in full remission (H)     Opioid use disorder, severe, dependence (H)         MEDICATION LIST (prior to visit)  ACCU-CHEK GUIDE test strip, USE TO TEST TWICE DAILY  albuterol (PROAIR HFA/PROVENTIL HFA/VENTOLIN HFA) 108 (90 Base) MCG/ACT inhaler, INHALE 2 PUFFS BY MOUTH EVERY 6 HOURS AS NEEDED FOR WHEEZING  ARIPiprazole (ABILIFY) 10 MG tablet, Take 1 tablet (10 mg) by mouth daily  aspirin (ASA) 81 MG EC tablet, Take 1 tablet (81 mg) by mouth daily  atorvastatin (LIPITOR) 20 MG tablet, Take 1 tablet (20 mg) by mouth daily  blood glucose monitoring (NO BRAND SPECIFIED) meter device kit, Use to test blood sugar two times daily or as directed.  budesonide-formoterol (SYMBICORT) 80-4.5 MCG/ACT Inhaler, INHALE 2 PUFFS BY MOUTH TWICE DAILY  buPROPion (WELLBUTRIN XL) 150 MG 24 hr tablet, Take 1 tablet (150 mg) by mouth every morning  docusate sodium (COLACE) 100 MG capsule, TAKE 1 CAPSULE(100 MG) BY MOUTH TWICE DAILY as needed for constipation  DTx Sharon - Subst Use Disorder (RESET-O), Use as directed for 84 days.  furosemide (LASIX) 20 MG tablet, Take 1 tablet (20 mg) by mouth daily as needed (swelling)  naloxone (NARCAN) 4 MG/0.1ML nasal spray, Spray 1 spray (4 mg) into one nostril alternating nostrils once as needed for opioid reversal every 2-3 minutes until assistance arrives  phentermine (ADIPEX-P) 37.5 MG tablet, Take 0.5-1 tablets (18.75-37.5 mg) by mouth every morning (before breakfast)    No current facility-administered medications on file prior to visit.      MEDICATION LIST (after visit)  Current Outpatient Medications   Medication     ACCU-CHEK GUIDE test strip     albuterol (PROAIR HFA/PROVENTIL HFA/VENTOLIN HFA) 108 (90 Base) MCG/ACT inhaler     ARIPiprazole (ABILIFY) 10 MG tablet     aspirin (ASA) 81 MG EC tablet      atorvastatin (LIPITOR) 20 MG tablet     blood glucose monitoring (NO BRAND SPECIFIED) meter device kit     budesonide-formoterol (SYMBICORT) 80-4.5 MCG/ACT Inhaler     buprenorphine HCl-naloxone HCl (SUBOXONE) 8-2 MG per film     buPROPion (WELLBUTRIN XL) 150 MG 24 hr tablet     docusate sodium (COLACE) 100 MG capsule     DTx Sharon - Subst Use Disorder (RESET-O)     furosemide (LASIX) 20 MG tablet     naloxone (NARCAN) 4 MG/0.1ML nasal spray     phentermine (ADIPEX-P) 37.5 MG tablet     No current facility-administered medications for this visit.         Allergies   Allergen Reactions     Seafood Other (See Comments)     Eyes turn yellow     Ibuprofen Nausea and Vomiting       Ernestina Tripp, [facility] Addiction Medicine  [facility]  [phone]

## 2023-06-15 NOTE — PROGRESS NOTES
[facility] Recovery Clinic    Peer  met with Tobin Bryant in the Recovery Clinic to introduce himself, detail services provided and discuss current status of recovery. Pt appeared alert, oriented and open to feedback during our discussion.   PRC sees patient today under provider diagnosis of opioid substance disorder, serve, dependence  H        Pt arrives for visit with provider for suboxone.   Per provider referral, Peer   Met with Robles.regarding Narcotic Anonymous meetings. Writer connected him with five locations near by his house. Robles reported sobriety five year. 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 Writer praised him for turning his life back around and stated Hope Can Hold You Accountable. Robles shared his triggers.Writer PRC and pt discussed the benefits of sobriety.  PRC encouraged establishing firm and healthy boundaries with ppl, places and things as an  essential element to the recovery process.      PRC provided business card to pt welcoming contact for recovery based support and resources. PRC and pt agree to speak again during an upcoming  visit.           Service Type:     Individual     Session Start Time: 12:15  Pm                    Session End Time:    12:30 Pm    Session Length:     15 mins        Patient Goal:   To utilize suboxone assisted treatment for sobriety and long term recovery.     Goal Progress:   Ongoing.    Key Risk Factors to Recovery:   PRC encourages being aware of risk factors that can lead to re-use which include avoiding isolation, avoiding triggers and managing cravings in a healthy manner. being open and willing to acceptance and change on a daily basis.  PRC encourages pt to establish a sober network calling tree to reach out to when needed.  Continue to practice honesty with ourselves and trusted support person(s).   PRC encourages regular attendance at recovery based meetings as well as  finding a sponsor for mentoring and accountability.   PRC encourages consideration of other services such as counseling for mental health issues which can correlate with our substance use.      Support Needs:   Ongoing care, support and resources for opioid substance use disorder.     Follow up:   PRC has provided pt with his contact information for support and resource needs.    PRC and pt agree to meet during an upcoming  visit.       [facility]  [address], [address]   [place], [place], [address]  Clinic Phone: [phone]  Clinic Fax: [phone]  Peer  phone: [phone]    Open Monday - Friday  9:00am-4:00pm  Walk in hours: 9am-3pm      Ingrid Alaniz  Anna 15, 2023  4:00 PM    JUSTIN Turcios provides clinical oversite and supervision of care.

## 2023-06-15 NOTE — NURSING NOTE
[facility] - Addiction Medicine       Rooming information: No concerns  Point of care urine drug screen positive for:  Lab Results   Component Value Date    BUP Screen Positive (A) 06/15/2023    BZO Negative 06/15/2023    BAR Negative 06/15/2023    MERCEDEZ Negative 06/15/2023    MAMP Negative 06/15/2023    AMP Negative 06/15/2023    MDMA Negative 06/15/2023    MTD Negative 06/15/2023    JDP143 Negative 06/15/2023    OXY Negative 06/15/2023    PCP Negative 06/15/2023    THC Negative 06/15/2023    TEMP Invalid (A) 06/15/2023    SGPOCT 1.025 06/15/2023       *POC urine drug screen does not screen for Fentanyl    PHQ-9 Scores:       6/30/2022    12:00 PM 12/19/2022     1:21 PM 6/15/2023    12:00 PM   PHQ   PHQ-9 Total Score 0 0 0   Q9: Thoughts of better off dead/self-harm past 2 weeks Not at all Not at all Not at all     SHABBIR-7 Scores:      6/30/2022    12:00 PM 8/3/2022     7:12 AM 6/15/2023    12:00 PM   SHABBIR-7 SCORE   Total Score  0 (minimal anxiety)    Total Score 0 0 0       Any other recent substance use:     Denies    NICOTINE-No  If using nicotine, ready to quit? No    Side effects related to medications pt would like to discuss with provider (constipation, dry mouth, HA, GI upset, sedation?) No     Narcan currently available: Yes    Primary care provider: Pawan Castro MD     Mental health provider: Yrn (follow up on MH referral if needed)    Any housing, insurance deficits?: denies    Contact information up to date? yes    3rd Party Involvement not at this  time (please obtain ANAYELI if pt would like to include)      Angelina Burgos LPN  Anna 15, 2023  9:35 AM

## 2023-06-16 ENCOUNTER — OFFICE VISIT (OUTPATIENT)
Dept: INTERNAL MEDICINE | Facility: CLINIC | Age: 58
End: 2023-06-16
Payer: COMMERCIAL

## 2023-06-16 VITALS
HEIGHT: 70 IN | WEIGHT: 315 LBS | OXYGEN SATURATION: 96 % | BODY MASS INDEX: 45.1 KG/M2 | HEART RATE: 79 BPM | DIASTOLIC BLOOD PRESSURE: 76 MMHG | TEMPERATURE: 97.6 F | RESPIRATION RATE: 20 BRPM | SYSTOLIC BLOOD PRESSURE: 130 MMHG

## 2023-06-16 DIAGNOSIS — E66.01 MORBID OBESITY (H): ICD-10-CM

## 2023-06-16 DIAGNOSIS — N52.9 ERECTILE DYSFUNCTION, UNSPECIFIED ERECTILE DYSFUNCTION TYPE: ICD-10-CM

## 2023-06-16 DIAGNOSIS — E11.42 TYPE 2 DIABETES MELLITUS WITH DIABETIC POLYNEUROPATHY, WITHOUT LONG-TERM CURRENT USE OF INSULIN (H): Primary | ICD-10-CM

## 2023-06-16 DIAGNOSIS — I10 ESSENTIAL HYPERTENSION: ICD-10-CM

## 2023-06-16 DIAGNOSIS — E78.2 MIXED HYPERLIPIDEMIA: ICD-10-CM

## 2023-06-16 LAB — HBA1C MFR BLD: 5.8 % (ref 0–5.6)

## 2023-06-16 PROCEDURE — 36415 COLL VENOUS BLD VENIPUNCTURE: CPT | Performed by: INTERNAL MEDICINE

## 2023-06-16 PROCEDURE — 83036 HEMOGLOBIN GLYCOSYLATED A1C: CPT | Performed by: INTERNAL MEDICINE

## 2023-06-16 PROCEDURE — 99214 OFFICE O/P EST MOD 30 MIN: CPT | Performed by: INTERNAL MEDICINE

## 2023-06-16 RX ORDER — TADALAFIL 20 MG/1
20 TABLET ORAL EVERY 24 HOURS
Qty: 30 TABLET | Refills: 4 | Status: SHIPPED | OUTPATIENT
Start: 2023-06-16 | End: 2023-11-20

## 2023-06-16 ASSESSMENT — PATIENT HEALTH QUESTIONNAIRE - PHQ9
SUM OF ALL RESPONSES TO PHQ QUESTIONS 1-9: 0
SUM OF ALL RESPONSES TO PHQ QUESTIONS 1-9: 0

## 2023-06-16 NOTE — PROGRESS NOTES
"  Office Visit - Follow Up   Tobin Bryant   57 year old male    Date of Visit: 6/16/2023    Chief Complaint   Patient presents with     RECHECK     Possible recheck of EKG and blood sugar discussion        Assessment and Plan   1. Type 2 diabetes mellitus with diabetic polyneuropathy, without long-term current use of insulin (H)  Has been well controlled, continue same check labs  - HEMOGLOBIN A1C; Future  - HEMOGLOBIN A1C    2. Essential hypertension  Blood pressure okay continue same    3. Mixed hyperlipidemia  Discussed importance of taking atorvastatin and he is in agreement    4. Erectile dysfunction, unspecified erectile dysfunction type  - tadalafil (ADCIRCA/CIALIS) 20 MG tablet; Take 1 tablet (20 mg) by mouth every 24 hours  Dispense: 30 tablet; Refill: 4    5. Morbid obesity (H)  The following high BMI interventions were performed this visit: encouragement to exercise and lifestyle education regarding diet    Return in about 3 months (around 9/16/2023) for Routine preventive.     History of Present Illness   This 57 year old man comes in for follow-up.  Overall he is doing well.  Continues to work with the weight loss clinic on losing weight.  No chest pain or shortness of breath.  Questions whether he needs to take atorvastatin.       Physical Exam   General Appearance:   No acute distress    /76 (BP Location: Left arm, Patient Position: Sitting, Cuff Size: Adult Large)   Pulse 79   Temp 97.6  F (36.4  C) (Tympanic)   Resp 20   Ht 1.778 m (5' 10\")   Wt 147.2 kg (324 lb 9.6 oz)   SpO2 96%   BMI 46.58 kg/m      Heart rate controlled rhythm regular lungs clear to auscultation bilaterally, no edema     Additional Information   Current Outpatient Medications   Medication Sig Dispense Refill     albuterol (PROAIR HFA/PROVENTIL HFA/VENTOLIN HFA) 108 (90 Base) MCG/ACT inhaler INHALE 2 PUFFS BY MOUTH EVERY 6 HOURS AS NEEDED FOR WHEEZING 18 g 3     ARIPiprazole (ABILIFY) 10 MG tablet Take 1 tablet " (10 mg) by mouth daily       aspirin (ASA) 81 MG EC tablet Take 1 tablet (81 mg) by mouth daily 90 tablet 3     budesonide-formoterol (SYMBICORT) 80-4.5 MCG/ACT Inhaler INHALE 2 PUFFS BY MOUTH TWICE DAILY 10.2 g 11     buprenorphine HCl-naloxone HCl (SUBOXONE) 8-2 MG per film 2 sl qam, 1 sl qpm 90 Film 2     buPROPion (WELLBUTRIN XL) 150 MG 24 hr tablet Take 1 tablet (150 mg) by mouth every morning       docusate sodium (COLACE) 100 MG capsule TAKE 1 CAPSULE(100 MG) BY MOUTH TWICE DAILY as needed for constipation 60 capsule 3     DTx Sharon - Subst Use Disorder (RESET-O) Use as directed for 84 days. 1 each 3     furosemide (LASIX) 20 MG tablet Take 1 tablet (20 mg) by mouth daily as needed (swelling)       naloxone (NARCAN) 4 MG/0.1ML nasal spray Spray 1 spray (4 mg) into one nostril alternating nostrils once as needed for opioid reversal every 2-3 minutes until assistance arrives 0.2 mL 11     phentermine (ADIPEX-P) 37.5 MG tablet Take 0.5-1 tablets (18.75-37.5 mg) by mouth every morning (before breakfast) 90 tablet 1     tadalafil (ADCIRCA/CIALIS) 20 MG tablet Take 1 tablet (20 mg) by mouth every 24 hours 30 tablet 4     atorvastatin (LIPITOR) 20 MG tablet Take 1 tablet (20 mg) by mouth daily (Patient not taking: Reported on 6/16/2023) 90 tablet 3       Time:      Pawan Castro MD  Answers for HPI/ROS submitted by the patient on 6/16/2023  PHQ9 TOTAL SCORE: 0  Nitroglycerin use:: never  Do you take an aspirin every day?: No  How many servings of fruits and vegetables do you eat daily?: 0-1  On average, how many sweetened beverages do you drink each day (Examples: soda, juice, sweet tea, etc.  Do NOT count diet or artificially sweetened beverages)?: 3  How many minutes a day do you exercise enough to make your heart beat faster?: 10 to 19  How many days a week do you exercise enough to make your heart beat faster?: 3 or less  How many days per week do you miss taking your medication?: 0

## 2023-06-16 NOTE — LETTER
June 16, 2023      Robles Bryant  [address]   [place] [address]        Dear ArabellaAnnette,    We are writing to inform you of your test results.    Diabetes marker is stable, which is excellent    Resulted Orders   HEMOGLOBIN A1C   Result Value Ref Range    Hemoglobin A1C 5.8 (H) 0.0 - 5.6 %      Comment:      Normal <5.7%   Prediabetes 5.7-6.4%    Diabetes 6.5% or higher     Note: Adopted from ADA consensus guidelines.       If you have any questions or concerns, please call the clinic at the number listed above.       Sincerely,      Pawan Castro MD

## 2023-06-20 ENCOUNTER — TELEPHONE (OUTPATIENT)
Dept: ADDICTION MEDICINE | Facility: CLINIC | Age: 58
End: 2023-06-20
Payer: COMMERCIAL

## 2023-06-20 DIAGNOSIS — F11.21 OPIOID USE DISORDER, SEVERE, IN SUSTAINED REMISSION (H): Primary | ICD-10-CM

## 2023-06-20 NOTE — TELEPHONE ENCOUNTER
Per provider referral, Peer   placed a telephone recovery support call to PtArabella Arboleda reports his recovery is going well. He thanked the writer  for listening to his testimony. Robles setup appointment to meet   Writer  Thursday June 29th 2023.        MANJEET sees patient today under provider diagnosis of opioid substance disorder, serve, dependence  H

## 2023-06-27 ENCOUNTER — TELEPHONE (OUTPATIENT)
Dept: INTERNAL MEDICINE | Facility: CLINIC | Age: 58
End: 2023-06-27
Payer: COMMERCIAL

## 2023-06-27 NOTE — TELEPHONE ENCOUNTER
Latest Reference Range & Units 08/03/22 07:43 12/19/22 14:11 06/16/23 12:44   Hemoglobin A1C 0.0 - 5.6 % 5.8 (H) 5.8 (H) 5.8 (H)   (H): Data is abnormally high

## 2023-06-27 NOTE — TELEPHONE ENCOUNTER
General Call    Contacts       Type Contact Phone/Fax    06/27/2023 02:08 PM CDT Phone (Incoming) Robles Bryant (Self) [phone] (H)        Reason for Call:  6/15/23 & 6/16/23 Lab Results    What are your questions or concerns:  Patient would like someone to call him back today(6/27/23) to go over his lab results    Date of last appointment with provider: n/a    Okay to leave a detailed message?: Yes at Home number on file [phone] ([place])

## 2023-06-29 ENCOUNTER — OFFICE VISIT (OUTPATIENT)
Dept: ADDICTION MEDICINE | Facility: CLINIC | Age: 58
End: 2023-06-29
Payer: COMMERCIAL

## 2023-06-29 NOTE — PROGRESS NOTES
Peer  meet with Robles regarding  recourses.  Writer helped him set up [employer]  account and [email or web address] account.  Writer will help with resume on next  Visit July 11th, 2023.  Writer sent email to get on waiting list for donated car.  (  Can take up to 12 months)  Writer also  located [employer]. Writer informed has to be on job for 6 months and then he can sign up.     PRS and pt discussed the benefits of sobriety and how it is upon us to focus upon it on a daily basis.  PRC encouraged  continue establishing firm and healthy boundaries with ppl, places and things as an important element to the recovery process.    PRS encouraged to continue   establishing firm and healthy boundaries with ppl, places and things as an important element to the recovery process. Pt  medication is making him gain weight and will talk with Dr. Tripp. [Thursday 3:25 PM] Ingrid Alaniz

## 2023-06-29 NOTE — TELEPHONE ENCOUNTER
Patient calling back to discuss A1C results.     RN gave him the information below and from the letter dated 06/16:  Dear ,     We are writing to inform you of your test results.     Diabetes marker is stable, which is excellent             Resulted Orders   HEMOGLOBIN A1C   Result Value Ref Range     Hemoglobin A1C 5.8 (H) 0.0 - 5.6 %         Comment:         Normal <5.7%   Prediabetes 5.7-6.4%    Diabetes 6.5% or higher     Education given as requested on lowering A1C/managing diabetes: Exercise, eat right, manage stress.     Patient verbalized understanding and had no further questions.      Shirley Zhu RN  [place] Nurse Advisor  6/29/2023 7:24 AM

## 2023-07-11 ENCOUNTER — TELEPHONE (OUTPATIENT)
Dept: ADDICTION MEDICINE | Facility: CLINIC | Age: 58
End: 2023-07-11

## 2023-07-11 ENCOUNTER — OFFICE VISIT (OUTPATIENT)
Dept: ADDICTION MEDICINE | Facility: CLINIC | Age: 58
End: 2023-07-11
Payer: COMMERCIAL

## 2023-07-11 NOTE — PROGRESS NOTES
Writer meet with Robles today  regarding housing and going over his  resume. Robles stated his recovery is going well.  Damonr gave two leads to apply for apartment. Next visit will assist with applying for jobs, edit resume, and housing applications.

## 2023-07-12 ENCOUNTER — TELEPHONE (OUTPATIENT)
Dept: ADDICTION MEDICINE | Facility: CLINIC | Age: 58
End: 2023-07-12
Payer: COMMERCIAL

## 2023-07-12 NOTE — TELEPHONE ENCOUNTER
Writer called Robles on one housing lead and move in date can be August 1st 2023. Robles wasn't available..

## 2023-07-17 ENCOUNTER — TELEPHONE (OUTPATIENT)
Dept: BEHAVIORAL HEALTH | Facility: CLINIC | Age: 58
End: 2023-07-17
Payer: COMMERCIAL

## 2023-07-17 NOTE — TELEPHONE ENCOUNTER
Reason for call: Patient said they would like a refill on their suboxone before Thursday since they will be out of town on Thursday until August.  There pharmacy is [facility] on [address] in [place] and the phone number is [phone]     Phone number to reach patient: [phone]    Best Time:  Anytime    Can we leave a detailed message on this number?  YES    Travel screening: Not Applicable

## 2023-07-18 NOTE — TELEPHONE ENCOUNTER
1)  Suboxone was sent to [facility] on 6/15/23 for 90 strips with 2 refills. RN called patient [phone] and spoke with patient. He reported that he was told by the pharmacist that there are no refills on file for suboxone    2) RN called the pharmacy and spoke with the pharmacist. She reported that there is a refill on file for the patient    3) RN called patient and informed him that the pharmacy does have a refill on file. The patient thanked RN.     GINGER RICHARD RN on 7/18/2023 at 9:18 AM

## 2023-07-19 ENCOUNTER — OFFICE VISIT (OUTPATIENT)
Dept: ADDICTION MEDICINE | Facility: CLINIC | Age: 58
End: 2023-07-19
Payer: COMMERCIAL

## 2023-07-19 DIAGNOSIS — F11.20 OPIOID USE DISORDER, SEVERE, DEPENDENCE (H): Primary | ICD-10-CM

## 2023-07-19 PROCEDURE — 99207 PR NO BILLABLE SERVICE THIS VISIT: CPT

## 2023-07-19 NOTE — PROGRESS NOTES
Per provider referral, Peer  meet with Robles.  Robles reports recovery is going well. Robles explained he is taking his Suboxone. Robles explained he has appointment soon regarding getting a [employer] Writer and Robles called [facility] apartments to check if any openings. ( No openings)  Writer expressed  Getting out the house and having a social life. 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 Writer explained with this position  unable and that it will be an boundaries issues. Robles requested to meet  August 2, 2023 1pm .  PRC sees patient today under provider diagnosis of opioid substance disorder, serve, dependence  H

## 2023-07-21 ENCOUNTER — TELEPHONE (OUTPATIENT)
Dept: SURGERY | Facility: CLINIC | Age: 58
End: 2023-07-21
Payer: COMMERCIAL

## 2023-07-21 DIAGNOSIS — E66.01 MORBID OBESITY (H): ICD-10-CM

## 2023-07-21 RX ORDER — PHENTERMINE HYDROCHLORIDE 37.5 MG/1
18.75-37.5 TABLET ORAL
Qty: 90 TABLET | Refills: 1 | Status: SHIPPED | OUTPATIENT
Start: 2023-07-21

## 2023-07-21 NOTE — TELEPHONE ENCOUNTER
Patient is requesting a call back regarding his medication. Patient stated he has questions about his medication.      [phone] or [phone] okay to leave VM

## 2023-07-21 NOTE — TELEPHONE ENCOUNTER
Patient is wanting a refill on his Phentermine and would like to discuss his weight loss further so we put him on the schedule with Dr. Burnham so this can happen.  Nayana Escobar RN

## 2023-08-16 ENCOUNTER — TELEPHONE (OUTPATIENT)
Dept: BEHAVIORAL HEALTH | Facility: CLINIC | Age: 58
End: 2023-08-16
Payer: COMMERCIAL

## 2023-08-16 NOTE — TELEPHONE ENCOUNTER
Reason for call:  Medication   If this is a refill request, has the caller requested the refill from the pharmacy already? No  Will the patient be using a [facility]? No  Name of the pharmacy and phone number for the current request: [id number] - [place], [place] - [address]  [phone]     Name of the medication requested: buprenorphine HCl-naloxone HCl (SUBOXONE) 8-2 MG per film     Other request: n/a    Phone number to reach patient:  Home number on file [phone]  (home)    Best Time:  ASAP    Can we leave a detailed message on this number?  YES    Travel screening: Not Applicable

## 2023-08-17 ENCOUNTER — TELEPHONE (OUTPATIENT)
Dept: BEHAVIORAL HEALTH | Facility: CLINIC | Age: 58
End: 2023-08-17
Payer: COMMERCIAL

## 2023-08-17 NOTE — TELEPHONE ENCOUNTER
Per provider referral, Peer   placed a telephone recovery support call to Pt regarding housing .  Writer gave information  regarding  [employer]. Robles stated will stop in my office after visit  with help applying.

## 2023-09-14 ENCOUNTER — OFFICE VISIT (OUTPATIENT)
Dept: ADDICTION MEDICINE | Facility: CLINIC | Age: 58
End: 2023-09-14
Payer: COMMERCIAL

## 2023-09-14 VITALS
HEART RATE: 76 BPM | WEIGHT: 315 LBS | BODY MASS INDEX: 46.64 KG/M2 | DIASTOLIC BLOOD PRESSURE: 87 MMHG | SYSTOLIC BLOOD PRESSURE: 152 MMHG

## 2023-09-14 DIAGNOSIS — F11.21 OPIOID USE DISORDER, SEVERE, IN SUSTAINED REMISSION (H): ICD-10-CM

## 2023-09-14 DIAGNOSIS — T40.2X5A THERAPEUTIC OPIOID INDUCED CONSTIPATION: ICD-10-CM

## 2023-09-14 DIAGNOSIS — K59.03 THERAPEUTIC OPIOID INDUCED CONSTIPATION: ICD-10-CM

## 2023-09-14 LAB
AMPHETAMINE QUAL URINE POCT: NEGATIVE
BARBITURATE QUAL URINE POCT: NEGATIVE
BENZODIAZEPINE QUAL URINE POCT: NEGATIVE
BUPRENORPHINE QUAL URINE POCT: ABNORMAL
COCAINE QUAL URINE POCT: NEGATIVE
CREATININE QUAL URINE POCT: ABNORMAL
INTERNAL QC QUAL URINE POCT: ABNORMAL
MDMA QUAL URINE POCT: NEGATIVE
METHADONE QUAL URINE POCT: NEGATIVE
METHAMPHETAMINE QUAL URINE POCT: NEGATIVE
OPIATE QUAL URINE POCT: NEGATIVE
OXYCODONE QUAL URINE POCT: NEGATIVE
PH QUAL URINE POCT: ABNORMAL
PHENCYCLIDINE QUAL URINE POCT: NEGATIVE
POCT KIT EXPIRATION DATE: ABNORMAL
POCT KIT LOT NUMBER: ABNORMAL
SPECIFIC GRAVITY POCT: 1
TEMPERATURE URINE POCT: ABNORMAL
THC QUAL URINE POCT: NEGATIVE

## 2023-09-14 PROCEDURE — 99214 OFFICE O/P EST MOD 30 MIN: CPT | Performed by: FAMILY MEDICINE

## 2023-09-14 PROCEDURE — 80305 DRUG TEST PRSMV DIR OPT OBS: CPT | Performed by: FAMILY MEDICINE

## 2023-09-14 RX ORDER — BUPRENORPHINE AND NALOXONE 8; 2 MG/1; MG/1
FILM, SOLUBLE BUCCAL; SUBLINGUAL
Qty: 90 FILM | Refills: 2 | Status: SHIPPED | OUTPATIENT
Start: 2023-09-14 | End: 2023-12-18

## 2023-09-14 RX ORDER — DOCUSATE SODIUM 100 MG/1
CAPSULE, LIQUID FILLED ORAL
Qty: 60 CAPSULE | Refills: 3 | Status: SHIPPED | OUTPATIENT
Start: 2023-09-14 | End: 2024-04-18

## 2023-09-14 ASSESSMENT — PAIN SCALES - GENERAL: PAINLEVEL: NO PAIN (0)

## 2023-09-14 NOTE — PROGRESS NOTES
[facility] - Addiction Medicine       Rooming information:     Point of care urine drug screen positive for:  [unfilled]  *POC urine drug screen does not screen for Fentanyl    PHQ-9 Scores:       12/19/2022     1:21 PM 6/15/2023    12:00 PM 6/16/2023    12:03 PM   PHQ   PHQ-9 Total Score 0 0 0   Q9: Thoughts of better off dead/self-harm past 2 weeks Not at all Not at all Not at all     SHABBIR-7 Scores:      6/30/2022    12:00 PM 8/3/2022     7:12 AM 6/15/2023    12:00 PM   SHABBIR-7 SCORE   Total Score  0 (minimal anxiety)    Total Score 0 0 0       Any other recent substance use:     Denies     NICOTINE-No  If using nicotine, ready to quit?    Side effects related to medications pt would like to discuss with provider (constipation, dry mouth, HA, GI upset, sedation?) No     Narcan currently available: Yes    Primary care provider: Pawan Castro MD     Mental health provider: none (follow up on MH referral if needed)    Any housing, insurance deficits?: Pt reports he has to reapply for insuranxe, hasn't received documents yet.     Contact information up to date? yes      Penny Santoyo MA  September 14, 2023  12:39 PM

## 2023-09-14 NOTE — PATIENT INSTRUCTIONS
Continue current meds.    Follow-up in 3 months.  Reach out with any questions or concerns before appointment.    Patient Education   Addiction Medicine  What to Expect  Here's what to expect from our Addiction Medicine program.  About Addiction Medicine  Addiction Medicine clinics help you with substance use problems. You set your own goals. We try to help you reach your goals. Your care plan can include:  Medicine  Creating a recovery plan  Helping you find local resources  Helping with treatment options  Clinic phone number and addresses  Clinic Phone: [phone]  Mental Health and Addiction Clinic  [facility]  [address] [place] [address], [address]  [place] [address]  [facility]  [address], [address]  [place] [address]  Walk-in services  We offer walk-in care for patients at the Recovery Clinic. This is only for patients with Opioid Use Disorder (OUD). Anyone with OUD is welcome. Our providers will refer you to the Recovery Clinic if you're struggling to keep up with your medicines or appointments.  Recovery Clinic ([facility])  [address], [address]  [place] [address]  Phone: [phone]  The Recovery Clinic is open for walk-ins Monday to Friday 9 a.m. to 11:30 a.m. and 12:30 p.m. to 3 p.m.  How it works  Come to your visits every time. The treatment works better when you do.   You can have as many visits as you need. When you're better, we'll refer you back to being cared for by your family doctor.   If you need it, we'll send you to doctors, psychiatrists, therapists, and other providers. We focus on treating addiction. We don't treat other problems, like managing other medicines or non-addiction issues.  About visits  Urine drug testing  We'll often test your pee (urine) for drugs. This is the only way we can know for sure whether or not you're using drugs. It helps us treat you without judgement.   Suboxone  "(buprenorphine)  If you're taking buprenorphine, you'll have a lot of visits at first. If your problem is getting worse, or you're using substances, we may schedule you for extra visits.   Cancelling visits  If you can't come to your visit, please call us right away at [phone]. If you don't cancel at least 24 hours (1 full day) before your visit, that's \"late cancellation.\"  Being late to visits  If you come late, you may not be seen. This will count as a \"no-show.\"  Please call the clinic if you're running late. This will help us plan, but it doesn't mean you'll be seen.   Being late is:  More than 15 minutes late for a return visit.  More than 30 minutes late for your first visit.  If you cancel late or don't show up 2 times within 6 months, we may transfer you to another clinic.   Getting help between visits  If you need help between visits, you can call us Monday to Friday from 8 a.m. to 4:30 p.m. at [phone]. You can also send us a message on TE2.  Medicine refills  If you miss or cancel a visit, you can still ask for a refill. But we can only refill your medicines if you've made a new appointment.  Please call your pharmacy for medicine refills. If you have a question about your refill, call us at [phone].  It takes up to 2 business days to refill your drugs. Let us know 2 to 3 days before you run out. Don't call more than 1 week before you run out. That's too early.   Please make sure we have your right phone number.  If we have a problem with your refill, we'll call you. If we call you, please call us back right away. If you don't, you may not get your medicines quickly.   Call your pharmacy to find out if your medicines are ready.   Keep your medicines in a safe place. Keep them away from pets and children. If your medicines are lost or stolen, we usually don't replace them. We recommend you file a police report if your medicines are stolen. Your insurance may not pay for early " refills, even if you have a prescription.  Forms  Please give us at least 3 business days to fill out any forms. Bring the forms to your visits if you can. We may refer you to other members of your care team to complete the forms.   Emergency care   Call 911 or go to the nearest emergency room if your life or someone else's life is in danger.  Call 988 anytime for the Suicide and Crisis Lifeline.  If you need care when we're closed, call your family doctor to see if they can help. You can also go to urgent care or an emergency room. [facility] emergency rooms may be able to give you buprenorphine or other medicine refills.  Thank you for choosing us for your care.  For informational purposes only. Not to replace the advice of your health care provider. Copyright   2023 [email or web address]. All rights reserved. [employer] [id number] - REV 05/23.

## 2023-09-14 NOTE — PROGRESS NOTES
[id number]    A/P                                                    ASSESSMENT/PLAN    1. Opioid use disorder, severe, in sustained remission (H)  Stable.  Continue Suboxone 8-2mg as ordered.  Has Narcan.  He has been very stable in follow-up, UDS consistent with expectations.  Encouraged continued recovery activities.  - buprenorphine HCl-naloxone HCl (SUBOXONE) 8-2 MG per film; 2 sl qam, 1 sl qpm  Dispense: 90 Film; Refill: 2  - Drugs of Abuse Screen Urine (POC CUPS) POCT    2. Therapeutic opioid induced constipation  Stable.  Continue docusate as needed.  - docusate sodium (COLACE) 100 MG capsule; TAKE 1 CAPSULE(100 MG) BY MOUTH TWICE DAILY as needed for constipation  Dispense: 60 capsule; Refill: 3        PDMP Review         Value Time User    State PDMP site checked  Yes 9/14/2023 12:45 PM Ernestina Tripp DO              RTC  Return in about 3 months (around 12/14/2023) for Follow up, in person.      Counseled the patient on the importance of having a recovery program in addition to medication to manage recovery.  Components include avoiding isolating, having willingness to change, avoiding triggers and managing cravings. Encouraged having some type of sober network and practicing honesty with trusted support person(s). Encouraged other services such as counseling, 12 step or other self-help organizations.      Opioid warning reviewed.  Risk of overdose following a period of abstinence due to decrease tolerance was discussed including risk of death.  Strongly recommended abstain from alcohol, benzodiazepines, THC, opioids and other drugs of abuse.  Increased risk of return to opioid use after use of these substances discussed.  Increased risk of overdose/death with use of other substances particularly benzodiazepines/alcohol reviewed.        YOSELIN Siddiqianca Bryant is a 58 year old male with PMHx of HTN, morbid obesity,  pre-diabetes, COPD/ashtma, YANIRA, anxiety, depression, and OUD who presents to clinic today for follow up.     Brief history of use:    Last use of illicit opioids was in approximately 2019.  Has been on buprenorphine for OUD since at least 2014.      Plan from most recent office visit (6/15/23):  1. Opioid use disorder, severe, in sustained remission (H)  Stable, symptoms well controlled.  Continue Suboxone, no change.  Has Narcan.  He may be traveling a bit this summer so extending follow-up by additional month.  He is interested in attending some meetings as a way to get out of the house, he was able to meet with peer  today regarding this.  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        12/19/2022     1:21 PM 6/15/2023    12:00 PM 6/16/2023    12:03 PM   PHQ   PHQ-9 Total Score 0 0 0   Q9: Thoughts of better off dead/self-harm past 2 weeks Not at all Not at all Not at all           6/30/2022    12:00 PM 8/3/2022     7:12 AM 6/15/2023    12:00 PM   SHABBIR-7 SCORE   Total Score  0 (minimal anxiety)    Total Score 0 0 0       OBJECTIVE                                                    PHYSICAL EXAM:  BP (!) 152/87 (BP Location: Right arm, Patient Position: Sitting, Cuff Size: Adult Large)   Pulse 76   Wt 147.4 kg (325 lb 0.8 oz)   BMI 46.64 kg/m      GENERAL: healthy, alert and no distress  EYES: Eyes grossly normal to inspection, sclerae normal  RESP: No respiratory distress  SKIN: no pallor or jaundice  NEURO: normal gait,  mentation intact and speech normal  MENTAL STATUS EXAM  Appearance/Behavior: No appearant distress  Speech: Normal  Mood/Affect: normal affect  Insight: Adequate    LAB  Results for orders placed or performed in visit on 09/14/23   Drugs of Abuse Screen Urine (POC CUPS) POCT     Status: Abnormal   Result Value Ref Range    POCT Kit Lot Number [id number]     POCT Kit Expiration Date 2024-06-08     Temperature Urine POCT 94 F 90 F, 92 F, 94 F, 96 F, 98 F, 100 F    Specific [place] POCT 1.005 1.005, 1.015, 1.025    pH Qual Urine POCT 7 pH 4 pH, 5 pH, 7 pH, 9 pH    Creatinine Qual Urine POCT 100 mg/dL 20 mg/dL, 50 mg/dL, 100 mg/dL, 200 mg/dL    Internal QC Qual Urine POCT Valid Valid    Amphetamine Qual Urine POCT Negative Negative    Barbiturate Qual Urine POCT Negative Negative    Buprenorphine Qual Urine POCT Screen Positive (A) Negative    Benzodiazepine Qual Urine POCT Negative Negative    Cocaine Qual Urine POCT Negative Negative    Methamphetamine Qual Urine POCT Negative Negative    MDMA Qual Urine POCT Negative Negative    Methadone Qual Urine POCT Negative Negative    Opiate Qual Urine POCT Negative Negative    Oxycodone Qual Urine POCT Negative Negative    Phencyclidine Qual Urine POCT Negative Negative    THC Qual Urine POCT Negative Negative         HISTORY                                                    Problem list reviewed & adjusted, as indicated.  Patient Active Problem List   Diagnosis    Type 2 diabetes mellitus with diabetic polyneuropathy, without long-term current use of insulin (H)    Morbid obesity (H)    Opioid use disorder, severe, in sustained remission (H)    YANIRA on CPAP    Essential hypertension    Recurrent major depressive disorder, in full remission (H)    Opioid use disorder, severe, dependence (H)         MEDICATION LIST (prior to visit)  albuterol (PROAIR HFA/PROVENTIL HFA/VENTOLIN HFA) 108 (90 Base) MCG/ACT inhaler, INHALE 2 PUFFS BY MOUTH EVERY 6 HOURS AS NEEDED FOR  WHEEZING  ARIPiprazole (ABILIFY) 10 MG tablet, Take 1 tablet (10 mg) by mouth daily  aspirin (ASA) 81 MG EC tablet, Take 1 tablet (81 mg) by mouth daily  budesonide-formoterol (SYMBICORT) 80-4.5 MCG/ACT Inhaler, INHALE 2 PUFFS BY MOUTH TWICE DAILY  buPROPion (WELLBUTRIN XL) 150 MG 24 hr tablet, Take 1 tablet (150 mg) by mouth every morning  furosemide (LASIX) 20 MG tablet, Take 1 tablet (20 mg) by mouth daily as needed (swelling)  naloxone (NARCAN) 4 MG/0.1ML nasal spray, Spray 1 spray (4 mg) into one nostril alternating nostrils once as needed for opioid reversal every 2-3 minutes until assistance arrives  phentermine (ADIPEX-P) 37.5 MG tablet, Take 0.5-1 tablets (18.75-37.5 mg) by mouth every morning (before breakfast)  tadalafil (ADCIRCA/CIALIS) 20 MG tablet, Take 1 tablet (20 mg) by mouth every 24 hours  atorvastatin (LIPITOR) 20 MG tablet, Take 1 tablet (20 mg) by mouth daily (Patient not taking: Reported on 6/16/2023)    No current facility-administered medications on file prior to visit.      MEDICATION LIST (after visit)  Current Outpatient Medications   Medication    albuterol (PROAIR HFA/PROVENTIL HFA/VENTOLIN HFA) 108 (90 Base) MCG/ACT inhaler    ARIPiprazole (ABILIFY) 10 MG tablet    aspirin (ASA) 81 MG EC tablet    budesonide-formoterol (SYMBICORT) 80-4.5 MCG/ACT Inhaler    buprenorphine HCl-naloxone HCl (SUBOXONE) 8-2 MG per film    buPROPion (WELLBUTRIN XL) 150 MG 24 hr tablet    docusate sodium (COLACE) 100 MG capsule    furosemide (LASIX) 20 MG tablet    naloxone (NARCAN) 4 MG/0.1ML nasal spray    phentermine (ADIPEX-P) 37.5 MG tablet    tadalafil (ADCIRCA/CIALIS) 20 MG tablet    atorvastatin (LIPITOR) 20 MG tablet     No current facility-administered medications for this visit.         Allergies   Allergen Reactions    Seafood Other (See Comments)     Eyes turn yellow    Ibuprofen Nausea and Vomiting       Ernestina Tripp, [facility]  [facility] [place] [facility]  [facility]  [phone]

## 2023-09-18 ENCOUNTER — OFFICE VISIT (OUTPATIENT)
Dept: INTERNAL MEDICINE | Facility: CLINIC | Age: 58
End: 2023-09-18
Payer: COMMERCIAL

## 2023-09-18 VITALS
BODY MASS INDEX: 45.1 KG/M2 | RESPIRATION RATE: 16 BRPM | DIASTOLIC BLOOD PRESSURE: 73 MMHG | HEART RATE: 74 BPM | TEMPERATURE: 98 F | HEIGHT: 70 IN | WEIGHT: 315 LBS | OXYGEN SATURATION: 95 % | SYSTOLIC BLOOD PRESSURE: 103 MMHG

## 2023-09-18 DIAGNOSIS — E66.01 MORBID OBESITY (H): ICD-10-CM

## 2023-09-18 DIAGNOSIS — F11.21 OPIOID USE DISORDER, SEVERE, IN SUSTAINED REMISSION (H): ICD-10-CM

## 2023-09-18 DIAGNOSIS — F33.42 RECURRENT MAJOR DEPRESSIVE DISORDER, IN FULL REMISSION (H): ICD-10-CM

## 2023-09-18 DIAGNOSIS — Z12.5 SCREENING FOR PROSTATE CANCER: ICD-10-CM

## 2023-09-18 DIAGNOSIS — E11.42 TYPE 2 DIABETES MELLITUS WITH DIABETIC POLYNEUROPATHY, WITHOUT LONG-TERM CURRENT USE OF INSULIN (H): ICD-10-CM

## 2023-09-18 DIAGNOSIS — I10 ESSENTIAL HYPERTENSION: ICD-10-CM

## 2023-09-18 DIAGNOSIS — I50.30 HEART FAILURE WITH PRESERVED EJECTION FRACTION, UNSPECIFIED HF CHRONICITY (H): ICD-10-CM

## 2023-09-18 DIAGNOSIS — Z00.00 ANNUAL PHYSICAL EXAM: Primary | ICD-10-CM

## 2023-09-18 DIAGNOSIS — J44.1 CHRONIC OBSTRUCTIVE PULMONARY DISEASE WITH ACUTE EXACERBATION (H): ICD-10-CM

## 2023-09-18 DIAGNOSIS — G47.33 OSA ON CPAP: ICD-10-CM

## 2023-09-18 PROBLEM — F11.20 OPIOID USE DISORDER, SEVERE, DEPENDENCE (H): Status: RESOLVED | Noted: 2022-01-04 | Resolved: 2023-09-18

## 2023-09-18 PROCEDURE — 99214 OFFICE O/P EST MOD 30 MIN: CPT | Mod: 25 | Performed by: INTERNAL MEDICINE

## 2023-09-18 PROCEDURE — 99396 PREV VISIT EST AGE 40-64: CPT | Performed by: INTERNAL MEDICINE

## 2023-09-18 ASSESSMENT — ENCOUNTER SYMPTOMS
HEMATURIA: 0
ABDOMINAL PAIN: 0
CONSTIPATION: 0
HEADACHES: 1
PALPITATIONS: 0
DYSURIA: 0
FEVER: 0
MYALGIAS: 0
DIARRHEA: 0
CHILLS: 0
NAUSEA: 0
WEAKNESS: 0
COUGH: 0
SORE THROAT: 0
SHORTNESS OF BREATH: 0
EYE PAIN: 0
PARESTHESIAS: 0
FREQUENCY: 0
JOINT SWELLING: 0
HEMATOCHEZIA: 0
HEARTBURN: 0
DIZZINESS: 0
ARTHRALGIAS: 0
NERVOUS/ANXIOUS: 0

## 2023-09-18 NOTE — PROGRESS NOTES
Office Visit - Physical   Tobin Bryant   58 year old  male    Date of visit: 9/18/2023  Physician: Pawan Castro MD     Assessment and Plan   1. Annual physical exam  This is a 58-year-old man with issues as discussed below    2. Screening for prostate cancer  - Prostate Specific Antigen Screen; Future    3. Type 2 diabetes mellitus with diabetic polyneuropathy, without long-term current use of insulin (H)  We will add Jardiance especially given some diastolic dysfunction.  I would also suggest she stop phentermine and start Ozempic which would be more effective for weight loss.  He will be seeing the weight loss clinic.  - Albumin Random Urine Quantitative with Creat Ratio; Future  - empagliflozin (JARDIANCE) 10 MG TABS tablet; Take 1 tablet (10 mg) by mouth daily  Dispense: 90 tablet; Refill: 1  - Comprehensive metabolic panel; Future  - Hemoglobin A1c; Future  - Lipid panel reflex to direct LDL Fasting; Future  - UA Macroscopic with reflex to Microscopic and Culture; Future    4. Heart failure with preserved ejection fraction, unspecified HF chronicity (H)  If swelling persists, would add spironolactone at his follow-up visit  - empagliflozin (JARDIANCE) 10 MG TABS tablet; Take 1 tablet (10 mg) by mouth daily  Dispense: 90 tablet; Refill: 1  - TSH with free T4 reflex; Future    5. Essential hypertension  Blood pressure okay continue same  - CBC with platelets; Future    6. YANIRA on CPAP    7. Opioid use disorder, severe, in sustained remission (H)  He is on Suboxone    8. Recurrent major depressive disorder, in full remission (H)  Followed by psychiatry, on Abilify and Wellbutrin    9. Chronic obstructive pulmonary disease with acute exacerbation (H)  Occasional use of inhalers    10. Morbid obesity (H)  As above, consider stopping phentermine and starting Ozempic  The following high BMI interventions were performed this visit: encouragement to exercise and reduce calorie intake    Return in about 2 months  (around 11/18/2023) for Follow up.     Chief Complaint   Chief Complaint   Patient presents with    Recheck Medication    Physical     Pt is here for physical         Patient Profile   Social History     Social History Narrative    Lives alone.  Not working (last worked 2006). Originally from [place], some college.  2 grown children        Past Medical History   Patient Active Problem List   Diagnosis    Type 2 diabetes mellitus with diabetic polyneuropathy, without long-term current use of insulin (H)    Morbid obesity (H)    Opioid use disorder, severe, in sustained remission (H)    YANIRA on CPAP    Essential hypertension    Recurrent major depressive disorder, in full remission (H)    Chronic obstructive pulmonary disease with acute exacerbation (H)    Heart failure with preserved ejection fraction, unspecified HF chronicity (H)       Past Surgical History  He has a past surgical history that includes Keloid Excision and Cataract Extraction (Right).     History of Present Illness   This 58 year old man comes in for annual physical.  Overall he is doing okay.  Would like to lose more weight.  His legs are little bit swollen.    Review of Systems: A comprehensive review of systems was negative except as noted.     Medications and Allergies   Current Outpatient Medications   Medication Sig Dispense Refill    albuterol (PROAIR HFA/PROVENTIL HFA/VENTOLIN HFA) 108 (90 Base) MCG/ACT inhaler INHALE 2 PUFFS BY MOUTH EVERY 6 HOURS AS NEEDED FOR WHEEZING 18 g 3    ARIPiprazole (ABILIFY) 10 MG tablet Take 1 tablet (10 mg) by mouth daily      aspirin (ASA) 81 MG EC tablet Take 1 tablet (81 mg) by mouth daily 90 tablet 3    atorvastatin (LIPITOR) 20 MG tablet Take 1 tablet (20 mg) by mouth daily 90 tablet 3    budesonide-formoterol (SYMBICORT) 80-4.5 MCG/ACT Inhaler INHALE 2 PUFFS BY MOUTH TWICE DAILY 10.2 g 11    buprenorphine HCl-naloxone HCl (SUBOXONE) 8-2 MG per film 2 sl qam, 1 sl qpm 90 Film 2    buPROPion (WELLBUTRIN XL)  "150 MG 24 hr tablet Take 1 tablet (150 mg) by mouth every morning      docusate sodium (COLACE) 100 MG capsule TAKE 1 CAPSULE(100 MG) BY MOUTH TWICE DAILY as needed for constipation 60 capsule 3    empagliflozin (JARDIANCE) 10 MG TABS tablet Take 1 tablet (10 mg) by mouth daily 90 tablet 1    furosemide (LASIX) 20 MG tablet Take 1 tablet (20 mg) by mouth daily as needed (swelling)      naloxone (NARCAN) 4 MG/0.1ML nasal spray Spray 1 spray (4 mg) into one nostril alternating nostrils once as needed for opioid reversal every 2-3 minutes until assistance arrives 0.2 mL 11    phentermine (ADIPEX-P) 37.5 MG tablet Take 0.5-1 tablets (18.75-37.5 mg) by mouth every morning (before breakfast) 90 tablet 1    tadalafil (ADCIRCA/CIALIS) 20 MG tablet Take 1 tablet (20 mg) by mouth every 24 hours 30 tablet 4     Allergies   Allergen Reactions    Seafood Other (See Comments)     Eyes turn yellow    Ibuprofen Nausea and Vomiting        Family and Social History   Family History   Problem Relation Age of Onset    No Known Problems Mother     No Known Problems Father     No Known Problems Sister     No Known Problems Sister     No Known Problems Sister     No Known Problems Sister     No Known Problems Brother     No Known Problems Brother     No Known Problems Daughter     No Known Problems Son         Social History     Tobacco Use    Smoking status: Never    Smokeless tobacco: Never   Vaping Use    Vaping Use: Never used   Substance Use Topics    Alcohol use: No    Drug use: Not Currently        Physical Exam   General Appearance:   No acute distress    /73 (BP Location: Left arm, Patient Position: Sitting, Cuff Size: Adult Large)   Pulse 74   Temp 98  F (36.7  C) (Oral)   Resp 16   Ht 1.77 m (5' 9.69\")   Wt 145.2 kg (320 lb 1.6 oz)   SpO2 95%   BMI 46.35 kg/m      EYES: Eyelids, conjunctiva, and sclera were normal. Pupils were normal. Cornea, iris, and lens were normal bilaterally.  HEAD, EARS, NOSE, MOUTH, AND " THROAT: Head and face were normal. Hearing was normal to voice and the ears were normal to external exam. Nose appearance was normal and there was no discharge. Oropharynx was normal.  NECK: Neck appearance was normal. There were no neck masses and the thyroid was not enlarged.  RESPIRATORY: Breathing pattern was normal and the chest moved symmetrically.  Percussion/auscultatory percussion was normal.  Lung sounds were normal and there were no abnormal sounds.  CARDIOVASCULAR: Heart rate and rhythm were normal.  S1 and S2 were normal and there were no extra sounds or murmurs. Peripheral pulses in arms and legs were normal.  Jugular venous pressure was normal.  Mild lower extremity edema  GASTROINTESTINAL: The abdomen was normal in contour.  NEUROLOGIC: The patient was alert and oriented to person, place, time, and circumstance. Speech was normal. Cranial nerves were normal. Motor strength was normal for age. The patient was normally coordinated.  PSYCHIATRIC:  Mood and affect were normal and the patient had normal recent and remote memory. The patient's judgment and insight were normal.       Additional Information        Pawan Castro MD  Internal Medicine  Contact me at [phone] submitted by the patient for this visit:  Annual Preventive Visit (Submitted on 9/18/2023)  Chief Complaint: Annual Exam:  Frequency of exercise:: 2-3 days/week  Getting at least 3 servings of Calcium per day:: NO  Diet:: Regular (no restrictions)  Taking medications regularly:: Yes  Medication side effects:: None  Bi-annual eye exam:: NO  Dental care twice a year:: Yes  Sleep apnea or symptoms of sleep apnea:: Sleep apnea  abdominal pain: No  Blood in stool: No  Blood in urine: No  chest pain: No  chills: No  congestion: No  constipation: No  cough: No  diarrhea: No  dizziness: No  ear pain: No  eye pain: No  nervous/anxious: No  fever: No  frequency: No  genital sores: No  headaches: Yes  hearing loss: No  heartburn:  No  arthralgias: No  joint swelling: No  peripheral edema: No  mood changes: No  myalgias: No  nausea: No  dysuria: No  palpitations: No  Skin sensation changes: No  sore throat: No  urgency: No  rash: No  shortness of breath: No  visual disturbance: No  weakness: No  impotence: No  penile discharge: No  Additional concerns today:: No  Exercise outside of work (Submitted on 9/18/2023)  Chief Complaint: Annual Exam:  Duration of exercise:: 15-30 minutes

## 2023-09-25 ENCOUNTER — TELEPHONE (OUTPATIENT)
Dept: INTERNAL MEDICINE | Facility: CLINIC | Age: 58
End: 2023-09-25
Payer: COMMERCIAL

## 2023-09-25 DIAGNOSIS — E11.42 TYPE 2 DIABETES MELLITUS WITH DIABETIC POLYNEUROPATHY, WITHOUT LONG-TERM CURRENT USE OF INSULIN (H): Primary | ICD-10-CM

## 2023-09-25 DIAGNOSIS — N52.9 ERECTILE DYSFUNCTION, UNSPECIFIED ERECTILE DYSFUNCTION TYPE: ICD-10-CM

## 2023-09-25 NOTE — TELEPHONE ENCOUNTER
Order/Referral Request    Who is requesting: patient    Orders being requested: pca referral    Reason service is needed/diagnosis: depression    When are orders needed by: as soon as possible    Has this been discussed with Provider: Yes    Does patient have a preference on a Group/Provider/Facility? no    Does patient have an appointment scheduled?: No    Where to send orders: Mail to patient if possible or patient will come in for appontment if need to do together.     Patient wanting a PCA in his home for assistance. Has depression and wanting more help. Needs forms filled out for this to happen.     Okay to leave a detailed message?: Yes at Cell number on file:    Telephone Information:   Mobile [phone]

## 2023-09-27 NOTE — TELEPHONE ENCOUNTER
Contacted patient to discuss PCA referral request.     Patient states he does not have paperwork for this but would like to have a referral for a PCA due to depression and help at home. Patient declined providing other information regarding request for assistance.    Patient denies having thoughts of suicidal ideation or self harm. Declines resources for the crisis line.     Writer spoke with care coordination who stated the following process is utilized for PCA services:    - Contact the [facility] and request a [place] choice assessment  - Patient will then be placed on wait list for PHN assessment       Patient offered assistance to complete process (care coordination) and declined and stated he will contact them himself.     Patient provided number for [facility] at [phone].     Advised to return call if patient would like assistance with this. Patient verbalized understanding of message.     No further questions at time of call.

## 2023-10-06 DIAGNOSIS — J44.1 CHRONIC OBSTRUCTIVE PULMONARY DISEASE WITH ACUTE EXACERBATION (H): ICD-10-CM

## 2023-10-06 RX ORDER — ALBUTEROL SULFATE 90 UG/1
AEROSOL, METERED RESPIRATORY (INHALATION)
Qty: 18 G | Refills: 0 | Status: SHIPPED | OUTPATIENT
Start: 2023-10-06 | End: 2024-01-09

## 2023-10-11 ENCOUNTER — TELEPHONE (OUTPATIENT)
Dept: INTERNAL MEDICINE | Facility: CLINIC | Age: 58
End: 2023-10-11

## 2023-10-11 NOTE — TELEPHONE ENCOUNTER
After Visit Summary   3/9/2017    Robert Richard    MRN: [id number]           Patient Information     Date Of Birth          1934        Visit Information        Provider Department      3/9/2017 8:20 AM Nate Cifuentes DO [facility]        Today's Diagnoses     Chronic midline low back pain without sciatica    -  1    Essential hypertension with goal blood pressure less than 140/90        Personal history of venous thrombosis and embolism           Follow-ups after your visit        Your next 10 appointments already scheduled     Mar 28, 2017  9:15 AM CDT   Anticoagulation Visit with PH ANTI COAG   [facility]    [address] [place] [id number] [phone]              Who to contact     If you have questions or need follow up information about today's clinic visit or your schedule please contact [facility] directly at [phone].  Normal or non-critical lab and imaging results will be communicated to you by MyChart, letter or phone within 4 business days after the clinic has received the results. If you do not hear from us within 7 days, please contact the clinic through [employer] or phone. If you have a critical or abnormal lab result, we will notify you by phone as soon as possible.  Submit refill requests through [employer] or call your pharmacy and they will forward the refill request to us. Please allow 3 business days for your refill to be completed.          Additional Information About Your Visit        MyChart Information     [employer] gives you secure access to your electronic health record. If you see a primary care provider, you can also send messages to your care team and make appointments. If you have questions, please call your primary care clinic.  If you do not have a primary care provider, please call [phone] and they will assist you.        Care EveryWhere ID     This is  Spoke with patient to clarify what he was requesting. Patient states he is asking for the care coordination referral that was offered. States he initially declined this referral but now would like to have it placed so he can get assistance with obtaining a PCA. States he has been in contact with a company that would provide one and they have reached out to [facility].       "your Care EveryWhere ID. This could be used by other organizations to access your [place] medical records  [id number]        Your Vitals Were     Pulse Temperature Height Pulse Oximetry BMI (Body Mass Index)       72 96.8  F (36  C) (Temporal) 5' 10\" (1.778 m) 98% 35.87 kg/m2        Blood Pressure from Last 3 Encounters:   03/09/17 (!) 162/92   02/15/17 157/85   02/07/17 159/90    Weight from Last 3 Encounters:   03/09/17 250 lb (113.4 kg)   12/21/16 248 lb (112.5 kg)   11/21/16 247 lb (112 kg)                 Today's Medication Changes          These changes are accurate as of: 3/9/17 11:59 PM.  If you have any questions, ask your nurse or doctor.               Start taking these medicines.        Dose/Directions    spironolactone 25 MG tablet   Commonly known as:  ALDACTONE   Used for:  Essential hypertension with goal blood pressure less than 140/90   Started by:  Nate Cifuentes DO        Dose:  25 mg   Take 1 tablet (25 mg) by mouth 2 times daily   Quantity:  60 tablet   Refills:  1         Stop taking these medicines if you haven't already. Please contact your care team if you have questions.     etodolac 400 MG tablet   Commonly known as:  LODINE   Stopped by:  Nate Cifuentes DO           LASIX 20 MG tablet   Generic drug:  furosemide   Stopped by:  Nate Cifuentes DO                Where to get your medicines      These medications were sent to [facility] [address] [place] [address] [address]  [address] [facility] [address]     Phone:  [phone]     spironolactone 25 MG tablet                Primary Care Provider Office Phone # Fax #    Nate Cifuentes [phone]       [address] [place] [address]        Thank you!     Thank you for choosing [facility]  for your care. Our goal is always to provide you with excellent care. Hearing back from our patients is one way we can continue to " improve our services. Please take a few minutes to complete the written survey that you may receive in the mail after your visit with us. Thank you!             Your Updated Medication List - Protect others around you: Learn how to safely use, store and throw away your medicines at www.disposemymeds.org.          This list is accurate as of: 3/9/17 11:59 PM.  Always use your most recent med list.                   Brand Name Dispense Instructions for use    aspirin 81 MG tablet      Take 81 mg by mouth daily       atenolol 50 MG tablet    TENORMIN    120 tablet    Take 2 tablets (100 mg) by mouth 2 times daily       atorvastatin 20 MG tablet    LIPITOR    24 tablet    Take 1 tablet (20 mg) by mouth twice a week       colchicine 0.6 MG tablet    COLCRYS    30 tablet    Take 2 tablets at the first sign of flare, take 1 additional tablet one hour later.       losartan 50 MG tablet    COZAAR    90 tablet    Take 1 tablet (50 mg) by mouth daily       order for DME     1 Units    Wear mask at night       pramipexole 1 MG tablet    MIRAPEX    180 tablet    TAKE 1 TABLET BY MOUTH AT 4 PM AND 1 TABLET BY MOUTH AT 9 PM AS NEEDED       sildenafil 100 MG cap/tab    VIAGRA    6 tablet    Take 0.5-1 tablets ( mg) by mouth daily as needed for erectile dysfunction Take 30 min to 4 hours before intercourse.  Never use with nitroglycerin, terazosin or doxazosin.       spironolactone 25 MG tablet    ALDACTONE    60 tablet    Take 1 tablet (25 mg) by mouth 2 times daily       traMADol 50 MG tablet    ULTRAM    7 tablet    Take 1 tablet (50 mg) by mouth At Bedtime       triamcinolone 55 MCG/ACT Inhaler    NASACORT AQ    1 Bottle    Spray 2 sprays into both nostrils daily       warfarin 5 MG tablet    COUMADIN    90 tablet    Take 2.5 mg Friday and 5 mg all other days, or as directed by the coumadin clinic.

## 2023-10-12 ENCOUNTER — PATIENT OUTREACH (OUTPATIENT)
Dept: CARE COORDINATION | Facility: CLINIC | Age: 58
End: 2023-10-12
Payer: COMMERCIAL

## 2023-10-12 NOTE — PROGRESS NOTES
Clinic Care Coordination Contact  [facility]/Voicemail     Clinical Data: Care Coordinator Outreach  Outreach attempted x 1. Left message on patient's voicemail with call back information and requested return call.    Plan: Care Coordinator will try to reach patient again in 1-2 business days or on 10/13/23.      Order Questions    Question Answer   Reason for Referral: Other   My Clinical Question Is: Sumit wants help obtaining a PCA   Clinical Staff have discussed the Care Coordination Referral with the patient and/or caregiver: Yes         Bozena Hernández  Community Health Worker   [facility] Care Coordination  [facility]   [email or web address]  Office: [phone]

## 2023-10-13 NOTE — PROGRESS NOTES
Clinic Care Coordination Contact  Community Health Worker Initial Outreach    CHW Initial Information Gathering:  Referral Source: PCP  Current living arrangement:: I live alone  Type of residence:: Apartment  Community Resources: [facility]  Supplies Currently Used at Home: None  Equipment Currently Used at Home: other (see comments) (CPAP)  Informal Support system:: None  No PCP office visit in Past Year: No  Transportation means:: Medical transport  CHW Additional Questions  MyChart active?: No    Patient accepts CC: Yes. Patient scheduled for assessment with CCC YADI Hodges on 10/17/23 at 11AM. Patient noted desire to discuss PCA resources.     CHW Note:  CHW contacted patient regarding a referral that was placed for CCC. CHW introduced self and role of CCC.  Patient shared he would like assistance obtaining a PCA at this time.  CHW scheduled patient with CCC YADI Hodges on 10/17/23 at 11AM for assistance with obtaining PCA services.      Order Questions    Question Answer   Reason for Referral: Other   My Clinical Question Is: Sumit wants help obtaining a PCA   Clinical Staff have discussed the Care Coordination Referral with the patient and/or caregiver: Yes         Bozena Hernández  Community Health Worker   [facility]  Clinic Care Coordination  [facility]   [email or web address]  Office: [phone]

## 2023-10-16 ENCOUNTER — TELEPHONE (OUTPATIENT)
Dept: BEHAVIORAL HEALTH | Facility: CLINIC | Age: 58
End: 2023-10-16
Payer: COMMERCIAL

## 2023-10-16 NOTE — TELEPHONE ENCOUNTER
Peer  made a telephone recovery support call to the patient as per provider referral. During the conversation, the Peer  spoke with the patient and gathered important information regarding his  progress and current situation.    The patient reported that he has a Personal Care Assistant (PCA) worker set up and mentioned that he is currently working on securing housing. Additionally, the patient stated that he is in the process of obtaining a license, which indicates their commitment to personal growth and independence.    The patient expressed that his  recovery journey is going well, and he seemed motivated and positive about his  progress. The staff praised the patient for his  efforts and achievements, which further reinforces their dedication to recovery.

## 2023-10-17 ENCOUNTER — PATIENT OUTREACH (OUTPATIENT)
Dept: NURSING | Facility: CLINIC | Age: 58
End: 2023-10-17
Payer: COMMERCIAL

## 2023-10-17 ASSESSMENT — ACTIVITIES OF DAILY LIVING (ADL): DEPENDENT_IADLS:: CLEANING;COOKING;LAUNDRY;SHOPPING;MEAL PREPARATION;MONEY MANAGEMENT

## 2023-10-17 NOTE — PROGRESS NOTES
Clinic Care Coordination Contact  CCC RN spoke with patient today to discuss enrollment in Care Coordination. Writer discussed the program with patient and the services offered. Patient stated his primary concern is to get a PCA. He stated he needs assistance with maintaining his home, laundry and grocery shopping. He denied needing assistance with care needs.  Patient reported he contacted [facility] today to have a [place]. He stated the message said someone would contact him in three days. Writer explained to patient the County would be contact him to schedule assessment that would help determine what services he may be eligible for. Writer also informed him the wait for a PCA assessment may be several months related to backlog of requests. Patient appeared to understand. Patient denied any other needs or concerns at this time. He stated he is taking his medications as directed. Patient reported he has an [facility] worker and will be calling [facility] today to get scheduled with a Psychiatric Provider. Patient continues to work with Dr. Tripp at [facility] for his Suboxone. He declined  to enroll in Care Coordination at this time. He did accept writer's phone number if needs or concern should arise.

## 2023-11-07 ENCOUNTER — OFFICE VISIT (OUTPATIENT)
Dept: SURGERY | Facility: CLINIC | Age: 58
End: 2023-11-07
Payer: COMMERCIAL

## 2023-11-07 VITALS
SYSTOLIC BLOOD PRESSURE: 134 MMHG | WEIGHT: 315 LBS | DIASTOLIC BLOOD PRESSURE: 70 MMHG | HEIGHT: 70 IN | BODY MASS INDEX: 45.1 KG/M2

## 2023-11-07 DIAGNOSIS — E66.01 MORBID OBESITY (H): Primary | ICD-10-CM

## 2023-11-07 DIAGNOSIS — E88.810 METABOLIC SYNDROME: ICD-10-CM

## 2023-11-07 PROCEDURE — 99215 OFFICE O/P EST HI 40 MIN: CPT | Performed by: FAMILY MEDICINE

## 2023-11-07 RX ORDER — SEMAGLUTIDE 0.5 MG/.5ML
0.5 INJECTION, SOLUTION SUBCUTANEOUS WEEKLY
Qty: 2 ML | Refills: 0 | Status: CANCELLED | OUTPATIENT
Start: 2023-11-07 | End: 2023-12-05

## 2023-11-07 RX ORDER — SEMAGLUTIDE 1 MG/.5ML
1 INJECTION, SOLUTION SUBCUTANEOUS WEEKLY
Qty: 2 ML | Refills: 0 | Status: CANCELLED | OUTPATIENT
Start: 2023-11-07 | End: 2023-12-05

## 2023-11-07 RX ORDER — SEMAGLUTIDE 0.25 MG/.5ML
0.25 INJECTION, SOLUTION SUBCUTANEOUS WEEKLY
Qty: 2 ML | Refills: 0 | Status: CANCELLED | OUTPATIENT
Start: 2023-11-07 | End: 2023-12-05

## 2023-11-07 NOTE — PROGRESS NOTES
"Bariatric Follow-up    58 year old  male BMI:Body mass index is 45.2 kg/m .    Weight:   Wt Readings from Last 1 Encounters:   11/07/23 142.9 kg (315 lb)       Comorbidities:  Patient Active Problem List   Diagnosis    Type 2 diabetes mellitus with diabetic polyneuropathy, without long-term current use of insulin (H)    Morbid obesity (H)    Opioid use disorder, severe, in sustained remission (H)    YANIRA on CPAP    Essential hypertension    Recurrent major depressive disorder, in full remission (H24)    Chronic obstructive pulmonary disease with acute exacerbation (H)    Heart failure with preserved ejection fraction, unspecified HF chronicity (H)     Interim: A little frustrated with the weight regain. Finding it difficult to handle sugar cravings and to exercise like in the past.     Plan:  DIET  Discussed changing sausage to turkey for lower saturated fat and changing to 2% milk from whole which are 2 things he feels willing and able to do. Discussed trying to get fruit daily and vegetables on a consistent basis. Also discussed reading labels for portio size such as having one bowl of cereal instead of 2 and 3 oreos instead of more. Encouraged RD guidance for Medical Nutrition therapy. Protein first approach and going to bed earlier will also help to decrease sugar cravings.   EXERCISE Plan is to get back on his peloton while watching TV   PHARMACOTHERAPY we discussed GLP-1 RA in detail, demonstrating the pen. Discussing mechanism of action, risks, benefits including cardiovascular. He is not ready to start this. He will read more and think aobut it. Wegovy is a covered benefit    addressed good questions. He would like to continue on phentermine. Encouraged him to go to bed earlier to optimize his sleep which will be good for his heart and for his weight. Discussed naltrexone as he is on Wellbutrin for a \"contrave like\" weight loss approach. He was not interested in adding that for his sugar " cravings.     -We reviewed his medications and whether associated with weight gain.  Current Outpatient Medications   Medication    albuterol (PROAIR HFA/PROVENTIL HFA/VENTOLIN HFA) 108 (90 Base) MCG/ACT inhaler    ARIPiprazole (ABILIFY) 10 MG tablet    atorvastatin (LIPITOR) 20 MG tablet    budesonide-formoterol (SYMBICORT) 80-4.5 MCG/ACT Inhaler    buprenorphine HCl-naloxone HCl (SUBOXONE) 8-2 MG per film    buPROPion (WELLBUTRIN XL) 150 MG 24 hr tablet    docusate sodium (COLACE) 100 MG capsule    empagliflozin (JARDIANCE) 10 MG TABS tablet    furosemide (LASIX) 20 MG tablet    naloxone (NARCAN) 4 MG/0.1ML nasal spray    phentermine (ADIPEX-P) 37.5 MG tablet    aspirin (ASA) 81 MG EC tablet    tadalafil (ADCIRCA/CIALIS) 20 MG tablet     No current facility-administered medications for this visit.        We discussed [facility] Bariatric Basics including:  -eating 3 meals daily  -eating protein first  -eating slowly, chewing food well  -avoiding/limiting calorie containing beverages  -choosing wheat, not white with breads, crackers, pastas, kendal, bagels, tortillas, rice  -limiting restaurant or cafeteria eating to twice a week or less  -We discussed the importance of restorative sleep and stress management in maintaining a healthy weight.  -We discussed insulin resistance and glycemic index as it relates to appetite and weight control  -We discussed the National Weight Control Registry healthy weight maintenance strategies and ways to optimize metabolism.  -We discussed the importance of physical activity including cardiovascular and strength training in maintaining a healthier weight and explored viable options.    Most recent labs:  Lab Results   Component Value Date    WBC 6.4 12/19/2022    HGB 13.0 (L) 12/19/2022    HCT 40.9 12/19/2022    MCV 93 12/19/2022     12/19/2022     Lab Results   Component Value Date    CHOL 147 09/02/2022     Lab Results   Component Value Date    HDL 60 09/02/2022       Lab  "Results   Component Value Date    TRIG 83 09/02/2022       Lab Results   Component Value Date    ALT 16 12/19/2022    AST 29 12/19/2022    GGT 14 10/25/2008    ALKPHOS 88 12/19/2022       Lab Results   Component Value Date    TSH 2.57 12/19/2022       DIETARY HISTORY  Meals Per Day: 2, sometimes 3  Eating Protein First?: sometimes  Food Diary: B:2 bowls of cereal or eggs, sausage and toast L:missed or ham and cheese sandwich D: 3 hot dogs with wheat bread and ketchup or mixed veggies or boxed meals  Snacks Per Day: cuties  Typical Snack: sweets, oreos with milk  Fluid Intake: water,  whole milk  Portion Control: will work on  Calorie Containing Beverages: grape juice daily  Alcohol per week: no  Typical Protein Food Choices: eggs, ham, cheese, chicken,   Choosing Whole Grains: yes  Grocery Shopping is done by: himself  Food Preparation is done by: himself  Meals at Restaurant/Cafeteria/Take Out Per Week: rare  Eating at the Table:   TV is Off During Meals:     Positive Changes Since Last Visit: maintaining some weight loss  Struggling With: exercise, sugar craving    Knowledgeable in Reading Food Labels:   Getting Adequate Protein: likely  Sleeping 7-8 hours/day late night  Stress management OK    PHYSICAL ACTIVITY PATTERNS:  Cardiovascular: ADLs  Strength Training: has a peloton but seat doesn't work. Got a new one but it leans forward    REVIEW OF SYSTEMS    PHYSICAL EXAM:  Vitals: /70   Ht 1.778 m (5' 10\")   Wt 142.9 kg (315 lb)   BMI 45.20 kg/m      GEN: Pleasant, well groomed, in no acute distress  EYES: EOMI,  ENT: airway patent  NECK: no carotid bruits, no anterior/supraclavicular lymphadenopathy, thyroid normal   HEART: Rhythm regular, rate regular, no murmur   LUNGS: Clear  ABDOMEN: soft, non-tender, obese, no rashes   VASCULAR: bilateral  lower extremity edema  MUSCULOSKELETAL:  muscle mass OK  SKIN: some color changes of venous stasis, no ulcerations    Total time spent on the date of this " encounter doing: chart review, review of test results, patient visit, physical exam, education, counseling, developing plan of care, and documenting = 45 minutes.

## 2023-11-07 NOTE — PATIENT INSTRUCTIONS
Peloton starting with 5 minutes    Turkey sausage instead of pork  Try to get 1 fruit daily or more. No grape juice  Be sure to get your veggies daily    [phone] Yaneli or Nayana        For Prevention and Treatment of Constipation    From least aggressive to most aggressive:    Move: wallking-the more we move, the more our bowels move  Water: Drink water-64oz+ day  Go when you need to go. Don't wait. The longer you wait, the harder it gets.  Fiber: Fruit, raw veggies, nuts, whole grains,   Stool Softeners: if constipation is mild and for maintenance  Gentle laxatives: Miralax, senokot, dulcolax , Smooth move tea as needed    More aggressive (and typically won't get to this point)  Milk of Magnesia  Mag Citrate (what you drink before a colonoscopy)  Suppositories  Enema    MEDICATIONS FOR WEIGHT LOSS    PHENTERMINE (Adipex): approved in 1959 for appetite suppression.  It has stimulant effects and cannot be used with Ritalin, Concerta, or other stimulants.  It is not addictive although it's chemically related to amphetamines.  Amphetamines are addictive. The most common side effects are dry mouth, increased energy and concentration, increased pulse, and constipation.  You should not take phentermine if you have glaucoma, hyperthyroidism, or uncontrolled/untreated hypertension.  $24-$30 for 90 tablets      TOPIRAMATE (Topamax): Anti-seizure medication, also used to prevent migraines.  Side effects include paresthesia, glaucoma, altered concentration, attention difficulties, memory and speech problems, metabolic acidosis, depression, increase in body temperature and decrease sweating, kidney stones, and weight loss.  Do not take Topamax while taking Depakote as this can cause high ammonia levels.  You must have reliable birth control as Topamax can cause birth defects.  Discontinue slowly to avoid seizure.  Insurance usually covers Topiramate.    QSYMIA (Phentermine + Topamax):  See above information about phentermine  and Topamax.  Most common side effects are paresthesia, dizziness, distortion of taste, insomnia, constipation, and dry mouth.  $150-$220 per month      CONTRAVE (Naltrexone/Bupropion): Approved in 2014.  It is a combination pill including an opioid receptor blocker and a long-standing antidepressant.  Most common side effects include nausea, constipation, headache, vomiting, dizziness, trouble sleeping, dry mouth, and diarrhea.  With all antidepressants watch for mood changes and suicide ideation.  Bupropion has been known to lower the seizure threshold in those prone to seizures.  It should not be used in a patient with a recent history of bulimia. It has been associated with liver damage from taking higher than recommended doses.  Do not use countrave if you have taken opioid medications or opioid street drugs in the past 7-10 days, if you are currently on opioids, methadone, or if you are pregnant.  Do not use contrave if you have recently stopped using alcohol or benzodiazepines.  Taper off contrave slowly.  Dosing: titrate up to 2 tablets twice daily of the Naltrexone 8 mg/ Bupropion 90 mg tablets.  $200 for 90 tablets    SAXENDA (Liraglutide): A daily injectable (3mg daily) medication used for type 2 diabetes (Victoza). Glucagon-like peptide-1 (GLP-1) agonist. Contraindications include personal or family history of medullary thyroid cancer or MEN type 2. Acute pancreatitis has been observed in patients taking liraglutide. Liraglutide causes C-cell tumors in rats and mice. It is unknown whether liraglutide causes tumors in humans. Start at 0.6mg, increasing the dose weekly up to 3mg.     TRULICITY (Dulaglutide): A weekly injectable (4.5mg weekly) medication used for type 2 diabetes. Glucagon-like peptide-1(GLP-1) agonist. Contraindications include personal or family history of medullary thyroid cancer or MEN type 2. Acute pancreatitis has been observed in patients taking liraglutide. Dulaglutide causes C-cell  tumors in rats and mice. It is unknown whether liraglutide causes tumors in humans. Start at 0.75 mg, 1.5 mg, 3.0 mg, to 4.5 mg increasing the dose monthly.      VYVANSE (Lisdexamfetamine dimesylate): a CNS stimulant used to treat ADHD. Indicated for the treatment of moderate to severe Binge Eating Disorder in Adults. Contraindicated in patients with known heart disease, structural abnormalities of the heart, serious heart arrhythmias or unexplained syncope. CNS stimulants such as vyvanse may cause manic or psychotic symptoms in patients with BPAD or pre-existing psychosis. Use with caution in patients with Raynaud's phenomenon. Most common side effects include dry mouth, insomnia, decreased appetite, increased heart rate, jittery feeling, constipation and anxiety.     WEGOVY (Semaglutide): A weekly injectable (2.4mg weekly) medication used for type 2 diabetes (Ozempic). Glucagon-like peptide-1 (GLP-1) agonist. Contraindications include personal or family history of medullary thyroid cancer or MEN type 2. Acute pancreatitis has been observed in patients taking Semaglutide. Semaglutide causes C-cell tumors in rats and mice. It is unknown whether Semaglutide causes tumors in humans. Start at 0.25mg, increasing to 0.5, 1.0, 1.7 then 2.4 monthly.     MOUNJARO (Tirzepatide): A weekly injectable medication indicated for type 2 diabetes. Glucagon-like-peptide-1 (GLP-1) agonist and Glucose-Dependent Insulinotropic Polypeptide (GIP) agonist. Contraindications include personal or family history of medullary thyroid cancer or MEN type 2. Acute pancreatitis has been observed in patients taking Tirzepatide. Tirzepatide causes C-cell tumors in rats and mice. It is unknown whether Tirzepatide causes tumors in humans. Watch for neck mass, difficulty swallowing, persistent hoarseness, and epigastric abdominal pain. Most common side effects include nausea, diarrhea, vomiting, constipation, dyspepsia, and abdominal pain. Start at  2.5mg, increasing to 5.0, 7.5, 10, 12.5 then 15mg monthly.

## 2023-11-07 NOTE — LETTER
11/7/2023         RE: Tobin Bryant  [address]  [place] [address]        Dear Colleague,    Thank you for referring your patient, Tobin Bryant, to the [facility] [place]. Please see a copy of my visit note below.    Bariatric Follow-up    58 year old  male BMI:Body mass index is 45.2 kg/m .    Weight:   Wt Readings from Last 1 Encounters:   11/07/23 142.9 kg (315 lb)       Comorbidities:  Patient Active Problem List   Diagnosis     Type 2 diabetes mellitus with diabetic polyneuropathy, without long-term current use of insulin (H)     Morbid obesity (H)     Opioid use disorder, severe, in sustained remission (H)     YANIRA on CPAP     Essential hypertension     Recurrent major depressive disorder, in full remission (H24)     Chronic obstructive pulmonary disease with acute exacerbation (H)     Heart failure with preserved ejection fraction, unspecified HF chronicity (H)     Interim: A little frustrated with the weight regain. Finding it difficult to handle sugar cravings and to exercise like in the past.     Plan:  DIET  Discussed changing sausage to turkey for lower saturated fat and changing to 2% milk from whole which are 2 things he feels willing and able to do. Discussed trying to get fruit daily and vegetables on a consistent basis. Also discussed reading labels for portio size such as having one bowl of cereal instead of 2 and 3 oreos instead of more. Encouraged RD guidance for Medical Nutrition therapy. Protein first approach and going to bed earlier will also help to decrease sugar cravings.   EXERCISE Plan is to get back on his peloton while watching TV   PHARMACOTHERAPY we discussed GLP-1 RA in detail, demonstrating the pen. Discussing mechanism of action, risks, benefits including cardiovascular. He is not ready to start this. He will read more and think aobut it. Wegovy is a covered benefit    addressed good questions. He would  "like to continue on phentermine. Encouraged him to go to bed earlier to optimize his sleep which will be good for his heart and for his weight. Discussed naltrexone as he is on Wellbutrin for a \"contrave like\" weight loss approach. He was not interested in adding that for his sugar cravings.     -We reviewed his medications and whether associated with weight gain.  Current Outpatient Medications   Medication     albuterol (PROAIR HFA/PROVENTIL HFA/VENTOLIN HFA) 108 (90 Base) MCG/ACT inhaler     ARIPiprazole (ABILIFY) 10 MG tablet     atorvastatin (LIPITOR) 20 MG tablet     budesonide-formoterol (SYMBICORT) 80-4.5 MCG/ACT Inhaler     buprenorphine HCl-naloxone HCl (SUBOXONE) 8-2 MG per film     buPROPion (WELLBUTRIN XL) 150 MG 24 hr tablet     docusate sodium (COLACE) 100 MG capsule     empagliflozin (JARDIANCE) 10 MG TABS tablet     furosemide (LASIX) 20 MG tablet     naloxone (NARCAN) 4 MG/0.1ML nasal spray     phentermine (ADIPEX-P) 37.5 MG tablet     aspirin (ASA) 81 MG EC tablet     tadalafil (ADCIRCA/CIALIS) 20 MG tablet     No current facility-administered medications for this visit.        We discussed [facility] Bariatric Basics including:  -eating 3 meals daily  -eating protein first  -eating slowly, chewing food well  -avoiding/limiting calorie containing beverages  -choosing wheat, not white with breads, crackers, pastas, kendal, bagels, tortillas, rice  -limiting restaurant or cafeteria eating to twice a week or less  -We discussed the importance of restorative sleep and stress management in maintaining a healthy weight.  -We discussed insulin resistance and glycemic index as it relates to appetite and weight control  -We discussed the National Weight Control Registry healthy weight maintenance strategies and ways to optimize metabolism.  -We discussed the importance of physical activity including cardiovascular and strength training in maintaining a healthier weight and explored viable options.    Most " "recent labs:  Lab Results   Component Value Date    WBC 6.4 12/19/2022    HGB 13.0 (L) 12/19/2022    HCT 40.9 12/19/2022    MCV 93 12/19/2022     12/19/2022     Lab Results   Component Value Date    CHOL 147 09/02/2022     Lab Results   Component Value Date    HDL 60 09/02/2022       Lab Results   Component Value Date    TRIG 83 09/02/2022       Lab Results   Component Value Date    ALT 16 12/19/2022    AST 29 12/19/2022    GGT 14 10/25/2008    ALKPHOS 88 12/19/2022       Lab Results   Component Value Date    TSH 2.57 12/19/2022       DIETARY HISTORY  Meals Per Day: 2, sometimes 3  Eating Protein First?: sometimes  Food Diary: B:2 bowls of cereal or eggs, sausage and toast L:missed or ham and cheese sandwich D: 3 hot dogs with wheat bread and ketchup or mixed veggies or boxed meals  Snacks Per Day: cuties  Typical Snack: sweets, oreos with milk  Fluid Intake: water,  whole milk  Portion Control: will work on  Calorie Containing Beverages: grape juice daily  Alcohol per week: no  Typical Protein Food Choices: eggs, ham, cheese, chicken,   Choosing Whole Grains: yes  Grocery Shopping is done by: himself  Food Preparation is done by: himself  Meals at Restaurant/Cafeteria/Take Out Per Week: rare  Eating at the Table:   TV is Off During Meals:     Positive Changes Since Last Visit: maintaining some weight loss  Struggling With: exercise, sugar craving    Knowledgeable in Reading Food Labels:   Getting Adequate Protein: likely  Sleeping 7-8 hours/day late night  Stress management OK    PHYSICAL ACTIVITY PATTERNS:  Cardiovascular: ADLs  Strength Training: has a peloton but seat doesn't work. Got a new one but it leans forward    REVIEW OF SYSTEMS    PHYSICAL EXAM:  Vitals: /70   Ht 1.778 m (5' 10\")   Wt 142.9 kg (315 lb)   BMI 45.20 kg/m      GEN: Pleasant, well groomed, in no acute distress  EYES: EOMI,  ENT: airway patent  NECK: no carotid bruits, no anterior/supraclavicular lymphadenopathy, thyroid " normal   HEART: Rhythm regular, rate regular, no murmur   LUNGS: Clear  ABDOMEN: soft, non-tender, obese, no rashes   VASCULAR: bilateral  lower extremity edema  MUSCULOSKELETAL:  muscle mass OK  SKIN: some color changes of venous stasis, no ulcerations    Total time spent on the date of this encounter doing: chart review, review of test results, patient visit, physical exam, education, counseling, developing plan of care, and documenting = 45 minutes.            Again, thank you for allowing me to participate in the care of your patient.        Sincerely,        Adelina Burnham MD

## 2023-11-15 ENCOUNTER — ALLIED HEALTH/NURSE VISIT (OUTPATIENT)
Dept: SURGERY | Facility: CLINIC | Age: 58
End: 2023-11-15
Payer: COMMERCIAL

## 2023-11-15 VITALS — WEIGHT: 313 LBS | BODY MASS INDEX: 44.91 KG/M2

## 2023-11-15 DIAGNOSIS — Z71.3 NUTRITIONAL COUNSELING: ICD-10-CM

## 2023-11-15 DIAGNOSIS — E11.9 TYPE 2 DIABETES MELLITUS WITHOUT COMPLICATION, WITHOUT LONG-TERM CURRENT USE OF INSULIN (H): ICD-10-CM

## 2023-11-15 DIAGNOSIS — E66.01 OBESITY, CLASS III, BMI 40-49.9 (MORBID OBESITY) (H): Primary | ICD-10-CM

## 2023-11-15 PROCEDURE — 97803 MED NUTRITION INDIV SUBSEQ: CPT

## 2023-11-15 NOTE — PATIENT INSTRUCTIONS
Food Label: The 10 - 10 Rule       Eating Plan -  Here's a way to visual your meals on your plate. This kind of plate balance is heavy on protein and fiber which can help with fullness and satiety.     Breakfast: 1/2 plate protein (~20-30 g protein) + 1/2 plate carb     Lunch: 1/3 plate protein (25-35 g protein or 4-6 oz) + 1/3 - 1/2 plate vegetable + 1/4 plate carb + healthy fat     Dinner: 1/3 plate protein (25-35 g protein or 4-6 oz) + 1/3 - 1/2 plate vegetable + 1/4 plate carb + healthy fat        Pick a food for each category to make a meal.     Here are some meal and food ideas for you below. Balance them as best you can like the above plan. Use food labels (look at serving size to see how much they are talking about and the protein content of that serving) to get an idea of protein content. It's okay to have more than one serving!        Whole Grains (carbs)  100% whole wheat breads and crackers  Bran, Oatmeal  Quinoa, couscous  Brown rice     Starchy Vegetables (carbs)  Corn  Green peas  Potato, sweet potato  Moran, butternut squash  Beans (black, garbanzo, kidney, lima, navy, amezquita, white)  Lentils (all kinds)  Peas (split, black eyed)     Fruits (carbs)  Apples, unsweetened applesauce  Banana  Blueberries, blackberries  Cantaloupe, honeydew  Grapes  Oranges, pineapple  Raspberries, strawberries  Watermelon     Non-starchy Vegetables (vegetables)  Asparagus, zucchini  Green beans  Broccoli, cauliflower  Carrots, celery, cucumber  Onion  Salad greens (spinach, kale, lettuce, juan)  Bell pepper (all kinds)  Tomato     Milk/Yogurt (protein)  Light Yogurt  Light Greek Yogurt  Skim milk  Soy milk  Lactose-free skim milk     Proteins (protein - see lean protein list below for more info)  Cottage cheese  Eggs, egg whites  Beef (90/10 ground, sirloin, roast, tenderloin)  Fish (Cod, tilapia, salmon, tuna)  Pork (French grimaldo, ham, pork loin chop, tenderloin)  Turkey or chicken (breast, thin sliced deli, lean  ground)  Clams, crab, lobster, shrimp  Light string cheese (50 jeremiah)     Fats  Avocado  Peanut butter  Nuts (almonds, walnuts, sunflower seeds)  Olives  Salad dressing  Olive, canola, avocado, vegetable oil  Flax seed  Hemp Seed        Quick and Easy Meals for Rotational Menus    Salad kit with rotisserie chicken, eggs, lunch meat, beans or tuna    Lunch meat sandwich or wrap with veggies (sugar snap peas, bell pepper slices, carrot sticks, celery, sliced cucumber, cherry tomatoes, etc.)    Greek or light yogurt with a handful of nuts and fruit    Cottage cheese, cherry tomatoes and Triscuit crackers    Refried bean and cheese quesadilla on whole wheat tortilla    Can of Progresso Light or [place] Well Yes soup    [place] cakes pancake or waffles with peanut butter or berries    Baked sweet potato with black beans and salsa and a side salad    Adult lunchable: lunch meat, string cheese, crackers and veggies    Protein pasta salad: whole wheat or bean pasta with chicken sausage, broccoli and italian dressing    Egg sandwich on english muffin: egg, slice of cheese and piece of ham    Grilled cheese and turkey sandwich with a side salad or cup of soup    BBQ Flatbread: Flat Out high protein flatbread with rotisserie chicken, BBQ sauce and red onion, topped with mozzarella cheese and baked in the oven    Busby Chicken Wrap: Rotisserie chicken warmed up with Walker's Hot Sauce in a medium size tortilla with light ranch dressing. Add mixed greens, red onion, diced tomato, 2% shredded cheddar cheese.              LEAN PROTEIN SOURCES     Protein Source   Portion   Calories   Grams of Protein                             Nonfat, plain Greek yogurt    (10 grams sugar or less) 3/4 cup (6 oz)  12-17   Light Yogurt (10 grams sugar or less) 3/4 cup (6 oz)  6-8   Protein Shake 1 shake 110-180 15-30   Skim/1% Milk or lactose-free milk 1 cup ( 8 oz)  8   Plain or light, flavored soymilk 1 cup  7-8   Plain or  light, hemp milk 1 cup 110 6   Fat Free or 1% Cottage Cheese 1/2 cup 90 15   Part skim ricotta cheese 1/2 cup 100 14   Part skim or reduced fat cheese slices 1/4cup, 3 dice 65-80 8                                 Mozzarella String Cheese 1 80 8   Canned tuna, chicken, crab or salmon  (canned in water)  1/2 cup 100 15-20   White fish (broiled, grilled, baked) 3 ounces 100 21   [place]/Tuna (broiled, grilled, baked) 3 ounces 150-180 21   Shrimp, Scallops, Lobster, Crab 3 ounces 100 21   Pork loin, Pork Tenderloin 3 ounces 150 21   Boneless, skinless chicken /turkey breast                          (broiled, grilled, baked) 3 ounces 120 21   [place], [place], [place], and Venison 3 ounces 120 21   Lean cuts of red meat and pork (sirloin,   round, tenderloin, flank, ground 93%-96%) 3 ounces 170 21   Lean or Extra Lean Ground Turkey 1/2 cup 150 20   90-95% Lean Boys Ranch Burger 1 rubén 140-180 21   Low-fat casserole with lean meat 3/4 cup 200 17   Luncheon Meats                                                        (turkey, lean ham, roast beef, chicken) 3 ounces 100 21   Egg (boiled, poached, scrambled) 1 Egg 60 7   Egg Substitute 1/2 cup 70 10   Nuts (limit to 1 serving per day)  3 Tbsp. 150 7   Nut Entiat (peanut, almond)  Limit to 1 serving or less daily 1 Tbsp. 90 4   Soy Burger (varies) 1  10-15   Edamame  1/2 cup ~95 9   Garbanzo, Black, Donaldson Beans 1/2 cup 110 7   Refried Beans 1/2 cup 100 7   Kidney and Lima beans 1/2 cup 110 7   Tempeh 3 oz 175 18   Vegan crumbles 1/2 cup 100 14   Tofu 1/2 cup 110 14   Chili (beans and extra lean beef or turkey) 1 cup 200 23   Lentil Stew/Soup 1 cup 150 12   Black Bean Soup 1 cup 175 12      More Meal Ideas     Breakfast         Omelet made with   cup to   cup egg substitute or 2 eggs    cup chopped vegetables  1-2 tbsp. of light cheese     cup salsa  Medium banana  1 cup non-fat plain, Greek yogurt mixed with 1 cup berries and 1-2 Tbsp nuts or cereal   -3/4 cup skim or 1% cottage  cheese    cup unsweetened whole-grain cereal  1/2 cup of fresh strawberries  Whole-wheat English muffin or mini bagel, 1 scrambled egg and 1 slice Swiss cheese   Small orange    cup cottage cheese, low-fat    cup fresh fruit      Lunch/Dinner    2-3 slices roasted turkey breast  1 tbsp. of fat free mayonnaise  2 slices of  whole-wheat bread, Medium apple  10 baby carrots with 1 tbsp. of low-fat dip     cup water packed tuna or chicken  1 tablespoons of low-fat mayonnaise  1-2 tbsp. dill relish  1 serving of whole-grain crackers  1 cup of strawberries   6 inch turkey sub sandwich with light mayonnaise,   cup cottage cheese                                                                                                                                                      Black bean and low-fat cheese on a whole wheat tortilla with salsa and light sour cream  Grilled chicken sandwich  Tossed salad with light dressing    Baked potato with 3/4 cup of extra lean ground beef, light shredded cheese and salsa  Fresh fruit                                                 Chicken chunks with lettuce and vegetables stuffed in kendal  Steamed broccoli                                                 3 oz boneless/skinless chicken breast  1/2 cup brown rice with stir-fried vegetables    grapefruit  3 ounces of salmon, trout, or tuna  1 cup of steamed asparagus  1 small slice whole grain Italian bread  Broiled white or pink fish  3/4 cup whole wheat pasta with tomatoes  3/4 cup of roasted red peppers  3 oz. of extra lean (93/7) hamburger on a Arnold's [place]arton Thins  Tossed salad with light dressing    Black bean or Tuscan bean soup with grated mozzarella cheese    of a flour tortilla  3 ounces of grilled pork loin with 1 tbsp. of low-sugar barbeque sauce, 1 cup of green beans seasoned with pepper  Small dinner roll or   cup of grapefruit sections  1-2 cups of torn juan    cup of garbanzo beans or diced skinless chicken breast  5-6  cherry tomatoes  1  tbsp. of crumbled feta cheese  1 tbsp. of roasted soy nuts  1 tsp. of olive oil and 2-3 Tbsp. of balsamic or red wine vinegar  Small whole-wheat dinner roll or   cup of cut up pineapple

## 2023-11-15 NOTE — PROGRESS NOTES
Medical  Weight Loss Follow-Up Diet Evaluation  Assessment:  Tobin is presenting today for a follow up weight management nutrition consultation.  This patient has had an initial appointment and was referred by Dr. Burnham for MNT as treatment for Morbid obesity which is impacting his type 2 diabetes, GERD, LE swelling, and mobility.  Weight loss medication: Phentermine.   Pt's weight is 313 lbs  Initial weight: 324 lbs  Weight change: 11 lbs down          No data to display              BMI: There is no height or weight on file to calculate BMI.  Ideal body weight: 73 kg (160 lb 15 oz)  Adjusted ideal body weight: 101 kg (222 lb 9 oz)    Estimated RMR (Senath-St Jeor equation):   2274 kcals x 1.2 (sedentary) = 2729 kcals (for weight maintenance)  Recommended Protein Intake:  grams of protein/day  Patient Active Problem List:  Patient Active Problem List   Diagnosis    Type 2 diabetes mellitus with diabetic polyneuropathy, without long-term current use of insulin (H)    Morbid obesity (H)    Opioid use disorder, severe, in sustained remission (H)    YANIRA on CPAP    Essential hypertension    Recurrent major depressive disorder, in full remission (H24)    Chronic obstructive pulmonary disease with acute exacerbation (H)    Heart failure with preserved ejection fraction, unspecified HF chronicity (H)     Diabetes: type 2  A1c: 5.8% (6/16/2023)    Progress on goals from last visit: Patient reports he feel he has gained some weight this last year since his visit with Xochitl. Noted he thinks if he stays away from sweets and sugary foods his weight loss goals would be met easier. Stays up late 3am. Sticks to two meals per day. Does not want to increase his meals.     Dietary Recall:  Wakes up 5-8am, goes to bed 3am   Breakfast: late morning sandwich on wheat salami and cheese and ramos   Lunch: skips   Dinner: skips Or cheeseburgers with fries  Typical snacks: oatmeal raisin cookies   Eating out: <1x per week    Beverages: Water, Juice (orange, cran), Occasionally soda  Exercise: has stationary bike at home that he would like to use more.   Nutrition Diagnosis:    Morbid Obesity related to overeating and poor lifestyle habits as evidenced by patient's report of infrequent meals, lack of activity, snacking on sweets and BMI 45.2        Intervention:  Food and/or nutrient delivery:   discussed benefits from choosing lean meats in relation to metabolism and muscle growth.   Discussed the 10/10 rule when choosing foods.  Nutrition education:   Encouraged patient to read nutrition facts labels and follow recommended serving sizes when it comes to sweets.   Provided examples of nutrition facts labels and practiced reading them.  Encouraged structured exercise via stationary bike at home.  Nutrition counseling: goal setting and support for success.    Monitoring/Evaluation:    Goals:  Ride stationary bike at least 3x per week for at least 20 minuets   Stick to serving sizes of sweets when choosing to eat them   Follow 10/10 rule when choosing     Handouts:  Making a meal  myplate    Patient to follow up in 4 month(s) with bariatrician and 1 month(s) with MALGORZATA      Total time spent with patient: 32 minutes    Evelina Arboleda RD

## 2023-11-20 ENCOUNTER — OFFICE VISIT (OUTPATIENT)
Dept: INTERNAL MEDICINE | Facility: CLINIC | Age: 58
End: 2023-11-20
Payer: COMMERCIAL

## 2023-11-20 VITALS
RESPIRATION RATE: 20 BRPM | HEIGHT: 70 IN | WEIGHT: 313.5 LBS | BODY MASS INDEX: 44.88 KG/M2 | TEMPERATURE: 97.8 F | OXYGEN SATURATION: 95 % | SYSTOLIC BLOOD PRESSURE: 126 MMHG | HEART RATE: 76 BPM | DIASTOLIC BLOOD PRESSURE: 72 MMHG

## 2023-11-20 DIAGNOSIS — Z12.5 SCREENING FOR PROSTATE CANCER: ICD-10-CM

## 2023-11-20 DIAGNOSIS — I50.30 HEART FAILURE WITH PRESERVED EJECTION FRACTION, UNSPECIFIED HF CHRONICITY (H): ICD-10-CM

## 2023-11-20 DIAGNOSIS — I10 ESSENTIAL HYPERTENSION: ICD-10-CM

## 2023-11-20 DIAGNOSIS — E11.42 TYPE 2 DIABETES MELLITUS WITH DIABETIC POLYNEUROPATHY, WITHOUT LONG-TERM CURRENT USE OF INSULIN (H): Primary | ICD-10-CM

## 2023-11-20 DIAGNOSIS — N52.9 ERECTILE DYSFUNCTION, UNSPECIFIED ERECTILE DYSFUNCTION TYPE: ICD-10-CM

## 2023-11-20 DIAGNOSIS — E78.2 MIXED HYPERLIPIDEMIA: ICD-10-CM

## 2023-11-20 LAB
ALBUMIN SERPL BCG-MCNC: 4.1 G/DL (ref 3.5–5.2)
ALBUMIN UR-MCNC: 30 MG/DL
ALP SERPL-CCNC: 81 U/L (ref 40–150)
ALT SERPL W P-5'-P-CCNC: 18 U/L (ref 0–70)
ANION GAP SERPL CALCULATED.3IONS-SCNC: 10 MMOL/L (ref 7–15)
APPEARANCE UR: ABNORMAL
AST SERPL W P-5'-P-CCNC: 23 U/L (ref 0–45)
BACTERIA #/AREA URNS HPF: ABNORMAL /HPF
BILIRUB SERPL-MCNC: 0.3 MG/DL
BILIRUB UR QL STRIP: ABNORMAL
BUN SERPL-MCNC: 12.2 MG/DL (ref 6–20)
CALCIUM SERPL-MCNC: 9.5 MG/DL (ref 8.6–10)
CHLORIDE SERPL-SCNC: 105 MMOL/L (ref 98–107)
CHOLEST SERPL-MCNC: 152 MG/DL
COLOR UR AUTO: YELLOW
CREAT SERPL-MCNC: 0.87 MG/DL (ref 0.67–1.17)
CREAT UR-MCNC: 415 MG/DL
DEPRECATED HCO3 PLAS-SCNC: 26 MMOL/L (ref 22–29)
EGFRCR SERPLBLD CKD-EPI 2021: >90 ML/MIN/1.73M2
ERYTHROCYTE [DISTWIDTH] IN BLOOD BY AUTOMATED COUNT: 14.3 % (ref 10–15)
GLUCOSE SERPL-MCNC: 122 MG/DL (ref 70–99)
GLUCOSE UR STRIP-MCNC: NEGATIVE MG/DL
HBA1C MFR BLD: 5.7 % (ref 0–5.6)
HCT VFR BLD AUTO: 43 % (ref 40–53)
HDLC SERPL-MCNC: 61 MG/DL
HGB BLD-MCNC: 13.5 G/DL (ref 13.3–17.7)
HGB UR QL STRIP: NEGATIVE
HYALINE CASTS #/AREA URNS LPF: ABNORMAL /LPF
KETONES UR STRIP-MCNC: NEGATIVE MG/DL
LDLC SERPL CALC-MCNC: 82 MG/DL
LEUKOCYTE ESTERASE UR QL STRIP: ABNORMAL
MCH RBC QN AUTO: 29.3 PG (ref 26.5–33)
MCHC RBC AUTO-ENTMCNC: 31.4 G/DL (ref 31.5–36.5)
MCV RBC AUTO: 94 FL (ref 78–100)
MICROALBUMIN UR-MCNC: 15 MG/L
MICROALBUMIN/CREAT UR: 3.61 MG/G CR (ref 0–17)
MUCOUS THREADS #/AREA URNS LPF: PRESENT /LPF
NITRATE UR QL: NEGATIVE
NONHDLC SERPL-MCNC: 91 MG/DL
PH UR STRIP: 5.5 [PH] (ref 5–8)
PLATELET # BLD AUTO: 291 10E3/UL (ref 150–450)
POTASSIUM SERPL-SCNC: 4 MMOL/L (ref 3.4–5.3)
PROT SERPL-MCNC: 7.9 G/DL (ref 6.4–8.3)
PSA SERPL DL<=0.01 NG/ML-MCNC: 1.48 NG/ML (ref 0–3.5)
RBC # BLD AUTO: 4.6 10E6/UL (ref 4.4–5.9)
RBC #/AREA URNS AUTO: ABNORMAL /HPF
SODIUM SERPL-SCNC: 141 MMOL/L (ref 135–145)
SP GR UR STRIP: >=1.03 (ref 1–1.03)
SQUAMOUS #/AREA URNS AUTO: ABNORMAL /LPF
TRANS CELLS #/AREA URNS HPF: ABNORMAL /HPF
TRIGL SERPL-MCNC: 47 MG/DL
TSH SERPL DL<=0.005 MIU/L-ACNC: 1.99 UIU/ML (ref 0.3–4.2)
UROBILINOGEN UR STRIP-ACNC: 1 E.U./DL
WBC # BLD AUTO: 7.5 10E3/UL (ref 4–11)
WBC #/AREA URNS AUTO: ABNORMAL /HPF

## 2023-11-20 PROCEDURE — 36415 COLL VENOUS BLD VENIPUNCTURE: CPT | Performed by: INTERNAL MEDICINE

## 2023-11-20 PROCEDURE — 82043 UR ALBUMIN QUANTITATIVE: CPT | Performed by: INTERNAL MEDICINE

## 2023-11-20 PROCEDURE — 80053 COMPREHEN METABOLIC PANEL: CPT | Performed by: INTERNAL MEDICINE

## 2023-11-20 PROCEDURE — 99214 OFFICE O/P EST MOD 30 MIN: CPT | Performed by: INTERNAL MEDICINE

## 2023-11-20 PROCEDURE — 83036 HEMOGLOBIN GLYCOSYLATED A1C: CPT | Performed by: INTERNAL MEDICINE

## 2023-11-20 PROCEDURE — 87086 URINE CULTURE/COLONY COUNT: CPT | Performed by: INTERNAL MEDICINE

## 2023-11-20 PROCEDURE — 80061 LIPID PANEL: CPT | Performed by: INTERNAL MEDICINE

## 2023-11-20 PROCEDURE — 81001 URINALYSIS AUTO W/SCOPE: CPT | Performed by: INTERNAL MEDICINE

## 2023-11-20 PROCEDURE — 82570 ASSAY OF URINE CREATININE: CPT | Performed by: INTERNAL MEDICINE

## 2023-11-20 PROCEDURE — G0103 PSA SCREENING: HCPCS | Performed by: INTERNAL MEDICINE

## 2023-11-20 PROCEDURE — 84443 ASSAY THYROID STIM HORMONE: CPT | Performed by: INTERNAL MEDICINE

## 2023-11-20 PROCEDURE — 85027 COMPLETE CBC AUTOMATED: CPT | Performed by: INTERNAL MEDICINE

## 2023-11-20 RX ORDER — SILDENAFIL 100 MG/1
50-100 TABLET, FILM COATED ORAL DAILY PRN
Qty: 30 TABLET | Refills: 1 | Status: SHIPPED | OUTPATIENT
Start: 2023-11-20

## 2023-11-21 LAB — BACTERIA UR CULT: NO GROWTH

## 2023-11-28 NOTE — TELEPHONE ENCOUNTER
Peer  from attempted to contact the patient to check on her progress. The purpose of this check-in was to provide support, assess any challenges or concerns, and offer guidance if needed. Unfortunately, the patient was not available during the attempted contact.

## 2023-11-29 DIAGNOSIS — N52.9 ERECTILE DYSFUNCTION, UNSPECIFIED ERECTILE DYSFUNCTION TYPE: ICD-10-CM

## 2023-11-29 RX ORDER — SILDENAFIL 100 MG/1
TABLET, FILM COATED ORAL
Qty: 30 TABLET | Refills: 0 | OUTPATIENT
Start: 2023-11-29

## 2023-11-29 NOTE — TELEPHONE ENCOUNTER
Pharmacy requested refills that are already active on file. Refused request to pharmacy.   unable to formally assess due to communication impairments

## 2023-12-08 ENCOUNTER — TELEPHONE (OUTPATIENT)
Dept: INTERNAL MEDICINE | Facility: CLINIC | Age: 58
End: 2023-12-08
Payer: COMMERCIAL

## 2023-12-13 ENCOUNTER — ALLIED HEALTH/NURSE VISIT (OUTPATIENT)
Dept: SURGERY | Facility: CLINIC | Age: 58
End: 2023-12-13
Payer: COMMERCIAL

## 2023-12-13 VITALS — BODY MASS INDEX: 45.77 KG/M2 | WEIGHT: 315 LBS

## 2023-12-13 DIAGNOSIS — E11.9 TYPE 2 DIABETES MELLITUS WITHOUT COMPLICATION, WITHOUT LONG-TERM CURRENT USE OF INSULIN (H): ICD-10-CM

## 2023-12-13 DIAGNOSIS — E66.01 OBESITY, CLASS III, BMI 40-49.9 (MORBID OBESITY) (H): Primary | ICD-10-CM

## 2023-12-13 DIAGNOSIS — Z71.3 NUTRITIONAL COUNSELING: ICD-10-CM

## 2023-12-13 PROCEDURE — 97803 MED NUTRITION INDIV SUBSEQ: CPT

## 2023-12-13 NOTE — PROGRESS NOTES
Medical  Weight Loss Follow-Up Diet Evaluation  Assessment:  Tobin is presenting today for a follow up weight management nutrition consultation.  This patient has had an initial appointment and was referred by Dr. Burnham for MNT as treatment for Morbid obesity which is impacting his type 2 diabetes, GERD, LE swelling, and mobility.  Weight loss medication: Phentermine. - haven't started yet.  Pt's weight is 319 lbs  Initial weight: 324 lbs  Weight change: 5 lbs down          No data to display              BMI: There is no height or weight on file to calculate BMI.  Ideal body weight: 73 kg (160 lb 15 oz)  Adjusted ideal body weight: 101 kg (222 lb 9 oz)    Estimated RMR (McDuffie-St Jeor equation):   2274 kcals x 1.2 (sedentary) = 2729 kcals (for weight maintenance)  Recommended Protein Intake:  grams of protein/day  Patient Active Problem List:  Patient Active Problem List   Diagnosis    Type 2 diabetes mellitus with diabetic polyneuropathy, without long-term current use of insulin (H)    Morbid obesity (H)    Opioid use disorder, severe, in sustained remission (H)    YANIRA on CPAP    Essential hypertension    Recurrent major depressive disorder, in full remission (H24)    Chronic obstructive pulmonary disease with acute exacerbation (H)    Heart failure with preserved ejection fraction, unspecified HF chronicity (H)     Diabetes: type 2  A1c: 5.7% (11/20/2023)    Progress on goals from last visit: Patient reports he is trying not to have sweet treats and have more fresh fruit available int he house. Noted he is trying to eat three meals per day (is not a big fan of it but he is trying). Reading the nutrition facts labels when picking out juice. Discussed how many of his caloires seem to be coming from liquids (juice & soda)   -taking a break from phentermine (2-3 weeks). Plans on starting it up again soon.   - boosted vegetable intake    Ride stationary bike at least 3x per week for at least 20 minuets - not  met  Stick to serving sizes of sweets when choosing to eat them - met   Follow 10/10 rule when choosing - not met       Dietary Recall:  Allergic to peanuts  Wakes up 5-8am, goes to bed 3am   Breakfast: Cheerios with milk OR x1 egg with turkey grimaldo   Lunch: skips Or orange   Dinner: Meat (steak or chicken sometimes fish) with mixed vegetables   Typical snacks: oatmeal raisin cookies   Eating out: <1x per week   Beverages: Water >64 oz   Juice (orange, cran) 2-3 glasses daily  Soda 2L every 2 weeks  Exercise: occasionally uses is stationary bike (1x/week)  Nutrition Diagnosis:    Morbid Obesity related to overeating and poor lifestyle habits as evidenced by patient's report of infrequent meals, lack of activity, snacking on sweets and BMI 45.77        Intervention:  Food and/or nutrient delivery:   discussed benefits from choosing lean meats in relation to metabolism and muscle growth.   Encouraged patient to limit liquid calories and choose zero calorie beverages.  Nutrition education:   Provided examples of nutrition facts labels and practiced reading them.  Encouraged structured exercise via stationary bike at home.  Nutrition counseling: goal setting and support for success.    Monitoring/Evaluation:    Goals:  Stick to one glass of juice per day  Continue with calorie free beverage options (crystal light, zahida, sally tea (sero sugar), calorie free gosia-aid squeeze, hint water ect)     Handouts:  none    Patient to follow up in 4 month(s) with bariatrician and 1 month(s) with RD      Total time spent with patient: 28 minutes    Evelina Arboleda RD

## 2023-12-18 ENCOUNTER — TELEPHONE (OUTPATIENT)
Dept: ADDICTION MEDICINE | Facility: CLINIC | Age: 58
End: 2023-12-18
Payer: COMMERCIAL

## 2023-12-18 DIAGNOSIS — F11.21 OPIOID USE DISORDER, SEVERE, IN SUSTAINED REMISSION (H): ICD-10-CM

## 2023-12-18 RX ORDER — BUPRENORPHINE AND NALOXONE 8; 2 MG/1; MG/1
FILM, SOLUBLE BUCCAL; SUBLINGUAL
Qty: 90 FILM | Refills: 1 | Status: SHIPPED | OUTPATIENT
Start: 2023-12-18 | End: 2024-02-15

## 2023-12-18 NOTE — TELEPHONE ENCOUNTER
Reason for call:  Medication   If this is a refill request, has the caller requested the refill from the pharmacy already? N/A  Will the patient be using a [facility]? No  Name of the pharmacy and phone number for the current request: [facility] [address] in [facility] [phone]    Name of the medication requested: subaxone 8mg    Other request: patient would like someone in the team to reach out before tmr's appt [phone]    Phone number to reach patient:  Home number on file [phone] (home)    Best Time:  any    Can we leave a detailed message on this number?  YES    Travel screening: Not Applicable

## 2023-12-18 NOTE — TELEPHONE ENCOUNTER
RN phoned the patient to let him know that his Suboxone had been sent to his pharmacy. He verbalized understanding        GINGER RICHARD RN on 12/18/2023 at 12:54 PM

## 2023-12-18 NOTE — TELEPHONE ENCOUNTER
RN called patient back to assess needs. He states the following:    He is planning to come to tomorrow's appointment with Dr. Tripp in person at 1:00 pm (12:45 check in)  He still has a few Suboxone strips left.   He is hoping to get refills of his Suboxone sent in today as he is leaving out of town tomorrow and there always seems to be delays in getting his medications from [facility]. He can then  the medication today and see Dr. Tripp tomorrow.     RN let patient know request would be sent to Dr. Tripp for review and RN will call patient back with an update.     Routing to Dr. Tripp.     Carrie Bourgeois RN on 12/18/2023 at 10:59 AM

## 2023-12-18 NOTE — TELEPHONE ENCOUNTER
I am happy to send script in now.      I actually need to reschedule his visit for tomorrow at 1pm.  I am happy to see him tomorrow morning (anytime between 8:30 and 11:30am) or we can reschedule for 2 months out (Feb).  Can you help him with this?    Thanks!    Ernestina Tripp, DO on 12/18/2023 at 11:02 AM

## 2023-12-19 ENCOUNTER — OFFICE VISIT (OUTPATIENT)
Dept: ADDICTION MEDICINE | Facility: CLINIC | Age: 58
End: 2023-12-19
Payer: COMMERCIAL

## 2023-12-19 ENCOUNTER — TELEPHONE (OUTPATIENT)
Dept: ADDICTION MEDICINE | Facility: CLINIC | Age: 58
End: 2023-12-19
Payer: COMMERCIAL

## 2023-12-19 VITALS
BODY MASS INDEX: 45.1 KG/M2 | WEIGHT: 315 LBS | HEART RATE: 87 BPM | DIASTOLIC BLOOD PRESSURE: 73 MMHG | HEIGHT: 70 IN | SYSTOLIC BLOOD PRESSURE: 124 MMHG

## 2023-12-19 DIAGNOSIS — T40.2X5A THERAPEUTIC OPIOID INDUCED CONSTIPATION: ICD-10-CM

## 2023-12-19 DIAGNOSIS — K59.03 THERAPEUTIC OPIOID INDUCED CONSTIPATION: ICD-10-CM

## 2023-12-19 DIAGNOSIS — F33.42 RECURRENT MAJOR DEPRESSIVE DISORDER, IN FULL REMISSION (H): ICD-10-CM

## 2023-12-19 DIAGNOSIS — F11.21 OPIOID USE DISORDER, SEVERE, IN SUSTAINED REMISSION (H): Primary | ICD-10-CM

## 2023-12-19 PROCEDURE — 99214 OFFICE O/P EST MOD 30 MIN: CPT | Performed by: FAMILY MEDICINE

## 2023-12-19 PROCEDURE — G0480 DRUG TEST DEF 1-7 CLASSES: HCPCS | Mod: 90 | Performed by: FAMILY MEDICINE

## 2023-12-19 PROCEDURE — 99000 SPECIMEN HANDLING OFFICE-LAB: CPT | Performed by: FAMILY MEDICINE

## 2023-12-19 PROCEDURE — 80305 DRUG TEST PRSMV DIR OPT OBS: CPT | Performed by: FAMILY MEDICINE

## 2023-12-19 NOTE — TELEPHONE ENCOUNTER
:Peer  Robles spoke with a recovering client, who reported that his recovery was going well. The client mentioned he was looking forward to moving in March and is waiting to hear back from an agency regarding getting a Personal Care Assistant (PCA). They also requested the specialist s personal number and a meeting.    Miladis Arboleda explained the boundaries and adhered to company policy, which does not allow for the sharing of personal information. The client understood and appreciated the professionalism.

## 2023-12-19 NOTE — PROGRESS NOTES
[facility] - Addiction Medicine       Rooming information:    Point of care urine drug screen positive for:  Lab Results   Component Value Date    BUP Screen Positive (A) 09/14/2023    BZO Negative 09/14/2023    BAR Negative 09/14/2023    MERCEDEZ Negative 09/14/2023    MAMP Negative 09/14/2023    AMP Negative 09/14/2023    MDMA Negative 09/14/2023    MTD Negative 09/14/2023    [id number] Negative 09/14/2023    OXY Negative 09/14/2023    PCP Negative 09/14/2023    THC Negative 09/14/2023    TEMP 94 F 09/14/2023    SGPOCT 1.005 09/14/2023       *POC urine drug screen does not screen for Fentanyl    PHQ-9 Scores:       12/19/2022     1:21 PM 6/15/2023    12:00 PM 6/16/2023    12:03 PM   PHQ   PHQ-9 Total Score 0 0 0   Q9: Thoughts of better off dead/self-harm past 2 weeks Not at all Not at all Not at all     SHABBIR-7 Scores:      6/30/2022    12:00 PM 8/3/2022     7:12 AM 6/15/2023    12:00 PM   SHABBIR-7 SCORE   Total Score  0 (minimal anxiety)    Total Score 0 0 0       Any other recent substance use:     Denies    NICOTINE-No  If using nicotine, ready to quit? No    Side effects related to medications pt would like to discuss with provider (constipation, dry mouth, HA, GI upset, sedation?) No     Narcan currently available: Yes    Primary care provider: Pawan Castro MD     Mental health provider: [facility] (follow up on MH referral if needed)    Any housing, insurance deficits?: no    Contact information up to date? Yes the home number    3rd Party Involvement no (please obtain ANAYELI if pt would like to include)        Pennie Poon MA  December 19, 2023  9:04 AM

## 2023-12-19 NOTE — PROGRESS NOTES
[facility]    A/P                                                    ASSESSMENT/PLAN    1. Opioid use disorder, severe, in sustained remission (H)  Stable.  Continue buprenorphine 16mg in the morning and 8mg in the evening (script sent yesterday).  Has Narcan.  Encouraged recovery activities.  - Drugs of Abuse Screen Urine (POC CUPS) POCT  - Buprenorphine & Metabolite Screen; Future    2. Therapeutic opioid induced constipation  Improved with Colace.  Discussed how increasing fruit/veg intake can also help constipation.    3. Recurrent major depressive disorder, in full remission (H24)  Reports mood is stable.  No med changes per patient.  Encouraged continued follow-up at [facility] and [facility].        PDMP Review         Value Time User    State PDMP site checked  Yes 12/19/2023  9:04 AM Ernestina Tripp,               RTC  Return in about 2 months (around 2/19/2024) for Follow up.      Counseled the patient on the importance of having a recovery program in addition to medication to manage recovery.  Components include avoiding isolating, having willingness to change, avoiding triggers and managing cravings. Encouraged having some type of sober network and practicing honesty with trusted support person(s). Encouraged other services such as counseling, 12 step or other self-help organizations.      Opioid warning reviewed.  Risk of overdose following a period of abstinence due to decrease tolerance was discussed including risk of death.  Strongly recommended abstain from alcohol, benzodiazepines, THC, opioids and other drugs of abuse.  Increased risk of return to opioid use after use of these substances discussed.  Increased risk of overdose/death with use of other substances particularly benzodiazepines/alcohol reviewed.        YSOELIN Bryant is a 58 year old male with PMHx of HTN, morbid obesity, pre-diabetes,  "COPD/ashtma, YANIRA, anxiety, depression, and OUD who presents to clinic today for follow up.     Brief history of use:    Last use of illicit opioids was in approximately 2019.  Has been on buprenorphine for OUD since at least 2014.      Plan from most recent office visit:  1. Opioid use disorder, severe, in sustained remission (H)  Stable.  Continue Suboxone 8-2mg as ordered.  Has Narcan.  He has been very stable in follow-up, UDS consistent with expectations.  Encouraged continued recovery activities.  - buprenorphine HCl-naloxone HCl (SUBOXONE) 8-2 MG per film; 2 sl qam, 1 sl qpm  Dispense: 90 Film; Refill: 2  - Drugs of Abuse Screen Urine (POC CUPS) POCT     2. Therapeutic opioid induced constipation  Stable.  Continue docusate as needed.  - docusate sodium (COLACE) 100 MG capsule; TAKE 1 CAPSULE(100 MG) BY MOUTH TWICE DAILY as needed for constipation  Dispense: 60 capsule; Refill: 3    TODAY'S VISIT  HPI Dec 19, 2023  - Has a slight headache this morning but otherwise doing well, no concerns, no changes in health  - Might be heading to [place] today through New Year  - Taking Suboxone as prescribed  - Constipation has been better since using Colace more regularly, no other side effects  - No opioid cravings (has sugar cravings though, notes how different foods impact this)  - Sleeping well  - Has not been going to meetings, continues with sponsor        12/19/2022     1:21 PM 6/15/2023    12:00 PM 6/16/2023    12:03 PM   PHQ   PHQ-9 Total Score 0 0 0   Q9: Thoughts of better off dead/self-harm past 2 weeks Not at all Not at all Not at all           6/30/2022    12:00 PM 8/3/2022     7:12 AM 6/15/2023    12:00 PM   SHABBIR-7 SCORE   Total Score  0 (minimal anxiety)    Total Score 0 0 0       OBJECTIVE                                                    PHYSICAL EXAM:  /73 (BP Location: Right arm, Patient Position: Sitting, Cuff Size: Adult Large)   Pulse 87   Ht 1.778 m (5' 10\")   Wt 144.3 kg (318 lb 1 oz)   " BMI 45.64 kg/m      GENERAL: healthy, alert and no distress  EYES: Eyes grossly normal to inspection, sclerae normal  RESP: No respiratory distress  SKIN: no pallor or jaundice  NEURO: Normal gait, mentation intact and speech normal  MENTAL STATUS EXAM  Appearance/Behavior: No appearant distress  Speech: Normal  Mood/Affect: normal affect  Insight: Adequate    LAB  Results for orders placed or performed in visit on 12/19/23   Drugs of Abuse Screen Urine (POC CUPS) POCT     Status: Abnormal   Result Value Ref Range    POCT Kit Lot Number [id number]     POCT Kit Expiration Date 06/08/2024     Temperature Urine POCT 94 F 90 F, 92 F, 94 F, 96 F, 98 F, 100 F    Specific [place] POCT 1.015 1.005, 1.015, 1.025    pH Qual Urine POCT 5 pH 4 pH, 5 pH, 7 pH, 9 pH    Creatinine Qual Urine POCT 50 mg/dL 20 mg/dL, 50 mg/dL, 100 mg/dL, 200 mg/dL    Internal QC Qual Urine POCT Valid Valid    Amphetamine Qual Urine POCT Negative Negative    Barbiturate Qual Urine POCT Negative Negative    Buprenorphine Qual Urine POCT Screen Positive (A) Negative    Benzodiazepine Qual Urine POCT Negative Negative    Cocaine Qual Urine POCT Negative Negative    Methamphetamine Qual Urine POCT Negative Negative    MDMA Qual Urine POCT Negative Negative    Methadone Qual Urine POCT Negative Negative    Opiate Qual Urine POCT Negative Negative    Oxycodone Qual Urine POCT Negative Negative    Phencyclidine Qual Urine POCT Negative Negative    THC Qual Urine POCT Negative Negative         HISTORY                                                    Problem list reviewed & adjusted, as indicated.  Patient Active Problem List   Diagnosis    Type 2 diabetes mellitus with diabetic polyneuropathy, without long-term current use of insulin (H)    Morbid obesity (H)    Opioid use disorder, severe, in sustained remission (H)    YANIRA on CPAP    Essential hypertension    Recurrent major depressive disorder, in full remission (H24)    Chronic obstructive pulmonary  disease with acute exacerbation (H)    Heart failure with preserved ejection fraction, unspecified HF chronicity (H)         MEDICATION LIST (prior to visit)  albuterol (PROAIR HFA/PROVENTIL HFA/VENTOLIN HFA) 108 (90 Base) MCG/ACT inhaler, INHALE 2 PUFFS BY MOUTH EVERY 6 HOURS AS NEEDED FOR WHEEZING  buprenorphine HCl-naloxone HCl (SUBOXONE) 8-2 MG per film, 2 sl qam, 1 sl qpm  buPROPion (WELLBUTRIN XL) 150 MG 24 hr tablet, Take 1 tablet (150 mg) by mouth every morning  docusate sodium (COLACE) 100 MG capsule, TAKE 1 CAPSULE(100 MG) BY MOUTH TWICE DAILY as needed for constipation  empagliflozin (JARDIANCE) 10 MG TABS tablet, Take 1 tablet (10 mg) by mouth daily  naloxone (NARCAN) 4 MG/0.1ML nasal spray, Spray 1 spray (4 mg) into one nostril alternating nostrils once as needed for opioid reversal every 2-3 minutes until assistance arrives  ARIPiprazole (ABILIFY) 10 MG tablet, Take 1 tablet (10 mg) by mouth daily  aspirin (ASA) 81 MG EC tablet, Take 1 tablet (81 mg) by mouth daily (Patient not taking: Reported on 11/7/2023)  atorvastatin (LIPITOR) 20 MG tablet, Take 1 tablet (20 mg) by mouth daily  budesonide-formoterol (SYMBICORT) 80-4.5 MCG/ACT Inhaler, INHALE 2 PUFFS BY MOUTH TWICE DAILY  furosemide (LASIX) 20 MG tablet, Take 1 tablet (20 mg) by mouth daily as needed (swelling) (Patient not taking: Reported on 12/19/2023)  phentermine (ADIPEX-P) 37.5 MG tablet, Take 0.5-1 tablets (18.75-37.5 mg) by mouth every morning (before breakfast)  sildenafil (VIAGRA) 100 MG tablet, Take 0.5-1 tablets ( mg) by mouth daily as needed (ED)    No current facility-administered medications on file prior to visit.      MEDICATION LIST (after visit)  Current Outpatient Medications   Medication    albuterol (PROAIR HFA/PROVENTIL HFA/VENTOLIN HFA) 108 (90 Base) MCG/ACT inhaler    buprenorphine HCl-naloxone HCl (SUBOXONE) 8-2 MG per film    buPROPion (WELLBUTRIN XL) 150 MG 24 hr tablet    docusate sodium (COLACE) 100 MG capsule     empagliflozin (JARDIANCE) 10 MG TABS tablet    naloxone (NARCAN) 4 MG/0.1ML nasal spray    ARIPiprazole (ABILIFY) 10 MG tablet    aspirin (ASA) 81 MG EC tablet    atorvastatin (LIPITOR) 20 MG tablet    budesonide-formoterol (SYMBICORT) 80-4.5 MCG/ACT Inhaler    furosemide (LASIX) 20 MG tablet    phentermine (ADIPEX-P) 37.5 MG tablet    sildenafil (VIAGRA) 100 MG tablet     No current facility-administered medications for this visit.         Allergies   Allergen Reactions    Seafood Other (See Comments)     Eyes turn yellow    Ibuprofen Nausea and Vomiting       Ernestina Tripp, [facility] Addiction Medicine  [facility]  [phone]

## 2023-12-22 LAB
BUPRENORPHINE UR-MCNC: 26 NG/ML
BUPRENORPHINE UR-MCNC: >1000 NG/ML
NALOXONE UR CFM-MCNC: <100 NG/ML
NORBUPRENORPHINE UR CFM-MCNC: >1000 NG/ML
NORBUPRENORPHINE UR-MCNC: 317 NG/ML

## 2024-01-04 ENCOUNTER — TELEPHONE (OUTPATIENT)
Dept: INTERNAL MEDICINE | Facility: CLINIC | Age: 59
End: 2024-01-04
Payer: COMMERCIAL

## 2024-01-04 NOTE — TELEPHONE ENCOUNTER
Reason for Call:  LAbs    Detailed comments: I need the most recent labs faxed back to us. [facility] . [phone]    Phone Number Patient can be reached at: Other phone number:  [phone]    Best Time: anytime    Can we leave a detailed message on this number? YES    Call taken on 1/4/2024 at 3:03 PM by RAÚL GILMAN

## 2024-01-08 DIAGNOSIS — J44.1 CHRONIC OBSTRUCTIVE PULMONARY DISEASE WITH ACUTE EXACERBATION (H): ICD-10-CM

## 2024-01-09 RX ORDER — ALBUTEROL SULFATE 90 UG/1
AEROSOL, METERED RESPIRATORY (INHALATION)
Qty: 18 G | Refills: 0 | Status: SHIPPED | OUTPATIENT
Start: 2024-01-09 | End: 2024-06-17

## 2024-02-14 ENCOUNTER — ALLIED HEALTH/NURSE VISIT (OUTPATIENT)
Dept: SURGERY | Facility: CLINIC | Age: 59
End: 2024-02-14
Payer: COMMERCIAL

## 2024-02-14 VITALS — BODY MASS INDEX: 45.63 KG/M2 | WEIGHT: 315 LBS

## 2024-02-14 DIAGNOSIS — Z71.3 NUTRITIONAL COUNSELING: ICD-10-CM

## 2024-02-14 DIAGNOSIS — E66.01 OBESITY, CLASS III, BMI 40-49.9 (MORBID OBESITY) (H): Primary | ICD-10-CM

## 2024-02-14 DIAGNOSIS — E11.9 TYPE 2 DIABETES MELLITUS WITHOUT COMPLICATION, WITHOUT LONG-TERM CURRENT USE OF INSULIN (H): ICD-10-CM

## 2024-02-14 PROCEDURE — 97803 MED NUTRITION INDIV SUBSEQ: CPT

## 2024-02-14 NOTE — PATIENT INSTRUCTIONS
Goals    Choose diet orange crush and diet grape juice to to limit calories  Pair carb snacks with a protein sources (meat, cheese, dairy products)   Avoid skipping lunch (healthy choice power bowls if needed)

## 2024-02-14 NOTE — PROGRESS NOTES
Medical  Weight Loss Follow-Up Diet Evaluation  Assessment:  Tobin is presenting today for a follow up weight management nutrition consultation.  This patient has had an initial appointment and was referred by Dr. Burnham for MNT as treatment for Morbid obesity which is impacting his type 2 diabetes, GERD, LE swelling, and mobility.  Weight loss medication: Phentermine. (When he remembers)  Pt's weight is 318 lbs  Initial weight: 324 lbs  Weight change: 6 lbs down          No data to display              BMI: There is no height or weight on file to calculate BMI.  Ideal body weight: 73 kg (160 lb 15 oz)  Adjusted ideal body weight: 101 kg (222 lb 9 oz)    Estimated RMR ([place]-St Jeor equation):   2274 kcals x 1.2 (sedentary) = 2729 kcals (for weight maintenance)  Recommended Protein Intake:  grams of protein/day  Patient Active Problem List:  Patient Active Problem List   Diagnosis    Type 2 diabetes mellitus with diabetic polyneuropathy, without long-term current use of insulin (H)    Morbid obesity (H)    Opioid use disorder, severe, in sustained remission (H)    YANIRA on CPAP    Essential hypertension    Recurrent major depressive disorder, in full remission (H24)    Chronic obstructive pulmonary disease with acute exacerbation (H)    Heart failure with preserved ejection fraction, unspecified HF chronicity (H)     Diabetes: type 2  A1c: 5.7% (11/20/2023)    Progress on goals from last visit: Patient reports his diet has remained the same. He is trying to limit his juice intake however has increase his soda intake. Rides his stationary bike 2x/week fr 10 minutes. Today we discussed the benefits of diet juice and soda regarding sugar and calorie intake.    Stick to one glass of juice per day - not met   Continue with calorie free beverage options (crystal light, zahida, sally tea (sero sugar), calorie free gosia-aid squeeze, hint water ect) - not met       Dietary Recall:  Allergic to peanuts  Wakes up 5-8am,  goes to bed 3am   Breakfast: Cheerios with milk OR breakfast sandwich on wheat   Lunch: skips Or orange   Dinner: chicken with mixed vegetables and rice   Typical snacks: oatmeal raisin cookies   Eating out: <1x per week   Beverages: Water >64 oz   Juice (orange, cran) 2-3 glasses daily  Soda x2 2L every 2 weeks  Exercise: occasionally uses is stationary bike (1x/week)  Nutrition Diagnosis:    Morbid Obesity related to overeating and poor lifestyle habits as evidenced by patient's report of infrequent meals, lack of activity, snacking on sweets and BMI 45.77        Intervention:  Food and/or nutrient delivery:   discussed benefits from choosing lean meats in relation to metabolism and muscle growth.   Encouraged patient to limit liquid calories and choose zero calorie beverages.  Nutrition education:   Provided examples of nutrition facts labels and practiced reading them.  Encouraged structured exercise via stationary bike at home.  Nutrition counseling: goal setting and support for success.    Monitoring/Evaluation:    Goals:  Choose diet orange crush and diet grape juice to to limit calories  Pair carb snacks with a protein sources (meat, cheese, dairy products)   Avoid skipping lunch (healthy choice power bowls if needed)     Handouts:  none    Patient to follow up in 2 month(s) with bariatrician and 3 month(s) with RD      Total time spent with patient: 35 minutes    Evelina Arboleda RD

## 2024-02-15 ENCOUNTER — OFFICE VISIT (OUTPATIENT)
Dept: ADDICTION MEDICINE | Facility: CLINIC | Age: 59
End: 2024-02-15
Payer: COMMERCIAL

## 2024-02-15 VITALS
WEIGHT: 315 LBS | OXYGEN SATURATION: 100 % | BODY MASS INDEX: 45.1 KG/M2 | DIASTOLIC BLOOD PRESSURE: 90 MMHG | HEIGHT: 70 IN | SYSTOLIC BLOOD PRESSURE: 139 MMHG | HEART RATE: 86 BPM

## 2024-02-15 DIAGNOSIS — K59.03 THERAPEUTIC OPIOID INDUCED CONSTIPATION: Primary | ICD-10-CM

## 2024-02-15 DIAGNOSIS — F33.42 RECURRENT MAJOR DEPRESSIVE DISORDER, IN FULL REMISSION (H): ICD-10-CM

## 2024-02-15 DIAGNOSIS — F11.21 OPIOID USE DISORDER, SEVERE, IN SUSTAINED REMISSION (H): ICD-10-CM

## 2024-02-15 DIAGNOSIS — T40.2X5A THERAPEUTIC OPIOID INDUCED CONSTIPATION: Primary | ICD-10-CM

## 2024-02-15 PROCEDURE — 99214 OFFICE O/P EST MOD 30 MIN: CPT | Performed by: FAMILY MEDICINE

## 2024-02-15 PROCEDURE — 80305 DRUG TEST PRSMV DIR OPT OBS: CPT | Performed by: FAMILY MEDICINE

## 2024-02-15 PROCEDURE — G2211 COMPLEX E/M VISIT ADD ON: HCPCS | Performed by: FAMILY MEDICINE

## 2024-02-15 RX ORDER — BUPRENORPHINE AND NALOXONE 8; 2 MG/1; MG/1
FILM, SOLUBLE BUCCAL; SUBLINGUAL
Qty: 90 FILM | Refills: 1 | Status: SHIPPED | OUTPATIENT
Start: 2024-02-15 | End: 2024-04-18

## 2024-02-15 NOTE — PROGRESS NOTES
[facility] - Addiction Medicine       Rooming information: Pt is trying to look for a new apartment    Point of care urine drug screen positive for:  Lab Results   Component Value Date    BUP Screen Positive (A) 02/15/2024    BZO Negative 02/15/2024    BAR Negative 02/15/2024    MERCEDEZ Negative 02/15/2024    MAMP Negative 02/15/2024    AMP Negative 02/15/2024    MDMA Negative 02/15/2024    MTD Negative 02/15/2024    [id number] Negative 02/15/2024    OXY Negative 02/15/2024    PCP Negative 02/15/2024    THC Negative 02/15/2024    TEMP Invalid (A) 02/15/2024    SGPOCT 1.025 02/15/2024       *POC urine drug screen does not screen for Fentanyl    PHQ-9 Scores: NA - Pt reports no MH complains. Denies any thoughts of hurting himself      12/19/2022     1:21 PM 6/15/2023    12:00 PM 6/16/2023    12:03 PM   PHQ   PHQ-9 Total Score 0 0 0   Q9: Thoughts of better off dead/self-harm past 2 weeks Not at all Not at all Not at all     SHABBIR-7 Scores: NA      6/30/2022    12:00 PM 8/3/2022     7:12 AM 6/15/2023    12:00 PM   SHABBIR-7 SCORE   Total Score  0 (minimal anxiety)    Total Score 0 0 0       Any other recent substance use:     Denies    NICOTINE-No  If using nicotine, ready to quit? No    Side effects related to medications pt would like to discuss with provider (constipation, dry mouth, HA, GI upset, sedation?) Yes: dry mouth & little constipation    Narcan currently available: Yes    Primary care provider: Pawan Castro MD     M[facility] health provider: yes, at [facility] (follow up on MH referral if needed)    Any housing, insurance deficits?:     Contact information up to date? yes    3rd Party Involvement NA (please obtain ANAYELI if pt would like to include)      Vanessa Parada LPN  February 15, 2024  12:04 PM

## 2024-02-15 NOTE — PROGRESS NOTES
[facility]    A/P                                                    ASSESSMENT/PLAN    1. Opioid use disorder, severe, in sustained remission (H)  Stable.  Reporting symptoms are well-controlled.  Continue Suboxone as ordered.  Has Narcan.  Encouraged continued recovery activities with sponsor.  - Drugs of Abuse Screen Urine (POC CUPS) POCT  - buprenorphine HCl-naloxone HCl (SUBOXONE) 8-2 MG per film; 2 sl qam, 1 sl qpm  Dispense: 90 Film; Refill: 1    2. Therapeutic opioid induced constipation  Stable.  Continue Colace as needed.    3. Recurrent major depressive disorder, in full remission (H24)  Reporting mood is stable.  He follows with psychiatry at [facility] and [facility].        PDMP Review         Value Time User    State PDMP site checked  Yes 2/15/2024 12:33 PM Ernestina Tripp,               RTC  Return in about 2 months (around 4/15/2024) for Follow up, in person.      Counseled the patient on the importance of having a recovery program in addition to medication to manage recovery.  Components include avoiding isolating, having willingness to change, avoiding triggers and managing cravings. Encouraged having some type of sober network and practicing honesty with trusted support person(s). Encouraged other services such as counseling, 12 step or other self-help organizations.      Opioid warning reviewed.  Risk of overdose following a period of abstinence due to decrease tolerance was discussed including risk of death.  Strongly recommended abstain from alcohol, benzodiazepines, THC, opioids and other drugs of abuse.  Increased risk of return to opioid use after use of these substances discussed.  Increased risk of overdose/death with use of other substances particularly benzodiazepines/alcohol reviewed.        YOSELIN Arboledaadelaanca Bryant is a 58 year old male with PMHx of HTN, morbid obesity, pre-diabetes, COPD/ashtma,  "YANIRA, anxiety, depression, and OUD who presents to clinic today for follow up.     Brief history of use:    Last use of illicit opioids was in approximately 2019.  Has been on buprenorphine for OUD since at least 2014.      Plan from most recent office visit (12/19/23):  1. Opioid use disorder, severe, in sustained remission (H)  Stable.  Continue buprenorphine 16mg in the morning and 8mg in the evening (script sent yesterday).  Has Narcan.  Encouraged recovery activities.  - Drugs of Abuse Screen Urine (POC CUPS) POCT  - Buprenorphine & Metabolite Screen; Future     2. Therapeutic opioid induced constipation  Improved with Colace.  Discussed how increasing fruit/veg intake can also help constipation.     3. Recurrent major depressive disorder, in full remission (H24)  Reports mood is stable.  No med changes per patient.  Encouraged continued follow-up at [facility] and [facility].    TODAY'S VISIT  HPI Feb 15, 2024  - Constipation is well managed with Colace  - Taking suboxone as prescribed  - No opioid cravings  -  Continues to be in contact with his sponsor regularly  -  No mood concerns, stable  - Sleeping well  - No changes in health  - No headaches, vision changes, CP, SOB, no abdominal pain, N/V, no blood in stool, some swelling in legs (gets better with elevation) -has upcoming appointment with PCP  - Has not been exercising but would like to start again        12/19/2022     1:21 PM 6/15/2023    12:00 PM 6/16/2023    12:03 PM   PHQ   PHQ-9 Total Score 0 0 0   Q9: Thoughts of better off dead/self-harm past 2 weeks Not at all Not at all Not at all           6/30/2022    12:00 PM 8/3/2022     7:12 AM 6/15/2023    12:00 PM   SHABBIR-7 SCORE   Total Score  0 (minimal anxiety)    Total Score 0 0 0       OBJECTIVE                                                    PHYSICAL EXAM:  BP (!) 139/90 (BP Location: Right arm, Patient Position: Sitting, Cuff Size: Adult Large)   Pulse 86   Ht 1.778 m (5' 10\")   Wt 146.5 kg (323 " lb)   SpO2 100%   BMI 46.35 kg/m      GENERAL: healthy, alert and no distress  EYES: Eyes grossly normal to inspection, sclerae normal  RESP: No respiratory distress  MS: no gross musculoskeletal defects noted  SKIN: no pallor or jaundice  NEURO: Normal gait, mentation intact and speech normal  MENTAL STATUS EXAM  Appearance/Behavior: No appearant distress  Speech: Normal  Mood/Affect: flat  Insight: Adequate    LAB  Results for orders placed or performed in visit on 02/15/24   Drugs of Abuse Screen Urine (POC CUPS) POCT     Status: Abnormal   Result Value Ref Range    POCT Kit Lot Number [id number]     POCT Kit Expiration Date 2025-08-22     Temperature Urine POCT Invalid (A) 90 F, 92 F, 94 F, 96 F, 98 F, 100 F    Specific [place] POCT 1.025 1.005, 1.015, 1.025    pH Qual Urine POCT 7 pH 4 pH, 5 pH, 7 pH, 9 pH    Creatinine Qual Urine POCT 200 mg/dL 20 mg/dL, 50 mg/dL, 100 mg/dL, 200 mg/dL    Internal QC Qual Urine POCT Valid Valid    Amphetamine Qual Urine POCT Negative Negative    Barbiturate Qual Urine POCT Negative Negative    Buprenorphine Qual Urine POCT Screen Positive (A) Negative    Benzodiazepine Qual Urine POCT Negative Negative    Cocaine Qual Urine POCT Negative Negative    Methamphetamine Qual Urine POCT Negative Negative    MDMA Qual Urine POCT Negative Negative    Methadone Qual Urine POCT Negative Negative    Opiate Qual Urine POCT Negative Negative    Oxycodone Qual Urine POCT Negative Negative    Phencyclidine Qual Urine POCT Negative Negative    THC Qual Urine POCT Negative Negative         HISTORY                                                    Problem list reviewed & adjusted, as indicated.  Patient Active Problem List   Diagnosis    Type 2 diabetes mellitus with diabetic polyneuropathy, without long-term current use of insulin (H)    Morbid obesity (H)    Opioid use disorder, severe, in sustained remission (H)    YANIRA on CPAP    Essential hypertension    Recurrent major depressive  disorder, in full remission (H24)    Chronic obstructive pulmonary disease with acute exacerbation (H)    Heart failure with preserved ejection fraction, unspecified HF chronicity (H)         MEDICATION LIST (prior to visit)  ARIPiprazole (ABILIFY) 10 MG tablet, Take 1 tablet (10 mg) by mouth daily  aspirin (ASA) 81 MG EC tablet, Take 1 tablet (81 mg) by mouth daily  atorvastatin (LIPITOR) 20 MG tablet, Take 1 tablet (20 mg) by mouth daily  budesonide-formoterol (SYMBICORT) 80-4.5 MCG/ACT Inhaler, INHALE 2 PUFFS BY MOUTH TWICE DAILY  buPROPion (WELLBUTRIN XL) 150 MG 24 hr tablet, Take 1 tablet (150 mg) by mouth every morning  docusate sodium (COLACE) 100 MG capsule, TAKE 1 CAPSULE(100 MG) BY MOUTH TWICE DAILY as needed for constipation  empagliflozin (JARDIANCE) 10 MG TABS tablet, Take 1 tablet (10 mg) by mouth daily  furosemide (LASIX) 20 MG tablet, Take 1 tablet (20 mg) by mouth daily as needed (swelling)  phentermine (ADIPEX-P) 37.5 MG tablet, Take 0.5-1 tablets (18.75-37.5 mg) by mouth every morning (before breakfast)  albuterol (VENTOLIN HFA) 108 (90 Base) MCG/ACT inhaler, INHALE 2 PUFFS BY MOUTH EVERY 6 HOURS AS NEEDED FOR WHEEZING  naloxone (NARCAN) 4 MG/0.1ML nasal spray, Spray 1 spray (4 mg) into one nostril alternating nostrils once as needed for opioid reversal every 2-3 minutes until assistance arrives  sildenafil (VIAGRA) 100 MG tablet, Take 0.5-1 tablets ( mg) by mouth daily as needed (ED)    No current facility-administered medications on file prior to visit.      MEDICATION LIST (after visit)  Current Outpatient Medications   Medication    ARIPiprazole (ABILIFY) 10 MG tablet    aspirin (ASA) 81 MG EC tablet    atorvastatin (LIPITOR) 20 MG tablet    budesonide-formoterol (SYMBICORT) 80-4.5 MCG/ACT Inhaler    buprenorphine HCl-naloxone HCl (SUBOXONE) 8-2 MG per film    buPROPion (WELLBUTRIN XL) 150 MG 24 hr tablet    docusate sodium (COLACE) 100 MG capsule    empagliflozin (JARDIANCE) 10 MG TABS  tablet    furosemide (LASIX) 20 MG tablet    phentermine (ADIPEX-P) 37.5 MG tablet    albuterol (VENTOLIN HFA) 108 (90 Base) MCG/ACT inhaler    naloxone (NARCAN) 4 MG/0.1ML nasal spray    sildenafil (VIAGRA) 100 MG tablet     No current facility-administered medications for this visit.         Allergies   Allergen Reactions    Seafood Other (See Comments)     Eyes turn yellow    Ibuprofen Nausea and Vomiting       [facility]  [facility]  [phone]

## 2024-02-26 ENCOUNTER — TELEPHONE (OUTPATIENT)
Dept: ADDICTION MEDICINE | Facility: CLINIC | Age: 59
End: 2024-02-26
Payer: COMMERCIAL

## 2024-02-26 NOTE — TELEPHONE ENCOUNTER
Peer  attempted to contact the patient to check on her progress. The purpose of this check-in was to provide support, assess any challenges or concerns, and offer guidance if needed. Unfortunately, the patient was not available.

## 2024-04-18 ENCOUNTER — OFFICE VISIT (OUTPATIENT)
Dept: ADDICTION MEDICINE | Facility: CLINIC | Age: 59
End: 2024-04-18
Payer: COMMERCIAL

## 2024-04-18 VITALS
DIASTOLIC BLOOD PRESSURE: 82 MMHG | BODY MASS INDEX: 45.1 KG/M2 | HEIGHT: 70 IN | WEIGHT: 315 LBS | OXYGEN SATURATION: 95 % | SYSTOLIC BLOOD PRESSURE: 125 MMHG | HEART RATE: 88 BPM

## 2024-04-18 DIAGNOSIS — K59.03 THERAPEUTIC OPIOID INDUCED CONSTIPATION: ICD-10-CM

## 2024-04-18 DIAGNOSIS — E66.01 MORBID OBESITY (H): ICD-10-CM

## 2024-04-18 DIAGNOSIS — F11.21 OPIOID USE DISORDER, SEVERE, IN SUSTAINED REMISSION (H): Primary | ICD-10-CM

## 2024-04-18 DIAGNOSIS — T40.2X5A THERAPEUTIC OPIOID INDUCED CONSTIPATION: ICD-10-CM

## 2024-04-18 LAB
AMPHETAMINE QUAL URINE POCT: ABNORMAL
BARBITURATE QUAL URINE POCT: NEGATIVE
BENZODIAZEPINE QUAL URINE POCT: NEGATIVE
BUPRENORPHINE QUAL URINE POCT: ABNORMAL
COCAINE QUAL URINE POCT: NEGATIVE
CREAT UR-MCNC: 92 MG/DL
CREATININE QUAL URINE POCT: ABNORMAL
INTERNAL QC QUAL URINE POCT: ABNORMAL
MDMA QUAL URINE POCT: NEGATIVE
METHADONE QUAL URINE POCT: NEGATIVE
METHAMPHETAMINE QUAL URINE POCT: NEGATIVE
OPIATE QUAL URINE POCT: NEGATIVE
OXYCODONE QUAL URINE POCT: NEGATIVE
PH QUAL URINE POCT: ABNORMAL
PHENCYCLIDINE QUAL URINE POCT: NEGATIVE
POCT KIT EXPIRATION DATE: ABNORMAL
POCT KIT LOT NUMBER: ABNORMAL
SPECIFIC GRAVITY POCT: 1.01
TEMPERATURE URINE POCT: ABNORMAL
THC QUAL URINE POCT: NEGATIVE

## 2024-04-18 PROCEDURE — G2211 COMPLEX E/M VISIT ADD ON: HCPCS | Performed by: FAMILY MEDICINE

## 2024-04-18 PROCEDURE — G0482 DRUG TEST DEF 15-21 CLASSES: HCPCS | Performed by: FAMILY MEDICINE

## 2024-04-18 PROCEDURE — 80305 DRUG TEST PRSMV DIR OPT OBS: CPT | Performed by: FAMILY MEDICINE

## 2024-04-18 PROCEDURE — 99214 OFFICE O/P EST MOD 30 MIN: CPT | Performed by: FAMILY MEDICINE

## 2024-04-18 RX ORDER — BUPRENORPHINE AND NALOXONE 8; 2 MG/1; MG/1
FILM, SOLUBLE BUCCAL; SUBLINGUAL
Qty: 90 FILM | Refills: 2 | Status: SHIPPED | OUTPATIENT
Start: 2024-04-18 | End: 2024-05-16

## 2024-04-18 RX ORDER — DOCUSATE SODIUM 100 MG/1
CAPSULE, LIQUID FILLED ORAL
Qty: 60 CAPSULE | Refills: 3 | Status: SHIPPED | OUTPATIENT
Start: 2024-04-18

## 2024-04-18 RX ORDER — BUPRENORPHINE AND NALOXONE 8; 2 MG/1; MG/1
FILM, SOLUBLE BUCCAL; SUBLINGUAL
Qty: 90 FILM | Refills: 1 | Status: SHIPPED | OUTPATIENT
Start: 2024-04-18 | End: 2024-04-18

## 2024-04-18 ASSESSMENT — ANXIETY QUESTIONNAIRES
GAD7 TOTAL SCORE: 3
3. WORRYING TOO MUCH ABOUT DIFFERENT THINGS: NOT AT ALL
6. BECOMING EASILY ANNOYED OR IRRITABLE: MORE THAN HALF THE DAYS
2. NOT BEING ABLE TO STOP OR CONTROL WORRYING: SEVERAL DAYS
5. BEING SO RESTLESS THAT IT IS HARD TO SIT STILL: NOT AT ALL
GAD7 TOTAL SCORE: 3
1. FEELING NERVOUS, ANXIOUS, OR ON EDGE: NOT AT ALL
7. FEELING AFRAID AS IF SOMETHING AWFUL MIGHT HAPPEN: NOT AT ALL
4. TROUBLE RELAXING: NOT AT ALL

## 2024-04-18 ASSESSMENT — PATIENT HEALTH QUESTIONNAIRE - PHQ9: SUM OF ALL RESPONSES TO PHQ QUESTIONS 1-9: 3

## 2024-04-18 NOTE — PROGRESS NOTES
[facility]    A/P                                                    ASSESSMENT/PLAN    1. Opioid use disorder, severe, in sustained remission (H)  Stable.  Sustained remission, symptoms well controlled.  Continue Suboxone 24mg TDD.  Has Narcan.  Encouraged continued connection with sponsor.    - Drugs of Abuse Screen Urine (POC CUPS) POCT  - buprenorphine HCl-naloxone HCl (SUBOXONE) 8-2 MG per film; 2 sl qam, 1 sl qpm  Dispense: 90 Film; Refill: 2  - Drug Confirmation Panel Urine with Creat - lab collect; Future  - Drug Confirmation Panel Urine with Creat - lab collect    2. Therapeutic opioid induced constipation  Stable/well controlled.  Continue Colace as needed.    - docusate sodium (COLACE) 100 MG capsule; TAKE 1 CAPSULE(100 MG) BY MOUTH TWICE DAILY as needed for constipation  Dispense: 60 capsule; Refill: 3    3. Morbid obesity (H)  Reports taking previously prescribed phentermine intermittently for weight loss.  Reports most recently took this morning.  Confirmatory UDS testing sent.  He does have upcoming follow-up with weight loss clinic.      He will call a few days prior to next visit and I will send script ahead of time because it works best for him to go directly to pharmacy from clinic as he has ride already set up.  Given he is very consistent with his follow-up, I think this is very reasonable.      PDMP Review         Value Time User    State PDMP site checked  Yes 4/18/2024  1:30 PM Ernestina Tripp DO              RTC  Return in about 3 months (around 7/18/2024) for Follow up, in person.    The longitudinal plan of care for the diagnosis(es)/condition(s) as documented were addressed during this visit. Due to the added complexity in care, I will continue to support Robles in the subsequent management and with ongoing continuity of care.      Counseled the patient on the importance of having a recovery program in addition to medication to manage recovery.  Components  include avoiding isolating, having willingness to change, avoiding triggers and managing cravings. Encouraged having some type of sober network and practicing honesty with trusted support person(s). Encouraged other services such as counseling, 12 step or other self-help organizations.      Opioid warning reviewed.  Risk of overdose following a period of abstinence due to decrease tolerance was discussed including risk of death.  Strongly recommended abstain from alcohol, benzodiazepines, THC, opioids and other drugs of abuse.  Increased risk of return to opioid use after use of these substances discussed.  Increased risk of overdose/death with use of other substances particularly benzodiazepines/alcohol reviewed.        YOSELIN Bryant is a 58 year old male with PMHx of HTN, morbid obesity, pre-diabetes, COPD/ashtma, YANIRA, anxiety, depression, and OUD who presents to clinic today for follow up.     Brief history of use:    Last use of illicit opioids was in approximately 2019.  Has been on buprenorphine for OUD since at least 2014.      Plan from most recent office visit (2/15/24):  1. Opioid use disorder, severe, in sustained remission (H)  Stable.  Reporting symptoms are well-controlled.  Continue Suboxone as ordered.  Has Narcan.  Encouraged continued recovery activities with sponsor.  - Drugs of Abuse Screen Urine (POC CUPS) POCT  - buprenorphine HCl-naloxone HCl (SUBOXONE) 8-2 MG per film; 2 sl qam, 1 sl qpm  Dispense: 90 Film; Refill: 1                2. Therapeutic opioid induced constipation  Stable.  Continue Colace as needed.     3. Recurrent major depressive disorder, in full remission (H24)  Reporting mood is stable.  He follows with psychiatry at [facility].    TODAY'S VISIT  HPI Apr 18, 2024  - Taking Suboxone as prescribed  - Requesting more stool softener for occasional constipation - uses every other week  - Occasional dry  "mouth  - Occasional dreams about using - can be triggered by things on TV; denies cravings  - Continues to work with his sponsor  - Mood has been ok but a bit more irritable lately  - Some issues with landlorgeronimo, working with  - denies any HI but wants to \"expose\" what his landlord is doing (referring to his concern that ticod is doing some shady things related to rent payments), asked several times and he denies any desire or plan/intent to physically harm ticod, has no weapons  - Hoping to move to new place soon, starting to look at apartments  - Sleeping pretty well  - No change in appetite, eating more cereal lately          6/15/2023    12:00 PM 6/16/2023    12:03 PM 4/18/2024     1:00 PM   PHQ   PHQ-9 Total Score 0 0 3   Q9: Thoughts of better off dead/self-harm past 2 weeks Not at all Not at all Not at all           8/3/2022     7:12 AM 6/15/2023    12:00 PM 4/18/2024     1:00 PM   SHABBIR-7 SCORE   Total Score 0 (minimal anxiety)     Total Score 0 0 3       OBJECTIVE                                                    PHYSICAL EXAM:  /82 (BP Location: Right arm, Patient Position: Sitting, Cuff Size: Adult Large)   Pulse 88   Ht 1.778 m (5' 10\")   Wt 145.2 kg (320 lb)   SpO2 95%   BMI 45.92 kg/m      GENERAL: healthy, alert and no distress  EYES: Eyes grossly normal to inspection, sclerae normal  RESP: No respiratory distress  MS: no gross musculoskeletal defects noted  SKIN: no pallor or jaundice  NEURO: Normal gait, mentation intact and speech normal  MENTAL STATUS EXAM  Appearance/Behavior: No appearant distress  Speech: Normal  Mood/Affect: normal affect  Insight: Adequate    LAB  Results for orders placed or performed in visit on 04/18/24   Urine Creatinine for Drug Screen Panel     Status: None   Result Value Ref Range    Creatinine Urine for Drug Screen 92 mg/dL   Drugs of Abuse Screen Urine (POC CUPS) POCT     Status: Abnormal   Result Value Ref Range    POCT Kit Lot Number [id number]  "    POCT Kit Expiration Date 2025-08-22     Temperature Urine POCT Invalid (A) 90 F, 92 F, 94 F, 96 F, 98 F, 100 F    Specific [place] POCT 1.015 1.005, 1.015, 1.025    pH Qual Urine POCT 7 pH 4 pH, 5 pH, 7 pH, 9 pH    Creatinine Qual Urine POCT 100 mg/dL 20 mg/dL, 50 mg/dL, 100 mg/dL, 200 mg/dL    Internal QC Qual Urine POCT Valid Valid    Amphetamine Qual Urine POCT Screen Positive (A) Negative    Barbiturate Qual Urine POCT Negative Negative    Buprenorphine Qual Urine POCT Screen Positive (A) Negative    Benzodiazepine Qual Urine POCT Negative Negative    Cocaine Qual Urine POCT Negative Negative    Methamphetamine Qual Urine POCT Negative Negative    MDMA Qual Urine POCT Negative Negative    Methadone Qual Urine POCT Negative Negative    Opiate Qual Urine POCT Negative Negative    Oxycodone Qual Urine POCT Negative Negative    Phencyclidine Qual Urine POCT Negative Negative    THC Qual Urine POCT Negative Negative   Drug Confirmation Panel Urine with Creat - lab collect     Status: None (In process)    Narrative    The following orders were created for panel order Drug Confirmation Panel Urine with Creat - lab collect.  Procedure                               Abnormality         Status                     ---------                               -----------         ------                     Urine Drug Confirmation ...[[id number]]                      In process                 Urine Creatinine for Kenan...[[id number]]                      Final result                 Please view results for these tests on the individual orders.         HISTORY                                                    Problem list reviewed & adjusted, as indicated.  Patient Active Problem List   Diagnosis    Type 2 diabetes mellitus with diabetic polyneuropathy, without long-term current use of insulin (H)    Morbid obesity (H)    Opioid use disorder, severe, in sustained remission (H)    YANIRA on CPAP    Essential hypertension    Recurrent  major depressive disorder, in full remission (H24)    Chronic obstructive pulmonary disease with acute exacerbation (H)    Heart failure with preserved ejection fraction, unspecified HF chronicity (H)         MEDICATION LIST (prior to visit)  Current Outpatient Medications   Medication Sig Dispense Refill    albuterol (VENTOLIN HFA) 108 (90 Base) MCG/ACT inhaler INHALE 2 PUFFS BY MOUTH EVERY 6 HOURS AS NEEDED FOR WHEEZING 18 g 0    ARIPiprazole (ABILIFY) 10 MG tablet Take 1 tablet (10 mg) by mouth daily      aspirin (ASA) 81 MG EC tablet Take 1 tablet (81 mg) by mouth daily 90 tablet 3    atorvastatin (LIPITOR) 20 MG tablet Take 1 tablet (20 mg) by mouth daily 90 tablet 3    buprenorphine HCl-naloxone HCl (SUBOXONE) 8-2 MG per film 2 sl qam, 1 sl qpm 90 Film 2    buPROPion (WELLBUTRIN XL) 150 MG 24 hr tablet Take 1 tablet (150 mg) by mouth every morning      docusate sodium (COLACE) 100 MG capsule TAKE 1 CAPSULE(100 MG) BY MOUTH TWICE DAILY as needed for constipation 60 capsule 3    empagliflozin (JARDIANCE) 10 MG TABS tablet Take 1 tablet (10 mg) by mouth daily 90 tablet 1    furosemide (LASIX) 20 MG tablet Take 1 tablet (20 mg) by mouth daily as needed (swelling)      phentermine (ADIPEX-P) 37.5 MG tablet Take 0.5-1 tablets (18.75-37.5 mg) by mouth every morning (before breakfast) 90 tablet 1    budesonide-formoterol (SYMBICORT) 80-4.5 MCG/ACT Inhaler INHALE 2 PUFFS BY MOUTH TWICE DAILY 10.2 g 11    naloxone (NARCAN) 4 MG/0.1ML nasal spray Spray 1 spray (4 mg) into one nostril alternating nostrils once as needed for opioid reversal every 2-3 minutes until assistance arrives 0.2 mL 11    sildenafil (VIAGRA) 100 MG tablet Take 0.5-1 tablets ( mg) by mouth daily as needed (ED) 30 tablet 1     No current facility-administered medications for this visit.       MEDICATION LIST (after visit)  Current Outpatient Medications   Medication Sig Dispense Refill    albuterol (VENTOLIN HFA) 108 (90 Base) MCG/ACT inhaler  INHALE 2 PUFFS BY MOUTH EVERY 6 HOURS AS NEEDED FOR WHEEZING 18 g 0    ARIPiprazole (ABILIFY) 10 MG tablet Take 1 tablet (10 mg) by mouth daily      aspirin (ASA) 81 MG EC tablet Take 1 tablet (81 mg) by mouth daily 90 tablet 3    atorvastatin (LIPITOR) 20 MG tablet Take 1 tablet (20 mg) by mouth daily 90 tablet 3    buprenorphine HCl-naloxone HCl (SUBOXONE) 8-2 MG per film 2 sl qam, 1 sl qpm 90 Film 2    buPROPion (WELLBUTRIN XL) 150 MG 24 hr tablet Take 1 tablet (150 mg) by mouth every morning      docusate sodium (COLACE) 100 MG capsule TAKE 1 CAPSULE(100 MG) BY MOUTH TWICE DAILY as needed for constipation 60 capsule 3    empagliflozin (JARDIANCE) 10 MG TABS tablet Take 1 tablet (10 mg) by mouth daily 90 tablet 1    furosemide (LASIX) 20 MG tablet Take 1 tablet (20 mg) by mouth daily as needed (swelling)      phentermine (ADIPEX-P) 37.5 MG tablet Take 0.5-1 tablets (18.75-37.5 mg) by mouth every morning (before breakfast) 90 tablet 1    budesonide-formoterol (SYMBICORT) 80-4.5 MCG/ACT Inhaler INHALE 2 PUFFS BY MOUTH TWICE DAILY 10.2 g 11    naloxone (NARCAN) 4 MG/0.1ML nasal spray Spray 1 spray (4 mg) into one nostril alternating nostrils once as needed for opioid reversal every 2-3 minutes until assistance arrives 0.2 mL 11    sildenafil (VIAGRA) 100 MG tablet Take 0.5-1 tablets ( mg) by mouth daily as needed (ED) 30 tablet 1     No current facility-administered medications for this visit.         Allergies   Allergen Reactions    Seafood Other (See Comments)     Eyes turn yellow    Ibuprofen Nausea and Vomiting       Ernestina Tripp, [facility]  [facility]  [phone]

## 2024-04-18 NOTE — PROGRESS NOTES
"[facility]       Rooming information: Pt is upset with his landlord   Flag for HI toward to his landlord. His answer was \" hypothetically\"     Point of care urine drug screen positive for:  Lab Results   Component Value Date    BUP Screen Positive (A) 04/18/2024    BZO Negative 04/18/2024    BAR Negative 04/18/2024    MERCEDEZ Negative 04/18/2024    MAMP Negative 04/18/2024    AMP Screen Positive (A) 04/18/2024    MDMA Negative 04/18/2024    MTD Negative 04/18/2024    [id number] Negative 04/18/2024    OXY Negative 04/18/2024    PCP Negative 04/18/2024    THC Negative 04/18/2024    TEMP Invalid (A) 04/18/2024    SGPOCT 1.015 04/18/2024       *POC urine drug screen does not screen for Fentanyl    PHQ-9 Scores:       6/15/2023    12:00 PM 6/16/2023    12:03 PM 4/18/2024     1:00 PM   PHQ   PHQ-9 Total Score 0 0 3   Q9: Thoughts of better off dead/self-harm past 2 weeks Not at all Not at all Not at all     SHABBIR-7 Scores:      8/3/2022     7:12 AM 6/15/2023    12:00 PM 4/18/2024     1:00 PM   SHABBIR-7 SCORE   Total Score 0 (minimal anxiety)     Total Score 0 0 3       Any other recent substance use:     Denies    NICOTINE-No  If using nicotine, ready to quit? NA    Side effects related to medications pt would like to discuss with provider (constipation, dry mouth, HA, GI upset, sedation?) Yes - constipation     Narcan currently available: Yes    Primary care provider: Pawan Castro MD     Mental health provider: at [facility]  (follow up on MH referral if needed)    Any housing, insurance deficits?: planning to move soon     Contact information up to date? yes    3rd Party Involvement NA (please obtain ANAYELI if pt would like to include)        Vanessa Parada LPN  April 18, 2024  1:05 PM  "

## 2024-04-24 LAB
BUPRENORPHINE UR CFM-MCNC: 319 NG/ML
BUPRENORPHINE/CREAT UR: 347 NG/MG {CREAT}
FENTANYL UR CFM-MCNC: 5 NG/ML
FENTANYL/CREAT UR: 5 NG/MG {CREAT}
NALOXONE UR CFM-MCNC: 1106 NG/ML
NALOXONE: 1202 NG/MG {CREAT}
NORBUPRENORPHINE UR CFM-MCNC: 865 NG/ML
NORBUPRENORPHINE/CREAT UR: 940 NG/MG {CREAT}

## 2024-04-25 ENCOUNTER — TELEPHONE (OUTPATIENT)
Dept: BEHAVIORAL HEALTH | Facility: CLINIC | Age: 59
End: 2024-04-25
Payer: COMMERCIAL

## 2024-04-25 NOTE — TELEPHONE ENCOUNTER
"Per Dr. Tripp:   Please reach out to Robles and let him know that his confirmatory drug screen was positive for fentanyl which was not expected.  I would like to have him return to clinic ASAP for a follow-up visit to discuss further.  Could you please assist him in getting scheduled within the next few weeks?      RN called Robles and discussed Dr. Tripp's directives. He verbalized understanding and will call to schedule.     He also reports that he did not use fentanyl and states \" I haven't even been around no substance like that.\"  "

## 2024-05-07 NOTE — TELEPHONE ENCOUNTER
Patient is scheduled for 5/16, will close encounter.'    Ernestina Tripp, DO on 5/7/2024 at 11:53 AM

## 2024-05-16 ENCOUNTER — OFFICE VISIT (OUTPATIENT)
Dept: ADDICTION MEDICINE | Facility: CLINIC | Age: 59
End: 2024-05-16
Payer: COMMERCIAL

## 2024-05-16 VITALS
HEIGHT: 70 IN | BODY MASS INDEX: 45.1 KG/M2 | SYSTOLIC BLOOD PRESSURE: 157 MMHG | HEART RATE: 91 BPM | DIASTOLIC BLOOD PRESSURE: 88 MMHG | WEIGHT: 315 LBS | OXYGEN SATURATION: 100 %

## 2024-05-16 DIAGNOSIS — F11.21 OPIOID USE DISORDER, SEVERE, IN SUSTAINED REMISSION (H): Primary | ICD-10-CM

## 2024-05-16 DIAGNOSIS — Z59.71 INSURANCE COVERAGE PROBLEMS: ICD-10-CM

## 2024-05-16 LAB
AMPHETAMINE QUAL URINE POCT: NEGATIVE
BARBITURATE QUAL URINE POCT: NEGATIVE
BENZODIAZEPINE QUAL URINE POCT: NEGATIVE
BUPRENORPHINE QUAL URINE POCT: NEGATIVE
COCAINE QUAL URINE POCT: NEGATIVE
CREATININE QUAL URINE POCT: ABNORMAL
INTERNAL QC QUAL URINE POCT: ABNORMAL
MDMA QUAL URINE POCT: NEGATIVE
METHADONE QUAL URINE POCT: NEGATIVE
METHAMPHETAMINE QUAL URINE POCT: NEGATIVE
OPIATE QUAL URINE POCT: ABNORMAL
OXYCODONE QUAL URINE POCT: NEGATIVE
PH QUAL URINE POCT: ABNORMAL
PHENCYCLIDINE QUAL URINE POCT: NEGATIVE
POCT KIT EXPIRATION DATE: ABNORMAL
POCT KIT LOT NUMBER: ABNORMAL
SPECIFIC GRAVITY POCT: 1.02
TEMPERATURE URINE POCT: ABNORMAL
THC QUAL URINE POCT: NEGATIVE

## 2024-05-16 PROCEDURE — G2211 COMPLEX E/M VISIT ADD ON: HCPCS | Performed by: FAMILY MEDICINE

## 2024-05-16 PROCEDURE — G0481 DRUG TEST DEF 8-14 CLASSES: HCPCS | Mod: 59 | Performed by: FAMILY MEDICINE

## 2024-05-16 PROCEDURE — 80305 DRUG TEST PRSMV DIR OPT OBS: CPT | Performed by: FAMILY MEDICINE

## 2024-05-16 PROCEDURE — 99214 OFFICE O/P EST MOD 30 MIN: CPT | Performed by: FAMILY MEDICINE

## 2024-05-16 RX ORDER — BUPRENORPHINE AND NALOXONE 8; 2 MG/1; MG/1
FILM, SOLUBLE BUCCAL; SUBLINGUAL
Qty: 180 FILM | Refills: 0 | Status: SHIPPED | OUTPATIENT
Start: 2024-05-16 | End: 2024-07-18
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
## 2024-05-16 NOTE — PROGRESS NOTES
[facility] - Addiction Medicine       Rooming information:    Point of care urine drug screen positive for:  Lab Results   Component Value Date    BUP Screen Positive (A) 04/18/2024    BZO Negative 04/18/2024    BAR Negative 04/18/2024    MERCEDEZ Negative 04/18/2024    MAMP Negative 04/18/2024    AMP Screen Positive (A) 04/18/2024    MDMA Negative 04/18/2024    MTD Negative 04/18/2024    UDA396 Negative 04/18/2024    OXY Negative 04/18/2024    PCP Negative 04/18/2024    THC Negative 04/18/2024    TEMP Invalid (A) 04/18/2024    SGPOCT 1.015 04/18/2024       *POC urine drug screen does not screen for Fentanyl    PHQ-9 Scores:       6/15/2023    12:00 PM 6/16/2023    12:03 PM 4/18/2024     1:00 PM   PHQ   PHQ-9 Total Score 0 0 3   Q9: Thoughts of better off dead/self-harm past 2 weeks Not at all Not at all Not at all     SHABBIR-7 Scores:      8/3/2022     7:12 AM 6/15/2023    12:00 PM 4/18/2024     1:00 PM   SHABBIR-7 SCORE   Total Score 0 (minimal anxiety)     Total Score 0 0 3       Any other recent substance use:     Patient will take to provider about    NICOTINE-No  If using nicotine, ready to quit? No    Side effects related to medications pt would like to discuss with provider (constipation, dry mouth, HA, GI upset, sedation?) No     Narcan currently available: Yes    Primary care provider: Pawan Castro MD     Mental health provider: Robles (follow up on MH referral if needed)    Any housing, insurance deficits?: insurance    Contact information up to date? [place] number is good [place] [facility]     3rd Party Involvement no (please obtain ANAYELI if pt would like to include)        Pennie Poon CMA  May 16, 2024  3:52 PM

## 2024-05-19 LAB — CREAT UR-MCNC: 120 MG/DL

## 2024-05-20 ENCOUNTER — TELEPHONE (OUTPATIENT)
Dept: PHARMACY | Facility: OTHER | Age: 59
End: 2024-05-20
Payer: COMMERCIAL

## 2024-05-20 LAB
FENTANYL UR CFM-MCNC: 172 NG/ML
FENTANYL/CREAT UR: 143 NG/MG {CREAT}
MORPHINE UR CFM-MCNC: 1180 NG/ML
MORPHINE/CREAT UR: 983 NG/MG {CREAT}
NORFENTANYL UR CFM-MCNC: 1140 NG/ML
NORFENTANYL/CREAT UR: 950 NG/MG {CREAT}

## 2024-05-20 NOTE — TELEPHONE ENCOUNTER
MTM referral from: Patient's insurance ([employer])    MTM referral outreach attempt #1 on May 20, 2024    Outcome: Declined     To schedule an appointment, please call the clinic MTM services phone number, [phone].  Also you can call the scheduling line at [phone].      I hope to see you soon!     Sincerely,         Janae Arnett, PharmD     Medication Therapy Management (MTM) Pharmacist

## 2024-05-24 NOTE — PROGRESS NOTES
Spoke to patient.  Reviewed urine drug confirmation results - + fentanyl and morphine.  He has resumed Suboxone per his report - taking 3 films per day as prescribed, no issues.  No further opioid use.      He notes that he was informed that he no longer has [employer] insurance and is stressing out about this.  Offered care coordination referral which he accepted.  Encouraged him to update me with any issues filling his medications.  He agreed.      Ernestina Tripp, DO on 5/24/2024 at 3:32 PM

## 2024-05-29 ENCOUNTER — PATIENT OUTREACH (OUTPATIENT)
Dept: CARE COORDINATION | Facility: CLINIC | Age: 59
End: 2024-05-29
Payer: COMMERCIAL

## 2024-05-29 NOTE — PROGRESS NOTES
Clinic Care Coordination Contact  Clinic Care Coordination Contact  OUTREACH    Referral Information:  Referral Source: Other, specify (Ernestina Tripp, )         Chief Complaint   Patient presents with    Clinic Care Coordination - Initial        Universal Utilization:      Utilization      No Show Count (past year)  2             ED Visits  0             Hospital Admissions  0                    Current as of: 5/25/2024  8:36 AM                Clinical Concerns:  Current Medical Concerns:      Current Behavioral Concerns:     Education Provided to patient: insurance       Health Maintenance Reviewed:    Clinical Pathway:     Medication Management:  Medication review status:      Functional Status:       Living Situation:       Lifestyle & Psychosocial Needs:    Social Determinants of Health     Food Insecurity: Low Risk  (11/20/2023)    Food Insecurity     Within the past 12 months, did you worry that your food would run out before you got money to buy more?: No     Within the past 12 months, did the food you bought just not last and you didn t have money to get more?: No   Depression: Not at risk (4/18/2024)    PHQ-2     PHQ-2 Score: 0   Housing Stability: Low Risk  (11/20/2023)    Housing Stability     Do you have housing? : Yes     Are you worried about losing your housing?: No   Tobacco Use: Low Risk  (5/16/2024)    Patient History     Smoking Tobacco Use: Never     Smokeless Tobacco Use: Never     Passive Exposure: Not on file   Financial Resource Strain: Low Risk  (11/20/2023)    Financial Resource Strain     Within the past 12 months, have you or your family members you live with been unable to get utilities (heat, electricity) when it was really needed?: No   Alcohol Use: Not on file   Transportation Needs: Low Risk  (11/20/2023)    Transportation Needs     Within the past 12 months, has lack of transportation kept you from medical appointments, getting your medicines, non-medical meetings or  appointments, work, or from getting things that you need?: No   Physical Activity: Not on file   Interpersonal Safety: Low Risk  (11/20/2023)    Interpersonal Safety     Do you feel physically and emotionally safe where you currently live?: Yes     Within the past 12 months, have you been hit, slapped, kicked or otherwise physically hurt by someone?: No     Within the past 12 months, have you been humiliated or emotionally abused in other ways by your partner or ex-partner?: No   Stress: Not on file   Social Connections: Not on file   Health Literacy: Not on file           SW CC spoke to pt in regards to referral for insurance. Pt states that he was told his insurance will need to be renewed at the end of the month. SW CC asked if pt was needing support finding how to renew or numbers to help. Pt states he can find this on his own and he was not needing any help or resources. SW CC said pt can let his provider know if future  CC outreach is needed.             Resources and Interventions:  Current Resources:        Care Plan:    Patient/Caregiver understanding: Patient verbalized understanding, engaged in AIDET communication during patient encounter.       Future Appointments                In 1 month Ernestina Tripp DO [facility] [place]    In 5 months Adelina Cabello MD [facility]            Plan: SW CC will do no further follow up at this time. Pt is aware to let provider know if future needs arise    ROMARIO Todd? Social Work Care Coordinator   [facility]  [email or web address]? [email or web address]    Phone: [phone]  she/her

## 2024-06-11 ENCOUNTER — TELEPHONE (OUTPATIENT)
Dept: ADDICTION MEDICINE | Facility: CLINIC | Age: 59
End: 2024-06-11
Payer: COMMERCIAL

## 2024-06-11 NOTE — TELEPHONE ENCOUNTER
"Reason for call:  Medication   If this is a refill request, has the caller requested the refill from the pharmacy already? N/A  Will the patient be using a [facility]? No  Name of the pharmacy and phone number for the current request: [facility] [address]    Name of the medication requested: suboxone    Other request: Pt wants to make sure his suboxone will be available for  on Friday, as he will only be in town for a couple days and then has to leave again.    Phone number to reach patient:  Home number on file [phone] (home)    Best Time:  any    Can we leave a detailed message on this number?  YES    Travel screening: Not Applicable      "

## 2024-06-11 NOTE — TELEPHONE ENCOUNTER
1) Reviewed refill request(s) from patient telephone encounter    2) Any Controlled Substance(s)? Yes   [place]  checked? [place]    3) Refill(s) requested for:     - buprenorphine HCl-naloxone HCl (SUBOXONE) 8-2 MG per film  Date last ordered: 5/16/24 Qty: 180 Refills: 0   Pharmacy should have refill(s) on file    Buprenorphine was sold on 5/17/24 for a 30 day supply. A 60 day supply was ordered on 5/16/24.     Called [facility]'s pharmacy. The patient has one refill on file. The earliest he can pick it up is 5/17/24.     4) Action taken: Attempted to call the patient. The voicemail box identified a person other than the patient. Did not leave a message.     5) Postponing encounter for next RN to call the patient to advise that a refill is on file.        GINGER RICHARD RN on 6/11/2024 at 2:42 PM

## 2024-06-12 NOTE — TELEPHONE ENCOUNTER
Reviewed previous documentation and made an attempt to call the patient. The patient did not answer. The voicemail greeting referred to a name that was not the patient's. Did not leave a message.     GINGER RICHARD RN on 6/12/2024 at 3:01 PM

## 2024-06-17 DIAGNOSIS — J44.1 CHRONIC OBSTRUCTIVE PULMONARY DISEASE WITH ACUTE EXACERBATION (H): ICD-10-CM

## 2024-06-17 RX ORDER — ALBUTEROL SULFATE 90 UG/1
AEROSOL, METERED RESPIRATORY (INHALATION)
Qty: 18 G | Refills: 0 | Status: SHIPPED | OUTPATIENT
Start: 2024-06-17

## 2024-07-18 ENCOUNTER — OFFICE VISIT (OUTPATIENT)
Dept: ADDICTION MEDICINE | Facility: CLINIC | Age: 59
End: 2024-07-18
Payer: COMMERCIAL

## 2024-07-18 VITALS
WEIGHT: 315 LBS | HEART RATE: 75 BPM | OXYGEN SATURATION: 96 % | BODY MASS INDEX: 45.1 KG/M2 | SYSTOLIC BLOOD PRESSURE: 136 MMHG | HEIGHT: 70 IN | DIASTOLIC BLOOD PRESSURE: 82 MMHG

## 2024-07-18 DIAGNOSIS — F11.93 OPIOID WITHDRAWAL (H): ICD-10-CM

## 2024-07-18 DIAGNOSIS — F11.21 OPIOID USE DISORDER, SEVERE, IN SUSTAINED REMISSION (H): Primary | ICD-10-CM

## 2024-07-18 LAB
AMPHETAMINE QUAL URINE POCT: NEGATIVE
BARBITURATE QUAL URINE POCT: NEGATIVE
BENZODIAZEPINE QUAL URINE POCT: NEGATIVE
BUPRENORPHINE QUAL URINE POCT: NEGATIVE
COCAINE QUAL URINE POCT: NEGATIVE
CREAT UR-MCNC: 119 MG/DL
CREATININE QUAL URINE POCT: ABNORMAL
INTERNAL QC QUAL URINE POCT: ABNORMAL
MDMA QUAL URINE POCT: NEGATIVE
METHADONE QUAL URINE POCT: NEGATIVE
METHAMPHETAMINE QUAL URINE POCT: NEGATIVE
OPIATE QUAL URINE POCT: NEGATIVE
OXYCODONE QUAL URINE POCT: NEGATIVE
PH QUAL URINE POCT: ABNORMAL
PHENCYCLIDINE QUAL URINE POCT: NEGATIVE
POCT KIT EXPIRATION DATE: ABNORMAL
POCT KIT LOT NUMBER: ABNORMAL
SPECIFIC GRAVITY POCT: 1
TEMPERATURE URINE POCT: ABNORMAL
THC QUAL URINE POCT: NEGATIVE

## 2024-07-18 PROCEDURE — G2211 COMPLEX E/M VISIT ADD ON: HCPCS | Performed by: FAMILY MEDICINE

## 2024-07-18 PROCEDURE — G0481 DRUG TEST DEF 8-14 CLASSES: HCPCS | Mod: 59 | Performed by: FAMILY MEDICINE

## 2024-07-18 PROCEDURE — 80305 DRUG TEST PRSMV DIR OPT OBS: CPT | Performed by: FAMILY MEDICINE

## 2024-07-18 PROCEDURE — 99214 OFFICE O/P EST MOD 30 MIN: CPT | Performed by: FAMILY MEDICINE

## 2024-07-18 RX ORDER — ONDANSETRON 4 MG/1
4 TABLET, ORALLY DISINTEGRATING ORAL EVERY 8 HOURS PRN
Qty: 9 TABLET | Refills: 0 | Status: SHIPPED | OUTPATIENT
Start: 2024-07-18 | End: 2024-09-27

## 2024-07-18 RX ORDER — BUPRENORPHINE AND NALOXONE 8; 2 MG/1; MG/1
FILM, SOLUBLE BUCCAL; SUBLINGUAL
Qty: 45 FILM | Refills: 0 | Status: SHIPPED | OUTPATIENT
Start: 2024-07-18 | End: 2024-08-02

## 2024-07-18 RX ORDER — CLONIDINE HYDROCHLORIDE 0.1 MG/1
0.1 TABLET ORAL 3 TIMES DAILY PRN
Qty: 9 TABLET | Refills: 0 | Status: SHIPPED | OUTPATIENT
Start: 2024-07-18 | End: 2024-09-27

## 2024-07-18 ASSESSMENT — PATIENT HEALTH QUESTIONNAIRE - PHQ9: SUM OF ALL RESPONSES TO PHQ QUESTIONS 1-9: 6

## 2024-07-18 ASSESSMENT — PAIN SCALES - GENERAL: PAINLEVEL: NO PAIN (0)

## 2024-07-18 NOTE — Clinical Note
Hi Dr Castro,  Looks like your staff have been trying to get in touch with Robles - I finally realized why we are having trouble reaching him.  The cell phone number listed is the correct number, not the home phone number (it is off by 1 number).  Perhaps you could pass this on to your RN so she can try reaching him again?  Thanks!  I will ask our  staff to fix contact info in the meantime.    Felicia

## 2024-07-18 NOTE — PROGRESS NOTES
[facility] - Addiction Medicine       Rooming information: Recheck  Takes Phentermine for Wt loss     Point of care urine drug screen positive for:  Lab Results   Component Value Date    BUP Negative 07/18/2024    BZO Negative 07/18/2024    BAR Negative 07/18/2024    MERCEDEZ Negative 07/18/2024    MAMP Negative 07/18/2024    AMP Negative 07/18/2024    MDMA Negative 07/18/2024    MTD Negative 07/18/2024    [id number] Negative 07/18/2024    OXY Negative 07/18/2024    PCP Negative 07/18/2024    THC Negative 07/18/2024    TEMP Invalid (A) 07/18/2024    SGPOCT 1.005 07/18/2024       *POC urine drug screen does not screen for Fentanyl    PHQ-9 Scores:       6/15/2023    12:00 PM 6/16/2023    12:03 PM 4/18/2024     1:00 PM   PHQ   PHQ-9 Total Score 0 0 3   Q9: Thoughts of better off dead/self-harm past 2 weeks Not at all Not at all Not at all     SHABBIR-7 Scores: NA      8/3/2022     7:12 AM 6/15/2023    12:00 PM 4/18/2024     1:00 PM   SHABBIR-7 SCORE   Total Score 0 (minimal anxiety)     Total Score 0 0 3       Any other recent substance use:     Unk- very faint second line for amphetamines & opioids    NICOTINE-No  If using nicotine, ready to quit? No    Side effects related to medications pt would like to discuss with provider (constipation, dry mouth, HA, GI upset, sedation?) Yes occasionally dry mouth    Narcan currently available: Yes    Primary care provider: Pawan Castro MD     Mental health provider: @ [facility] (follow up on MH referral if needed)    Any housing, insurance deficits?: in a process of moving    Contact information up to date? yes    3rd Party Involvement NA (please obtain ANAYELI if pt would like to include)        Vanessa Parada LPN  July 18, 2024  11:56 AM
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
## 2024-07-22 LAB
FENTANYL UR CFM-MCNC: 310 NG/ML
FENTANYL/CREAT UR: 261 NG/MG {CREAT}
MORPHINE UR CFM-MCNC: 205 NG/ML
MORPHINE/CREAT UR: 172 NG/MG {CREAT}
NORFENTANYL UR CFM-MCNC: 1880 NG/ML
NORFENTANYL/CREAT UR: 1580 NG/MG {CREAT}
XYLAZINE UR QL CFM: PRESENT

## 2024-07-22 NOTE — PROGRESS NOTES
Several attempts to contact patient on 7/19 but no ring, no VM.  Tried again today, called patient cell - realized cell phone number listed is correct ([phone]) not the home number.    He has resume Suboxone without issue, taking 1.5 films BID.  No questions or concerns.  He will see me on 8/2 as scheduled.    Ernestina Tripp, DO on 7/22/2024 at 4:42 PM

## 2024-08-02 ENCOUNTER — OFFICE VISIT (OUTPATIENT)
Dept: ADDICTION MEDICINE | Facility: CLINIC | Age: 59
End: 2024-08-02
Payer: COMMERCIAL

## 2024-08-02 VITALS
OXYGEN SATURATION: 98 % | BODY MASS INDEX: 43.82 KG/M2 | HEART RATE: 79 BPM | DIASTOLIC BLOOD PRESSURE: 75 MMHG | HEIGHT: 71 IN | WEIGHT: 313 LBS | SYSTOLIC BLOOD PRESSURE: 124 MMHG

## 2024-08-02 DIAGNOSIS — F11.21 OPIOID USE DISORDER, SEVERE, IN SUSTAINED REMISSION (H): ICD-10-CM

## 2024-08-02 DIAGNOSIS — R11.2 NAUSEA AND VOMITING, UNSPECIFIED VOMITING TYPE: Primary | ICD-10-CM

## 2024-08-02 LAB
AMPHETAMINE QUAL URINE POCT: NEGATIVE
BARBITURATE QUAL URINE POCT: NEGATIVE
BENZODIAZEPINE QUAL URINE POCT: NEGATIVE
BUPRENORPHINE QUAL URINE POCT: ABNORMAL
COCAINE QUAL URINE POCT: NEGATIVE
CREAT UR-MCNC: 244 MG/DL
CREATININE QUAL URINE POCT: ABNORMAL
INTERNAL QC QUAL URINE POCT: ABNORMAL
MDMA QUAL URINE POCT: NEGATIVE
METHADONE QUAL URINE POCT: NEGATIVE
METHAMPHETAMINE QUAL URINE POCT: NEGATIVE
OPIATE QUAL URINE POCT: NEGATIVE
OXYCODONE QUAL URINE POCT: NEGATIVE
PH QUAL URINE POCT: ABNORMAL
PHENCYCLIDINE QUAL URINE POCT: NEGATIVE
POCT KIT EXPIRATION DATE: ABNORMAL
POCT KIT LOT NUMBER: ABNORMAL
SPECIFIC GRAVITY POCT: 1.01
TEMPERATURE URINE POCT: ABNORMAL
THC QUAL URINE POCT: NEGATIVE

## 2024-08-02 PROCEDURE — 80305 DRUG TEST PRSMV DIR OPT OBS: CPT | Performed by: FAMILY MEDICINE

## 2024-08-02 PROCEDURE — 99213 OFFICE O/P EST LOW 20 MIN: CPT | Performed by: FAMILY MEDICINE

## 2024-08-02 PROCEDURE — G0481 DRUG TEST DEF 8-14 CLASSES: HCPCS | Mod: 59 | Performed by: FAMILY MEDICINE

## 2024-08-02 PROCEDURE — G2211 COMPLEX E/M VISIT ADD ON: HCPCS | Performed by: FAMILY MEDICINE

## 2024-08-02 RX ORDER — BUPRENORPHINE AND NALOXONE 8; 2 MG/1; MG/1
FILM, SOLUBLE BUCCAL; SUBLINGUAL
Qty: 90 FILM | Refills: 0 | Status: SHIPPED | OUTPATIENT
Start: 2024-08-02 | End: 2024-09-27

## 2024-08-02 ASSESSMENT — PAIN SCALES - GENERAL: PAINLEVEL: SEVERE PAIN (7)

## 2024-08-02 NOTE — PROGRESS NOTES
[facility] Addiction Medicine    A/P                                                    ASSESSMENT/PLAN    1. Opioid use disorder, severe, in sustained remission (H)  Reporting symptoms are well controlled since resuming Suboxone after last visit, denies any other opioid use.  Recommend he continue Suboxone 24mg/day.  Has Narcan.  Reviewed benefits of psychosocial treatment including individual therapy, programming, and meetings.   - Drugs of Abuse Screen Urine (POC CUPS) POCT  - Drug Confirmation Panel Urine with Creat - lab collect; Future  - buprenorphine HCl-naloxone HCl (SUBOXONE) 8-2 MG per film; 2 sl qam, 1 sl qpm  Dispense: 90 Film; Refill: 0  - Drug Confirmation Panel Urine with Creat - lab collect    2. Nausea and vomiting, unspecified vomiting type  Recommend seeking care in ER if symptoms not improving.          PDMP Review         Value Time User    State PDMP site checked  Yes 7/18/2024 11:57 AM Ernestina Tripp DO              RTC  Return in about 4 weeks (around 8/30/2024) for Follow up, in person.    The longitudinal plan of care for the diagnosis(es)/condition(s) as documented were addressed during this visit. Due to the added complexity in care, I will continue to support Robles in the subsequent management and with ongoing continuity of care.      Counseled the patient on the importance of having a recovery program in addition to medication to manage recovery.  Components include avoiding isolating, having willingness to change, avoiding triggers and managing cravings. Encouraged having some type of sober network and practicing honesty with trusted support person(s). Encouraged other services such as counseling, 12 step or other self-help organizations.      Opioid warning reviewed.  Risk of overdose following a period of abstinence due to decrease tolerance was discussed including risk of death.  Strongly recommended abstain from alcohol, benzodiazepines, THC, opioids and other drugs  of abuse.  Increased risk of return to opioid use after use of these substances discussed.  Increased risk of overdose/death with use of other substances particularly benzodiazepines/alcohol reviewed.        SUBJECTIVE                                                        Tobin Bryant is a 59 year old male with PMHx of HTN, morbid obesity, pre-diabetes, COPD/ashtma, YANIRA, anxiety, depression, and OUD who presents to clinic today for follow up.     Brief history of use:    Last use of illicit opioids was in approximately 2019.  Has been on buprenorphine for OUD since at least 2014.      +urine fentanyl on 4/18/24 and 5/16/24.  Confirmation urine drug test on 7/18 positive for fentanyl and xylazine.        Plan from most recent office visit (7/18/24):  1. Opioid use disorder, severe, in sustained remission (H)  Needs improvement, ongoing use since last visit.  Reviewed home induction instructions for resuming Suboxone and risks of precipitated withdrawal.  Resume Suboxone 8-2mg TID.  Confirms he has Narcan.    - Drugs of Abuse Screen Urine (POC CUPS) POCT  - Drug Confirmation Panel Urine with Creat - lab collect; Future  - Drug Confirmation Panel Urine with Creat - lab collect  - buprenorphine HCl-naloxone HCl (SUBOXONE) 8-2 MG per film; 2 sl qam, 1 sl qpm  Dispense: 45 Film; Refill: 0     2. Opioid withdrawal (H)  Reviewed use of comfort medications.  - cloNIDine (CATAPRES) 0.1 MG tablet; Take 1 tablet (0.1 mg) by mouth 3 times daily as needed (opioid withdrawal)  Dispense: 9 tablet; Refill: 0  - ondansetron (ZOFRAN ODT) 4 MG ODT tab; Take 1 tablet (4 mg) by mouth every 8 hours as needed for nausea or vomiting  Dispense: 9 tablet; Refill: 0    TODAY'S VISIT  HPI Aug 2, 2024  - Vomitting and diarrhea over night, didn't get much sleep so very tired today, able to keep liquids down - wants to head home  - Reviewed results of confirmatory drug testing at last visit  - Resumes Suboxone after last visit without issue,  "taking 1.5 films BID (total 24mg of buprenorphine)  - Cravings well managed, no further use  - Denies side effects from Suboxone  - Denies mood concerns  - Continues to work on finding new apartment          6/16/2023    12:03 PM 4/18/2024     1:00 PM 7/18/2024    11:00 AM   PHQ   PHQ-9 Total Score 0 3 6   Q9: Thoughts of better off dead/self-harm past 2 weeks Not at all Not at all Not at all           8/3/2022     7:12 AM 6/15/2023    12:00 PM 4/18/2024     1:00 PM   SHABBIR-7 SCORE   Total Score 0 (minimal anxiety)     Total Score 0 0 3       OBJECTIVE                                                    PHYSICAL EXAM:  /75 (BP Location: Right arm, Patient Position: Sitting, Cuff Size: Adult Large)   Pulse 79   Ht 1.791 m (5' 10.5\")   Wt 142 kg (313 lb)   SpO2 98%   BMI 44.28 kg/m      GENERAL: appears tired but non-toxic, alert and no distress  EYES: Eyes grossly normal to inspection, sclerae normal  HEENT:  moist oral mucosa  RESP: No respiratory distress, no coughing or wheezing  SKIN: no pallor or jaundice  NEURO: Normal gait, mentation intact and speech normal  MENTAL STATUS EXAM  Appearance/Behavior: No appearant distress  Speech: Normal  Mood/Affect: depressed affect  Insight: Adequate    LAB  Results for orders placed or performed in visit on 08/02/24   Urine Creatinine for Drug Screen Panel     Status: None   Result Value Ref Range    Creatinine Urine for Drug Screen 244 mg/dL   Drugs of Abuse Screen Urine (POC CUPS) POCT     Status: Abnormal   Result Value Ref Range    POCT Kit Lot Number [id number]     POCT Kit Expiration Date 2026-02-28     Temperature Urine POCT 94 F 90 F, 92 F, 94 F, 96 F, 98 F, 100 F    Specific [place] POCT 1.015 1.005, 1.015, 1.025    pH Qual Urine POCT 9 pH 4 pH, 5 pH, 7 pH, 9 pH    Creatinine Qual Urine POCT 10 mg/dL (A) 20 mg/dL, 50 mg/dL, 100 mg/dL, 200 mg/dL    Internal QC Qual Urine POCT Valid Valid    Amphetamine Qual Urine POCT Negative Negative    Barbiturate Qual " Urine POCT Negative Negative    Buprenorphine Qual Urine POCT Screen Positive (A) Negative    Benzodiazepine Qual Urine POCT Negative Negative    Cocaine Qual Urine POCT Negative Negative    Methamphetamine Qual Urine POCT Negative Negative    MDMA Qual Urine POCT Negative Negative    Methadone Qual Urine POCT Negative Negative    Opiate Qual Urine POCT Negative Negative    Oxycodone Qual Urine POCT Negative Negative    Phencyclidine Qual Urine POCT Negative Negative    THC Qual Urine POCT Negative Negative   Drug Confirmation Panel Urine with Creat - lab collect     Status: None (In process)    Narrative    The following orders were created for panel order Drug Confirmation Panel Urine with Creat - lab collect.  Procedure                               Abnormality         Status                     ---------                               -----------         ------                     Urine Drug Confirmation ...[[id number]]                      In process                 Urine Creatinine for Kenan...[[id number]]                      Final result                 Please view results for these tests on the individual orders.         HISTORY                                                    Problem list reviewed & adjusted, as indicated.  Patient Active Problem List   Diagnosis    Type 2 diabetes mellitus with diabetic polyneuropathy, without long-term current use of insulin (H)    Morbid obesity (H)    Opioid use disorder, severe, in sustained remission (H)    YANIRA on CPAP    Essential hypertension    Recurrent major depressive disorder, in full remission (H24)    Chronic obstructive pulmonary disease with acute exacerbation (H)    Heart failure with preserved ejection fraction, unspecified HF chronicity (H)         MEDICATION LIST (prior to visit)  Current Outpatient Medications   Medication Sig Dispense Refill    buprenorphine HCl-naloxone HCl (SUBOXONE) 8-2 MG per film 2 sl qam, 1 sl qpm 90 Film 0    cloNIDine (CATAPRES)  0.1 MG tablet Take 1 tablet (0.1 mg) by mouth 3 times daily as needed (opioid withdrawal) 9 tablet 0    albuterol (PROAIR HFA/PROVENTIL HFA/VENTOLIN HFA) 108 (90 Base) MCG/ACT inhaler INHALE 2 PUFFS BY MOUTH EVERY 6 HOURS AS NEEDED FOR WHEEZING 18 g 0    ARIPiprazole (ABILIFY) 10 MG tablet Take 1 tablet (10 mg) by mouth daily      aspirin (ASA) 81 MG EC tablet Take 1 tablet (81 mg) by mouth daily 90 tablet 3    atorvastatin (LIPITOR) 20 MG tablet Take 1 tablet (20 mg) by mouth daily 90 tablet 3    budesonide-formoterol (SYMBICORT) 80-4.5 MCG/ACT Inhaler INHALE 2 PUFFS BY MOUTH TWICE DAILY 10.2 g 11    buPROPion (WELLBUTRIN XL) 150 MG 24 hr tablet Take 1 tablet (150 mg) by mouth every morning      docusate sodium (COLACE) 100 MG capsule TAKE 1 CAPSULE(100 MG) BY MOUTH TWICE DAILY as needed for constipation 60 capsule 3    empagliflozin (JARDIANCE) 10 MG TABS tablet Take 1 tablet (10 mg) by mouth daily 90 tablet 1    furosemide (LASIX) 20 MG tablet Take 1 tablet (20 mg) by mouth daily as needed (swelling)      naloxone (NARCAN) 4 MG/0.1ML nasal spray Spray 1 spray (4 mg) into one nostril alternating nostrils once as needed for opioid reversal every 2-3 minutes until assistance arrives 0.2 mL 11    ondansetron (ZOFRAN ODT) 4 MG ODT tab Take 1 tablet (4 mg) by mouth every 8 hours as needed for nausea or vomiting 9 tablet 0    phentermine (ADIPEX-P) 37.5 MG tablet Take 0.5-1 tablets (18.75-37.5 mg) by mouth every morning (before breakfast) 90 tablet 1    sildenafil (VIAGRA) 100 MG tablet Take 0.5-1 tablets ( mg) by mouth daily as needed (ED) 30 tablet 1     No current facility-administered medications for this visit.       MEDICATION LIST (after visit)  Current Outpatient Medications   Medication Sig Dispense Refill    buprenorphine HCl-naloxone HCl (SUBOXONE) 8-2 MG per film 2 sl qam, 1 sl qpm 90 Film 0    cloNIDine (CATAPRES) 0.1 MG tablet Take 1 tablet (0.1 mg) by mouth 3 times daily as needed (opioid  withdrawal) 9 tablet 0    albuterol (PROAIR HFA/PROVENTIL HFA/VENTOLIN HFA) 108 (90 Base) MCG/ACT inhaler INHALE 2 PUFFS BY MOUTH EVERY 6 HOURS AS NEEDED FOR WHEEZING 18 g 0    ARIPiprazole (ABILIFY) 10 MG tablet Take 1 tablet (10 mg) by mouth daily      aspirin (ASA) 81 MG EC tablet Take 1 tablet (81 mg) by mouth daily 90 tablet 3    atorvastatin (LIPITOR) 20 MG tablet Take 1 tablet (20 mg) by mouth daily 90 tablet 3    budesonide-formoterol (SYMBICORT) 80-4.5 MCG/ACT Inhaler INHALE 2 PUFFS BY MOUTH TWICE DAILY 10.2 g 11    buPROPion (WELLBUTRIN XL) 150 MG 24 hr tablet Take 1 tablet (150 mg) by mouth every morning      docusate sodium (COLACE) 100 MG capsule TAKE 1 CAPSULE(100 MG) BY MOUTH TWICE DAILY as needed for constipation 60 capsule 3    empagliflozin (JARDIANCE) 10 MG TABS tablet Take 1 tablet (10 mg) by mouth daily 90 tablet 1    furosemide (LASIX) 20 MG tablet Take 1 tablet (20 mg) by mouth daily as needed (swelling)      naloxone (NARCAN) 4 MG/0.1ML nasal spray Spray 1 spray (4 mg) into one nostril alternating nostrils once as needed for opioid reversal every 2-3 minutes until assistance arrives 0.2 mL 11    ondansetron (ZOFRAN ODT) 4 MG ODT tab Take 1 tablet (4 mg) by mouth every 8 hours as needed for nausea or vomiting 9 tablet 0    phentermine (ADIPEX-P) 37.5 MG tablet Take 0.5-1 tablets (18.75-37.5 mg) by mouth every morning (before breakfast) 90 tablet 1    sildenafil (VIAGRA) 100 MG tablet Take 0.5-1 tablets ( mg) by mouth daily as needed (ED) 30 tablet 1     No current facility-administered medications for this visit.         Allergies   Allergen Reactions    Seafood Other (See Comments)     Eyes turn yellow    Ibuprofen Nausea and Vomiting       Ernestina Tripp, [facility] Addiction Medicine  [facility]  [phone]

## 2024-08-02 NOTE — PROGRESS NOTES
"  [facility]       Rooming information:   \" Felling very weak\" \" the food last night made me really sick\"   Resumed Suboxone 2 days after his last appointment  Takes Suboxone 1.5 film a day  Mental health is stable per patient     Point of care urine drug screen positive for:  Lab Results   Component Value Date    BUP Screen Positive (A) 08/02/2024    BZO Negative 08/02/2024    BAR Negative 08/02/2024    MERCEDEZ Negative 08/02/2024    MAMP Negative 08/02/2024    AMP Negative 08/02/2024    MDMA Negative 08/02/2024    MTD Negative 08/02/2024    JPG604 Negative 08/02/2024    OXY Negative 08/02/2024    PCP Negative 08/02/2024    THC Negative 08/02/2024    TEMP 94 F 08/02/2024    SGPOCT 1.015 08/02/2024       *POC urine drug screen does not screen for Fentanyl    PHQ-9 Scores: declined      6/16/2023    12:03 PM 4/18/2024     1:00 PM 7/18/2024    11:00 AM   PHQ   PHQ-9 Total Score 0 3 6   Q9: Thoughts of better off dead/self-harm past 2 weeks Not at all Not at all Not at all     SHABBIR-7 Scores: denies      8/3/2022     7:12 AM 6/15/2023    12:00 PM 4/18/2024     1:00 PM   SHABBIR-7 SCORE   Total Score 0 (minimal anxiety)     Total Score 0 0 3       Any other recent substance use:     Denies. None since last visit   NICOTINE-No  If using nicotine, ready to quit? No    Side effects related to medications pt would like to discuss with provider (constipation, dry mouth, HA, GI upset, sedation?) No     Narcan currently available: Yes    Primary care provider: Pawan Castro MD     Mental health provider: @ [facility] (follow up on MH referral if needed)    Any housing, insurance deficits?:     Contact information up to date? Yes. Phone # confirmed    3rd Party Involvement NA (please obtain ANAYELI if pt would like to include)        Vanessa Parada LPN  August 2, 2024  9:40 AM  "

## 2024-08-07 ENCOUNTER — NURSE TRIAGE (OUTPATIENT)
Dept: INTERNAL MEDICINE | Facility: CLINIC | Age: 59
End: 2024-08-07

## 2024-08-07 LAB
BUPRENORPHINE UR CFM-MCNC: 12 NG/ML
BUPRENORPHINE/CREAT UR: 5 NG/MG {CREAT}
FENTANYL UR CFM-MCNC: 68 NG/ML
FENTANYL/CREAT UR: 28 NG/MG {CREAT}
NORBUPRENORPHINE UR CFM-MCNC: 72 NG/ML
NORBUPRENORPHINE/CREAT UR: 30 NG/MG {CREAT}
NORFENTANYL UR CFM-MCNC: 2040 NG/ML
NORFENTANYL/CREAT UR: 836 NG/MG {CREAT}

## 2024-08-07 NOTE — TELEPHONE ENCOUNTER
"Nurse Triage SBAR    Is this a 2nd Level Triage? NO    Situation: Patient reports musculoskeletal pain in his right shoulder along with tingling sensation.     Background: Has never had this type of pain before. Denies any recent injury or over-use.     Assessment: States pain is 7/10 in severity. Starts down below his right low back and radiates up to his shoulder. Denies any loss of sensation or movement to hands or fingers. Has tried OTC topical pain medication that did not help much. States \"if I put my elbow on the table and put my chin in the palm of my hand the pain worsens\"    Protocol Recommended Disposition:   See in Office Within 3 Days    Recommendation: Scheduled visit today with Dr. Castro. RN otherwise suggested an orthopedic urgent care but patient declines this suggestion.          Does the patient meet one of the following criteria for ADS visit consideration? 16+ years old, with an [facility] PCP     TIP  Providers, please consider if this condition is appropriate for management at one of our Acute and Diagnostic Services sites.     If patient is a good candidate, please use dotphrase <dot>triageresponse and select Refer to ADS to document.         Reason for Disposition   MODERATE pain (e.g., interferes with normal activities) and present > 3 days    Additional Information   Negative: Shock suspected (e.g., cold/pale/clammy skin, too weak to stand, low BP, rapid pulse)   Negative: Similar pain previously and it was from 'heart attack'   Negative: Similar pain previously and it was from 'angina' and not relieved by nitroglycerin   Negative: Sounds like a life-threatening emergency to the triager   Negative: Chest pain   Negative: Followed an injury to shoulder   Negative: Difficulty breathing or unusual sweating (e.g., sweating without exertion)   Negative: Pain lasting > 5 minutes and pain also present in chest  (Exception: Pain is clearly made worse by movement.)   Negative: Age > 40 and no obvious " "cause and pain even when not moving the arm  (Exception: Pain is clearly made worse by moving arm or bending neck.)   Negative: Red area or streak and fever   Negative: Swollen joint and fever   Negative: Entire arm is swollen   Negative: Patient sounds very sick or weak to the triager   Negative: SEVERE pain (e.g., excruciating, unable to do any normal activities)   Negative: Shoulder pains with exertion (e.g., walking) and pain goes away on resting and not present now   Negative: Painful rash with multiple small blisters grouped together (i.e., dermatomal distribution or 'band' or 'stripe')   Negative: Looks like a boil, infected sore, deep ulcer or other infected rash (spreading redness, pus)   Negative: Localized rash is very painful (no fever)   Negative: Weakness (i.e., loss of strength) in hand or fingers  (Exception: Not truly weak; hand feels weak because of pain.)   Negative: Numbness (i.e., loss of sensation) in hand or fingers   Negative: Unable to use arm at all and because of shoulder pain or stiffness   Negative: Patient wants to be seen    Answer Assessment - Initial Assessment Questions  1. ONSET: \"When did the pain start?\"      4 days ago.   2. LOCATION: \"Where is the pain located?\"      Right back to the top of my shoulder   3. PAIN: \"How bad is the pain?\" (Scale 1-10; or mild, moderate, severe)    - MILD (1-3): doesn't interfere with normal activities    - MODERATE (4-7): interferes with normal activities (e.g., work or school) or awakens from sleep    - SEVERE (8-10): excruciating pain, unable to do any normal activities, unable to move arm at all due to pain      Moderate pain 7/10  4. WORK OR EXERCISE: \"Has there been any recent work or exercise that involved this part of the body?\"      nothing  5. CAUSE: \"What do you think is causing the shoulder pain?\"      I dont know  6. OTHER SYMPTOMS: \"Do you have any other symptoms?\" (e.g., neck pain, swelling, rash, fever, numbness, weakness)      No " "rash, swelling. Some neck pain  7. PREGNANCY: \"Is there any chance you are pregnant?\" \"When was your last menstrual period?\"      no    Protocols used: Shoulder Pain-A-OH    "

## 2024-08-07 NOTE — TELEPHONE ENCOUNTER
General Call    Contacts       Contact Date/Time Type Contact Phone/Fax    08/07/2024 11:19 AM CDT Phone (Incoming) Robles Bryant (Self) [phone] (M)          Reason for Call:  Pain in right shoulder    What are your questions or concerns:  Pt is experiencing pain and tingling in his right arm starting from under his shoulder blade on his back up and over from shoulder down arm. Says it feels numb after pain as well. He said it feels kind of like when you hit your elbow but in his shoulder and had been going on for 4 days. No report of injury.     Date of last appointment with provider: Next appt is 8/15    Okay to leave a detailed message?: Yes at Cell number on file:    Telephone Information:   Mobile [phone]

## 2024-08-08 ENCOUNTER — OFFICE VISIT (OUTPATIENT)
Dept: FAMILY MEDICINE | Facility: CLINIC | Age: 59
End: 2024-08-08
Payer: COMMERCIAL

## 2024-08-08 VITALS
HEIGHT: 71 IN | HEART RATE: 92 BPM | DIASTOLIC BLOOD PRESSURE: 71 MMHG | TEMPERATURE: 98.5 F | WEIGHT: 311.4 LBS | RESPIRATION RATE: 18 BRPM | BODY MASS INDEX: 43.6 KG/M2 | SYSTOLIC BLOOD PRESSURE: 117 MMHG | OXYGEN SATURATION: 94 %

## 2024-08-08 DIAGNOSIS — M25.511 ACUTE PAIN OF RIGHT SHOULDER: Primary | ICD-10-CM

## 2024-08-08 DIAGNOSIS — E11.42 TYPE 2 DIABETES MELLITUS WITH DIABETIC POLYNEUROPATHY, WITHOUT LONG-TERM CURRENT USE OF INSULIN (H): ICD-10-CM

## 2024-08-08 PROCEDURE — 99213 OFFICE O/P EST LOW 20 MIN: CPT

## 2024-08-08 RX ORDER — PREDNISONE 20 MG/1
40 TABLET ORAL DAILY
Qty: 10 TABLET | Refills: 0 | Status: SHIPPED | OUTPATIENT
Start: 2024-08-08 | End: 2024-08-23

## 2024-08-08 ASSESSMENT — PAIN SCALES - GENERAL: PAINLEVEL: SEVERE PAIN (6)

## 2024-08-08 NOTE — PROGRESS NOTES
Assessment & Plan     (M25.511) Acute pain of right shoulder  (primary encounter diagnosis)  Comment: Acute.  Vital signs stable with mildly elevated pulse, but findings are otherwise unremarkable.  Patient continues to have discomfort without loss of range of motion, persistent weakness, or complete loss of sensation.  No concerns for septic joint or bilateral radiculopathy that would warrant the need for more emergent medical attention.  Helps rule out cervical radiculopathy with negative Spurling test, but more concerned about rotator cuff impingement considering presence of unilateral radiculopathy.  Greatly believe that patient would benefit from a burst of steroids alongside the rest of the medication management prescribed in the emergency department.  Would like him to begin physical therapy, as this type of injury will likely warrant more ongoing and formal attention.  Patient already has follow-up scheduled with his primary care provider in 1 week, which will be great time to reassess improvement in his symptoms.  We are still a bit early on in the plan of care to determine whether or not treatment options will be beneficial. Offered education on medications including appropriate dosing, possible side effects, and possible adverse effects.  Education given on return to clinic instructions as well as alarm signs that would require the need for immediate medical attention.  Patient attested to understanding.  Plan: predniSONE (DELTASONE) 20 MG tablet, Physical         Therapy  Referral    (E11.42) Type 2 diabetes mellitus with diabetic polyneuropathy, without long-term current use of insulin (H)  Comment: Chronic.  Discussed type 2 diabetes with patient, and he verbalizes that he does not have diabetes.  Patient is currently on Jardiance, and notes that he believes that this is an as needed medication that is meant to be taken with peripheral edema.  We educated patient that he may be referring to  the Lasix, and patient is very adamant that he is referring to the Jardiance.  He does not believe that he is taking his Jardiance daily.  A1c has historically been quite well-managed below 6.  He does not take his blood sugars at home.  Inquiring about blood sugar management considering that likely diagnosis of impingement and radiculopathy warrants a steroid burst.  Considering that patient's A1c's have been very stable in the past, short burst of steroids with mildly elevated blood sugars during that time should be safe for this patient.  He will be following up with his primary care provider in just 1 week, and I would like him to continue to the discussion about his medications and what they are used for during that time. Offered education on medications including appropriate dosing, possible side effects, and possible adverse effects.  Education given on return to clinic instructions as well as alarm signs that would require the need for immediate medical attention.  Patient attested to understanding.  Plan: Ensure Jardiance is being taken once daily as prescribed  Follow-up with primary care provider in 1 week for ongoing discussion about type 2 diabetes    This progress note has been dictated, with use of voice recognition software. Any grammatical, typographical, or context errors are unintentional and inherent to use of voice recognition software.     Prescription drug management  I spent a total of 19 minutes on the day of the visit.   Time spent by me doing chart review, history and exam, documentation and further activities per the note    FUTURE APPOINTMENTS:       - Follow-up visit in 1 week with PCP       - Follow-up for annual visit or as needed  Patient Instructions   Shoulder Pain    Pain Management  Begin taking medications to manage shoulder pain today as we discussed. These will work best to manage your pain when taken around the clock rather than every so often when your pain is  worse  -Tylenol 325-650mg every 4-6 hours  -Ibuprofen 600-800mg every 6-8 hours  -Prednisone 40mg once daily for 5 days   -Flexeril 10mg up to twice daily as needed -this medication can cause drowsiness, so please be aware of this when taking it.  I generally recommend that patient's reserve this for bedtime, but if you do take it during the day, please avoid driving or engaging in any activities that require you to be very alert.    Apply cold packs to the area for 20min at a time 4 times per day. This will help to reduce swelling and offer some numbing to manage pain. You can also use warm packs to manage pain if this provides better relief.    Movement  It is very important to maintain your normal daily activities as tolerated. Avoiding sitting, laying, standing, or remaining in one position for extended periods of time. Continuing to stretch and move your body will help to encourage healing and keep your muscles loose.    I have placed a referral for physical therapy (PT). I think it is a beneficial option for the pain that you are describing! PT will work with you to build a plan that is customized to your needs and physical abilities. You may feel worse before you feel better, so I encourage you to follow the plan with physical therapy for 6-8 weeks before deciding if it is working or not. Muscle injuries can take time to heal well, and it will require consistent effort!    Next Step  If this plan doesn't seem to be helping after 4-6 weeks then I want you to come back to see me, and we can discuss what the next options might be. Seek emergency medical help If pain becomes unbearable, is not responding to pain medication, or is accompanied by fever, warmth, or loss of function.      Yehuda Arboleda is a 59 year old, presenting for the following health issues:  Shoulder Pain and Recheck Medication (Pt is here to discuss having rt shoulder pain that radiates from rt elbow through rt side of low back to rt  shoulder. Pt was seen at [facility] and asked to follow up with Primary Clinic.)        8/8/2024    11:06 AM   Additional Questions   Roomed by refugio   Accompanied by milan         8/8/2024    11:06 AM   Patient Reported Additional Medications   Patient reports taking the following new medications cyclobenzaprine 10mg, lidocaine cream, ibuprofen.     History of Present Illness       Reason for visit:  Rt shoulder pain    He eats 0-1 servings of fruits and vegetables daily.He consumes 0 sweetened beverage(s) daily.He exercises with enough effort to increase his heart rate 9 or less minutes per day.  He exercises with enough effort to increase his heart rate 3 or less days per week.   He is taking medications regularly.   Robles is a 59-year-old male with a past medical history significant for type 2 diabetes, YANIRA with CPAP, morbid obesity, essential hypertension, heart failure with preserved ejection fraction, depression, and opioid use disorder in remission who presents today to follow-up after being seen in the emergency department with acute right shoulder pain.  Patient presented to the emergency department yesterday as he was having about 5 to 7 days of acute new onset right shoulder pain that was accompanied by some numbness and tingling in the arm.  He had reached out to his primary care provider and noted that the pain seemed most significant when he rested his elbow on a table, and placed his hand under his chin.  Workup in the emergency department including troponin, CBC, BMP, and x-ray were all largely unremarkable and helps to rule out more malicious etiology of his symptoms.  He was prescribed lidocaine patches, Flexeril, and encouraged to use over-the-counter treatment options to manage his concerns, and follow-up if symptoms were not improving.  He follows up in the clinic today with ongoing discomfort.  Patient notes that the Flexeril may have mildly helped, but it is causing him to be quite  "drowsy.  He attest to more intermittent pain that does come and go, and is generally always accompanied by intermittent numbness and tingling.  He occasionally feels some weakness in the extremity with these episodes as well, but this symptom is a bit more few and far between.  He notes that the pain seems to present in the right shoulder, and generally always radiates down the entirety of the right arm and into the right hand.  Pain occasionally radiates into his right shoulder blade as well, but this is a bit less frequent.  He is continue to try out the Flexeril, but has not tried any other over-the-counter pain medications, and has not yet tried the lidocaine patches.  He declines any redness, loss of range of motion, complete inability to move the extremity, swelling, fever, chills, body aches, lightheadedness or dizziness, blurry or double vision, neck stiffness, chest pain, shortness of breath, or GI upset.  He is unable to tell whether or not things seem to be improving, as he was just assessed in the emergency department yesterday.      Review of Systems  Constitutional, HEENT, cardiovascular, pulmonary, gi and gu systems are negative, except as otherwise noted.      Objective    /71 (BP Location: Left arm, Patient Position: Sitting, Cuff Size: Adult Large)   Pulse 92   Temp 98.5  F (36.9  C) (Axillary)   Resp 18   Ht 1.791 m (5' 10.5\")   Wt 141.3 kg (311 lb 6.4 oz)   SpO2 94%   BMI 44.05 kg/m    Body mass index is 44.05 kg/m .  Physical Exam   GENERAL: Patient dozing off in the clinic with difficulty maintaining full conversation due to nodding off and nearly falling asleep  NECK: no adenopathy, no asymmetry, masses, or scars  RESP: lungs clear to auscultation - no rales, rhonchi or wheezes  CV: regular rate and rhythm, normal S1 S2, no S3 or S4, no murmur, click or rub, no peripheral edema  ABDOMEN: soft, nontender, no hepatosplenomegaly, no masses and bowel sounds normal  MS: no gross " musculoskeletal defects noted, no edema.  Strength intact with mildly reduced sensation to fine pricks on all fingers.  Range of motion fully intact, but pain more reproduced with empty can test.  Negative Spurling on the right  SKIN: no suspicious lesions or rashes  NEURO: Normal strength and tone, mentation intact and speech normal    Yisel De Leon DNP FNP-C  Family Nurse Practitioner - Same Day Provider  [facility] [place]        Signed Electronically by: BRANDAN Gaines CNP

## 2024-08-08 NOTE — PATIENT INSTRUCTIONS
Shoulder Pain    Pain Management  Begin taking medications to manage shoulder pain today as we discussed. These will work best to manage your pain when taken around the clock rather than every so often when your pain is worse  -Tylenol 325-650mg every 4-6 hours  -Ibuprofen 600-800mg every 6-8 hours  -Prednisone 40mg once daily for 5 days   -Flexeril 10mg up to twice daily as needed -this medication can cause drowsiness, so please be aware of this when taking it.  I generally recommend that patient's reserve this for bedtime, but if you do take it during the day, please avoid driving or engaging in any activities that require you to be very alert.    Apply cold packs to the area for 20min at a time 4 times per day. This will help to reduce swelling and offer some numbing to manage pain. You can also use warm packs to manage pain if this provides better relief.    Movement  It is very important to maintain your normal daily activities as tolerated. Avoiding sitting, laying, standing, or remaining in one position for extended periods of time. Continuing to stretch and move your body will help to encourage healing and keep your muscles loose.    I have placed a referral for physical therapy (PT). I think it is a beneficial option for the pain that you are describing! PT will work with you to build a plan that is customized to your needs and physical abilities. You may feel worse before you feel better, so I encourage you to follow the plan with physical therapy for 6-8 weeks before deciding if it is working or not. Muscle injuries can take time to heal well, and it will require consistent effort!    Next Step  If this plan doesn't seem to be helping after 4-6 weeks then I want you to come back to see me, and we can discuss what the next options might be. Seek emergency medical help If pain becomes unbearable, is not responding to pain medication, or is accompanied by fever, warmth, or loss of function.

## 2024-08-15 ENCOUNTER — OFFICE VISIT (OUTPATIENT)
Dept: INTERNAL MEDICINE | Facility: CLINIC | Age: 59
End: 2024-08-15
Payer: COMMERCIAL

## 2024-08-15 VITALS
TEMPERATURE: 97.9 F | WEIGHT: 315 LBS | BODY MASS INDEX: 44.1 KG/M2 | OXYGEN SATURATION: 94 % | HEART RATE: 78 BPM | DIASTOLIC BLOOD PRESSURE: 86 MMHG | HEIGHT: 71 IN | RESPIRATION RATE: 17 BRPM | SYSTOLIC BLOOD PRESSURE: 138 MMHG

## 2024-08-15 DIAGNOSIS — I50.30 HEART FAILURE WITH PRESERVED EJECTION FRACTION, UNSPECIFIED HF CHRONICITY (H): ICD-10-CM

## 2024-08-15 DIAGNOSIS — E11.42 TYPE 2 DIABETES MELLITUS WITH DIABETIC POLYNEUROPATHY, WITHOUT LONG-TERM CURRENT USE OF INSULIN (H): Primary | ICD-10-CM

## 2024-08-15 DIAGNOSIS — M54.2 NECK PAIN ON RIGHT SIDE: ICD-10-CM

## 2024-08-15 DIAGNOSIS — I10 ESSENTIAL HYPERTENSION: ICD-10-CM

## 2024-08-15 DIAGNOSIS — R20.2 TINGLING OF RIGHT UPPER EXTREMITY: ICD-10-CM

## 2024-08-15 LAB — HBA1C MFR BLD: 5.9 % (ref 0–5.6)

## 2024-08-15 PROCEDURE — G2211 COMPLEX E/M VISIT ADD ON: HCPCS | Performed by: INTERNAL MEDICINE

## 2024-08-15 PROCEDURE — 36415 COLL VENOUS BLD VENIPUNCTURE: CPT | Performed by: INTERNAL MEDICINE

## 2024-08-15 PROCEDURE — 99214 OFFICE O/P EST MOD 30 MIN: CPT | Performed by: INTERNAL MEDICINE

## 2024-08-15 PROCEDURE — 83036 HEMOGLOBIN GLYCOSYLATED A1C: CPT | Performed by: INTERNAL MEDICINE

## 2024-08-15 ASSESSMENT — PAIN SCALES - GENERAL: PAINLEVEL: MILD PAIN (3)

## 2024-08-15 NOTE — LETTER
August 15, 2024      Robles Bryant  [address]     [place] [address]        Dear ArabellaAnnette,    We are writing to inform you of your test results.    Diabetes marker is stable, excellent    Resulted Orders   Hemoglobin A1c   Result Value Ref Range    Hemoglobin A1C 5.9 (H) 0.0 - 5.6 %      Comment:      Normal <5.7%   Prediabetes 5.7-6.4%    Diabetes 6.5% or higher     Note: Adopted from ADA consensus guidelines.       If you have any questions or concerns, please call the clinic at the number listed above.       Sincerely,      Pawan Castro MD             Detail Level: Detailed Size Of Lesion: 1.5 x 1.5 cm Size Of Lesion In Cm (Optional): 0

## 2024-08-15 NOTE — PROGRESS NOTES
"  Office Visit - Follow Up   Tobin Bryant   59 year old male    Date of Visit: 8/15/2024    Chief Complaint   Patient presents with    History of Present Illness     Right shoulder pain  Wants heart checked  A1c        Assessment and Plan   1. Type 2 diabetes mellitus with diabetic polyneuropathy, without long-term current use of insulin (H)  Well-controlled, he is on Jardiance more for diastolic heart failure than sugar control, does not sound like he has been taking this but he would like to resume this  - Hemoglobin A1c; Future  - empagliflozin (JARDIANCE) 10 MG TABS tablet; Take 1 tablet (10 mg) by mouth daily  Dispense: 90 tablet; Refill: 4  - Hemoglobin A1c    2. Heart failure with preserved ejection fraction, unspecified HF chronicity (H)  Needs excellent blood pressure control, resume Jardiance, consider addition of spironolactone, will review again in 1 month  - empagliflozin (JARDIANCE) 10 MG TABS tablet; Take 1 tablet (10 mg) by mouth daily  Dispense: 90 tablet; Refill: 4    3. Essential hypertension  Pressure borderline resume Jardiance, consider spironolactone    4. Neck pain on right side  5. Tingling of right upper extremity  Improved reviewed evaluation  - Spine  Referral; Future      Return in about 4 weeks (around 9/12/2024) for Follow up.     History of Present Illness   This 59 year old man comes in for follow-up.  He was seen in early August in the emergency room with right arm pain.  Had EKG without ischemic changes, negative troponin, BMP and CBC looked okay.  Chest x-ray looked okay.  Then came into clinic and saw Yisel.  She prescribed methylprednisolone which seems to be helpful.  Still with a little bit of right sided neck pain and intermittent paresthesias down the right arm.       Physical Exam   General Appearance:   No acute distress    /86   Pulse 78   Temp 97.9  F (36.6  C)   Resp 17   Ht 1.791 m (5' 10.5\")   Wt (!) 153 kg (337 lb 3.2 oz)   SpO2 94%   BMI " 47.70 kg/m           Additional Information   Current Outpatient Medications   Medication Sig Dispense Refill    albuterol (PROAIR HFA/PROVENTIL HFA/VENTOLIN HFA) 108 (90 Base) MCG/ACT inhaler INHALE 2 PUFFS BY MOUTH EVERY 6 HOURS AS NEEDED FOR WHEEZING 18 g 0    ARIPiprazole (ABILIFY) 10 MG tablet Take 1 tablet (10 mg) by mouth daily      aspirin (ASA) 81 MG EC tablet Take 1 tablet (81 mg) by mouth daily 90 tablet 3    atorvastatin (LIPITOR) 20 MG tablet Take 1 tablet (20 mg) by mouth daily 90 tablet 3    budesonide-formoterol (SYMBICORT) 80-4.5 MCG/ACT Inhaler INHALE 2 PUFFS BY MOUTH TWICE DAILY 10.2 g 11    buprenorphine HCl-naloxone HCl (SUBOXONE) 8-2 MG per film 2 sl qam, 1 sl qpm 90 Film 0    buPROPion (WELLBUTRIN XL) 150 MG 24 hr tablet Take 1 tablet (150 mg) by mouth every morning      cloNIDine (CATAPRES) 0.1 MG tablet Take 1 tablet (0.1 mg) by mouth 3 times daily as needed (opioid withdrawal) 9 tablet 0    docusate sodium (COLACE) 100 MG capsule TAKE 1 CAPSULE(100 MG) BY MOUTH TWICE DAILY as needed for constipation 60 capsule 3    empagliflozin (JARDIANCE) 10 MG TABS tablet Take 1 tablet (10 mg) by mouth daily 90 tablet 4    furosemide (LASIX) 20 MG tablet Take 1 tablet (20 mg) by mouth daily as needed (swelling)      ondansetron (ZOFRAN ODT) 4 MG ODT tab Take 1 tablet (4 mg) by mouth every 8 hours as needed for nausea or vomiting 9 tablet 0    phentermine (ADIPEX-P) 37.5 MG tablet Take 0.5-1 tablets (18.75-37.5 mg) by mouth every morning (before breakfast) 90 tablet 1    sildenafil (VIAGRA) 100 MG tablet Take 0.5-1 tablets ( mg) by mouth daily as needed (ED) 30 tablet 1    naloxone (NARCAN) 4 MG/0.1ML nasal spray Spray 1 spray (4 mg) into one nostril alternating nostrils once as needed for opioid reversal every 2-3 minutes until assistance arrives 0.2 mL 11       Time:     The longitudinal plan of care for the diagnosis(es)/condition(s) as documented were addressed during this visit. Due to the  added complexity in care, I will continue to support Robles in the subsequent management and with ongoing continuity of care.     Pawan Castro MD  Answers submitted by the patient for this visit:  General Questionnaire (Submitted on 8/8/2024)  Chief Complaint: Chronic problems general questions HPI Form  What is the reason for your visit today? : rt shoulder pain  How many servings of fruits and vegetables do you eat daily?: 0-1  On average, how many sweetened beverages do you drink each day (Examples: soda, juice, sweet tea, etc.  Do NOT count diet or artificially sweetened beverages)?: 0  How many minutes a day do you exercise enough to make your heart beat faster?: 9 or less  How many days a week do you exercise enough to make your heart beat faster?: 3 or less  How many days per week do you miss taking your medication?: 0

## 2024-08-22 ENCOUNTER — TELEPHONE (OUTPATIENT)
Dept: INTERNAL MEDICINE | Facility: CLINIC | Age: 59
End: 2024-08-22
Payer: COMMERCIAL

## 2024-08-22 DIAGNOSIS — M25.511 ACUTE PAIN OF RIGHT SHOULDER: ICD-10-CM

## 2024-08-22 NOTE — TELEPHONE ENCOUNTER
Pt wanting to know what his lab results were from  8/15. Relayed that Dr. Castro has not had a chance to comment on them yet. Once he reviews he should get a call or letter in the mail.     Pt wanting a call if able to refill the medications requested.

## 2024-08-22 NOTE — TELEPHONE ENCOUNTER
New Medication Request        What medication are you requesting?: cyclobenzaprine, prednisone, ibuprofen    Reason for medication request: shoulder pain. Patient was referred to a specialist but cannot get in to see them until 9/9.      Controlled Substance Agreement on file:   CSA -- Patient Level:    CSA: None found at the patient level.         Preferred Pharmacy:    [id number] - [place], [place] - [address]  [address]  [place] [address]  Phone: [phone] Fax: [phone]      Okay to leave a detailed message?: Yes at Cell number on file:    Telephone Information:   Mobile [phone]

## 2024-08-23 RX ORDER — PREDNISONE 20 MG/1
40 TABLET ORAL DAILY
Qty: 10 TABLET | Refills: 0 | Status: SHIPPED | OUTPATIENT
Start: 2024-08-23 | End: 2024-08-28

## 2024-08-23 RX ORDER — IBUPROFEN 600 MG/1
600 TABLET, FILM COATED ORAL EVERY 6 HOURS PRN
Qty: 40 TABLET | Refills: 2 | Status: SHIPPED | OUTPATIENT
Start: 2024-08-23 | End: 2024-10-01

## 2024-08-23 RX ORDER — CYCLOBENZAPRINE HCL 10 MG
10 TABLET ORAL 3 TIMES DAILY PRN
Qty: 40 TABLET | Refills: 1 | Status: SHIPPED | OUTPATIENT
Start: 2024-08-23 | End: 2024-10-01

## 2024-08-23 NOTE — TELEPHONE ENCOUNTER
Chart review indicates cyclobenzaprine and ibuprofen were prescribed when he was in [facility] ER on 8/7 and prednisone was prescribed by Yisel on 8/8.    cyclobenzaprine (FLEXERIL) 10 MG tablet  Take 1 Tablet (10 mg) by mouth three times a day as needed for Muscle Spasms. 20 Tablet    08/07/2024     ibuprofen (MOTRIN) 400 MG tablet  Take 1 Tablet (400 mg) by mouth every 6 hours as needed for Pain. 30 Tablet    08/07/2024     predniSONE (DELTASONE) 20 MG tablet 10 tablet 0 8/8/2024 8/13/2024 No   Sig - Route: Take 2 tablets (40 mg) by mouth daily for 5 days - Oral

## 2024-09-16 ENCOUNTER — TELEPHONE (OUTPATIENT)
Dept: ADDICTION MEDICINE | Facility: CLINIC | Age: 59
End: 2024-09-16

## 2024-09-16 NOTE — TELEPHONE ENCOUNTER
Reason for call:  Other   Patient called regarding (reason for call): call back  Additional comments: pt reported he had a death in his immediate family and that is why his missed his last appt. Please call pt    Phone number to reach patient:  Cell number on file:    Telephone Information:   Mobile [phone]       Best Time:  anytime    Can we leave a detailed message on this number?  YES    Travel screening: Not Applicable

## 2024-09-16 NOTE — TELEPHONE ENCOUNTER
Attempted to call patient again. Call rang and rang as before.     Carrie Buorgeois RN on 9/16/2024 at 2:09 PM

## 2024-09-16 NOTE — TELEPHONE ENCOUNTER
Attempted to contact patient again. Call rang and rang and disconnected.     Carrie Bourgeois RN on 9/16/2024 at 1:10 PM

## 2024-09-16 NOTE — TELEPHONE ENCOUNTER
Attempted to call patient back. Call rang and rang and then call dropped.     Will continue outreach efforts.    Carrie Bourgeois RN on 9/16/2024 at 1:02 PM

## 2024-09-16 NOTE — TELEPHONE ENCOUNTER
RN attempted to call patient back but the call rang and rang with no ability to leave a VM.     RN sent a MyC text to patients phone to complete MyChart sign up to help facilitate communication.     Will continue to try to call patient.     Last visit: 24  Next visit: 24  No show: 9/10/24    Last Suboxone script sent:     buprenorphine HCl-naloxone HCl (SUBOXONE) 8-2 MG per film 90 Film 0 2024 -- No   Si sl qam, 1 sl qpm     Carrie Bourgeois RN on 2024 at 12:16 PM

## 2024-09-17 NOTE — TELEPHONE ENCOUNTER
"RN noted that patient had requested a call back regarding some issues that he wished to discuss with Dr. Tripp and/or nursing.       RN noted that RN colleague had made numerous attempts to phone the patient yesterday with no success.      2.  RN made a second attempt to call the patient.  The phone message stated, \"Sorry, your call cannot be completed at this time  Please hang up and try your call again later.\"  RN was not able to LVM    3.  RN will postpone this encounter with instructions for nursing to make another attempt to call the patient tomorrow.        Leonidas Chaudhary RN on 9/17/2024 at 9:10 AM   "

## 2024-09-18 NOTE — TELEPHONE ENCOUNTER
"RN made a 3rd attempt to contact this patient at Contact Information  [phone] (Home Phone) [phone] (Mobile).  RN was unable to reach the patient.  The phone message stated, \"Sorry, your call cannot be completed at this time  Please hang up and try your call again later.\"  RN was not able to LVM     1.  RN will postpone this encounter with instructions for nursing to make another attempt to call the patient tomorrow.      Leonidas Chaudhary RN on 9/18/2024 at 9:44 AM         "

## 2024-09-27 ENCOUNTER — TELEPHONE (OUTPATIENT)
Dept: ADDICTION MEDICINE | Facility: CLINIC | Age: 59
End: 2024-09-27
Payer: COMMERCIAL

## 2024-09-27 ENCOUNTER — OFFICE VISIT (OUTPATIENT)
Dept: ADDICTION MEDICINE | Facility: CLINIC | Age: 59
End: 2024-09-27
Payer: COMMERCIAL

## 2024-09-27 VITALS
WEIGHT: 310 LBS | HEART RATE: 76 BPM | BODY MASS INDEX: 43.85 KG/M2 | DIASTOLIC BLOOD PRESSURE: 78 MMHG | OXYGEN SATURATION: 99 % | SYSTOLIC BLOOD PRESSURE: 129 MMHG

## 2024-09-27 DIAGNOSIS — F11.21 OPIOID USE DISORDER, SEVERE, IN SUSTAINED REMISSION (H): Primary | ICD-10-CM

## 2024-09-27 DIAGNOSIS — F11.21 OPIOID USE DISORDER, SEVERE, IN SUSTAINED REMISSION (H): ICD-10-CM

## 2024-09-27 DIAGNOSIS — F11.93 OPIOID WITHDRAWAL (H): ICD-10-CM

## 2024-09-27 DIAGNOSIS — F43.21 GRIEF: Primary | ICD-10-CM

## 2024-09-27 LAB
AMPHETAMINE QUAL URINE POCT: NEGATIVE
BARBITURATE QUAL URINE POCT: NEGATIVE
BENZODIAZEPINE QUAL URINE POCT: NEGATIVE
BUPRENORPHINE QUAL URINE POCT: NEGATIVE
COCAINE QUAL URINE POCT: NEGATIVE
CREAT UR-MCNC: 63 MG/DL
CREATININE QUAL URINE POCT: ABNORMAL
INTERNAL QC QUAL URINE POCT: ABNORMAL
MDMA QUAL URINE POCT: NEGATIVE
METHADONE QUAL URINE POCT: NEGATIVE
METHAMPHETAMINE QUAL URINE POCT: NEGATIVE
OPIATE QUAL URINE POCT: NEGATIVE
OXYCODONE QUAL URINE POCT: NEGATIVE
PH QUAL URINE POCT: ABNORMAL
PHENCYCLIDINE QUAL URINE POCT: NEGATIVE
POCT KIT EXPIRATION DATE: ABNORMAL
POCT KIT LOT NUMBER: ABNORMAL
SPECIFIC GRAVITY POCT: 1
TEMPERATURE URINE POCT: ABNORMAL
THC QUAL URINE POCT: NEGATIVE

## 2024-09-27 PROCEDURE — 99214 OFFICE O/P EST MOD 30 MIN: CPT | Performed by: FAMILY MEDICINE

## 2024-09-27 PROCEDURE — 80305 DRUG TEST PRSMV DIR OPT OBS: CPT | Performed by: FAMILY MEDICINE

## 2024-09-27 PROCEDURE — G0481 DRUG TEST DEF 8-14 CLASSES: HCPCS | Mod: 59 | Performed by: FAMILY MEDICINE

## 2024-09-27 PROCEDURE — G2211 COMPLEX E/M VISIT ADD ON: HCPCS | Performed by: FAMILY MEDICINE

## 2024-09-27 PROCEDURE — 99207 PR NO CHARGE LOS: CPT

## 2024-09-27 RX ORDER — ONDANSETRON 4 MG/1
4 TABLET, ORALLY DISINTEGRATING ORAL EVERY 8 HOURS PRN
Qty: 9 TABLET | Refills: 0 | Status: SHIPPED | OUTPATIENT
Start: 2024-09-27

## 2024-09-27 RX ORDER — GABAPENTIN 300 MG/1
300 CAPSULE ORAL 3 TIMES DAILY PRN
Qty: 12 CAPSULE | Refills: 0 | Status: SHIPPED | OUTPATIENT
Start: 2024-09-27

## 2024-09-27 RX ORDER — BUPRENORPHINE AND NALOXONE 8; 2 MG/1; MG/1
FILM, SOLUBLE BUCCAL; SUBLINGUAL
Qty: 90 FILM | Refills: 0 | Status: SHIPPED | OUTPATIENT
Start: 2024-09-27

## 2024-09-27 RX ORDER — CLONIDINE HYDROCHLORIDE 0.1 MG/1
0.1 TABLET ORAL 3 TIMES DAILY PRN
Qty: 12 TABLET | Refills: 0 | Status: SHIPPED | OUTPATIENT
Start: 2024-09-27

## 2024-09-27 NOTE — PROGRESS NOTES
[facility]    Peer  met with Tobin Bryant in the Recovery Clinic to introduce herself, detail services provided and discuss current status of recovery. Pt appeared alert, oriented and open to feedback during our discussion.     Pt arrives for visit with provider for suboxone.  [facility] sees patient today under provider diagnosis of opioid substance disorder, severe, dependence  (H)     During the session, I engaged with Robles following his visit with Dr. Tripp. The primary focus of our discussion was on emotional regulation and the strategies for controlling emotions effectively.      Emotional Control:    I inquired whether Robles had discussed emotional control with Dr. Tripp or his therapist. He confirmed that he had received guidance on this topic from both professionals.  Robles expressed an understanding of the importance of managing his emotions and indicated a willingness to implement the strategies discussed.  Safety Assurance:    Robles assured me that he does not intend to cause harm to himself or others. This statement is crucial as it reflects his current mental state and commitment to maintaining safety for himself and those around him.  He articulated feelings of relief and positivity after his conversation with Dr. Tripp, suggesting that the session had a beneficial impact on his emotional well-being.  Emotional Well-being:    Robles reported feeling  great  after talking to Dr. Tripp, indicating a positive shift in his mood and outlook.  This improvement in emotional state is significant as it may enhance his engagement in recovery activities and support systems.  Plan Moving Forward:      Encourage ongoing communication with his therapist regarding any challenges he may face in managing emotions.  Reinforce coping strategies discussed during therapy sessions to ensure they are being utilized effectively.  Conclusion: Robles appears to be making progress in understanding  and managing his emotions, as evidenced by his positive feedback following discussions with Dr. Tripp.                                                                         [facility] provided business card to pt welcoming contact for recovery based support and resources. PRC and pt agree to speak again during an upcoming  visit.           Service Type:     Individual     Session Start Time:                         Session End Time:        Session Length:             Patient Goal:   To utilize suboxone assisted treatment for sobriety and long term recovery.     Goal Progress:   Ongoing.    Key Risk Factors to Recovery:   PRC encourages being aware of risk factors that can lead to re-use which include avoiding isolation, avoiding triggers and managing cravings in a healthy manner. being open and willing to acceptance and change on a daily basis.  PRC encourages pt to establish a sober network calling tree to reach out to when needed.  Continue to practice honesty with ourselves and trusted support person(s).   [facility] encourages regular attendance at recovery based meetings as well as finding a sponsor for mentoring and accountability.   PRC encourages consideration of other services such as counseling for mental health issues which can correlate with our substance use.      Support Needs:   Ongoing care, support and resources for opioid substance use disorder.     Follow up:   [facility] has provided pt with her contact information for support and resource needs.    PRC and pt agree to meet during an upcoming  visit.       [facility]  [address], [address]   [place], [place], [address]  Clinic Phone: [phone]  Clinic Fax: [phone]  Peer  phone: [phone]    Open Monday - Friday  9:00am-4:00pm  Walk in hours: 9am-3pm      Ingrid Alaniz  September 27, 2024  10:15 AM    [facility],  Provides clinical oversite and supervision of care [facility] provides clinical oversite  and supervision of care.

## 2024-09-27 NOTE — PROGRESS NOTES
[facility] - Addiction Medicine       Point of care urine drug screen positive for:  Lab Results   Component Value Date    BUP Negative 09/27/2024    BZO Negative 09/27/2024    BAR Negative 09/27/2024    MERCEDEZ Negative 09/27/2024    MAMP Negative 09/27/2024    AMP Negative 09/27/2024    MDMA Negative 09/27/2024    MTD Negative 09/27/2024    XJK540 Negative 09/27/2024    OXY Negative 09/27/2024    PCP Negative 09/27/2024    THC Negative 09/27/2024    TEMP Invalid (A) 09/27/2024    SGPOCT 1.005 09/27/2024       Vanessa Parada LPN  September 27, 2024  8:32 AM

## 2024-09-27 NOTE — PROGRESS NOTES
"     [facility]    A/P                                                    ASSESSMENT/PLAN    1. Opioid use disorder, severe, in sustained remission (H)  Needs improvement.  Return to use after running out of Suboxone again.  Last use was \"a few days ago\" but he does not appear to be in significant withdrawal.  Reviewed risks of precipitated withdrawal and the timing of first dose of Suboxone.  He feels comfortable with home induction and indicates he plans to wait a few more days.  He will be traveling back and forth to [place] to be with family and meet with police regarding his brother's death so I did provide a 1 month script of Suboxone and stressed the importance of follow-up prior to running out.  He confirms he has Narcan.  - Drugs of Abuse Screen Urine (POC CUPS) POCT  - buprenorphine HCl-naloxone HCl (SUBOXONE) 8-2 MG per film; 2 sl qam, 1 sl qpm  Dispense: 90 Film; Refill: 0  - Drug Confirmation Panel Urine with Creat - lab collect; Future  - Drug Confirmation Panel Urine with Creat - lab collect    2. Opioid withdrawal (H)  Reviewed use of comfort medications.  - cloNIDine (CATAPRES) 0.1 MG tablet; Take 1 tablet (0.1 mg) by mouth 3 times daily as needed (opioid withdrawal).  Dispense: 12 tablet; Refill: 0  - ondansetron (ZOFRAN ODT) 4 MG ODT tab; Take 1 tablet (4 mg) by mouth every 8 hours as needed for nausea or vomiting.  Dispense: 9 tablet; Refill: 0  - gabapentin (NEURONTIN) 300 MG capsule; Take 1 capsule (300 mg) by mouth 3 times daily as needed (opioid cravings).  Dispense: 12 capsule; Refill: 0    3. Grief  We discussed his brother's murder and the grief he is experiencing.  Validated his feelings.  He denies any SI.  Initially alluded to thoughts of hurting others but further discussion he denies this - he knows he would be at risk for harming someone if they were in his face and bothering him so he just avoids this circumstances, he does not have any specific person in " mind when he made initial comment, and has not intent or plan to harm anyone.  He does not have access to weapons.  He also states that he wants to be present for family and would never want to have assisted time.  He shares the music is a great way to distract himself and calm himself.  We discussed that therapy can be helpful in managing grief and the other emotions that come with it and I offered to connect him with a therapist, he will let me know if he wants to pursue this.            PDMP Review         Value Time User    State PDMP site checked  Yes 9/27/2024  8:34 AM Ernestina Tripp, DO              RTC  Return in about 4 weeks (around 10/25/2024) for Follow up, in person.    The longitudinal plan of care for the diagnosis(es)/condition(s) as documented were addressed during this visit. Due to the added complexity in care, I will continue to support Robles in the subsequent management and with ongoing continuity of care.      Counseled the patient on the importance of having a recovery program in addition to medication to manage recovery.  Components include avoiding isolating, having willingness to change, avoiding triggers and managing cravings. Encouraged having some type of sober network and practicing honesty with trusted support person(s). Encouraged other services such as counseling, 12 step or other self-help organizations.      Opioid warning reviewed.  Risk of overdose following a period of abstinence due to decrease tolerance was discussed including risk of death.  Strongly recommended abstain from alcohol, benzodiazepines, THC, opioids and other drugs of abuse.  Increased risk of return to opioid use after use of these substances discussed.  Increased risk of overdose/death with use of other substances particularly benzodiazepines/alcohol reviewed.        YOSELIN Rudd CONCHA Bryant is a 59 year old male with PMHx of HTN, morbid obesity,  "pre-diabetes, COPD/ashtma, YANIRA, anxiety, depression, and OUD who presents to clinic today for follow up.     Brief history of use:    Last use of illicit opioids was in approximately 2019.  Has been on buprenorphine for OUD since at least 2014.      +urine fentanyl on 4/18/24 and 5/16/24.  Confirmation urine drug test on 7/18 positive for fentanyl and xylazine.        Plan from most recent office visit (8/2/24):  1. Opioid use disorder, severe, in sustained remission (H)  Reporting symptoms are well controlled since resuming Suboxone after last visit, denies any other opioid use.  Recommend he continue Suboxone 24mg/day.  Has Narcan.  Reviewed benefits of psychosocial treatment including individual therapy, programming, and meetings.   - Drugs of Abuse Screen Urine (POC CUPS) POCT  - Drug Confirmation Panel Urine with Creat - lab collect; Future  - buprenorphine HCl-naloxone HCl (SUBOXONE) 8-2 MG per film; 2 sl qam, 1 sl qpm  Dispense: 90 Film; Refill: 0  - Drug Confirmation Panel Urine with Creat - lab collect     2. Nausea and vomiting, unspecified vomiting type  Recommend seeking care in ER if symptoms not improving.         TODAY'S VISIT  HPI Sep 27, 2024  - Brother killed in [place] end of August, man was arrested in [place] after murdering another person here  - Out of Suboxone x 1 month, phone encounter reviewed  - Using opioids intermittently since then, last use a few days ago  - Has experienced precipitated withdrawal so plans to wait even a few more days before starting  - Currently no significant withdrawal, usually starts around day 3 - sweating and yawning are first signs  - Family is supportive, going back to [place] in a few days  - Not sleeping well, sleep schedule is a bit variable  - \"Little bit of an appetite\"  - No SI or HI            6/16/2023    12:03 PM 4/18/2024     1:00 PM 7/18/2024    11:00 AM   PHQ   PHQ-9 Total Score 0 3 6   Q9: Thoughts of better off dead/self-harm past 2 weeks Not " at all Not at all Not at all           8/3/2022     7:12 AM 6/15/2023    12:00 PM 4/18/2024     1:00 PM   SHABBIR-7 SCORE   Total Score 0 (minimal anxiety)     Total Score 0 0 3       OBJECTIVE                                                    PHYSICAL EXAM:  /78 (BP Location: Left arm, Patient Position: Sitting, Cuff Size: Adult Large)   Pulse 76   Wt 140.6 kg (310 lb)   SpO2 99%   BMI 43.85 kg/m      GENERAL: healthy, alert and no distress  EYES: Eyes grossly normal to inspection, sclerae normal  RESP: No respiratory distress, no coughing or wheezing  SKIN: no pallor or jaundice  NEURO: Normal gait, no tremor, mentation intact and speech normal  MENTAL STATUS EXAM  Appearance/Behavior: No appearant distress  Speech: Normal  Mood/Affect: depressed, tearful  Insight: Adequate    LAB  Results for orders placed or performed in visit on 09/27/24   Urine Creatinine for Drug Screen Panel     Status: None   Result Value Ref Range    Creatinine Urine for Drug Screen 63 mg/dL   Drugs of Abuse Screen Urine (POC CUPS) POCT     Status: Abnormal   Result Value Ref Range    POCT Kit Lot Number [id number]     POCT Kit Expiration Date 2026-02-28     Temperature Urine POCT Invalid (A) 90 F, 92 F, 94 F, 96 F, 98 F, 100 F    Specific [place] POCT 1.005 1.005, 1.015, 1.025    pH Qual Urine POCT 7 pH 4 pH, 5 pH, 7 pH, 9 pH    Creatinine Qual Urine POCT 20 mg/dL 20 mg/dL, 50 mg/dL, 100 mg/dL, 200 mg/dL    Internal QC Qual Urine POCT Valid Valid    Amphetamine Qual Urine POCT Negative Negative    Barbiturate Qual Urine POCT Negative Negative    Buprenorphine Qual Urine POCT Negative Negative    Benzodiazepine Qual Urine POCT Negative Negative    Cocaine Qual Urine POCT Negative Negative    Methamphetamine Qual Urine POCT Negative Negative    MDMA Qual Urine POCT Negative Negative    Methadone Qual Urine POCT Negative Negative    Opiate Qual Urine POCT Negative Negative    Oxycodone Qual Urine POCT Negative Negative    Phencyclidine  Qual Urine POCT Negative Negative    THC Qual Urine POCT Negative Negative   Drug Confirmation Panel Urine with Creat - lab collect     Status: None (In process)    Narrative    The following orders were created for panel order Drug Confirmation Panel Urine with Creat - lab collect.  Procedure                               Abnormality         Status                     ---------                               -----------         ------                     Urine Drug Confirmation ...[[id number]]                      In process                 Urine Creatinine for Kenan...[[id number]]                      Final result                 Please view results for these tests on the individual orders.         HISTORY                                                    Problem list reviewed & adjusted, as indicated.  Patient Active Problem List   Diagnosis    Type 2 diabetes mellitus with diabetic polyneuropathy, without long-term current use of insulin (H)    Morbid obesity (H)    Opioid use disorder, severe, in sustained remission (H)    YANIRA on CPAP    Essential hypertension    Recurrent major depressive disorder, in full remission (H)    Chronic obstructive pulmonary disease with acute exacerbation (H)    Heart failure with preserved ejection fraction, unspecified HF chronicity (H)         MEDICATION LIST (prior to visit)  Current Outpatient Medications   Medication Sig Dispense Refill    albuterol (PROAIR HFA/PROVENTIL HFA/VENTOLIN HFA) 108 (90 Base) MCG/ACT inhaler INHALE 2 PUFFS BY MOUTH EVERY 6 HOURS AS NEEDED FOR WHEEZING 18 g 0    ARIPiprazole (ABILIFY) 10 MG tablet Take 1 tablet (10 mg) by mouth daily      aspirin (ASA) 81 MG EC tablet Take 1 tablet (81 mg) by mouth daily 90 tablet 3    atorvastatin (LIPITOR) 20 MG tablet Take 1 tablet (20 mg) by mouth daily 90 tablet 3    budesonide-formoterol (SYMBICORT) 80-4.5 MCG/ACT Inhaler INHALE 2 PUFFS BY MOUTH TWICE DAILY 10.2 g 11    buprenorphine HCl-naloxone HCl (SUBOXONE) 8-2  MG per film 2 sl qam, 1 sl qpm 90 Film 0    buPROPion (WELLBUTRIN XL) 150 MG 24 hr tablet Take 1 tablet (150 mg) by mouth every morning      cloNIDine (CATAPRES) 0.1 MG tablet Take 1 tablet (0.1 mg) by mouth 3 times daily as needed (opioid withdrawal). 12 tablet 0    cyclobenzaprine (FLEXERIL) 10 MG tablet Take 1 tablet (10 mg) by mouth 3 times daily as needed for muscle spasms. 40 tablet 1    docusate sodium (COLACE) 100 MG capsule TAKE 1 CAPSULE(100 MG) BY MOUTH TWICE DAILY as needed for constipation 60 capsule 3    empagliflozin (JARDIANCE) 10 MG TABS tablet Take 1 tablet (10 mg) by mouth daily 90 tablet 4    furosemide (LASIX) 20 MG tablet Take 1 tablet (20 mg) by mouth daily as needed (swelling)      gabapentin (NEURONTIN) 300 MG capsule Take 1 capsule (300 mg) by mouth 3 times daily as needed (opioid cravings). 12 capsule 0    ibuprofen (ADVIL/MOTRIN) 600 MG tablet Take 1 tablet (600 mg) by mouth every 6 hours as needed for moderate pain. 40 tablet 2    naloxone (NARCAN) 4 MG/0.1ML nasal spray Spray 1 spray (4 mg) into one nostril alternating nostrils once as needed for opioid reversal every 2-3 minutes until assistance arrives 0.2 mL 11    ondansetron (ZOFRAN ODT) 4 MG ODT tab Take 1 tablet (4 mg) by mouth every 8 hours as needed for nausea or vomiting. 9 tablet 0    phentermine (ADIPEX-P) 37.5 MG tablet Take 0.5-1 tablets (18.75-37.5 mg) by mouth every morning (before breakfast) 90 tablet 1    sildenafil (VIAGRA) 100 MG tablet Take 0.5-1 tablets ( mg) by mouth daily as needed (ED) 30 tablet 1     No current facility-administered medications for this visit.       MEDICATION LIST (after visit)  Current Outpatient Medications   Medication Sig Dispense Refill    albuterol (PROAIR HFA/PROVENTIL HFA/VENTOLIN HFA) 108 (90 Base) MCG/ACT inhaler INHALE 2 PUFFS BY MOUTH EVERY 6 HOURS AS NEEDED FOR WHEEZING 18 g 0    ARIPiprazole (ABILIFY) 10 MG tablet Take 1 tablet (10 mg) by mouth daily      aspirin (ASA) 81 MG  EC tablet Take 1 tablet (81 mg) by mouth daily 90 tablet 3    atorvastatin (LIPITOR) 20 MG tablet Take 1 tablet (20 mg) by mouth daily 90 tablet 3    budesonide-formoterol (SYMBICORT) 80-4.5 MCG/ACT Inhaler INHALE 2 PUFFS BY MOUTH TWICE DAILY 10.2 g 11    buprenorphine HCl-naloxone HCl (SUBOXONE) 8-2 MG per film 2 sl qam, 1 sl qpm 90 Film 0    buPROPion (WELLBUTRIN XL) 150 MG 24 hr tablet Take 1 tablet (150 mg) by mouth every morning      cloNIDine (CATAPRES) 0.1 MG tablet Take 1 tablet (0.1 mg) by mouth 3 times daily as needed (opioid withdrawal). 12 tablet 0    cyclobenzaprine (FLEXERIL) 10 MG tablet Take 1 tablet (10 mg) by mouth 3 times daily as needed for muscle spasms. 40 tablet 1    docusate sodium (COLACE) 100 MG capsule TAKE 1 CAPSULE(100 MG) BY MOUTH TWICE DAILY as needed for constipation 60 capsule 3    empagliflozin (JARDIANCE) 10 MG TABS tablet Take 1 tablet (10 mg) by mouth daily 90 tablet 4    furosemide (LASIX) 20 MG tablet Take 1 tablet (20 mg) by mouth daily as needed (swelling)      gabapentin (NEURONTIN) 300 MG capsule Take 1 capsule (300 mg) by mouth 3 times daily as needed (opioid cravings). 12 capsule 0    ibuprofen (ADVIL/MOTRIN) 600 MG tablet Take 1 tablet (600 mg) by mouth every 6 hours as needed for moderate pain. 40 tablet 2    naloxone (NARCAN) 4 MG/0.1ML nasal spray Spray 1 spray (4 mg) into one nostril alternating nostrils once as needed for opioid reversal every 2-3 minutes until assistance arrives 0.2 mL 11    ondansetron (ZOFRAN ODT) 4 MG ODT tab Take 1 tablet (4 mg) by mouth every 8 hours as needed for nausea or vomiting. 9 tablet 0    phentermine (ADIPEX-P) 37.5 MG tablet Take 0.5-1 tablets (18.75-37.5 mg) by mouth every morning (before breakfast) 90 tablet 1    sildenafil (VIAGRA) 100 MG tablet Take 0.5-1 tablets ( mg) by mouth daily as needed (ED) 30 tablet 1     No current facility-administered medications for this visit.         Allergies   Allergen Reactions    Seafood  Other (See Comments)     Eyes turn yellow    Ibuprofen Nausea and Vomiting       Ernestina Tripp, DO  [facility]  [facility]  [phone]

## 2024-09-27 NOTE — PROGRESS NOTES
[facility]       Rooming information:    - Pt declined to complete PHQ9/GAD7      PHQ-9 Scores:       6/16/2023    12:03 PM 4/18/2024     1:00 PM 7/18/2024    11:00 AM   PHQ   PHQ-9 Total Score 0 3 6   Q9: Thoughts of better off dead/self-harm past 2 weeks Not at all Not at all Not at all     SHABBIR-7 Scores:      8/3/2022     7:12 AM 6/15/2023    12:00 PM 4/18/2024     1:00 PM   SHABBIR-7 SCORE   Total Score 0 (minimal anxiety)     Total Score 0 0 3       Any other recent substance use:     Pt stated yes but does not recall what he took    NICOTINE-No  If using nicotine, ready to quit? No    Side effects related to medications pt would like to discuss with provider (constipation, dry mouth, HA, GI upset, sedation?) No, but has been out of suboxone for about a month so has been using, will need refill on prescription     Narcan currently available: Yes    Primary care provider: Pawan Castro MD     Mental health provider: Yrn  (follow up on MH referral if needed)    Any housing, insurance deficits?: no issues, but planning to move, need a new change    Contact information up to date? Yes     3rd Party Involvemen N/A  (please obtain ANAYELI if pt would like to include)        Pal Arboleda MA  September 27, 2024  8:23 AM
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
## 2024-09-28 DIAGNOSIS — F11.93 OPIOID WITHDRAWAL (H): ICD-10-CM

## 2024-09-30 RX ORDER — GABAPENTIN 300 MG/1
CAPSULE ORAL
Qty: 12 CAPSULE | Refills: 0 | OUTPATIENT
Start: 2024-09-30

## 2024-09-30 NOTE — TELEPHONE ENCOUNTER
"Date of Last Office Visit: 9/27/24  Date of Next Office Visit:  11/1/24  No shows since last visit: No  More than one patient-initiated cancellation (with reschedule) since last seen in clinic? No    []Medication refilled per  Medication Refill in Ambulatory Care  policy.  [x]Medication unable to be refilled by RN due to criteria not met as indicated below:    []Eligibility: has not had a provider visit within last 6 months   []Supervision: no future appointment; < 7 days before next appointment   []Compliance: no shows; cancellations; lapse in therapy   []Verification: order discrepancy; may need modification...   [] > 30-day supply request   []Advanced refill request: > 7 days before refill date   [x]Controlled medication   []Medication not included in policy   []Review: new med; med adjusted ? 30 days; safety alert; requires lab monitoring...   []Scope of Practice: refill request processed by LPN/MA   []Other:      Medication(s) requested:     -  gabapentin (NEURONTIN) 300 MG capsule   Date last ordered: 9/27/24  Qty: 12  Refills: 0      Any Controlled Substance(s)? Yes   [place]  checked? Yes   gabapentin (NEURONTIN) 300 MG capsule  was last sold on 9/27/24 for quantity of 12.  Other controlled substance on [place] ?: Yes   If yes, are any new medications? No      Requested medication(s) verified as identical to current order? Yes    Any lapse in adherence to medication(s) greater than 5 days? No      Additional action taken? routed encounter to provider for review.      Last visit treatment plan:     A/P                                                    ASSESSMENT/PLAN     1. Opioid use disorder, severe, in sustained remission (H)  Needs improvement.  Return to use after running out of Suboxone again.  Last use was \"a few days ago\" but he does not appear to be in significant withdrawal.  Reviewed risks of precipitated withdrawal and the timing of first dose of Suboxone.  He feels comfortable with home induction " and indicates he plans to wait a few more days.  He will be traveling back and forth to [place] to be with family and meet with police regarding his brother's death so I did provide a 1 month script of Suboxone and stressed the importance of follow-up prior to running out.  He confirms he has Narcan.  - Drugs of Abuse Screen Urine (POC CUPS) POCT  - buprenorphine HCl-naloxone HCl (SUBOXONE) 8-2 MG per film; 2 sl qam, 1 sl qpm  Dispense: 90 Film; Refill: 0  - Drug Confirmation Panel Urine with Creat - lab collect; Future  - Drug Confirmation Panel Urine with Creat - lab collect     2. Opioid withdrawal (H)  Reviewed use of comfort medications.  - cloNIDine (CATAPRES) 0.1 MG tablet; Take 1 tablet (0.1 mg) by mouth 3 times daily as needed (opioid withdrawal).  Dispense: 12 tablet; Refill: 0  - ondansetron (ZOFRAN ODT) 4 MG ODT tab; Take 1 tablet (4 mg) by mouth every 8 hours as needed for nausea or vomiting.  Dispense: 9 tablet; Refill: 0  - gabapentin (NEURONTIN) 300 MG capsule; Take 1 capsule (300 mg) by mouth 3 times daily as needed (opioid cravings).  Dispense: 12 capsule; Refill: 0     3. Grief  We discussed his brother's murder and the grief he is experiencing.  Validated his feelings.  He denies any SI.  Initially alluded to thoughts of hurting others but further discussion he denies this - he knows he would be at risk for harming someone if they were in his face and bothering him so he just avoids this circumstances, he does not have any specific person in mind when he made initial comment, and has not intent or plan to harm anyone.  He does not have access to weapons.  He also states that he wants to be present for family and would never want to have long-term time.  He shares the music is a great way to distract himself and calm himself.  We discussed that therapy can be helpful in managing grief and the other emotions that come with it and I offered to connect him with a therapist, he will let me know if he wants  to pursue this.      RTC  Return in about 4 weeks (around 10/25/2024) for Follow up, in person.       Any medication(s) require lab monitoring? No

## 2024-09-30 NOTE — TELEPHONE ENCOUNTER
Please reach out to Robles to see how he is doing in terms of resuming Suboxone.  The gabapentin was to be temporary comfort medication while he was starting Suboxone.    Thanks!    Ernestina Tripp, DO on 9/30/2024 at 4:37 PM

## 2024-10-01 ENCOUNTER — OFFICE VISIT (OUTPATIENT)
Dept: PHYSICAL MEDICINE AND REHAB | Facility: CLINIC | Age: 59
End: 2024-10-01
Attending: INTERNAL MEDICINE
Payer: COMMERCIAL

## 2024-10-01 VITALS
HEART RATE: 65 BPM | BODY MASS INDEX: 42.66 KG/M2 | WEIGHT: 298 LBS | DIASTOLIC BLOOD PRESSURE: 88 MMHG | HEIGHT: 70 IN | SYSTOLIC BLOOD PRESSURE: 134 MMHG

## 2024-10-01 DIAGNOSIS — M25.511 ACUTE PAIN OF RIGHT SHOULDER: ICD-10-CM

## 2024-10-01 DIAGNOSIS — M54.12 CERVICAL RADICULOPATHY: Primary | ICD-10-CM

## 2024-10-01 DIAGNOSIS — F11.93 OPIOID WITHDRAWAL (H): ICD-10-CM

## 2024-10-01 DIAGNOSIS — R20.2 TINGLING OF RIGHT UPPER EXTREMITY: ICD-10-CM

## 2024-10-01 PROCEDURE — 99204 OFFICE O/P NEW MOD 45 MIN: CPT | Performed by: NURSE PRACTITIONER

## 2024-10-01 RX ORDER — IBUPROFEN 600 MG/1
600 TABLET, FILM COATED ORAL EVERY 6 HOURS PRN
Qty: 40 TABLET | Refills: 2 | Status: SHIPPED | OUTPATIENT
Start: 2024-10-01

## 2024-10-01 RX ORDER — CYCLOBENZAPRINE HCL 10 MG
10 TABLET ORAL 3 TIMES DAILY PRN
Qty: 40 TABLET | Refills: 1 | Status: SHIPPED | OUTPATIENT
Start: 2024-10-01

## 2024-10-01 ASSESSMENT — PAIN SCALES - GENERAL: PAINLEVEL: MILD PAIN (2)

## 2024-10-01 NOTE — LETTER
10/1/2024      Tobin Bryant  [address]   [address]  [place] [address]      Dear Colleague,    Thank you for referring your patient, Tobin Bryant, to the [facility]. Please see a copy of my visit note below.    ASSESSMENT: Tobin Bryant is a 59 year old male presents for consultation at the request of PCP Pawan Castro, who presents today for new patient evaluation of :     -tingling of right upper extremity    Dec sensation R forearm, otherwise full strength, reflexes intact  Given sx overall significantly improving, I recommended starting PT combined with prn flexeril and ibuprofen. Refilled these today. Orders placed for PT. We talked about the use of gabapentin for neuropathic pain. Since symptoms are improving and he has not started it yet as a prn for opioid cravings, would not suggest scheduled basis at this time. Follow up in 6 wks for symptom review           No data to display                     Diagnoses and all orders for this visit:  Cervical radiculopathy  -     ibuprofen (ADVIL/MOTRIN) 600 MG tablet; Take 1 tablet (600 mg) by mouth every 6 hours as needed for moderate pain.  -     cyclobenzaprine (FLEXERIL) 10 MG tablet; Take 1 tablet (10 mg) by mouth 3 times daily as needed for muscle spasms.  -     Physical Therapy  Referral; Future  Tingling of right upper extremity  -     Spine  Referral  Opioid withdrawal (H)  Acute pain of right shoulder       PLAN:  Reviewed spine anatomy and disease process. Discussed diagnosis and treatment options with the patient today. A shared decision making model was used. The patient's values and choices were respected. The following represents what was discussed and decided upon by the provider and the patient.     -DIAGNOSTIC TESTS:  Images were personally reviewed and interpreted and explained to patient today using spine model.   --no new imaging    -PHYSICAL THERAPY:    -PT referral placed  today  Discussed the importance of core strengthening, ROM, stretching exercises with the patient and how each of these entities is important in decreasing pain.  Explained to the patient that the purpose of physical therapy is to teach the patient a home exercise program.  These exercises need to be performed every day in order to decrease pain and prevent future occurrences of pain.    -MEDICATIONS:    -refilled ibuprofen and flexeril  Discussed multiple medication options today with patient. Discussed risks, side effects, and proper use of medications. Patient verbalized understanding.    -INTERVENTIONS:    -Discussed the role of injections with patient today. Patient would be a good candidate in the future for either epidural steroid injections or medial branch blocks if indicated based on symptoms and supported by imaging results.    -PATIENT EDUCATION: Total time of 40 minutes, on the day of service, spent with the patient, reviewing the chart, placing orders, and documenting.   -Today we also discussed the issues related to the pros and cons of the current treatment plan.    -FOLLOW-UP: 4-6wks     Advised patient to call the [facility] if symptoms worsen or if they develop red flag symptoms such as numbness, weakness, severe pain uncontrolled by current pain med regimen, or any new or worsening problems controlling bladder and bowel function.   ______________________________________________________________________    SUBJECTIVE:   Tobin Bryant  is a 59 year old male hx type 2 diabetes, COPD, YANIRA, morbid obesity, htn, heart failure, depression, opioid use disorder severe in sustained remission who presents today for new patient evaluation of tingling of right upper extremity    1mo atraumatic severe R shoulder pain down the right arm and into the medial hand, with numbness in this distribution as well. No position was comfortable. The arm felt weak. He was seen in the ED 8/7. Ever since he started taking  the meds from ED (ibuprofen, flexeril 10, lidocaine cream) and prednisone course from PCP (seen 8/8, 8/15), it's been mild but still there. 1.5-2/10. Tingling is just in the forearm now. Worse in the morning. Not worse with shoulder position. Has continued taking ibuprofen, flexeril PRN. PT referral was placed 8/8, he does not report being contacted.    He denies face numbness, leg numbness, weakness, imbalance, or changes in bowel or bladder control.      -Treatment to Date:     -Medications:    Current Outpatient Medications   Medication Sig Dispense Refill     cyclobenzaprine (FLEXERIL) 10 MG tablet Take 1 tablet (10 mg) by mouth 3 times daily as needed for muscle spasms. 40 tablet 1     ibuprofen (ADVIL/MOTRIN) 600 MG tablet Take 1 tablet (600 mg) by mouth every 6 hours as needed for moderate pain. 40 tablet 2     albuterol (PROAIR HFA/PROVENTIL HFA/VENTOLIN HFA) 108 (90 Base) MCG/ACT inhaler INHALE 2 PUFFS BY MOUTH EVERY 6 HOURS AS NEEDED FOR WHEEZING 18 g 0     ARIPiprazole (ABILIFY) 10 MG tablet Take 1 tablet (10 mg) by mouth daily       aspirin (ASA) 81 MG EC tablet Take 1 tablet (81 mg) by mouth daily 90 tablet 3     atorvastatin (LIPITOR) 20 MG tablet Take 1 tablet (20 mg) by mouth daily 90 tablet 3     budesonide-formoterol (SYMBICORT) 80-4.5 MCG/ACT Inhaler INHALE 2 PUFFS BY MOUTH TWICE DAILY 10.2 g 11     buprenorphine HCl-naloxone HCl (SUBOXONE) 8-2 MG per film 2 sl qam, 1 sl qpm 90 Film 0     buPROPion (WELLBUTRIN XL) 150 MG 24 hr tablet Take 1 tablet (150 mg) by mouth every morning       cloNIDine (CATAPRES) 0.1 MG tablet Take 1 tablet (0.1 mg) by mouth 3 times daily as needed (opioid withdrawal). 12 tablet 0     docusate sodium (COLACE) 100 MG capsule TAKE 1 CAPSULE(100 MG) BY MOUTH TWICE DAILY as needed for constipation 60 capsule 3     empagliflozin (JARDIANCE) 10 MG TABS tablet Take 1 tablet (10 mg) by mouth daily 90 tablet 4     furosemide (LASIX) 20 MG tablet Take 1 tablet (20 mg) by mouth  daily as needed (swelling)       gabapentin (NEURONTIN) 300 MG capsule Take 1 capsule (300 mg) by mouth 3 times daily as needed (opioid cravings). 12 capsule 0     naloxone (NARCAN) 4 MG/0.1ML nasal spray Spray 1 spray (4 mg) into one nostril alternating nostrils once as needed for opioid reversal every 2-3 minutes until assistance arrives 0.2 mL 11     ondansetron (ZOFRAN ODT) 4 MG ODT tab Take 1 tablet (4 mg) by mouth every 8 hours as needed for nausea or vomiting. 9 tablet 0     phentermine (ADIPEX-P) 37.5 MG tablet Take 0.5-1 tablets (18.75-37.5 mg) by mouth every morning (before breakfast) 90 tablet 1     sildenafil (VIAGRA) 100 MG tablet Take 0.5-1 tablets ( mg) by mouth daily as needed (ED) 30 tablet 1     No current facility-administered medications for this visit.       Allergies   Allergen Reactions     Seafood Other (See Comments)     Eyes turn yellow     Ibuprofen Nausea and Vomiting       Past Medical History:   Diagnosis Date     Anxiety and depression      Foot pain      GERD (gastroesophageal reflux disease)      Hypertension      Impaired physical mobility      Increased PTH level 9/5/2019     Low back pain      Low serum testosterone level 9/5/2019     Lower leg edema      Morbid obesity with BMI of 45.0-49.9, adult (H)      Pre-diabetes      Sleep apnea, obstructive     CPAP     Vitamin deficiency 9/5/2019        Patient Active Problem List   Diagnosis     Type 2 diabetes mellitus with diabetic polyneuropathy, without long-term current use of insulin (H)     Morbid obesity (H)     Opioid use disorder, severe, in sustained remission (H)     YANIRA on CPAP     Essential hypertension     Recurrent major depressive disorder, in full remission (H)     Chronic obstructive pulmonary disease with acute exacerbation (H)     Heart failure with preserved ejection fraction, unspecified HF chronicity (H)       Past Surgical History:   Procedure Laterality Date     CATARACT EXTRACTION Right      KELOID  "EXCISION      neck       Family History   Problem Relation Age of Onset     No Known Problems Mother      No Known Problems Father      No Known Problems Sister      No Known Problems Sister      No Known Problems Sister      No Known Problems Sister      No Known Problems Brother      No Known Problems Brother      No Known Problems Daughter      No Known Problems Son        Reviewed past medical, surgical, and family history with patient found on new patient intake packet located in EMR Media tab.     SOCIAL HX: did not fill out    Oswestry (SHARON) Questionnaire:         No data to display                Neck Disability Index:       No data to display                   PHQ-2 Score:       3/11/2014     1:41 PM   PHQ-2 ( 1999 [employer])   Q1: Little interest or pleasure in doing things 0   Q2: Feeling down, depressed or hopeless 0   PHQ-2 Score 0          ROS:  Specifically negative for bowel/bladder dysfunction, balance changes. Did not fill out intake questionnaire    OBJECTIVE:  /88   Pulse 65   Ht 5' 10\" (1.778 m)   Wt 298 lb (135.2 kg)   BMI 42.76 kg/m      PHYSICAL EXAMINATION:  --CONSTITUTIONAL: Vital signs as above. No acute distress. The patient is well nourished and well groomed.  --PSYCHIATRIC: The patient is awake, alert, oriented to person, place, and time, and answering questions appropriately with clear speech. Appropriate mood and affect   --HEENT: Sclera are non-injected. Extraocular muscles are intact. Moist oral mucosa.  --SKIN: Skin over the face, bilateral upper extremities, and posterior torso is clean, dry, intact without rashes.  --LYMPH NODES: No palpable or tender anterior/posterior cervical, submandibular, or supraclavicular lymph nodes.  --RESPIRATORY: Normal rhythm and effort. No abnormal accessory muscle breathing patterns noted.     --NEUROLOGIC: CN III-XII are grossly intact.   --GROSS MOTOR: Easily arises from a seated position. Toe walking, heel walking, and tandem gait are " normal.      --UPPER EXTREMITY MOTOR TESTING:  Shoulder abduction: right 5/5, left 5/5  Triceps: right 5/5 left 5/5  Biceps:right 5/5  left 5/5,    Hand :  right 5/5  left 5/5,   Intrinsics: right 5/5  left 5/5,   Extensors: right 5/5  left 5/5,     --LOWER EXTREMITY MOTOR TESTING  Hip flexion: right 5/5  left 5/5,   Quads:right 5/5  left 5/5,   Hamstrings: right 5/5  left 5/5,   Dorsiflexion: right 5/5  left 5/5,   Plantarflexion: right 5/5  left 5/5,   EHL: right 5/5  left 5/5,     REFLEXES: 1/4 symmetric triceps, biceps, brachioradialis bilaterally. 1/4 symmetric patellar, achilles reflex bilaterally.  Negative Clonus, Babinski, and Cuellar's bilaterally.      SENSATION: decreased in the right forearm, otherwise intact throughout the upper and lower extremities    --VASCULAR: Warm upper and lower limbs bilaterally. No swelling or color change noted.    --CERVICAL SPINE: Inspection reveals no evidence of deformity or swelling. No point tenderness to palpation of cervical spine. No tenderness to palpation of traps, scaps, or paraspinal musculature.    --SHOULDERS: Full range of motion bilaterally: abduction, flexion, cross chest movement internal/external rotation.  No tenderness to palpation or swelling noted of AC joint.    --WRISTS: Full range of motion bilaterally, negative tinel and phalen signs bilaterally.      RESULTS:   Prior medical records from [facility] and [facility] Everywhere were reviewed today.         IMAGING:  Shoulder xr report reviewed. No images available. No spine specific imaging done.      XR Shoulder Right 2 Views  Order: [id number]  Narrative    EXAM: XR SHOULDER RT AP/Y/AXILLARY  LOCATION: [facility]  DATE: 8/7/2024    INDICATION: Pain, PAIN  COMPARISON: 4/21/2022    IMPRESSION:    No evidence of acute fracture or dislocation.    Minimal degenerative changes of the glenohumeral and acromioclavicular joints.    Soft tissues grossly unremarkable.    Exam End: 08/07/24  8:06 PM           This note was dictated using voice recognition software. Any grammatical or context distortions are unintentional and inherent to the software.       [facility]agan P[facility]  [facility]  O. [phone]      Again, thank you for allowing me to participate in the care of your patient.        Sincerely,        BRANDAN Linares CNP

## 2024-10-01 NOTE — TELEPHONE ENCOUNTER
Reviewed Dr. Tripp's directive to call the patient re: see how he is doing in terms of resuming Suboxone.  The gabapentin was to be temporary comfort medication while he was starting Suboxone.    RN called the patient at [phone], he was at a dentist appointment and asked for a call back later.     Beth Gómez RN on 10/1/2024 at 11:00 AM

## 2024-10-01 NOTE — PATIENT INSTRUCTIONS
Flexeril 10mg three times daily as needed was refilled today  Ibuprofen 600mg three times daily as needed was refilled today  Please continue these medications as needed until your next appointment        ~You have been referred for Physical Therapy to [facility]. They will call you to schedule an appointment.      Scheduling phone number is [phone] for [facility], or [place] location.  If you have not heard from the scheduling office within 2 business days, please call [phone] for ALL other locations.    Discussed the importance of core strengthening, ROM, stretching exercises and how each of these entities is important in decreasing pain and improving long term spine health.  The purpose of physical therapy is to teach you an individualized home exercise program.  These exercises need to be performed every day in order to decrease pain and prevent future occurrences of pain.             Radicular Pain    Radicular pain in either the arm or leg is usually from a bulging disc in the spine. A portion of the herniated disc may press against the nerves as the nerves exit the spine. This causes pain which is felt down the arm or leg. Other causes of radicular pain may include:  Fractures.  Heart disease.  Cancer.  An abnormal and usually degenerative state of the nervous system or nerves (neuropathy).    In most cases, radicular pain is treated without imaging unless symptoms do not start to improve. If that is the case, your provider may order a CT or MRI scan to determine the cause.     Nerves in the cervical spine (neck) may cause radicular pain into the outer shoulder and down the arm. It can spread down to the thumb and fingers. The symptoms vary depending on which nerve root has been affected. In most cases, radicular pain improves with conservative treatment such as physical therapy, cervical traction, medications, and epidural steroid injections. A  "program for neck rehabilitation with stretching and strengthening exercises is an important part of management. Treatment may take months, and surgery may be considered as a last resort if the symptoms do not improve.    Nerves in the lumbar spine (lower back) may cause radicular pain into the hip and down the leg. The commonly used term for this type of pain is \"sciatica\". Conservative treatment is also recommended for this problem. Most patients feel better after 2 to 4 weeks of rest and other supportive care. You should avoid bending, lifting, and all other activities which can make your pain worse. Physical therapy, traction, medications, and epidural steroid injections can be good options to help with your recovery. A program for back injury rehabilitation with stretching and strengthening exercises is an important part of management. Surgery may be considered as a last resort if symptoms do not improve with conservative treatment.     You may take over-the-counter or prescription medicines for pain, discomfort, or fever as directed by your caregiver. Muscle relaxants may help by relieving more severe pain and spasm. Neuropathic medication (such as gabapentin, lyrica, or cymbalta) can help decrease your symptoms of nerve pain as well. Cold or massage can also give significant relief. Spinal manipulation is not recommended as it can increase the degree of disc protrusion. We do not recommend taking narcotic medication such as percocet, oxycodone, norco, dilaudid, or others unless pain is severe and not controlled with any other oral options.    Epidural steroid injections are often effective treatment for radicular pain. These injections deliver strong anti-inflammatory medicine to the area directly around the nerve root in the space between your back bones (vertebrae). Your provider can give you more information about steroid injections. These injections are most effective when given within two weeks of the " onset of acute pain. You should see your provider for follow up care as recommended.     In most cases, radicular pain is treated without imaging unless symptoms do not start to improve. If that is the case, your provider may order a CT or MRI scan to determine the cause.     SEEK IMMEDIATE MEDICAL CARE IF:  You develop increased pain, weakness, or numbness in your arm or leg.  You develop difficulty with bladder or bowel control.  You develop abdominal pain.    Document Released: 01/25/2006 Document Revised: 03/11/2013 Document Reviewed: 04/12/2010  Patient Information  2013 [employer].       ~Please call our [facility] Nurse Navigation line [phone] with any questions or concerns about your treatment plan, if symptoms worsen and you would like to be seen urgently, or if you have any new or worsening numbness, weakness, or problems controlling bladder and bowel function.  ~You are also welcome to contact Yane Hagan via [facility], but please be aware that responses to [facility] message may take 2-3 days due to the high volume of patients seen in clinic.

## 2024-10-01 NOTE — PROGRESS NOTES
ASSESSMENT: Tobin Bryant is a 59 year old male presents for consultation at the request of PCP Pawan Castro, who presents today for new patient evaluation of :     -tingling of right upper extremity    Dec sensation R forearm, otherwise full strength, reflexes intact  Given sx overall significantly improving, I recommended starting PT combined with prn flexeril and ibuprofen. Refilled these today. Orders placed for PT. We talked about the use of gabapentin for neuropathic pain. Since symptoms are improving and he has not started it yet as a prn for opioid cravings, would not suggest scheduled basis at this time. Follow up in 6 wks for symptom review           No data to display                     Diagnoses and all orders for this visit:  Cervical radiculopathy  -     ibuprofen (ADVIL/MOTRIN) 600 MG tablet; Take 1 tablet (600 mg) by mouth every 6 hours as needed for moderate pain.  -     cyclobenzaprine (FLEXERIL) 10 MG tablet; Take 1 tablet (10 mg) by mouth 3 times daily as needed for muscle spasms.  -     Physical Therapy  Referral; Future  Tingling of right upper extremity  -     Spine  Referral  Opioid withdrawal (H)  Acute pain of right shoulder       PLAN:  Reviewed spine anatomy and disease process. Discussed diagnosis and treatment options with the patient today. A shared decision making model was used. The patient's values and choices were respected. The following represents what was discussed and decided upon by the provider and the patient.     -DIAGNOSTIC TESTS:  Images were personally reviewed and interpreted and explained to patient today using spine model.   --no new imaging    -PHYSICAL THERAPY:    -PT referral placed today  Discussed the importance of core strengthening, ROM, stretching exercises with the patient and how each of these entities is important in decreasing pain.  Explained to the patient that the purpose of physical therapy is to teach the patient a home  exercise program.  These exercises need to be performed every day in order to decrease pain and prevent future occurrences of pain.    -MEDICATIONS:    -refilled ibuprofen and flexeril  Discussed multiple medication options today with patient. Discussed risks, side effects, and proper use of medications. Patient verbalized understanding.    -INTERVENTIONS:    -Discussed the role of injections with patient today. Patient would be a good candidate in the future for either epidural steroid injections or medial branch blocks if indicated based on symptoms and supported by imaging results.    -PATIENT EDUCATION: Total time of 40 minutes, on the day of service, spent with the patient, reviewing the chart, placing orders, and documenting.   -Today we also discussed the issues related to the pros and cons of the current treatment plan.    -FOLLOW-UP: 4-6wks     Advised patient to call the [facility] if symptoms worsen or if they develop red flag symptoms such as numbness, weakness, severe pain uncontrolled by current pain med regimen, or any new or worsening problems controlling bladder and bowel function.   ______________________________________________________________________    SUBJECTIVE:   Tobin Bryant  is a 59 year old male hx type 2 diabetes, COPD, YANIRA, morbid obesity, htn, heart failure, depression, opioid use disorder severe in sustained remission who presents today for new patient evaluation of tingling of right upper extremity    1mo atraumatic severe R shoulder pain down the right arm and into the medial hand, with numbness in this distribution as well. No position was comfortable. The arm felt weak. He was seen in the ED 8/7. Ever since he started taking the meds from ED (ibuprofen, flexeril 10, lidocaine cream) and prednisone course from PCP (seen 8/8, 8/15), it's been mild but still there. 1.5-2/10. Tingling is just in the forearm now. Worse in the morning. Not worse with shoulder position. Has  continued taking ibuprofen, flexeril PRN. PT referral was placed 8/8, he does not report being contacted.    He denies face numbness, leg numbness, weakness, imbalance, or changes in bowel or bladder control.      -Treatment to Date:     -Medications:    Current Outpatient Medications   Medication Sig Dispense Refill    cyclobenzaprine (FLEXERIL) 10 MG tablet Take 1 tablet (10 mg) by mouth 3 times daily as needed for muscle spasms. 40 tablet 1    ibuprofen (ADVIL/MOTRIN) 600 MG tablet Take 1 tablet (600 mg) by mouth every 6 hours as needed for moderate pain. 40 tablet 2    albuterol (PROAIR HFA/PROVENTIL HFA/VENTOLIN HFA) 108 (90 Base) MCG/ACT inhaler INHALE 2 PUFFS BY MOUTH EVERY 6 HOURS AS NEEDED FOR WHEEZING 18 g 0    ARIPiprazole (ABILIFY) 10 MG tablet Take 1 tablet (10 mg) by mouth daily      aspirin (ASA) 81 MG EC tablet Take 1 tablet (81 mg) by mouth daily 90 tablet 3    atorvastatin (LIPITOR) 20 MG tablet Take 1 tablet (20 mg) by mouth daily 90 tablet 3    budesonide-formoterol (SYMBICORT) 80-4.5 MCG/ACT Inhaler INHALE 2 PUFFS BY MOUTH TWICE DAILY 10.2 g 11    buprenorphine HCl-naloxone HCl (SUBOXONE) 8-2 MG per film 2 sl qam, 1 sl qpm 90 Film 0    buPROPion (WELLBUTRIN XL) 150 MG 24 hr tablet Take 1 tablet (150 mg) by mouth every morning      cloNIDine (CATAPRES) 0.1 MG tablet Take 1 tablet (0.1 mg) by mouth 3 times daily as needed (opioid withdrawal). 12 tablet 0    docusate sodium (COLACE) 100 MG capsule TAKE 1 CAPSULE(100 MG) BY MOUTH TWICE DAILY as needed for constipation 60 capsule 3    empagliflozin (JARDIANCE) 10 MG TABS tablet Take 1 tablet (10 mg) by mouth daily 90 tablet 4    furosemide (LASIX) 20 MG tablet Take 1 tablet (20 mg) by mouth daily as needed (swelling)      gabapentin (NEURONTIN) 300 MG capsule Take 1 capsule (300 mg) by mouth 3 times daily as needed (opioid cravings). 12 capsule 0    naloxone (NARCAN) 4 MG/0.1ML nasal spray Spray 1 spray (4 mg) into one nostril alternating nostrils  once as needed for opioid reversal every 2-3 minutes until assistance arrives 0.2 mL 11    ondansetron (ZOFRAN ODT) 4 MG ODT tab Take 1 tablet (4 mg) by mouth every 8 hours as needed for nausea or vomiting. 9 tablet 0    phentermine (ADIPEX-P) 37.5 MG tablet Take 0.5-1 tablets (18.75-37.5 mg) by mouth every morning (before breakfast) 90 tablet 1    sildenafil (VIAGRA) 100 MG tablet Take 0.5-1 tablets ( mg) by mouth daily as needed (ED) 30 tablet 1     No current facility-administered medications for this visit.       Allergies   Allergen Reactions    Seafood Other (See Comments)     Eyes turn yellow    Ibuprofen Nausea and Vomiting       Past Medical History:   Diagnosis Date    Anxiety and depression     Foot pain     GERD (gastroesophageal reflux disease)     Hypertension     Impaired physical mobility     Increased PTH level 9/5/2019    Low back pain     Low serum testosterone level 9/5/2019    Lower leg edema     Morbid obesity with BMI of 45.0-49.9, adult (H)     Pre-diabetes     Sleep apnea, obstructive     CPAP    Vitamin deficiency 9/5/2019        Patient Active Problem List   Diagnosis    Type 2 diabetes mellitus with diabetic polyneuropathy, without long-term current use of insulin (H)    Morbid obesity (H)    Opioid use disorder, severe, in sustained remission (H)    YANIRA on CPAP    Essential hypertension    Recurrent major depressive disorder, in full remission (H)    Chronic obstructive pulmonary disease with acute exacerbation (H)    Heart failure with preserved ejection fraction, unspecified HF chronicity (H)       Past Surgical History:   Procedure Laterality Date    CATARACT EXTRACTION Right     KELOID EXCISION      neck       Family History   Problem Relation Age of Onset    No Known Problems Mother     No Known Problems Father     No Known Problems Sister     No Known Problems Sister     No Known Problems Sister     No Known Problems Sister     No Known Problems Brother     No Known Problems  "Brother     No Known Problems Daughter     No Known Problems Son        Reviewed past medical, surgical, and family history with patient found on new patient intake packet located in EMR Media tab.     SOCIAL HX: did not fill out    Oswestry (SHARON) Questionnaire:         No data to display                Neck Disability Index:       No data to display                   PHQ-2 Score:       3/11/2014     1:41 PM   PHQ-2 ( 1999 [employer])   Q1: Little interest or pleasure in doing things 0   Q2: Feeling down, depressed or hopeless 0   PHQ-2 Score 0          ROS:  Specifically negative for bowel/bladder dysfunction, balance changes. Did not fill out intake questionnaire    OBJECTIVE:  /88   Pulse 65   Ht 5' 10\" (1.778 m)   Wt 298 lb (135.2 kg)   BMI 42.76 kg/m      PHYSICAL EXAMINATION:  --CONSTITUTIONAL: Vital signs as above. No acute distress. The patient is well nourished and well groomed.  --PSYCHIATRIC: The patient is awake, alert, oriented to person, place, and time, and answering questions appropriately with clear speech. Appropriate mood and affect   --HEENT: Sclera are non-injected. Extraocular muscles are intact. Moist oral mucosa.  --SKIN: Skin over the face, bilateral upper extremities, and posterior torso is clean, dry, intact without rashes.  --LYMPH NODES: No palpable or tender anterior/posterior cervical, submandibular, or supraclavicular lymph nodes.  --RESPIRATORY: Normal rhythm and effort. No abnormal accessory muscle breathing patterns noted.     --NEUROLOGIC: CN III-XII are grossly intact.   --GROSS MOTOR: Easily arises from a seated position. Toe walking, heel walking, and tandem gait are normal.      --UPPER EXTREMITY MOTOR TESTING:  Shoulder abduction: right 5/5, left 5/5  Triceps: right 5/5 left 5/5  Biceps:right 5/5  left 5/5,    Hand :  right 5/5  left 5/5,   Intrinsics: right 5/5  left 5/5,   Extensors: right 5/5  left 5/5,     --LOWER EXTREMITY MOTOR TESTING  Hip flexion: right 5/5  " left 5/5,   Quads:right 5/5  left 5/5,   Hamstrings: right 5/5  left 5/5,   Dorsiflexion: right 5/5  left 5/5,   Plantarflexion: right 5/5  left 5/5,   EHL: right 5/5  left 5/5,     REFLEXES: 1/4 symmetric triceps, biceps, brachioradialis bilaterally. 1/4 symmetric patellar, achilles reflex bilaterally.  Negative Clonus, Babinski, and Cuellar's bilaterally.      SENSATION: decreased in the right forearm, otherwise intact throughout the upper and lower extremities    --VASCULAR: Warm upper and lower limbs bilaterally. No swelling or color change noted.    --CERVICAL SPINE: Inspection reveals no evidence of deformity or swelling. No point tenderness to palpation of cervical spine. No tenderness to palpation of traps, scaps, or paraspinal musculature.    --SHOULDERS: Full range of motion bilaterally: abduction, flexion, cross chest movement internal/external rotation.  No tenderness to palpation or swelling noted of AC joint.    --WRISTS: Full range of motion bilaterally, negative tinel and phalen signs bilaterally.      RESULTS:   Prior medical records from [facility] and [facility]where were reviewed today.         IMAGING:  Shoulder xr report reviewed. No images available. No spine specific imaging done.      XR Shoulder Right 2 Views  Order: [id number]  Narrative    EXAM: XR SHOULDER RT AP/Y/AXILLARY  LOCATION: [facility]  DATE: 8/7/2024    INDICATION: Pain, PAIN  COMPARISON: 4/21/2022    IMPRESSION:    No evidence of acute fracture or dislocation.    Minimal degenerative changes of the glenohumeral and acromioclavicular joints.    Soft tissues grossly unremarkable.    Exam End: 08/07/24  8:06 PM          This note was dictated using voice recognition software. Any grammatical or context distortions are unintentional and inherent to the software.       Yane MENAP-C  [facility]  O. [phone]

## 2024-10-01 NOTE — TELEPHONE ENCOUNTER
"RN called called he patient at [phone], explained to the patient that the gabapentin was just a temporary comfort mediation as he started the Suboxone.  The patient said he was \"doing okay, feeling fine today\" and was okay that the gabapentin was not refilled.    He verbalized understanding if anything changes or he has questions to call [phone].    RN forwarding to Dr. Tripp for awareness.     Beth Gómez RN on 10/1/2024 at 2:30 PM    "

## 2024-10-02 LAB
FENTANYL UR CFM-MCNC: 71 NG/ML
FENTANYL/CREAT UR: 113 NG/MG {CREAT}
MORPHINE UR CFM-MCNC: 97 NG/ML
MORPHINE/CREAT UR: 154 NG/MG {CREAT}
NORFENTANYL UR CFM-MCNC: 289 NG/ML
NORFENTANYL/CREAT UR: 459 NG/MG {CREAT}
XYLAZINE UR QL CFM: PRESENT

## 2024-10-03 DIAGNOSIS — Z12.11 COLON CANCER SCREENING: ICD-10-CM

## 2024-10-17 ENCOUNTER — ORDERS ONLY (AUTO-RELEASED) (OUTPATIENT)
Dept: ADMISSION | Facility: CLINIC | Age: 59
End: 2024-10-17
Payer: COMMERCIAL

## 2024-10-17 DIAGNOSIS — Z12.11 COLON CANCER SCREENING: ICD-10-CM

## 2024-10-29 ENCOUNTER — OFFICE VISIT (OUTPATIENT)
Dept: SURGERY | Facility: CLINIC | Age: 59
End: 2024-10-29
Payer: COMMERCIAL

## 2024-10-29 VITALS
SYSTOLIC BLOOD PRESSURE: 126 MMHG | WEIGHT: 315 LBS | DIASTOLIC BLOOD PRESSURE: 80 MMHG | BODY MASS INDEX: 45.1 KG/M2 | HEIGHT: 70 IN

## 2024-10-29 DIAGNOSIS — F43.21 GRIEVING: ICD-10-CM

## 2024-10-29 DIAGNOSIS — E66.01 MORBID OBESITY (H): Primary | ICD-10-CM

## 2024-10-29 DIAGNOSIS — E88.810 METABOLIC SYNDROME: ICD-10-CM

## 2024-10-29 PROCEDURE — 99214 OFFICE O/P EST MOD 30 MIN: CPT | Performed by: FAMILY MEDICINE

## 2024-10-29 NOTE — PROGRESS NOTES
Bariatric Follow-up    59 year old  male BMI:Body mass index is 45.9 kg/m .    Weight:   Wt Readings from Last 1 Encounters:   10/29/24 145.1 kg (319 lb 14.4 oz)    pounds      Comorbidities:  Patient Active Problem List   Diagnosis    Type 2 diabetes mellitus with diabetic polyneuropathy, without long-term current use of insulin (H)    Morbid obesity (H)    Opioid use disorder, severe, in sustained remission (H)    YANIRA on CPAP    Essential hypertension    Recurrent major depressive disorder, in full remission (H)    Chronic obstructive pulmonary disease with acute exacerbation (H)    Heart failure with preserved ejection fraction, unspecified HF chronicity (H)         Interim: Maintaining 2# weight loss. Not using CPAP. Tired. Using phentermine on and off. Brother was murdered in [place]. He has been going back and forth. Man who killed his brother is in custody here in [place] as he also shot someone in the [facility]. Robles is mourning the loss of his brother, making trips back and forth to [place]. Blood pressure and cholesterol look good, A1c 5.9.     Plan:  DIET  Do your best to eat healthy foods and hydrate   EXERCISE Some walking/intentional activity can help to boost moods and immune system.   PHARMACOTHERAPY refill phentermine. Zepbound looks to be a covered benefit. Will discuss this next visit as grief today not a good time.    Urged Robles to talk to family and friends and do his best to eat well, sleep well and walk some to boost his immunity. Self care during this time of grief     -We reviewed his medications and whether associated with weight gain.  Current Outpatient Medications   Medication Sig Dispense Refill    albuterol (PROAIR HFA/PROVENTIL HFA/VENTOLIN HFA) 108 (90 Base) MCG/ACT inhaler INHALE 2 PUFFS BY MOUTH EVERY 6 HOURS AS NEEDED FOR WHEEZING 18 g 0    ARIPiprazole (ABILIFY) 10 MG tablet Take 1 tablet (10 mg) by mouth daily      aspirin (ASA) 81 MG EC tablet Take 1 tablet (81 mg) by mouth  daily 90 tablet 3    atorvastatin (LIPITOR) 20 MG tablet Take 1 tablet (20 mg) by mouth daily 90 tablet 3    budesonide-formoterol (SYMBICORT) 80-4.5 MCG/ACT Inhaler INHALE 2 PUFFS BY MOUTH TWICE DAILY 10.2 g 11    buprenorphine HCl-naloxone HCl (SUBOXONE) 8-2 MG per film 2 sl qam, 1 sl qpm 90 Film 0    buPROPion (WELLBUTRIN XL) 150 MG 24 hr tablet Take 1 tablet (150 mg) by mouth every morning      cloNIDine (CATAPRES) 0.1 MG tablet Take 1 tablet (0.1 mg) by mouth 3 times daily as needed (opioid withdrawal). 12 tablet 0    cyclobenzaprine (FLEXERIL) 10 MG tablet Take 1 tablet (10 mg) by mouth 3 times daily as needed for muscle spasms. 40 tablet 1    docusate sodium (COLACE) 100 MG capsule TAKE 1 CAPSULE(100 MG) BY MOUTH TWICE DAILY as needed for constipation 60 capsule 3    empagliflozin (JARDIANCE) 10 MG TABS tablet Take 1 tablet (10 mg) by mouth daily 90 tablet 4    furosemide (LASIX) 20 MG tablet Take 1 tablet (20 mg) by mouth daily as needed (swelling)      gabapentin (NEURONTIN) 300 MG capsule Take 1 capsule (300 mg) by mouth 3 times daily as needed (opioid cravings). 12 capsule 0    ibuprofen (ADVIL/MOTRIN) 600 MG tablet Take 1 tablet (600 mg) by mouth every 6 hours as needed for moderate pain. 40 tablet 2    naloxone (NARCAN) 4 MG/0.1ML nasal spray Spray 1 spray (4 mg) into one nostril alternating nostrils once as needed for opioid reversal every 2-3 minutes until assistance arrives 0.2 mL 11    ondansetron (ZOFRAN ODT) 4 MG ODT tab Take 1 tablet (4 mg) by mouth every 8 hours as needed for nausea or vomiting. 9 tablet 0    phentermine (ADIPEX-P) 37.5 MG tablet Take 0.5-1 tablets (18.75-37.5 mg) by mouth every morning (before breakfast) 90 tablet 1    sildenafil (VIAGRA) 100 MG tablet Take 0.5-1 tablets ( mg) by mouth daily as needed (ED) 30 tablet 1     No current facility-administered medications for this visit.        We discussed [facility] Bariatric Basics including:  -eating 3 meals daily  -eating  protein first  -eating slowly, chewing food well  -avoiding/limiting calorie containing beverages  -choosing wheat, not white with breads, crackers, pastas, kendal, bagels, tortillas, rice  -limiting restaurant or cafeteria eating to twice a week or less  -We discussed the importance of restorative sleep and stress management in maintaining a healthy weight.  -We discussed insulin resistance and glycemic index as it relates to appetite and weight control  -We discussed the National Weight Control Registry healthy weight maintenance strategies and ways to optimize metabolism.  -We discussed the importance of physical activity including cardiovascular and strength training in maintaining a healthier weight and explored viable options.    Most recent labs:  Recent Labs   Lab Test 11/20/23  1049 09/02/22  1021   CHOL 152 147   HDL 61 60   LDL 82 70   TRIG 47 83      Hemoglobin A1C   Date Value Ref Range Status   08/15/2024 5.9 (H) 0.0 - 5.6 % Final     Comment:     Normal <5.7%   Prediabetes 5.7-6.4%    Diabetes 6.5% or higher     Note: Adopted from ADA consensus guidelines.   03/11/2014 5.5 4.1 - 5.7 % Final      TSH   Date Value Ref Range Status   11/20/2023 1.99 0.30 - 4.20 uIU/mL Final   09/16/2021 1.69 0.30 - 5.00 uIU/mL Final      BP Readings from Last 3 Encounters:   10/29/24 126/80   10/01/24 134/88   08/15/24 138/86          DIETARY HISTORY  Meals Per Day: 2  Eating Protein First?: sometimes  Food Diary: B:frosted flakes, scrambled egg with sausage on WW 2% milk L:crackers, chips D:chicken   Snacks Per Day: yes  Typical Snack: crackers chips  Fluid Intake: B-8 Splash,   Portion Control: improved  Calorie Containing Beverages: V-8  Alcohol per week: no  Typical Protein Food Choices: chicken, eggs, cheese,   Choosing Whole Grains: yes  Grocery Shopping is done by: himself  Food Preparation is done by: himself  Meals at Restaurant/Cafeteria/Take Out Per Week: never  Eating at the Table:   TV is Off During Meals:  "    Positive Changes Since Last Visit: maintaining weight  Struggling With: left leg pain    Knowledgeable in Reading Food Labels: yes  Getting Adequate Protein: yes  Sleeping 7-8 hours/day no  Stress management grieving    PHYSICAL ACTIVITY PATTERNS:  Cardiovascular: waks sometimes  Strength Training: not currently    REVIEW OF SYSTEMS      PHYSICAL EXAM:  Vitals: /80   Ht 1.778 m (5' 10\")   Wt 145.1 kg (319 lb 14.4 oz)   BMI 45.90 kg/m        GEN: Appears very tired,   EYES: EOMI,  ENT: Airway crowded  NECK:   HEART: Rhythm regular, rate regular,   LUNGS: Clear  ABDOMEN:BS+  VASCULAR: bilateral trace to 1+  lower extremity edema  MUSCULOSKELETAL:  muscle mass good  SKIN:  no color changes of venous stasis, no ulcerations    Total time spent on the date of this encounter doing: chart review, review of test results, patient visit, physical exam, education, counseling, developing plan of care, and documenting = 30minutes.          "

## 2024-10-29 NOTE — PATIENT INSTRUCTIONS
https://www.hungersolutions.org/find-help/      MEDICATIONS FOR WEIGHT LOSS    PHENTERMINE (Adipex): approved in 1959 for appetite suppression.  It has stimulant effects and cannot be used with Ritalin, Concerta, or other stimulants.  It is not addictive although it's chemically related to amphetamines.  Amphetamines are addictive. The most common side effects are dry mouth, increased energy and concentration, increased pulse, and constipation.  You should not take phentermine if you have glaucoma, hyperthyroidism, or uncontrolled/untreated hypertension.  $24-$30 for 90 tablets        ZEPBOUND (Tirzepatide (called Mounjaro for Type 2 Diabetes): A weekly injectable medication indicated for type 2 diabetes in 2022 and for weight loss in 2023.. Glucagon-like-peptide-1 (GLP-1) agonist and Glucose-Dependent Insulinotropic Polypeptide (GIP) agonist. Contraindications include personal or family history of medullary thyroid cancer or MEN type 2. Acute pancreatitis has been observed in patients taking Tirzepatide. Tirzepatide causes C-cell tumors in rats and mice. It is unknown whether Tirzepatide causes tumors in humans. Watch for neck mass, difficulty swallowing, persistent hoarseness, and epigastric abdominal pain. Most common side effects include nausea, diarrhea, vomiting, constipation, dyspepsia, and abdominal pain. Start at 2.5mg, increasing to 5.0, 7.5, 10, 12.5 then 15mg monthly. $1,000/month

## 2024-10-29 NOTE — LETTER
10/29/2024      Tobin Bryant  [address]   [address]  [place] [address]      Dear Colleague,    Thank you for referring your patient, Tobin Bryant, to the [facility] SURGERY [facility] AND BARIATRICS CARE [place]. Please see a copy of my visit note below.    Bariatric Follow-up    59 year old  male BMI:Body mass index is 45.9 kg/m .    Weight:   Wt Readings from Last 1 Encounters:   10/29/24 145.1 kg (319 lb 14.4 oz)    pounds      Comorbidities:  Patient Active Problem List   Diagnosis     Type 2 diabetes mellitus with diabetic polyneuropathy, without long-term current use of insulin (H)     Morbid obesity (H)     Opioid use disorder, severe, in sustained remission (H)     YANIRA on CPAP     Essential hypertension     Recurrent major depressive disorder, in full remission (H)     Chronic obstructive pulmonary disease with acute exacerbation (H)     Heart failure with preserved ejection fraction, unspecified HF chronicity (H)         Interim: Maintaining 2# weight loss. Not using CPAP. Tired. Using phentermine on and off. Brother was murdered in [place]. He has been going back and forth. Man who killed his brother is in custody here in [place] as he also shot someone in the [facility]. Robles is mourning the loss of his brother, making trips back and forth to [place]. Blood pressure and cholesterol look good, A1c 5.9.     Plan:  DIET  Do your best to eat healthy foods and hydrate   EXERCISE Some walking/intentional activity can help to boost moods and immune system.   PHARMACOTHERAPY refill phentermine. Zepbound looks to be a covered benefit. Will discuss this next visit as grief today not a good time.    Urged Robles to talk to family and friends and do his best to eat well, sleep well and walk some to boost his immunity. Self care during this time of grief     -We reviewed his medications and whether associated with weight gain.  Current Outpatient Medications   Medication Sig Dispense Refill      albuterol (PROAIR HFA/PROVENTIL HFA/VENTOLIN HFA) 108 (90 Base) MCG/ACT inhaler INHALE 2 PUFFS BY MOUTH EVERY 6 HOURS AS NEEDED FOR WHEEZING 18 g 0     ARIPiprazole (ABILIFY) 10 MG tablet Take 1 tablet (10 mg) by mouth daily       aspirin (ASA) 81 MG EC tablet Take 1 tablet (81 mg) by mouth daily 90 tablet 3     atorvastatin (LIPITOR) 20 MG tablet Take 1 tablet (20 mg) by mouth daily 90 tablet 3     budesonide-formoterol (SYMBICORT) 80-4.5 MCG/ACT Inhaler INHALE 2 PUFFS BY MOUTH TWICE DAILY 10.2 g 11     buprenorphine HCl-naloxone HCl (SUBOXONE) 8-2 MG per film 2 sl qam, 1 sl qpm 90 Film 0     buPROPion (WELLBUTRIN XL) 150 MG 24 hr tablet Take 1 tablet (150 mg) by mouth every morning       cloNIDine (CATAPRES) 0.1 MG tablet Take 1 tablet (0.1 mg) by mouth 3 times daily as needed (opioid withdrawal). 12 tablet 0     cyclobenzaprine (FLEXERIL) 10 MG tablet Take 1 tablet (10 mg) by mouth 3 times daily as needed for muscle spasms. 40 tablet 1     docusate sodium (COLACE) 100 MG capsule TAKE 1 CAPSULE(100 MG) BY MOUTH TWICE DAILY as needed for constipation 60 capsule 3     empagliflozin (JARDIANCE) 10 MG TABS tablet Take 1 tablet (10 mg) by mouth daily 90 tablet 4     furosemide (LASIX) 20 MG tablet Take 1 tablet (20 mg) by mouth daily as needed (swelling)       gabapentin (NEURONTIN) 300 MG capsule Take 1 capsule (300 mg) by mouth 3 times daily as needed (opioid cravings). 12 capsule 0     ibuprofen (ADVIL/MOTRIN) 600 MG tablet Take 1 tablet (600 mg) by mouth every 6 hours as needed for moderate pain. 40 tablet 2     naloxone (NARCAN) 4 MG/0.1ML nasal spray Spray 1 spray (4 mg) into one nostril alternating nostrils once as needed for opioid reversal every 2-3 minutes until assistance arrives 0.2 mL 11     ondansetron (ZOFRAN ODT) 4 MG ODT tab Take 1 tablet (4 mg) by mouth every 8 hours as needed for nausea or vomiting. 9 tablet 0     phentermine (ADIPEX-P) 37.5 MG tablet Take 0.5-1 tablets (18.75-37.5 mg) by mouth every  morning (before breakfast) 90 tablet 1     sildenafil (VIAGRA) 100 MG tablet Take 0.5-1 tablets ( mg) by mouth daily as needed (ED) 30 tablet 1     No current facility-administered medications for this visit.        We discussed [facility] Bariatric Basics including:  -eating 3 meals daily  -eating protein first  -eating slowly, chewing food well  -avoiding/limiting calorie containing beverages  -choosing wheat, not white with breads, crackers, pastas, kendal, bagels, tortillas, rice  -limiting restaurant or cafeteria eating to twice a week or less  -We discussed the importance of restorative sleep and stress management in maintaining a healthy weight.  -We discussed insulin resistance and glycemic index as it relates to appetite and weight control  -We discussed the National Weight Control Registry healthy weight maintenance strategies and ways to optimize metabolism.  -We discussed the importance of physical activity including cardiovascular and strength training in maintaining a healthier weight and explored viable options.    Most recent labs:  Recent Labs   Lab Test 11/20/23  1049 09/02/22  1021   CHOL 152 147   HDL 61 60   LDL 82 70   TRIG 47 83      Hemoglobin A1C   Date Value Ref Range Status   08/15/2024 5.9 (H) 0.0 - 5.6 % Final     Comment:     Normal <5.7%   Prediabetes 5.7-6.4%    Diabetes 6.5% or higher     Note: Adopted from ADA consensus guidelines.   03/11/2014 5.5 4.1 - 5.7 % Final      TSH   Date Value Ref Range Status   11/20/2023 1.99 0.30 - 4.20 uIU/mL Final   09/16/2021 1.69 0.30 - 5.00 uIU/mL Final      BP Readings from Last 3 Encounters:   10/29/24 126/80   10/01/24 134/88   08/15/24 138/86          DIETARY HISTORY  Meals Per Day: 2  Eating Protein First?: sometimes  Food Diary: B:frosted flakes, scrambled egg with sausage on WW 2% milk L:crackers, chips D:chicken   Snacks Per Day: yes  Typical Snack: crackers chips  Fluid Intake: B-8 Splash,   Portion Control: improved  Calorie  "Containing Beverages: V-8  Alcohol per week: no  Typical Protein Food Choices: chicken, eggs, cheese,   Choosing Whole Grains: yes  Grocery Shopping is done by: himself  Food Preparation is done by: himself  Meals at Restaurant/Cafeteria/Take Out Per Week: never  Eating at the Table:   TV is Off During Meals:     Positive Changes Since Last Visit: maintaining weight  Struggling With: left leg pain    Knowledgeable in Reading Food Labels: yes  Getting Adequate Protein: yes  Sleeping 7-8 hours/day no  Stress management grieving    PHYSICAL ACTIVITY PATTERNS:  Cardiovascular: waks sometimes  Strength Training: not currently    REVIEW OF SYSTEMS      PHYSICAL EXAM:  Vitals: /80   Ht 1.778 m (5' 10\")   Wt 145.1 kg (319 lb 14.4 oz)   BMI 45.90 kg/m        GEN: Appears very tired,   EYES: EOMI,  ENT: Airway crowded  NECK:   HEART: Rhythm regular, rate regular,   LUNGS: Clear  ABDOMEN:BS+  VASCULAR: bilateral trace to 1+  lower extremity edema  MUSCULOSKELETAL:  muscle mass good  SKIN:  no color changes of venous stasis, no ulcerations    Total time spent on the date of this encounter doing: chart review, review of test results, patient visit, physical exam, education, counseling, developing plan of care, and documenting = 30minutes.            Again, thank you for allowing me to participate in the care of your patient.        Sincerely,        Adelina Burnham MD  "

## 2024-11-01 ENCOUNTER — OFFICE VISIT (OUTPATIENT)
Dept: ADDICTION MEDICINE | Facility: CLINIC | Age: 59
End: 2024-11-01
Payer: COMMERCIAL

## 2024-11-01 VITALS
SYSTOLIC BLOOD PRESSURE: 148 MMHG | WEIGHT: 315 LBS | HEIGHT: 70 IN | OXYGEN SATURATION: 100 % | DIASTOLIC BLOOD PRESSURE: 86 MMHG | BODY MASS INDEX: 45.1 KG/M2 | HEART RATE: 70 BPM

## 2024-11-01 DIAGNOSIS — F43.21 GRIEF: ICD-10-CM

## 2024-11-01 DIAGNOSIS — F11.93 OPIOID WITHDRAWAL (H): ICD-10-CM

## 2024-11-01 DIAGNOSIS — F11.21 OPIOID USE DISORDER, SEVERE, IN SUSTAINED REMISSION (H): Primary | ICD-10-CM

## 2024-11-01 PROCEDURE — G0481 DRUG TEST DEF 8-14 CLASSES: HCPCS | Mod: 59 | Performed by: FAMILY MEDICINE

## 2024-11-01 PROCEDURE — 80305 DRUG TEST PRSMV DIR OPT OBS: CPT | Performed by: FAMILY MEDICINE

## 2024-11-01 PROCEDURE — G2211 COMPLEX E/M VISIT ADD ON: HCPCS | Performed by: FAMILY MEDICINE

## 2024-11-01 PROCEDURE — 99214 OFFICE O/P EST MOD 30 MIN: CPT | Performed by: FAMILY MEDICINE

## 2024-11-01 RX ORDER — CLONIDINE HYDROCHLORIDE 0.1 MG/1
0.1 TABLET ORAL 3 TIMES DAILY PRN
Qty: 12 TABLET | Refills: 0 | Status: SHIPPED | OUTPATIENT
Start: 2024-11-01

## 2024-11-01 RX ORDER — BUPRENORPHINE AND NALOXONE 8; 2 MG/1; MG/1
FILM, SOLUBLE BUCCAL; SUBLINGUAL
Qty: 90 FILM | Refills: 1 | Status: SHIPPED | OUTPATIENT
Start: 2024-11-01

## 2024-11-01 RX ORDER — GABAPENTIN 300 MG/1
300 CAPSULE ORAL 3 TIMES DAILY PRN
Qty: 12 CAPSULE | Refills: 0 | Status: SHIPPED | OUTPATIENT
Start: 2024-11-01

## 2024-11-01 NOTE — PROGRESS NOTES
[facility]    A/P                                                    ASSESSMENT/PLAN    1. Opioid use disorder, severe, in sustained remission (H)  Improving.  Denies opioid use for 3 weeks.  Has resumed Suboxone.  We discussed importance of taking consisently.  Discussed that he can rinse mouth once films are dissolved.  Continue Suboxone 24mg TDD.  Good discussion regarding risks of fentanyl use (and unintentional use) and xylazine use - reviewed previous urine confirmation.  He asked good questions and was engaged in the discussion.    - Drugs of Abuse Screen Urine (POC CUPS) POCT  - buprenorphine HCl-naloxone HCl (SUBOXONE) 8-2 MG per film; 2 sl qam, 1 sl qpm  Dispense: 90 Film; Refill: 1  - naloxone (NARCAN) 4 MG/0.1ML nasal spray; Spray 1 spray (4 mg) into one nostril alternating nostrils once as needed for opioid reversal. every 2-3 minutes until assistance arrives  Dispense: 0.2 mL; Refill: 11  - Drug Confirmation Panel Urine with Creat - lab collect; Future  - Drug Confirmation Panel Urine with Creat - lab collect    2. Opioid withdrawal (H)  OK to continue clonidine and gabapentin for a little longer while he continues to stablize back on Suboxone.    - cloNIDine (CATAPRES) 0.1 MG tablet; Take 1 tablet (0.1 mg) by mouth 3 times daily as needed (opioid withdrawal).  Dispense: 12 tablet; Refill: 0  - gabapentin (NEURONTIN) 300 MG capsule; Take 1 capsule (300 mg) by mouth 3 times daily as needed (opioid cravings).  Dispense: 12 capsule; Refill: 0    3. Grief  Reports mood is improved but still experiencing grief regarding death of his brother.  Normalized grief as part of human experience and encouraged him to allow himself time to grieve.  Continues to travel back and forth to [facility] to meet with detectives and support family.        PDMP Review         Value Time User    State PDMP site checked  Yes 11/1/2024 10:34 AM Ernestina Tripp, DO              RTC  Return in about 6  "weeks (around 12/13/2024) for Follow up, in person.    The longitudinal plan of care for the diagnosis(es)/condition(s) as documented were addressed during this visit. Due to the added complexity in care, I will continue to support Robles in the subsequent management and with ongoing continuity of care.      Counseled the patient on the importance of having a recovery program in addition to medication to manage recovery.  Components include avoiding isolating, having willingness to change, avoiding triggers and managing cravings. Encouraged having some type of sober network and practicing honesty with trusted support person(s). Encouraged other services such as counseling, 12 step or other self-help organizations.      Opioid warning reviewed.  Risk of overdose following a period of abstinence due to decrease tolerance was discussed including risk of death.  Strongly recommended abstain from alcohol, benzodiazepines, THC, opioids and other drugs of abuse.  Increased risk of return to opioid use after use of these substances discussed.  Increased risk of overdose/death with use of other substances particularly benzodiazepines/alcohol reviewed.        YOSELIN Bryant is a 59 year old male with PMHx of HTN, morbid obesity, pre-diabetes, COPD/ashtma, YANIRA, anxiety, depression, and OUD who presents to clinic today for follow up.     Brief history of use:    Last use of illicit opioids was in approximately 2019.  Has been on buprenorphine for OUD since at least 2014.      +urine fentanyl on 4/18/24 and 5/16/24.  Confirmation urine drug test on 7/18 positive for fentanyl and xylazine.       Plan from most recent office visit (9/27/24):  1. Opioid use disorder, severe, in sustained remission (H)  Needs improvement.  Return to use after running out of Suboxone again.  Last use was \"a few days ago\" but he does not appear to be in significant withdrawal.  Reviewed " risks of precipitated withdrawal and the timing of first dose of Suboxone.  He feels comfortable with home induction and indicates he plans to wait a few more days.  He will be traveling back and forth to [place] to be with family and meet with police regarding his brother's death so I did provide a 1 month script of Suboxone and stressed the importance of follow-up prior to running out.  He confirms he has Narcan.  - Drugs of Abuse Screen Urine (POC CUPS) POCT  - buprenorphine HCl-naloxone HCl (SUBOXONE) 8-2 MG per film; 2 sl qam, 1 sl qpm  Dispense: 90 Film; Refill: 0  - Drug Confirmation Panel Urine with Creat - lab collect; Future  - Drug Confirmation Panel Urine with Creat - lab collect     2. Opioid withdrawal (H)  Reviewed use of comfort medications.  - cloNIDine (CATAPRES) 0.1 MG tablet; Take 1 tablet (0.1 mg) by mouth 3 times daily as needed (opioid withdrawal).  Dispense: 12 tablet; Refill: 0  - ondansetron (ZOFRAN ODT) 4 MG ODT tab; Take 1 tablet (4 mg) by mouth every 8 hours as needed for nausea or vomiting.  Dispense: 9 tablet; Refill: 0  - gabapentin (NEURONTIN) 300 MG capsule; Take 1 capsule (300 mg) by mouth 3 times daily as needed (opioid cravings).  Dispense: 12 capsule; Refill: 0     3. Grief  We discussed his brother's murder and the grief he is experiencing.  Validated his feelings.  He denies any SI.  Initially alluded to thoughts of hurting others but further discussion he denies this - he knows he would be at risk for harming someone if they were in his face and bothering him so he just avoids this circumstances, he does not have any specific person in mind when he made initial comment, and has not intent or plan to harm anyone.  He does not have access to weapons.  He also states that he wants to be present for family and would never want to have intermediate time.  He shares the music is a great way to distract himself and calm himself.  We discussed that therapy can be helpful in managing grief  "and the other emotions that come with it and I offered to connect him with a therapist, he will let me know if he wants to pursue this.      TODAY'S VISIT  HPI Nov 1, 2024  - Back in town since Wednesday, spending time with family and working with detectives in [place]  - Takes bus back and forth to [place], hard to sleep on bus so sleep schedule has been off  - Clonidine and gabapentin were helpful for withdrawal - was able to resume Suboxone without issue  - Taking Suboxone - taste is bothersome   - Has tried tablets, not any better than films  - Minimal cravings while taking Suboxone, no use for 3 weeks  - Mood is better since last visit        6/16/2023    12:03 PM 4/18/2024     1:00 PM 7/18/2024    11:00 AM   PHQ   PHQ-9 Total Score 0 3 6   Q9: Thoughts of better off dead/self-harm past 2 weeks Not at all  Not at all Not at all       Patient-reported           8/3/2022     7:12 AM 6/15/2023    12:00 PM 4/18/2024     1:00 PM   SHABBIR-7 SCORE   Total Score 0 (minimal anxiety)     Total Score 0 0 3       OBJECTIVE                                                    PHYSICAL EXAM:  BP (!) 148/86 (BP Location: Right arm, Patient Position: Sitting, Cuff Size: Adult Large)   Pulse 70   Ht 1.778 m (5' 10\")   Wt 142.9 kg (315 lb 1.9 oz)   SpO2 100%   BMI 45.22 kg/m      GENERAL: healthy, alert and no distress  EYES: Eyes grossly normal to inspection, sclerae normal  RESP: No respiratory distress  MS: no gross musculoskeletal defects noted, no edema  SKIN: no pallor or jaundice  NEURO: Normal gait, mentation intact and speech normal  MENTAL STATUS EXAM  Appearance/Behavior: No appearant distress  Speech: Normal  Mood/Affect: normal affect  Insight: Adequate    LAB  Results for orders placed or performed in visit on 11/01/24   Drugs of Abuse Screen Urine (POC CUPS) POCT     Status: Abnormal   Result Value Ref Range    POCT Kit Lot Number [id number]     POCT Kit Expiration Date 2282026     Temperature Urine POCT 94 F 90 F, " 92 F, 94 F, 96 F, 98 F, 100 F    Specific [place] POCT 1.005 1.005, 1.015, 1.025    pH Qual Urine POCT 9 pH 4 pH, 5 pH, 7 pH, 9 pH    Creatinine Qual Urine POCT 50 mg/dL 20 mg/dL, 50 mg/dL, 100 mg/dL, 200 mg/dL    Internal QC Qual Urine POCT Valid Valid    Amphetamine Qual Urine POCT Negative Negative    Barbiturate Qual Urine POCT Negative Negative    Buprenorphine Qual Urine POCT Screen Positive (A) Negative    Benzodiazepine Qual Urine POCT Negative Negative    Cocaine Qual Urine POCT Negative Negative    Methamphetamine Qual Urine POCT Negative Negative    MDMA Qual Urine POCT Negative Negative    Methadone Qual Urine POCT Negative Negative    Opiate Qual Urine POCT Negative Negative    Oxycodone Qual Urine POCT Negative Negative    Phencyclidine Qual Urine POCT Negative Negative    THC Qual Urine POCT Negative Negative   Drug Confirmation Panel Urine with Creat - lab collect     Status: None (In process)    Narrative    The following orders were created for panel order Drug Confirmation Panel Urine with Creat - lab collect.  Procedure                               Abnormality         Status                     ---------                               -----------         ------                     Urine Drug Confirmation ...[[id number]]                      In process                 Urine Creatinine for Kenan...[[id number]]                      In process                   Please view results for these tests on the individual orders.         HISTORY                                                    Problem list reviewed & adjusted, as indicated.  Patient Active Problem List   Diagnosis    Type 2 diabetes mellitus with diabetic polyneuropathy, without long-term current use of insulin (H)    Morbid obesity (H)    Opioid use disorder, severe, in sustained remission (H)    YANIRA on CPAP    Essential hypertension    Recurrent major depressive disorder, in full remission (H)    Chronic obstructive pulmonary disease with  acute exacerbation (H)    Heart failure with preserved ejection fraction, unspecified HF chronicity (H)         MEDICATION LIST (prior to visit)  Current Outpatient Medications   Medication Sig Dispense Refill    albuterol (PROAIR HFA/PROVENTIL HFA/VENTOLIN HFA) 108 (90 Base) MCG/ACT inhaler INHALE 2 PUFFS BY MOUTH EVERY 6 HOURS AS NEEDED FOR WHEEZING 18 g 0    ARIPiprazole (ABILIFY) 10 MG tablet Take 1 tablet (10 mg) by mouth daily      atorvastatin (LIPITOR) 20 MG tablet Take 1 tablet (20 mg) by mouth daily 90 tablet 3    budesonide-formoterol (SYMBICORT) 80-4.5 MCG/ACT Inhaler INHALE 2 PUFFS BY MOUTH TWICE DAILY 10.2 g 11    buprenorphine HCl-naloxone HCl (SUBOXONE) 8-2 MG per film 2 sl qam, 1 sl qpm 90 Film 1    buPROPion (WELLBUTRIN XL) 150 MG 24 hr tablet Take 1 tablet (150 mg) by mouth every morning      cloNIDine (CATAPRES) 0.1 MG tablet Take 1 tablet (0.1 mg) by mouth 3 times daily as needed (opioid withdrawal). 12 tablet 0    cyclobenzaprine (FLEXERIL) 10 MG tablet Take 1 tablet (10 mg) by mouth 3 times daily as needed for muscle spasms. 40 tablet 1    docusate sodium (COLACE) 100 MG capsule TAKE 1 CAPSULE(100 MG) BY MOUTH TWICE DAILY as needed for constipation 60 capsule 3    empagliflozin (JARDIANCE) 10 MG TABS tablet Take 1 tablet (10 mg) by mouth daily 90 tablet 4    furosemide (LASIX) 20 MG tablet Take 1 tablet (20 mg) by mouth daily as needed (swelling)      gabapentin (NEURONTIN) 300 MG capsule Take 1 capsule (300 mg) by mouth 3 times daily as needed (opioid cravings). 12 capsule 0    ibuprofen (ADVIL/MOTRIN) 600 MG tablet Take 1 tablet (600 mg) by mouth every 6 hours as needed for moderate pain. 40 tablet 2    naloxone (NARCAN) 4 MG/0.1ML nasal spray Spray 1 spray (4 mg) into one nostril alternating nostrils once as needed for opioid reversal. every 2-3 minutes until assistance arrives 0.2 mL 11    phentermine (ADIPEX-P) 37.5 MG tablet Take 0.5-1 tablets (18.75-37.5 mg) by mouth every morning  (before breakfast) 90 tablet 1    sildenafil (VIAGRA) 100 MG tablet Take 0.5-1 tablets ( mg) by mouth daily as needed (ED) 30 tablet 1    aspirin (ASA) 81 MG EC tablet Take 1 tablet (81 mg) by mouth daily (Patient not taking: Reported on 11/1/2024) 90 tablet 3     No current facility-administered medications for this visit.       MEDICATION LIST (after visit)  Current Outpatient Medications   Medication Sig Dispense Refill    albuterol (PROAIR HFA/PROVENTIL HFA/VENTOLIN HFA) 108 (90 Base) MCG/ACT inhaler INHALE 2 PUFFS BY MOUTH EVERY 6 HOURS AS NEEDED FOR WHEEZING 18 g 0    ARIPiprazole (ABILIFY) 10 MG tablet Take 1 tablet (10 mg) by mouth daily      atorvastatin (LIPITOR) 20 MG tablet Take 1 tablet (20 mg) by mouth daily 90 tablet 3    budesonide-formoterol (SYMBICORT) 80-4.5 MCG/ACT Inhaler INHALE 2 PUFFS BY MOUTH TWICE DAILY 10.2 g 11    buprenorphine HCl-naloxone HCl (SUBOXONE) 8-2 MG per film 2 sl qam, 1 sl qpm 90 Film 1    buPROPion (WELLBUTRIN XL) 150 MG 24 hr tablet Take 1 tablet (150 mg) by mouth every morning      cloNIDine (CATAPRES) 0.1 MG tablet Take 1 tablet (0.1 mg) by mouth 3 times daily as needed (opioid withdrawal). 12 tablet 0    cyclobenzaprine (FLEXERIL) 10 MG tablet Take 1 tablet (10 mg) by mouth 3 times daily as needed for muscle spasms. 40 tablet 1    docusate sodium (COLACE) 100 MG capsule TAKE 1 CAPSULE(100 MG) BY MOUTH TWICE DAILY as needed for constipation 60 capsule 3    empagliflozin (JARDIANCE) 10 MG TABS tablet Take 1 tablet (10 mg) by mouth daily 90 tablet 4    furosemide (LASIX) 20 MG tablet Take 1 tablet (20 mg) by mouth daily as needed (swelling)      gabapentin (NEURONTIN) 300 MG capsule Take 1 capsule (300 mg) by mouth 3 times daily as needed (opioid cravings). 12 capsule 0    ibuprofen (ADVIL/MOTRIN) 600 MG tablet Take 1 tablet (600 mg) by mouth every 6 hours as needed for moderate pain. 40 tablet 2    naloxone (NARCAN) 4 MG/0.1ML nasal spray Spray 1 spray (4 mg) into one  nostril alternating nostrils once as needed for opioid reversal. every 2-3 minutes until assistance arrives 0.2 mL 11    phentermine (ADIPEX-P) 37.5 MG tablet Take 0.5-1 tablets (18.75-37.5 mg) by mouth every morning (before breakfast) 90 tablet 1    sildenafil (VIAGRA) 100 MG tablet Take 0.5-1 tablets ( mg) by mouth daily as needed (ED) 30 tablet 1    aspirin (ASA) 81 MG EC tablet Take 1 tablet (81 mg) by mouth daily (Patient not taking: Reported on 11/1/2024) 90 tablet 3     No current facility-administered medications for this visit.         Allergies   Allergen Reactions    Seafood Other (See Comments)     Eyes turn yellow    Ibuprofen Nausea and Vomiting       Ernestina Tripp, [facility]  [facility]  [phone]

## 2024-11-01 NOTE — PROGRESS NOTES
[facility] - Addiction Medicine       Rooming information:    Point of care urine drug screen positive for:  Lab Results   Component Value Date    BUP Negative 09/27/2024    BZO Negative 09/27/2024    BAR Negative 09/27/2024    MERCEDEZ Negative 09/27/2024    MAMP Negative 09/27/2024    AMP Negative 09/27/2024    MDMA Negative 09/27/2024    MTD Negative 09/27/2024    KVX244 Negative 09/27/2024    OXY Negative 09/27/2024    PCP Negative 09/27/2024    THC Negative 09/27/2024    TEMP Invalid (A) 09/27/2024    SGPOCT 1.005 09/27/2024       *POC urine drug screen does not screen for Fentanyl    PHQ-9 Scores:       6/16/2023    12:03 PM 4/18/2024     1:00 PM 7/18/2024    11:00 AM   PHQ   PHQ-9 Total Score 0 3 6   Q9: Thoughts of better off dead/self-harm past 2 weeks Not at all  Not at all Not at all       Patient-reported     SHABBIR-7 Scores:      8/3/2022     7:12 AM 6/15/2023    12:00 PM 4/18/2024     1:00 PM   SHABBIR-7 SCORE   Total Score 0 (minimal anxiety)     Total Score 0 0 3       Any other recent substance use:     Denies    NICOTINE-No  If using nicotine, ready to quit? No    Side effects related to medications pt would like to discuss with provider (constipation, dry mouth, HA, GI upset, sedation?) No     Narcan currently available: Yes    Primary care provider: Pawan Castro MD     Mental health provider: [facility] (follow up on MH referral if needed)    Any housing, insurance deficits?: no    Contact information up to date? yes    3rd Party Involvement no (please obtain ANAYELI if pt would like to include)        Pennie Poon MA  November 1, 2024  10:28 AM

## 2024-11-02 LAB
CREAT UR-MCNC: 113 MG/DL
NONINV COLON CA DNA+OCC BLD SCRN STL QL: NORMAL

## 2024-11-06 LAB
BUPRENORPHINE UR CFM-MCNC: 6 NG/ML
BUPRENORPHINE/CREAT UR: 5 NG/MG {CREAT}
FENTANYL UR CFM-MCNC: 4 NG/ML
FENTANYL/CREAT UR: 4 NG/MG {CREAT}
GABAPENTIN UR QL CFM: PRESENT
NALOXONE UR CFM-MCNC: 9 NG/ML
NALOXONE: 8 NG/MG {CREAT}
NORBUPRENORPHINE UR CFM-MCNC: 11 NG/ML
NORBUPRENORPHINE/CREAT UR: 10 NG/MG {CREAT}
NORFENTANYL UR CFM-MCNC: 76 NG/ML
NORFENTANYL/CREAT UR: 67 NG/MG {CREAT}

## 2024-11-08 DIAGNOSIS — E66.01 MORBID OBESITY (H): ICD-10-CM

## 2024-11-08 RX ORDER — PHENTERMINE HYDROCHLORIDE 37.5 MG/1
TABLET ORAL
Qty: 90 TABLET | Refills: 1 | Status: SHIPPED | OUTPATIENT
Start: 2024-11-08

## 2024-11-11 NOTE — PATIENT INSTRUCTIONS
Call PCP office regarding your cough and leg swelling.  Any chest pain/pressure or shortness of breath please go to ER.        Continue Suboxone, no changes.  Sent to [facility] on [address].  Call if needing early due to travel plans.    Janusz will call you later to help set up RESET-O estefany on your phone.     Detail Level: Zone Samples Given: Aquaphor ointment

## 2024-12-11 LAB — NONINV COLON CA DNA+OCC BLD SCRN STL QL: NORMAL

## 2025-01-02 ENCOUNTER — TELEPHONE (OUTPATIENT)
Dept: INTERNAL MEDICINE | Facility: CLINIC | Age: 60
End: 2025-01-02
Payer: COMMERCIAL

## 2025-01-02 NOTE — TELEPHONE ENCOUNTER
"Per review of chart, Coljeffersonnikole 12/5/24 \"Sample Could Not Be Processed\". Comment states \"The sample stability limit had been exceeded. The patient will be contacted to initiate a new sample collection.\"     Informed patient that the sample could not be processed and asked if [employer] reached out to him regarding this. Patient states that he did receive a new kit. Provided phone number to contact for [employer] ([phone]) for any questions and to ensure sample is adequate and stable to be processed.   "

## 2025-01-02 NOTE — TELEPHONE ENCOUNTER
General Call      Reason for Call:  Patient calling to inquire about Colog[employer] results     Writer sees the final result was done GAYATRI([employer])  Order: [id number]  Collected 12/5/2024 10:49 AM       Status: Final result      -Writer can't give result, no PCP comment   -Writer also sees no call back to give results. Been 4 weeks   -No MyChart     What are your questions or concerns:  Wants a call back ASAP     Okay to leave a detailed message?: Yes at Home number on file [phone] (home)

## 2025-02-20 ENCOUNTER — TELEPHONE (OUTPATIENT)
Dept: BEHAVIORAL HEALTH | Facility: CLINIC | Age: 60
End: 2025-02-20
Payer: COMMERCIAL

## 2025-02-20 LAB — NONINV COLON CA DNA+OCC BLD SCRN STL QL: NEGATIVE

## 2025-03-11 ENCOUNTER — OFFICE VISIT (OUTPATIENT)
Dept: ADDICTION MEDICINE | Facility: CLINIC | Age: 60
End: 2025-03-11
Payer: COMMERCIAL

## 2025-03-11 DIAGNOSIS — F11.21 OPIOID USE DISORDER, SEVERE, IN SUSTAINED REMISSION (H): Primary | ICD-10-CM

## 2025-03-11 DIAGNOSIS — F11.93 OPIOID WITHDRAWAL (H): ICD-10-CM

## 2025-03-11 DIAGNOSIS — F33.42 RECURRENT MAJOR DEPRESSIVE DISORDER, IN FULL REMISSION: ICD-10-CM

## 2025-03-11 LAB
AMPHETAMINE QUAL URINE POCT: NEGATIVE
BARBITURATE QUAL URINE POCT: NEGATIVE
BENZODIAZEPINE QUAL URINE POCT: NEGATIVE
BUPRENORPHINE QUAL URINE POCT: NEGATIVE
COCAINE QUAL URINE POCT: NEGATIVE
CREATININE QUAL URINE POCT: ABNORMAL
INTERNAL QC QUAL URINE POCT: ABNORMAL
MDMA QUAL URINE POCT: NEGATIVE
METHADONE QUAL URINE POCT: NEGATIVE
METHAMPHETAMINE QUAL URINE POCT: NEGATIVE
OPIATE QUAL URINE POCT: ABNORMAL
OXYCODONE QUAL URINE POCT: NEGATIVE
PH QUAL URINE POCT: ABNORMAL
PHENCYCLIDINE QUAL URINE POCT: NEGATIVE
POCT KIT EXPIRATION DATE: ABNORMAL
POCT KIT LOT NUMBER: ABNORMAL
SPECIFIC GRAVITY POCT: 1.01
TEMPERATURE URINE POCT: ABNORMAL
THC QUAL URINE POCT: NEGATIVE

## 2025-03-11 PROCEDURE — G0482 DRUG TEST DEF 15-21 CLASSES: HCPCS | Performed by: FAMILY MEDICINE

## 2025-03-11 PROCEDURE — G2211 COMPLEX E/M VISIT ADD ON: HCPCS | Performed by: FAMILY MEDICINE

## 2025-03-11 PROCEDURE — 99214 OFFICE O/P EST MOD 30 MIN: CPT | Performed by: FAMILY MEDICINE

## 2025-03-11 PROCEDURE — 80305 DRUG TEST PRSMV DIR OPT OBS: CPT | Performed by: FAMILY MEDICINE

## 2025-03-11 RX ORDER — ONDANSETRON 4 MG/1
4 TABLET, ORALLY DISINTEGRATING ORAL EVERY 8 HOURS PRN
Qty: 9 TABLET | Refills: 0 | Status: SHIPPED | OUTPATIENT
Start: 2025-03-11

## 2025-03-11 RX ORDER — GABAPENTIN 300 MG/1
300 CAPSULE ORAL 3 TIMES DAILY PRN
Qty: 12 CAPSULE | Refills: 0 | Status: SHIPPED | OUTPATIENT
Start: 2025-03-11 | End: 2025-03-11

## 2025-03-11 RX ORDER — BUPRENORPHINE AND NALOXONE 8; 2 MG/1; MG/1
FILM, SOLUBLE BUCCAL; SUBLINGUAL
Qty: 90 FILM | Refills: 0 | Status: SHIPPED | OUTPATIENT
Start: 2025-03-11

## 2025-03-11 RX ORDER — GABAPENTIN 300 MG/1
300 CAPSULE ORAL 3 TIMES DAILY PRN
Qty: 12 CAPSULE | Refills: 0 | Status: SHIPPED | OUTPATIENT
Start: 2025-03-11

## 2025-03-11 RX ORDER — CLONIDINE HYDROCHLORIDE 0.1 MG/1
0.1 TABLET ORAL 3 TIMES DAILY PRN
Qty: 12 TABLET | Refills: 0 | Status: SHIPPED | OUTPATIENT
Start: 2025-03-11

## 2025-03-11 ASSESSMENT — ANXIETY QUESTIONNAIRES
3. WORRYING TOO MUCH ABOUT DIFFERENT THINGS: SEVERAL DAYS
7. FEELING AFRAID AS IF SOMETHING AWFUL MIGHT HAPPEN: SEVERAL DAYS
GAD7 TOTAL SCORE: 4
2. NOT BEING ABLE TO STOP OR CONTROL WORRYING: SEVERAL DAYS
6. BECOMING EASILY ANNOYED OR IRRITABLE: SEVERAL DAYS
GAD7 TOTAL SCORE: 4
4. TROUBLE RELAXING: NOT AT ALL
1. FEELING NERVOUS, ANXIOUS, OR ON EDGE: NOT AT ALL
5. BEING SO RESTLESS THAT IT IS HARD TO SIT STILL: NOT AT ALL

## 2025-03-11 ASSESSMENT — PATIENT HEALTH QUESTIONNAIRE - PHQ9: SUM OF ALL RESPONSES TO PHQ QUESTIONS 1-9: 18

## 2025-03-11 NOTE — PROGRESS NOTES
[facility]    A/P                                                    ASSESSMENT/PLAN    1. Opioid use disorder, severe, in sustained remission (H)  ***  - Drugs of Abuse Screen Urine (POC CUPS) POCT        PDMP Review         Value Time User    State PDMP site checked  Yes 11/1/2024 10:34 AM Ernestina Tripp DO              RTC  No follow-ups on file.    The longitudinal plan of care for the diagnosis(es)/condition(s) as documented were addressed during this visit. Due to the added complexity in care, I will continue to support Robles in the subsequent management and with ongoing continuity of care.      Counseled the patient on the importance of having a recovery program in addition to medication to manage recovery.  Components include avoiding isolating, having willingness to change, avoiding triggers and managing cravings. Encouraged having some type of sober network and practicing honesty with trusted support person(s). Encouraged other services such as counseling, 12 step or other self-help organizations.      ***Opioid warning reviewed.  Risk of overdose following a period of abstinence due to decrease tolerance was discussed including risk of death.  Strongly recommended abstain from alcohol, benzodiazepines, THC, opioids and other drugs of abuse.  Increased risk of return to opioid use after use of these substances discussed.  Increased risk of overdose/death with use of other substances particularly benzodiazepines/alcohol reviewed.        SUBJECTIVE                                                        {ahvirdet:[id number]}    Tobin Bryant is a 59 year old male who presents to clinic today for follow up      Plan from most recent office visit (11/1/24):  1. Opioid use disorder, severe, in sustained remission (H)  Improving.  Denies opioid use for 3 weeks.  Has resumed Suboxone.  We discussed importance of taking consisently.  Discussed that he can rinse mouth once films are  dissolved.  Continue Suboxone 24mg TDD.  Good discussion regarding risks of fentanyl use (and unintentional use) and xylazine use - reviewed previous urine confirmation.  He asked good questions and was engaged in the discussion.    - Drugs of Abuse Screen Urine (POC CUPS) POCT  - buprenorphine HCl-naloxone HCl (SUBOXONE) 8-2 MG per film; 2 sl qam, 1 sl qpm  Dispense: 90 Film; Refill: 1  - naloxone (NARCAN) 4 MG/0.1ML nasal spray; Spray 1 spray (4 mg) into one nostril alternating nostrils once as needed for opioid reversal. every 2-3 minutes until assistance arrives  Dispense: 0.2 mL; Refill: 11  - Drug Confirmation Panel Urine with Creat - lab collect; Future  - Drug Confirmation Panel Urine with Creat - lab collect     2. Opioid withdrawal (H)  OK to continue clonidine and gabapentin for a little longer while he continues to stablize back on Suboxone.    - cloNIDine (CATAPRES) 0.1 MG tablet; Take 1 tablet (0.1 mg) by mouth 3 times daily as needed (opioid withdrawal).  Dispense: 12 tablet; Refill: 0  - gabapentin (NEURONTIN) 300 MG capsule; Take 1 capsule (300 mg) by mouth 3 times daily as needed (opioid cravings).  Dispense: 12 capsule; Refill: 0     3. Grief  Reports mood is improved but still experiencing grief regarding death of his brother.  Normalized grief as part of human experience and encouraged him to allow himself time to grieve.  Continues to travel back and forth to [facility] to meet with detectives and support family.      TODAY'S VISIT  HPI Mar 11, 2025  - Multiples losses  - Using few times per week  - Heroin, last use yesterday morning  -   - Was sick for a week about 3 weeks ago, feeling better for 1.5 weeks  - Insurance changing to [employer] next month        4/18/2024     1:00 PM 7/18/2024    11:00 AM 3/11/2025     1:00 PM   PHQ   PHQ-9 Total Score 3 6 18   Q9: Thoughts of better off dead/self-harm past 2 weeks Not at all Not at all Not at all           6/15/2023    12:00 PM 4/18/2024     1:00 PM  "3/11/2025     1:00 PM   SHABBIR-7 SCORE   Total Score 0 3 4       OBJECTIVE                                                    PHYSICAL EXAM:  There were no vitals taken for this visit.    { Exam Brief:180199::\"GENERAL: healthy, alert and no distress\",\"EYES: Eyes grossly normal to inspection, PERRL and conjunctivae and sclerae normal\",\"RESP: No respiratory distress\",\"MENTAL STATUS EXAM\"}    LAB  Results for orders placed or performed in visit on 03/11/25   Drugs of Abuse Screen Urine (POC CUPS) POCT     Status: Abnormal   Result Value Ref Range    POCT Kit Lot Number [id number]     POCT Kit Expiration Date 2026-02-28     Temperature Urine POCT 94 F 90 F, 92 F, 94 F, 96 F, 98 F, 100 F    Specific [place] POCT 1.015 1.005, 1.015, 1.025    pH Qual Urine POCT 7 pH 4 pH, 5 pH, 7 pH, 9 pH    Creatinine Qual Urine POCT 50 mg/dL 20 mg/dL, 50 mg/dL, 100 mg/dL, 200 mg/dL    Internal QC Qual Urine POCT Valid Valid    Amphetamine Qual Urine POCT Negative Negative    Barbiturate Qual Urine POCT Negative Negative    Buprenorphine Qual Urine POCT Negative Negative    Benzodiazepine Qual Urine POCT Negative Negative    Cocaine Qual Urine POCT Negative Negative    Methamphetamine Qual Urine POCT Negative Negative    MDMA Qual Urine POCT Negative Negative    Methadone Qual Urine POCT Negative Negative    Opiate Qual Urine POCT Screen Positive (A) Negative    Oxycodone Qual Urine POCT Negative Negative    Phencyclidine Qual Urine POCT Negative Negative    THC Qual Urine POCT Negative Negative         HISTORY                                                    Problem list reviewed & adjusted, as indicated.  Patient Active Problem List   Diagnosis    Type 2 diabetes mellitus with diabetic polyneuropathy, without long-term current use of insulin (H)    Morbid obesity (H)    Opioid use disorder, severe, in sustained remission (H)    YANIRA on CPAP    Essential hypertension    Recurrent major depressive disorder, in full remission    Chronic " obstructive pulmonary disease with acute exacerbation (H)    Heart failure with preserved ejection fraction, unspecified HF chronicity (H)         MEDICATION LIST (prior to visit)  Current Outpatient Medications   Medication Sig Dispense Refill    albuterol (PROAIR HFA/PROVENTIL HFA/VENTOLIN HFA) 108 (90 Base) MCG/ACT inhaler INHALE 2 PUFFS BY MOUTH EVERY 6 HOURS AS NEEDED FOR WHEEZING (Patient not taking: Reported on 3/11/2025) 18 g 0    ARIPiprazole (ABILIFY) 10 MG tablet Take 1 tablet (10 mg) by mouth daily (Patient not taking: Reported on 3/11/2025)      aspirin (ASA) 81 MG EC tablet Take 1 tablet (81 mg) by mouth daily (Patient not taking: Reported on 3/11/2025) 90 tablet 3    atorvastatin (LIPITOR) 20 MG tablet Take 1 tablet (20 mg) by mouth daily (Patient not taking: Reported on 3/11/2025) 90 tablet 3    budesonide-formoterol (SYMBICORT) 80-4.5 MCG/ACT Inhaler INHALE 2 PUFFS BY MOUTH TWICE DAILY (Patient not taking: Reported on 3/11/2025) 10.2 g 11    buprenorphine HCl-naloxone HCl (SUBOXONE) 8-2 MG per film 2 sl qam, 1 sl qpm (Patient not taking: Reported on 3/11/2025) 90 Film 1    buPROPion (WELLBUTRIN XL) 150 MG 24 hr tablet Take 1 tablet (150 mg) by mouth every morning (Patient not taking: Reported on 3/11/2025)      cloNIDine (CATAPRES) 0.1 MG tablet Take 1 tablet (0.1 mg) by mouth 3 times daily as needed (opioid withdrawal). (Patient not taking: Reported on 3/11/2025) 12 tablet 0    cyclobenzaprine (FLEXERIL) 10 MG tablet Take 1 tablet (10 mg) by mouth 3 times daily as needed for muscle spasms. (Patient not taking: Reported on 3/11/2025) 40 tablet 1    docusate sodium (COLACE) 100 MG capsule TAKE 1 CAPSULE(100 MG) BY MOUTH TWICE DAILY as needed for constipation (Patient not taking: Reported on 3/11/2025) 60 capsule 3    empagliflozin (JARDIANCE) 10 MG TABS tablet Take 1 tablet (10 mg) by mouth daily (Patient not taking: Reported on 3/11/2025) 90 tablet 4    furosemide (LASIX) 20 MG tablet Take 1 tablet  (20 mg) by mouth daily as needed (swelling) (Patient not taking: Reported on 3/11/2025)      gabapentin (NEURONTIN) 300 MG capsule Take 1 capsule (300 mg) by mouth 3 times daily as needed (opioid cravings). (Patient not taking: Reported on 3/11/2025) 12 capsule 0    ibuprofen (ADVIL/MOTRIN) 600 MG tablet Take 1 tablet (600 mg) by mouth every 6 hours as needed for moderate pain. (Patient not taking: Reported on 3/11/2025) 40 tablet 2    naloxone (NARCAN) 4 MG/0.1ML nasal spray Spray 1 spray (4 mg) into one nostril alternating nostrils once as needed for opioid reversal. every 2-3 minutes until assistance arrives (Patient not taking: Reported on 3/11/2025) 0.2 mL 11    phentermine (ADIPEX-P) 37.5 MG tablet TAKE 1/2 TO 1 TABLET(18.75 TO 37.5 MG) BY MOUTH EVERY MORNING BEFORE BREAKFAST (Patient not taking: Reported on 3/11/2025) 90 tablet 1    sildenafil (VIAGRA) 100 MG tablet Take 0.5-1 tablets ( mg) by mouth daily as needed (ED) (Patient not taking: Reported on 3/11/2025) 30 tablet 1     No current facility-administered medications for this visit.       MEDICATION LIST (after visit)  Current Outpatient Medications   Medication Sig Dispense Refill    albuterol (PROAIR HFA/PROVENTIL HFA/VENTOLIN HFA) 108 (90 Base) MCG/ACT inhaler INHALE 2 PUFFS BY MOUTH EVERY 6 HOURS AS NEEDED FOR WHEEZING (Patient not taking: Reported on 3/11/2025) 18 g 0    ARIPiprazole (ABILIFY) 10 MG tablet Take 1 tablet (10 mg) by mouth daily (Patient not taking: Reported on 3/11/2025)      aspirin (ASA) 81 MG EC tablet Take 1 tablet (81 mg) by mouth daily (Patient not taking: Reported on 3/11/2025) 90 tablet 3    atorvastatin (LIPITOR) 20 MG tablet Take 1 tablet (20 mg) by mouth daily (Patient not taking: Reported on 3/11/2025) 90 tablet 3    budesonide-formoterol (SYMBICORT) 80-4.5 MCG/ACT Inhaler INHALE 2 PUFFS BY MOUTH TWICE DAILY (Patient not taking: Reported on 3/11/2025) 10.2 g 11    buprenorphine HCl-naloxone HCl (SUBOXONE) 8-2 MG per  film 2 sl qam, 1 sl qpm (Patient not taking: Reported on 3/11/2025) 90 Film 1    buPROPion (WELLBUTRIN XL) 150 MG 24 hr tablet Take 1 tablet (150 mg) by mouth every morning (Patient not taking: Reported on 3/11/2025)      cloNIDine (CATAPRES) 0.1 MG tablet Take 1 tablet (0.1 mg) by mouth 3 times daily as needed (opioid withdrawal). (Patient not taking: Reported on 3/11/2025) 12 tablet 0    cyclobenzaprine (FLEXERIL) 10 MG tablet Take 1 tablet (10 mg) by mouth 3 times daily as needed for muscle spasms. (Patient not taking: Reported on 3/11/2025) 40 tablet 1    docusate sodium (COLACE) 100 MG capsule TAKE 1 CAPSULE(100 MG) BY MOUTH TWICE DAILY as needed for constipation (Patient not taking: Reported on 3/11/2025) 60 capsule 3    empagliflozin (JARDIANCE) 10 MG TABS tablet Take 1 tablet (10 mg) by mouth daily (Patient not taking: Reported on 3/11/2025) 90 tablet 4    furosemide (LASIX) 20 MG tablet Take 1 tablet (20 mg) by mouth daily as needed (swelling) (Patient not taking: Reported on 3/11/2025)      gabapentin (NEURONTIN) 300 MG capsule Take 1 capsule (300 mg) by mouth 3 times daily as needed (opioid cravings). (Patient not taking: Reported on 3/11/2025) 12 capsule 0    ibuprofen (ADVIL/MOTRIN) 600 MG tablet Take 1 tablet (600 mg) by mouth every 6 hours as needed for moderate pain. (Patient not taking: Reported on 3/11/2025) 40 tablet 2    naloxone (NARCAN) 4 MG/0.1ML nasal spray Spray 1 spray (4 mg) into one nostril alternating nostrils once as needed for opioid reversal. every 2-3 minutes until assistance arrives (Patient not taking: Reported on 3/11/2025) 0.2 mL 11    phentermine (ADIPEX-P) 37.5 MG tablet TAKE 1/2 TO 1 TABLET(18.75 TO 37.5 MG) BY MOUTH EVERY MORNING BEFORE BREAKFAST (Patient not taking: Reported on 3/11/2025) 90 tablet 1    sildenafil (VIAGRA) 100 MG tablet Take 0.5-1 tablets ( mg) by mouth daily as needed (ED) (Patient not taking: Reported on 3/11/2025) 30 tablet 1     No current  facility-administered medications for this visit.         Allergies   Allergen Reactions    Seafood Other (See Comments)     Eyes turn yellow    Ibuprofen Nausea and Vomiting       Ernestina Tripp DO [facility]  [phone]       Base) MCG/ACT inhaler INHALE 2 PUFFS BY MOUTH EVERY 6 HOURS AS NEEDED FOR WHEEZING (Patient not taking: Reported on 3/11/2025) 18 g 0    ARIPiprazole (ABILIFY) 10 MG tablet Take 1 tablet (10 mg) by mouth daily (Patient not taking: Reported on 3/11/2025)      aspirin (ASA) 81 MG EC tablet Take 1 tablet (81 mg) by mouth daily (Patient not taking: Reported on 3/11/2025) 90 tablet 3    atorvastatin (LIPITOR) 20 MG tablet Take 1 tablet (20 mg) by mouth daily (Patient not taking: Reported on 3/11/2025) 90 tablet 3    budesonide-formoterol (SYMBICORT) 80-4.5 MCG/ACT Inhaler INHALE 2 PUFFS BY MOUTH TWICE DAILY (Patient not taking: Reported on 3/11/2025) 10.2 g 11    buPROPion (WELLBUTRIN XL) 150 MG 24 hr tablet Take 1 tablet (150 mg) by mouth every morning (Patient not taking: Reported on 3/11/2025)      cyclobenzaprine (FLEXERIL) 10 MG tablet Take 1 tablet (10 mg) by mouth 3 times daily as needed for muscle spasms. (Patient not taking: Reported on 3/11/2025) 40 tablet 1    docusate sodium (COLACE) 100 MG capsule TAKE 1 CAPSULE(100 MG) BY MOUTH TWICE DAILY as needed for constipation (Patient not taking: Reported on 3/11/2025) 60 capsule 3    empagliflozin (JARDIANCE) 10 MG TABS tablet Take 1 tablet (10 mg) by mouth daily (Patient not taking: Reported on 3/11/2025) 90 tablet 4    furosemide (LASIX) 20 MG tablet Take 1 tablet (20 mg) by mouth daily as needed (swelling) (Patient not taking: Reported on 3/11/2025)      ibuprofen (ADVIL/MOTRIN) 600 MG tablet Take 1 tablet (600 mg) by mouth every 6 hours as needed for moderate pain. (Patient not taking: Reported on 3/11/2025) 40 tablet 2    phentermine (ADIPEX-P) 37.5 MG tablet TAKE 1/2 TO 1 TABLET(18.75 TO 37.5 MG) BY MOUTH EVERY MORNING BEFORE BREAKFAST (Patient not taking: Reported on 3/11/2025) 90 tablet 1    sildenafil (VIAGRA) 100 MG tablet Take 0.5-1 tablets ( mg) by mouth daily as needed (ED) (Patient not taking: Reported on 3/11/2025) 30 tablet 1     No current  facility-administered medications for this visit.         Allergies   Allergen Reactions    Seafood Other (See Comments)     Eyes turn yellow    Ibuprofen Nausea and Vomiting       Ernestina Tripp DO [facility]  [phone]

## 2025-03-11 NOTE — PROGRESS NOTES
[facility]    Peer  met with Tobin Byrant in the Recovery Clinic to introduce herself, detail services provided and discuss current status of recovery. Pt appeared alert, oriented and open to feedback during our discussion.     Pt arrives for visit with provider for suboxone.  PRC sees patient today under provider diagnosis of opioid substance disorder, severe, dependence  (H)       During the session, we emphasized the importance of self-care in light of the recent loss. The patient was encouraged to engage in activities that promote emotional healing and to seek support when necessary. We discussed various self-care strategies tailored to their preferences and lifestyle, highlighting the significance of maintaining routines that foster resilience during difficult times. Additionally, we updated the patient s contact information to ensure He  can receive timely support and resources as needed, facilitating better communication moving forward.  However, it was noted that the patient expressed reluctance regarding updating his  new number in the computer.        [facility] provided business card to pt welcoming contact for recovery based support and resources. PRC and pt agree to speak again during an upcoming RC visit.           Service Type:     Individual     Session Start Time:  1:00 pm                       Session End Time:    1 :15 pm    Session Length:     15 mins        Patient Goal:   To utilize suboxone assisted treatment for sobriety and long term recovery.     Goal Progress:   Ongoing.    Key Risk Factors to Recovery:   [facility] encourages being aware of risk factors that can lead to re-use which include avoiding isolation, avoiding triggers and managing cravings in a healthy manner. being open and willing to acceptance and change on a daily basis.  [facility] encourages pt to establish a sober network calling tree to reach out to when needed.  Continue to practice honesty  with ourselves and trusted support person(s).   PRC encourages regular attendance at recovery based meetings as well as finding a sponsor for mentoring and accountability.   PRC encourages consideration of other services such as counseling for mental health issues which can correlate with our substance use.      Support Needs:   Ongoing care, support and resources for opioid substance use disorder.     Follow up:   PRC has provided pt with her contact information for support and resource needs.    PRC and pt agree to meet during an upcoming  visit.       [facility]  [address], [address]   [place], [place], [address]  Clinic Phone: [phone]  Clinic Fax: [phone]  Peer  phone: [phone]    Open Monday - Friday  9:00am-4:00pm  Walk in hours: 9am-3pm      Ingrid Alaniz  March 11, 2025  3:56 PM    JUSTIN Morataya,  Provides clinical oversite and supervision of care JUSTIN provides clinical oversite and supervision of care.

## 2025-03-11 NOTE — PROGRESS NOTES
"[facility] - Addiction Medicine       Rooming information:    Was sick for 17-18 days, not sure if he had COVID; feeling better now  Gaining Weight   Has been out of psych medications  as well as Suboxone for a few months but saw his psychiatrist yesterday, \" I got to  his prescriptions \"   Recently lost many family members    Point of care urine drug screen positive for:  Lab Results   Component Value Date    BUP Negative 03/11/2025    BZO Negative 03/11/2025    BAR Negative 03/11/2025    MERCEDEZ Negative 03/11/2025    MAMP Negative 03/11/2025    AMP Negative 03/11/2025    MDMA Negative 03/11/2025    MTD Negative 03/11/2025    [id number] Screen Positive (A) 03/11/2025    OXY Negative 03/11/2025    PCP Negative 03/11/2025    THC Negative 03/11/2025    TEMP 94 F 03/11/2025    SGPOCT 1.015 03/11/2025       *POC urine drug screen does not screen for Fentanyl    PHQ-9 Scores:       4/18/2024     1:00 PM 7/18/2024    11:00 AM 3/11/2025     1:00 PM   PHQ   PHQ-9 Total Score 3 6 18   Q9: Thoughts of better off dead/self-harm past 2 weeks Not at all Not at all Not at all     SHABBIR-7 Scores:      6/15/2023    12:00 PM 4/18/2024     1:00 PM 3/11/2025     1:00 PM   SHABBIR-7 SCORE   Total Score 0 3 4       Any other recent substance use:     Heroin   - last use was yesterday around 11 am; currently no discomfort yet    NICOTINE-No  If using nicotine, ready to quit? NA    Side effects related to medications pt would like to discuss with provider (constipation, dry mouth, HA, GI upset, sedation?) NA    Narcan currently available: Yes    Primary care provider: Pawan Castro MD     Mental health provider: at [facility]  (follow up on MH referral if needed)    Any housing, insurance deficits?: denies    Contact information up to date? yes    [employer]olvement NA (please obtain ANAYELI if pt would like to include)        Vanessa Parada LPN  March 11, 2025  1:13 PM  "

## 2025-03-11 NOTE — PATIENT INSTRUCTIONS
Follow-up with me on 4/10 @ 12pm.      I will have a nurse call you next Monday to check in on how things are going with getting back on your Suboxone.      Call [phone] if you have questions or concerns.

## 2025-03-12 LAB — CREAT UR-MCNC: 85 MG/DL

## 2025-03-15 LAB
CODEINE UR CFM-MCNC: 64 NG/ML
CODEINE/CREAT UR: 75 NG/MG {CREAT}
FENTANYL UR CFM-MCNC: 271 NG/ML
FENTANYL/CREAT UR: 319 NG/MG {CREAT}
MORPHINE UR CFM-MCNC: 1080 NG/ML
MORPHINE/CREAT UR: 1271 NG/MG {CREAT}
NORFENTANYL UR CFM-MCNC: 712 NG/ML
NORFENTANYL/CREAT UR: 838 NG/MG {CREAT}
XYLAZINE UR QL CFM: PRESENT

## 2025-03-16 ENCOUNTER — TELEPHONE (OUTPATIENT)
Dept: ADDICTION MEDICINE | Facility: CLINIC | Age: 60
End: 2025-03-16
Payer: COMMERCIAL

## 2025-03-16 NOTE — TELEPHONE ENCOUNTER
Please call Robles to check in on how he is doing since his recent visit with me:    Robles was lost to follow-up since November, return to opioid use.  Returned to clinic on 3/11.  Reported last use was morning of 3/10.  Plan was to resume Suboxone 24mg/day.  He was very comfortable doing this.  I provided scripts for comfort medications as well as Suboxone.      Has he been able to resume Suboxone?  Any problems doing so?  Any side effects or lingering cravings?  Please review that his confirmation test shows he was likely using heroin and fentanyl with xylazine.  Xylazine is a non-opioid sedative.  If he is having ongoing withdrawal symptoms despite resuming Suboxone, it may be related to xylazine withdrawal and clonidine is often the most helpful for this.      He is going to see me for follow-up on 4/10 at 12pm (this needs to be officially scheduled - schedule templates are being adjusted) but please reassure him that I am available by phone if he needs anything sooner.    Thank you so much!    Ernestina Tripp, DO on 3/16/2025 at 10:43 AM

## 2025-03-17 NOTE — TELEPHONE ENCOUNTER
"RN received instructions from Dr. Tripp regarding this patients plan of care.       RN reviewed Dr. Tripp's instructions.     2.  RN phoned and spoke to the patient.  RN reviewed Dr. Tripp's instructions with the patient verbatim.  The patient replied as follows:    He stated \"I feel OK today.\"  He denied withdrawal or cravings.    He indicated he has not started the Suboxone yet but will start it today and notify the clinic with any adverse reactions, side effects or lingering craving.  RN reviewed Dr. Tripp's instructions regarding his UDS and Drug Confirmation results.  RN discussed Dr. Tripp's instructions regarding Xylazine and Clonidine use.  The patient verbalized understanding and will monitor for residual cravings once Suboxone is initiated and utilize the Clonidine PRN if needed.    The patient is agreeable to 4/10/24 appointment with Dr. Tripp at 1200 hours.      3.  RN reviewed Dr. Tripp's instructions regarding a future appointment 4/10/25 at 1200 hours.  RN phoned the [facility]'s to schedule this appointment The [facility] will be reaching out to Dr. Tripp regarding this to clarify scheduling issues.      Leonidas Chaudhary RN on 3/17/2025 at 2:02 PM       "

## 2025-04-09 DIAGNOSIS — E66.01 MORBID OBESITY (H): ICD-10-CM

## 2025-04-09 RX ORDER — PHENTERMINE HYDROCHLORIDE 37.5 MG/1
18.75-37.5 TABLET ORAL
Qty: 90 TABLET | Refills: 1 | Status: SHIPPED | OUTPATIENT
Start: 2025-04-09

## 2025-04-10 ENCOUNTER — OFFICE VISIT (OUTPATIENT)
Dept: ADDICTION MEDICINE | Facility: CLINIC | Age: 60
End: 2025-04-10
Payer: MEDICAID

## 2025-04-10 ENCOUNTER — TELEPHONE (OUTPATIENT)
Dept: SURGERY | Facility: CLINIC | Age: 60
End: 2025-04-10

## 2025-04-10 DIAGNOSIS — Z91.199 NO-SHOW FOR APPOINTMENT: Primary | ICD-10-CM

## 2025-04-10 NOTE — TELEPHONE ENCOUNTER
Prior Authorization Retail Medication Request    Medication/Dose: Phentermine 37.5 mg tablets daily  New/renewal/insurance change PA/secondary ins. PA: New    Insurance   Key: [id number]    Pharmacy Information (if different than what is on RX)  Name:  [facility] Information  Preferred routing pool for dept communication: [facility]ariat[facility] Support Pool [facility]

## 2025-04-14 NOTE — TELEPHONE ENCOUNTER
Prior Authorization Approval          Authorization Effective Date: 4/14/2025  Authorization Expiration Date: 7/13/2025  Medication: Phentermine 37.5 mg tablets daily-PA APPROVED   Approved Dose/Quantity:   Reference #:     Insurance Company: [employer] for [place] Medicaid Phone [phone] Fax [phone]  Expected CoPay:       CoPay Card Available:      Foundation Assistance Needed:    Which Pharmacy is filling the prescription (Not needed for infusion/clinic administered): [id number] - [place], [place] - [address]  Pharmacy Notified:  Yes- **Instructed pharmacy to notify patient when script is ready to /ship.**  Patient Notified:  Yes

## 2025-04-14 NOTE — TELEPHONE ENCOUNTER
Central Prior Authorization Team   Phone: [phone]    PA Initiation    Medication: Phentermine 37.5 mg tablets daily  Insurance Company: [employer] for [place] Medicaid Phone [phone] Fax [phone]  Pharmacy Filling the Rx: [facility] #[id number] - [place], [place] - [address]  Filling Pharmacy Phone: [phone]  Filling Pharmacy Fax:    Start Date: 4/14/2025

## 2025-05-06 ENCOUNTER — OFFICE VISIT (OUTPATIENT)
Dept: ADDICTION MEDICINE | Facility: CLINIC | Age: 60
End: 2025-05-06
Payer: COMMERCIAL

## 2025-05-06 VITALS
WEIGHT: 315 LBS | BODY MASS INDEX: 46.06 KG/M2 | HEART RATE: 84 BPM | DIASTOLIC BLOOD PRESSURE: 69 MMHG | OXYGEN SATURATION: 94 % | SYSTOLIC BLOOD PRESSURE: 130 MMHG

## 2025-05-06 DIAGNOSIS — F33.42 RECURRENT MAJOR DEPRESSIVE DISORDER, IN FULL REMISSION: ICD-10-CM

## 2025-05-06 DIAGNOSIS — F11.21 OPIOID USE DISORDER, SEVERE, IN SUSTAINED REMISSION (H): Primary | ICD-10-CM

## 2025-05-06 LAB
AMPHETAMINE QUAL URINE POCT: ABNORMAL
BARBITURATE QUAL URINE POCT: NEGATIVE
BENZODIAZEPINE QUAL URINE POCT: NEGATIVE
BUPRENORPHINE QUAL URINE POCT: NEGATIVE
COCAINE QUAL URINE POCT: NEGATIVE
CREATININE QUAL URINE POCT: ABNORMAL
INTERNAL QC QUAL URINE POCT: ABNORMAL
MDMA QUAL URINE POCT: NEGATIVE
METHADONE QUAL URINE POCT: NEGATIVE
METHAMPHETAMINE QUAL URINE POCT: NEGATIVE
OPIATE QUAL URINE POCT: ABNORMAL
OXYCODONE QUAL URINE POCT: NEGATIVE
PH QUAL URINE POCT: ABNORMAL
PHENCYCLIDINE QUAL URINE POCT: NEGATIVE
POCT KIT EXPIRATION DATE: ABNORMAL
POCT KIT LOT NUMBER: ABNORMAL
SPECIFIC GRAVITY POCT: 1
TEMPERATURE URINE POCT: ABNORMAL
THC QUAL URINE POCT: NEGATIVE

## 2025-05-06 PROCEDURE — 3075F SYST BP GE 130 - 139MM HG: CPT | Performed by: FAMILY MEDICINE

## 2025-05-06 PROCEDURE — 3078F DIAST BP <80 MM HG: CPT | Performed by: FAMILY MEDICINE

## 2025-05-06 PROCEDURE — 80305 DRUG TEST PRSMV DIR OPT OBS: CPT | Performed by: FAMILY MEDICINE

## 2025-05-06 PROCEDURE — 99214 OFFICE O/P EST MOD 30 MIN: CPT | Performed by: FAMILY MEDICINE

## 2025-05-06 PROCEDURE — 1126F AMNT PAIN NOTED NONE PRSNT: CPT | Performed by: FAMILY MEDICINE

## 2025-05-06 PROCEDURE — G2211 COMPLEX E/M VISIT ADD ON: HCPCS | Performed by: FAMILY MEDICINE

## 2025-05-06 RX ORDER — BUPRENORPHINE AND NALOXONE 8; 2 MG/1; MG/1
FILM, SOLUBLE BUCCAL; SUBLINGUAL
Qty: 30 FILM | Refills: 0 | Status: SHIPPED | OUTPATIENT
Start: 2025-05-06

## 2025-05-06 ASSESSMENT — PATIENT HEALTH QUESTIONNAIRE - PHQ9: SUM OF ALL RESPONSES TO PHQ QUESTIONS 1-9: 3

## 2025-05-06 ASSESSMENT — PAIN SCALES - GENERAL: PAINLEVEL_OUTOF10: NO PAIN (0)

## 2025-05-06 NOTE — PROGRESS NOTES
[facility] - Addiction Medicine       Rooming information:  His close friend  of overdose a month ago  Has been off of the Suboxone for unknown time     Point of care urine drug screen positive for:  Lab Results   Component Value Date    BUP Negative 2025    BZO Negative 2025    BAR Negative 2025    MERCEDEZ Negative 2025    MAMP Negative 2025    AMP Screen Positive (A) 2025    MDMA Negative 2025    MTD Negative 2025    [id number] Screen Positive (A) 2025    OXY Negative 2025    PCP Negative 2025    THC Negative 2025    TEMP 92 F 2025    SGPOCT 1.005 2025       *POC urine drug screen does not screen for Fentanyl    PHQ-9 Scores: 3      2024    11:00 AM 3/11/2025     1:00 PM 2025     1:00 PM   PHQ   PHQ-9 Total Score 6 18 3   Q9: Thoughts of better off dead/self-harm past 2 weeks Not at all Not at all Not at all     SHABBIR-7 Scores: NA      6/15/2023    12:00 PM 2024     1:00 PM 3/11/2025     1:00 PM   SHABBIR-7 SCORE   Total Score 0 3 4       Any other recent substance use:     Last use reported on , supposedly heroin    NICOTINE- No  If using nicotine, ready to quit? NA    Side effects related to medications pt would like to discuss with provider (constipation, dry mouth, HA, GI upset, sedation?) possible Wt gain    Narcan currently available: Yes but possible     Primary care provider: Pawan Castro MD     Mental health provider: has appt at [facility] on Thursday (follow up on MH referral if needed)    Any housing, insurance deficits?: denies    Contact information up to date? yes    3rd Party Involvement NA (please obtain ANAYELI if pt would like to include)      Vanessa Parada LPN  May 6, 2025  12:55 PM

## 2025-05-06 NOTE — PATIENT INSTRUCTIONS
[place] [id number], [address]   [place], [place], [address]  Phone: [phone]  Fax: [phone]    Open Monday-Friday  Closed over lunch hour  Walk in hours: 9am-11:30am and 12:30-3pm

## 2025-05-06 NOTE — PROGRESS NOTES
[facility] Addiction Medicine    A/P                                                    ASSESSMENT/PLAN    1. Opioid use disorder, severe, in sustained remission (H) (Primary)  Return to use again after last visit, last use 2 days ago.  Plan to resume Suboxone, close follow-up in 10 days.  He usually waits a few days from last use before resuming Suboxone. Still has clonidine at home to take for withdrawal.  Discussed psychosocial recovery supports - he would like to plan on getting back to meetings and if that is not enough support would consider IOP.  New script for Narcan sent.  Met with [facility] after visit today.  Reviewed option for Recovery Clinic if needed as well.    - Drugs of Abuse Screen Urine (POC CUPS) POCT  - buprenorphine HCl-naloxone HCl (SUBOXONE) 8-2 MG per film; 2 sl qam, 1 sl qpm  Dispense: 30 Film; Refill: 0  - naloxone (NARCAN) 4 MG/0.1ML nasal spray; Spray 1 spray (4 mg) into one nostril alternating nostrils once as needed for opioid reversal. every 2-3 minutes until assistance arrives  Dispense: 0.2 mL; Refill: 11    2. Recurrent major depressive disorder, in full remission  Reports mood improving recently.  Encouraged upcoming psychiatry appointment.      Encouraged follow-up with PCP.      PDMP Review         Value Time User    State PDMP site checked  Yes 5/6/2025 12:56 PM Ernestina Tripp,               RTC  Return in about 10 days (around 5/16/2025) for in person 5/16 @ noon.    The longitudinal plan of care for the diagnosis(es)/condition(s) as documented were addressed during this visit. Due to the added complexity in care, I will continue to support Robles in the subsequent management and with ongoing continuity of care.      Counseled the patient on the importance of having a recovery program in addition to medication to manage recovery.  Components include avoiding isolating, having willingness to change, avoiding triggers and managing cravings. Encouraged having some type  of sober network and practicing honesty with trusted support person(s). Encouraged other services such as counseling, 12 step or other self-help organizations.      Opioid warning reviewed.  Risk of overdose following a period of abstinence due to decrease tolerance was discussed including risk of death.  Strongly recommended abstain from alcohol, benzodiazepines, THC, opioids and other drugs of abuse.  Increased risk of return to opioid use after use of these substances discussed.  Increased risk of overdose/death with use of other substances particularly benzodiazepines/alcohol reviewed.        YOSELIN Bryant is a 59 year old male with PMHx of HTN, morbid obesity, pre-diabetes, COPD/ashtma, YANIRA, anxiety, depression, and OUD who presents to clinic today for follow up.     Brief history of use:    Last use of illicit opioids was in approximately 2019.  Has been on buprenorphine for OUD since at least 2014.      +urine fentanyl on 4/18/24 and 5/16/24.  Confirmation urine drug test on 7/18 positive for fentanyl and xylazine.      Brother was murdered August 2024.  Ran out of Suboxone for a month and returned to intermittent opioid use.  Lost to follow-up after 11/1/24.  RTC on 3/11/25.    Plan from most recent office visit (3/11/25):  1. Opioid use disorder, severe, in sustained remission (H) (Primary)  Return to use of heroin/fentanyl.  Last use yesterday.  Reviewed home Suboxone induction instructions - he is very comfortable and usually waits a few days before starting.  Plan to resume previous effective dose of 24mg/day.  New script for Narcan.  Due to scheduling, he will come for follow-up in 1 month but is open to a follow-up call next week.  He also met with [facility] today.  We discussed option for treatment program and he will consider - his biggest concern was transportation and we reviewed option of using medical rides through insurance for this.   If he decides he would like to pursue this prior to his next visit he will call and let me know so we can place referral for [facility] evaluation.    - Drugs of Abuse Screen Urine (POC CUPS) POCT  - buprenorphine HCl-naloxone HCl (SUBOXONE) 8-2 MG per film; 2 sl qam, 1 sl qpm  Dispense: 90 Film; Refill: 0  - naloxone (NARCAN) 4 MG/0.1ML nasal spray; Spray 1 spray (4 mg) into one nostril alternating nostrils once as needed for opioid reversal. every 2-3 minutes until assistance arrives  Dispense: 0.2 mL; Refill: 11  - Drug Confirmation Panel Urine with Creat - lab collect; Future  - Drug Confirmation Panel Urine with Creat - lab collect     2. Opioid withdrawal (H)  Reviewed use of comfort medications.    - cloNIDine (CATAPRES) 0.1 MG tablet; Take 1 tablet (0.1 mg) by mouth 3 times daily as needed (opioid withdrawal).  Dispense: 12 tablet; Refill: 0  - ondansetron (ZOFRAN ODT) 4 MG ODT tab; Take 1 tablet (4 mg) by mouth every 8 hours as needed for nausea or vomiting.  Dispense: 9 tablet; Refill: 0  - gabapentin (NEURONTIN) 300 MG capsule; Take 1 capsule (300 mg) by mouth 3 times daily as needed (withdrawal).  Dispense: 12 capsule; Refill: 0     3. Recurrent major depressive disorder, in full remission  Reports mood is overall stable.  Encouraged follow-up with psychiatry.      TODAY'S VISIT  HPI May 6, 2025  - Filled Suboxone 3/12/25, took for a month, no issues resuming  - Seeing Ingrid after visit today  - Long time since he's been to a meeting, looking to get reconnected - may consider IOP  - not around particular person - feeling better, she was not sober  - Was able to start Suboxone after last visit, then ran out after he missed visit  - Returned to use, last use 2 days ago  - Seeing his psychiatrist on Thursday  - Restarted phentermine for weight loss          7/18/2024    11:00 AM 3/11/2025     1:00 PM 5/6/2025     1:00 PM   PHQ   PHQ-9 Total Score 6 18 3   Q9: Thoughts of better off dead/self-harm past 2 weeks Not  at all Not at all Not at all           6/15/2023    12:00 PM 4/18/2024     1:00 PM 3/11/2025     1:00 PM   SHABBIR-7 SCORE   Total Score 0 3 4       OBJECTIVE                                                    PHYSICAL EXAM:  /69 (BP Location: Right arm, Patient Position: Sitting, Cuff Size: Adult Large)   Pulse 84   Wt (!) 145.6 kg (321 lb)   SpO2 94%   BMI 46.06 kg/m      GENERAL: healthy, alert and no distress  EYES: Eyes grossly normal to inspection, sclerae normal  RESP: No respiratory distress, no coughing or wheezing  SKIN: no pallor or jaundice, no diaphoresis  NEURO: normal gait, no tremor, mentation intact and speech normal  MENTAL STATUS EXAM  Appearance/Behavior: No appearant distress  Speech: Normal  Mood/Affect: normal affect  Insight: Fair    LAB  Results for orders placed or performed in visit on 05/06/25   Drugs of Abuse Screen Urine (POC CUPS) POCT     Status: Abnormal   Result Value Ref Range    POCT Kit Lot Number [id number]     POCT Kit Expiration Date 2026-01-18     Temperature Urine POCT 92 F 90 F, 92 F, 94 F, 96 F, 98 F, 100 F    Specific [place] POCT 1.005 1.005, 1.015, 1.025    pH Qual Urine POCT 5 pH 4 pH, 5 pH, 7 pH, 9 pH    Creatinine Qual Urine POCT 50 mg/dL 20 mg/dL, 50 mg/dL, 100 mg/dL, 200 mg/dL    Internal QC Qual Urine POCT Valid Valid    Amphetamine Qual Urine POCT Screen Positive (A) Negative    Barbiturate Qual Urine POCT Negative Negative    Buprenorphine Qual Urine POCT Negative Negative    Benzodiazepine Qual Urine POCT Negative Negative    Cocaine Qual Urine POCT Negative Negative    Methamphetamine Qual Urine POCT Negative Negative    MDMA Qual Urine POCT Negative Negative    Methadone Qual Urine POCT Negative Negative    Opiate Qual Urine POCT Screen Positive (A) Negative    Oxycodone Qual Urine POCT Negative Negative    Phencyclidine Qual Urine POCT Negative Negative    THC Qual Urine POCT Negative Negative         HISTORY                                                     Problem list reviewed & adjusted, as indicated.  Patient Active Problem List   Diagnosis    Type 2 diabetes mellitus with diabetic polyneuropathy, without long-term current use of insulin (H)    Morbid obesity (H)    Opioid use disorder, severe, in sustained remission (H)    YANIRA on CPAP    Essential hypertension    Recurrent major depressive disorder, in full remission    Chronic obstructive pulmonary disease with acute exacerbation (H)    Heart failure with preserved ejection fraction, unspecified HF chronicity (H)         MEDICATION LIST (prior to visit)  Current Outpatient Medications   Medication Sig Dispense Refill    buprenorphine HCl-naloxone HCl (SUBOXONE) 8-2 MG per film 2 sl qam, 1 sl qpm 30 Film 0    cloNIDine (CATAPRES) 0.1 MG tablet Take 1 tablet (0.1 mg) by mouth 3 times daily as needed (opioid withdrawal). 12 tablet 0    naloxone (NARCAN) 4 MG/0.1ML nasal spray Spray 1 spray (4 mg) into one nostril alternating nostrils once as needed for opioid reversal. every 2-3 minutes until assistance arrives 0.2 mL 11    albuterol (PROAIR HFA/PROVENTIL HFA/VENTOLIN HFA) 108 (90 Base) MCG/ACT inhaler INHALE 2 PUFFS BY MOUTH EVERY 6 HOURS AS NEEDED FOR WHEEZING (Patient not taking: Reported on 3/11/2025) 18 g 0    ARIPiprazole (ABILIFY) 10 MG tablet Take 1 tablet (10 mg) by mouth daily (Patient not taking: Reported on 3/11/2025)      aspirin (ASA) 81 MG EC tablet Take 1 tablet (81 mg) by mouth daily (Patient not taking: Reported on 11/1/2024) 90 tablet 3    atorvastatin (LIPITOR) 20 MG tablet Take 1 tablet (20 mg) by mouth daily (Patient not taking: Reported on 3/11/2025) 90 tablet 3    budesonide-formoterol (SYMBICORT) 80-4.5 MCG/ACT Inhaler INHALE 2 PUFFS BY MOUTH TWICE DAILY (Patient not taking: Reported on 3/11/2025) 10.2 g 11    buPROPion (WELLBUTRIN XL) 150 MG 24 hr tablet Take 1 tablet (150 mg) by mouth every morning (Patient not taking: Reported on 3/11/2025)      cyclobenzaprine (FLEXERIL) 10 MG  tablet Take 1 tablet (10 mg) by mouth 3 times daily as needed for muscle spasms. (Patient not taking: Reported on 3/11/2025) 40 tablet 1    docusate sodium (COLACE) 100 MG capsule TAKE 1 CAPSULE(100 MG) BY MOUTH TWICE DAILY as needed for constipation (Patient not taking: Reported on 3/11/2025) 60 capsule 3    empagliflozin (JARDIANCE) 10 MG TABS tablet Take 1 tablet (10 mg) by mouth daily (Patient not taking: Reported on 3/11/2025) 90 tablet 4    furosemide (LASIX) 20 MG tablet Take 1 tablet (20 mg) by mouth daily as needed (swelling) (Patient not taking: Reported on 3/11/2025)      gabapentin (NEURONTIN) 300 MG capsule Take 1 capsule (300 mg) by mouth 3 times daily as needed (withdrawal). 12 capsule 0    ibuprofen (ADVIL/MOTRIN) 600 MG tablet Take 1 tablet (600 mg) by mouth every 6 hours as needed for moderate pain. (Patient not taking: Reported on 3/11/2025) 40 tablet 2    ondansetron (ZOFRAN ODT) 4 MG ODT tab Take 1 tablet (4 mg) by mouth every 8 hours as needed for nausea or vomiting. 9 tablet 0    phentermine (ADIPEX-P) 37.5 MG tablet Take 0.5-1 tablets (18.75-37.5 mg) by mouth every morning (before breakfast). 90 tablet 1    sildenafil (VIAGRA) 100 MG tablet Take 0.5-1 tablets ( mg) by mouth daily as needed (ED) (Patient not taking: Reported on 3/11/2025) 30 tablet 1     No current facility-administered medications for this visit.       MEDICATION LIST (after visit)  Current Outpatient Medications   Medication Sig Dispense Refill    buprenorphine HCl-naloxone HCl (SUBOXONE) 8-2 MG per film 2 sl qam, 1 sl qpm 30 Film 0    cloNIDine (CATAPRES) 0.1 MG tablet Take 1 tablet (0.1 mg) by mouth 3 times daily as needed (opioid withdrawal). 12 tablet 0    naloxone (NARCAN) 4 MG/0.1ML nasal spray Spray 1 spray (4 mg) into one nostril alternating nostrils once as needed for opioid reversal. every 2-3 minutes until assistance arrives 0.2 mL 11    albuterol (PROAIR HFA/PROVENTIL HFA/VENTOLIN HFA) 108 (90 Base) MCG/ACT  inhaler INHALE 2 PUFFS BY MOUTH EVERY 6 HOURS AS NEEDED FOR WHEEZING (Patient not taking: Reported on 3/11/2025) 18 g 0    ARIPiprazole (ABILIFY) 10 MG tablet Take 1 tablet (10 mg) by mouth daily (Patient not taking: Reported on 3/11/2025)      aspirin (ASA) 81 MG EC tablet Take 1 tablet (81 mg) by mouth daily (Patient not taking: Reported on 11/1/2024) 90 tablet 3    atorvastatin (LIPITOR) 20 MG tablet Take 1 tablet (20 mg) by mouth daily (Patient not taking: Reported on 3/11/2025) 90 tablet 3    budesonide-formoterol (SYMBICORT) 80-4.5 MCG/ACT Inhaler INHALE 2 PUFFS BY MOUTH TWICE DAILY (Patient not taking: Reported on 3/11/2025) 10.2 g 11    buPROPion (WELLBUTRIN XL) 150 MG 24 hr tablet Take 1 tablet (150 mg) by mouth every morning (Patient not taking: Reported on 3/11/2025)      cyclobenzaprine (FLEXERIL) 10 MG tablet Take 1 tablet (10 mg) by mouth 3 times daily as needed for muscle spasms. (Patient not taking: Reported on 3/11/2025) 40 tablet 1    docusate sodium (COLACE) 100 MG capsule TAKE 1 CAPSULE(100 MG) BY MOUTH TWICE DAILY as needed for constipation (Patient not taking: Reported on 3/11/2025) 60 capsule 3    empagliflozin (JARDIANCE) 10 MG TABS tablet Take 1 tablet (10 mg) by mouth daily (Patient not taking: Reported on 3/11/2025) 90 tablet 4    furosemide (LASIX) 20 MG tablet Take 1 tablet (20 mg) by mouth daily as needed (swelling) (Patient not taking: Reported on 3/11/2025)      gabapentin (NEURONTIN) 300 MG capsule Take 1 capsule (300 mg) by mouth 3 times daily as needed (withdrawal). 12 capsule 0    ibuprofen (ADVIL/MOTRIN) 600 MG tablet Take 1 tablet (600 mg) by mouth every 6 hours as needed for moderate pain. (Patient not taking: Reported on 3/11/2025) 40 tablet 2    ondansetron (ZOFRAN ODT) 4 MG ODT tab Take 1 tablet (4 mg) by mouth every 8 hours as needed for nausea or vomiting. 9 tablet 0    phentermine (ADIPEX-P) 37.5 MG tablet Take 0.5-1 tablets (18.75-37.5 mg) by mouth every morning (before  breakfast). 90 tablet 1    sildenafil (VIAGRA) 100 MG tablet Take 0.5-1 tablets ( mg) by mouth daily as needed (ED) (Patient not taking: Reported on 3/11/2025) 30 tablet 1     No current facility-administered medications for this visit.         Allergies   Allergen Reactions    Seafood Other (See Comments)     Eyes turn yellow    Ibuprofen Nausea and Vomiting       Ernestina Tripp [facility] Medicine  [facility]  [phone]

## 2025-05-13 ENCOUNTER — OFFICE VISIT (OUTPATIENT)
Dept: ADDICTION MEDICINE | Facility: CLINIC | Age: 60
End: 2025-05-13
Payer: COMMERCIAL

## 2025-05-13 DIAGNOSIS — F11.21 OPIOID USE DISORDER, SEVERE, IN SUSTAINED REMISSION (H): Primary | ICD-10-CM

## 2025-05-13 PROCEDURE — H0038 SELF-HELP/PEER SVC PER 15MIN: HCPCS

## 2025-05-13 NOTE — PROGRESS NOTES
[facility] Individual Summary Note     Session Start Time: 10:00  am   Session End Time: 10:08 am     Duration: 15 mins    DATA: Peer  met with Tobin Bryant in the [facility] to introduce herself, detail services provided and discuss current status of recovery. Engaged In a discussion regarding where pt is at in terms of their recovery.     During the session, the peer  provided the patient with a  s manual to assist them in preparing for the driving test. This resource aims to help the patient feel more confident and knowledgeable about the requirements for obtaining a  s license.     Intervention: Facilitated an individual session, utilized active listening, provided validation, utilized motivational interviewing techniques, provided shared experience.    Assessment: Patient appeared alert     Plan: Peer  will continue to provide support as needed. Provided patient with business card to pt encouraging pt to reach out to peer  if needed additional support and resources.  (Pt plans to follow up at the recovery clinic  for follow up appointment. PRC will follow up with pt upon next appt check in.)    Schedule follow-up appointments to monitor progress in both recovery and preparation for the driving test.  Encourage continued use of the  s manual and suggest practice tests or driving lessons if necessary.  Reinforce positive coping strategies and support systems that can aid in maintaining motivation throughout this process.              Patient Identified Goals  To continue to take my other medications as prescribed.      Key Risk Factors to Recovery:   PRC encourages being aware of risk factors that can lead to re-use which include avoiding isolation, avoiding triggers and managing cravings in a healthy manner. being open and willing to acceptance and change on a daily basis.  PRC encourages  pt to establish a sober network calling tree to reach out to when needed.  Continue to practice honesty with ourselves and trusted support person(s).   PRC encourages regular attendance at recovery based meetings as well as finding a sponsor for mentoring and accountability.   PRC encourages consideration of other services such as counseling for mental health issues which can correlate with our substance use.      Support Needs:   Ongoing care, support and resources for opioid substance use disorder.     Follow up:   PRC has provided pt with her contact information for support and resource needs.    PRC and pt agree to meet during an upcoming [facility] Addiction visit.       [facility]  [address]  [address]   [place] [address]  Clinic Phone: [phone]  Clinic Fax:     [phone]  Peer  phone: [phone]     Open Monday - Friday   8:00am-4:30pm      Ingrid Alaniz  May 13, 2025  10:22 AM    Attestation: Kinza Ramos, LPCC, LADC Provides oversight and supervision of care.

## 2025-05-15 ENCOUNTER — TELEPHONE (OUTPATIENT)
Dept: ADDICTION MEDICINE | Facility: CLINIC | Age: 60
End: 2025-05-15
Payer: COMMERCIAL

## 2025-05-15 ENCOUNTER — TELEPHONE (OUTPATIENT)
Dept: ADDICTION MEDICINE | Facility: CLINIC | Age: 60
End: 2025-05-15

## 2025-05-16 ENCOUNTER — OFFICE VISIT (OUTPATIENT)
Dept: ADDICTION MEDICINE | Facility: CLINIC | Age: 60
End: 2025-05-16
Payer: COMMERCIAL

## 2025-05-16 VITALS
WEIGHT: 312 LBS | OXYGEN SATURATION: 96 % | BODY MASS INDEX: 44.77 KG/M2 | SYSTOLIC BLOOD PRESSURE: 131 MMHG | DIASTOLIC BLOOD PRESSURE: 89 MMHG | HEART RATE: 91 BPM

## 2025-05-16 DIAGNOSIS — F11.20 OPIOID USE DISORDER, SEVERE, DEPENDENCE (H): Primary | ICD-10-CM

## 2025-05-16 DIAGNOSIS — F43.21 GRIEF: ICD-10-CM

## 2025-05-16 DIAGNOSIS — F33.42 RECURRENT MAJOR DEPRESSIVE DISORDER, IN FULL REMISSION: ICD-10-CM

## 2025-05-16 LAB
AMPHETAMINE QUAL URINE POCT: NEGATIVE
BARBITURATE QUAL URINE POCT: NEGATIVE
BENZODIAZEPINE QUAL URINE POCT: NEGATIVE
BUPRENORPHINE QUAL URINE POCT: ABNORMAL
COCAINE QUAL URINE POCT: NEGATIVE
CREAT UR-MCNC: 366 MG/DL
CREATININE QUAL URINE POCT: ABNORMAL
INTERNAL QC QUAL URINE POCT: ABNORMAL
MDMA QUAL URINE POCT: NEGATIVE
METHADONE QUAL URINE POCT: NEGATIVE
METHAMPHETAMINE QUAL URINE POCT: NEGATIVE
OPIATE QUAL URINE POCT: ABNORMAL
OXYCODONE QUAL URINE POCT: NEGATIVE
PH QUAL URINE POCT: ABNORMAL
PHENCYCLIDINE QUAL URINE POCT: NEGATIVE
POCT KIT EXPIRATION DATE: ABNORMAL
POCT KIT LOT NUMBER: ABNORMAL
SPECIFIC GRAVITY POCT: 1.01
TEMPERATURE URINE POCT: ABNORMAL
THC QUAL URINE POCT: NEGATIVE

## 2025-05-16 PROCEDURE — G2211 COMPLEX E/M VISIT ADD ON: HCPCS | Performed by: FAMILY MEDICINE

## 2025-05-16 PROCEDURE — 80305 DRUG TEST PRSMV DIR OPT OBS: CPT | Performed by: FAMILY MEDICINE

## 2025-05-16 PROCEDURE — 99214 OFFICE O/P EST MOD 30 MIN: CPT | Performed by: FAMILY MEDICINE

## 2025-05-16 PROCEDURE — 3079F DIAST BP 80-89 MM HG: CPT | Performed by: FAMILY MEDICINE

## 2025-05-16 PROCEDURE — 3075F SYST BP GE 130 - 139MM HG: CPT | Performed by: FAMILY MEDICINE

## 2025-05-16 PROCEDURE — G0482 DRUG TEST DEF 15-21 CLASSES: HCPCS | Performed by: FAMILY MEDICINE

## 2025-05-16 RX ORDER — BUPRENORPHINE AND NALOXONE 8; 2 MG/1; MG/1
1 FILM, SOLUBLE BUCCAL; SUBLINGUAL 2 TIMES DAILY
Qty: 90 FILM | Refills: 0 | Status: SHIPPED | OUTPATIENT
Start: 2025-05-16

## 2025-05-16 NOTE — NURSING NOTE
[facility] - Addiction Medicine       Rooming information:    -mom had a heart attack last night  -need refills on suboxone    Point of care urine drug screen positive for:  Lab Results   Component Value Date    BUP Screen Positive (A) 05/16/2025    BZO Negative 05/16/2025    BAR Negative 05/16/2025    MERCEDEZ Negative 05/16/2025    MAMP Negative 05/16/2025    AMP Negative 05/16/2025    MDMA Negative 05/16/2025    MTD Negative 05/16/2025    [id number] Screen Positive (A) 05/16/2025    OXY Negative 05/16/2025    PCP Negative 05/16/2025    THC Negative 05/16/2025    TEMP 96 F 05/16/2025    SGPOCT 1.015 05/16/2025       *POC urine drug screen does not screen for Fentanyl    PHQ-9 Scores:       7/18/2024    11:00 AM 3/11/2025     1:00 PM 5/6/2025     1:00 PM   PHQ   PHQ-9 Total Score 6 18 3   Q9: Thoughts of better off dead/self-harm past 2 weeks Not at all Not at all Not at all     SHABBIR-7 Scores:      6/15/2023    12:00 PM 4/18/2024     1:00 PM 3/11/2025     1:00 PM   SHABBIR-7 SCORE   Total Score 0 3 4       Any other recent substance use:     Heroin - 2 weeks ago    NICOTINE-No      Side effects related to medications pt would like to discuss with provider (constipation, dry mouth, HA, GI upset, sedation?) No     Narcan currently available: Yes    Primary care provider: Pawan Castro MD     Mental health provider: Yrn  (follow up on MH referral if needed)    Any housing, insurance deficits?: stable    Contact information up to date? yes    3rd Party Involvement none today  (please obtain ANAYELI if pt would like to include)        Pal Arboleda MA  May 16, 2025  11:52 AM

## 2025-05-16 NOTE — PROGRESS NOTES
[facility]    A/P                                                    ASSESSMENT/PLAN    1. Opioid use disorder, severe, dependence (H) (Primary)  Reporting symptoms are controlled with 1-2 films per day.  Previously taking 3 films per day so will leave option to increase to TID if needed, especially given acute stressors.  Encouraged continued abstinence.  Has Narcan.  Encouraged engagement with [facility], consider additional psychosocial treatment if not meeting recovery goals.    - Drugs of Abuse Screen Urine (POC CUPS) POCT  - Drug Confirmation Panel Urine with Creat - lab collect; Future  - Drug Confirmation Panel Urine with Creat - lab collect  - buprenorphine HCl-naloxone HCl (SUBOXONE) 8-2 MG per film; Place 1 Film under the tongue 2 times daily. Can increase to TID if needed for cravings or withdrawal  Dispense: 90 Film; Refill: 0    2. Recurrent major depressive disorder, in full remission  3. Grief  Overall mental health seems stable despite acute grief/stress related to his mom have heart attack and still waiting to hear update from siblings on whether he needs to go to [place] or not.  I am happy he has returned to his psychiatric provider.        PDMP Review         Value Time User    State PDMP site checked  Yes 5/6/2025 12:56 PM Ernestina Tripp, DO              RTC  Return in 25 days (on 6/10/2025) for in person - double book at 3:30pm .    The longitudinal plan of care for the diagnosis(es)/condition(s) as documented were addressed during this visit. Due to the added complexity in care, I will continue to support Robles in the subsequent management and with ongoing continuity of care.      Counseled the patient on the importance of having a recovery program in addition to medication to manage recovery.  Components include avoiding isolating, having willingness to change, avoiding triggers and managing cravings. Encouraged having some type of sober network and practicing  honesty with trusted support person(s). Encouraged other services such as counseling, 12 step or other self-help organizations.      Opioid warning reviewed.  Risk of overdose following a period of abstinence due to decrease tolerance was discussed including risk of death.  Strongly recommended abstain from alcohol, benzodiazepines, THC, opioids and other drugs of abuse.  Increased risk of return to opioid use after use of these substances discussed.  Increased risk of overdose/death with use of other substances particularly benzodiazepines/alcohol reviewed.        YOSELIN Bryant is a 59 year old male with PMHx of HTN, morbid obesity, pre-diabetes, COPD/ashtma, YANIRA, anxiety, depression, and OUD who presents to clinic today for follow up.     Brief history of use:    Last use of illicit opioids was in approximately 2019.  Has been on buprenorphine for OUD since at least 2014.      +urine fentanyl on 4/18/24 and 5/16/24.  Confirmation urine drug test on 7/18 positive for fentanyl and xylazine.      Brother was murdered August 2024.  Ran out of Suboxone for a month and returned to intermittent opioid use.  Lost to follow-up after 11/1/24.  RTC on 3/11/25 and then again on 5/6/25.        Plan from most recent office visit (5/6/25):  1. Opioid use disorder, severe, in sustained remission (H) (Primary)  Return to use again after last visit, last use 2 days ago.  Plan to resume Suboxone, close follow-up in 10 days.  He usually waits a few days from last use before resuming Suboxone. Still has clonidine at home to take for withdrawal.  Discussed psychosocial recovery supports - he would like to plan on getting back to meetings and if that is not enough support would consider IOP.  New script for Narcan sent.  Met with [facility] after visit today.  Reviewed option for Recovery Clinic if needed as well.    - Drugs of Abuse Screen Urine (POC CUPS) POCT  -  buprenorphine HCl-naloxone HCl (SUBOXONE) 8-2 MG per film; 2 sl qam, 1 sl qpm  Dispense: 30 Film; Refill: 0  - naloxone (NARCAN) 4 MG/0.1ML nasal spray; Spray 1 spray (4 mg) into one nostril alternating nostrils once as needed for opioid reversal. every 2-3 minutes until assistance arrives  Dispense: 0.2 mL; Refill: 11     2. Recurrent major depressive disorder, in full remission  Reports mood improving recently.  Encouraged upcoming psychiatry appointment.      TODAY'S VISIT  HPI May 16, 2025  Reports he was able to start Suboxone without issue, has been taking 1-2 films daily for past 6 days  Denies opioid cravings, denies opioid use since last visit  Drinking lots of water, not eating much  Feels a little sweaty (not necessarily related to medication)  Sleep interuppted by upstairs neighbor - noisy; Robles is light sleeper  Mom had heart attack over the weekend in [place], he is waiting to hear from siblings if he should travel down to see her  Saw psychiatry after last visit as planned, no med changes, resumed medications as before  Planning to see Dr Matthew SCOTT - needs to have blood work completed for psychiatry        7/18/2024    11:00 AM 3/11/2025     1:00 PM 5/6/2025     1:00 PM   PHQ   PHQ-9 Total Score 6 18 3   Q9: Thoughts of better off dead/self-harm past 2 weeks Not at all Not at all Not at all           6/15/2023    12:00 PM 4/18/2024     1:00 PM 3/11/2025     1:00 PM   SHABBIR-7 SCORE   Total Score 0 3 4       OBJECTIVE                                                    PHYSICAL EXAM:  /89   Pulse 91   Wt (!) 141.5 kg (312 lb)   SpO2 96%   BMI 44.77 kg/m      GENERAL: healthy, alert and no distress  EYES: Eyes grossly normal to inspection, sclerae normal  RESP: No respiratory distress  SKIN: no pallor or jaundice  NEURO: Normal gait, mentation intact and speech normal  MENTAL STATUS EXAM  Appearance/Behavior: No appearant distress  Speech: Normal  Mood/Affect: depressed affect and flat  Insight:  Fair    LAB  Results for orders placed or performed in visit on 05/16/25   Urine Creatinine for Drug Screen Panel     Status: None   Result Value Ref Range    Creatinine Urine for Drug Screen 366 mg/dL   Drugs of Abuse Screen Urine (POC CUPS) POCT     Status: Abnormal   Result Value Ref Range    POCT Kit Lot Number [id number]     POCT Kit Expiration Date 01/18/2026     Temperature Urine POCT 96 F 90 F, 92 F, 94 F, 96 F, 98 F, 100 F    Specific [place] POCT 1.015 1.005, 1.015, 1.025    pH Qual Urine POCT 5 pH 4 pH, 5 pH, 7 pH, 9 pH    Creatinine Qual Urine POCT 100 mg/dL 20 mg/dL, 50 mg/dL, 100 mg/dL, 200 mg/dL    Internal QC Qual Urine POCT Valid Valid    Amphetamine Qual Urine POCT Negative Negative    Barbiturate Qual Urine POCT Negative Negative    Buprenorphine Qual Urine POCT Screen Positive (A) Negative    Benzodiazepine Qual Urine POCT Negative Negative    Cocaine Qual Urine POCT Negative Negative    Methamphetamine Qual Urine POCT Negative Negative    MDMA Qual Urine POCT Negative Negative    Methadone Qual Urine POCT Negative Negative    Opiate Qual Urine POCT Screen Positive (A) Negative    Oxycodone Qual Urine POCT Negative Negative    Phencyclidine Qual Urine POCT Negative Negative    THC Qual Urine POCT Negative Negative   Drug Confirmation Panel Urine with Creat - lab collect     Status: None (In process)    Narrative    The following orders were created for panel order Drug Confirmation Panel Urine with Creat - lab collect.  Procedure                               Abnormality         Status                     ---------                               -----------         ------                     Urine Drug Confirmation...[[id number]]                      In process                 Urine Creatinine for Dr...[[id number]]                      Final result                 Please view results for these tests on the individual orders.         HISTORY                                                    Problem  list reviewed & adjusted, as indicated.  Patient Active Problem List   Diagnosis    Type 2 diabetes mellitus with diabetic polyneuropathy, without long-term current use of insulin (H)    Morbid obesity (H)    Opioid use disorder, severe, in sustained remission (H)    YANIRA on CPAP    Essential hypertension    Recurrent major depressive disorder, in full remission    Chronic obstructive pulmonary disease with acute exacerbation (H)    Heart failure with preserved ejection fraction, unspecified HF chronicity (H)         MEDICATION LIST (prior to visit)  Current Outpatient Medications   Medication Sig Dispense Refill    albuterol (PROAIR HFA/PROVENTIL HFA/VENTOLIN HFA) 108 (90 Base) MCG/ACT inhaler INHALE 2 PUFFS BY MOUTH EVERY 6 HOURS AS NEEDED FOR WHEEZING 18 g 0    ARIPiprazole (ABILIFY) 10 MG tablet Take 1 tablet (10 mg) by mouth daily      aspirin (ASA) 81 MG EC tablet Take 1 tablet (81 mg) by mouth daily 90 tablet 3    atorvastatin (LIPITOR) 20 MG tablet Take 1 tablet (20 mg) by mouth daily 90 tablet 3    budesonide-formoterol (SYMBICORT) 80-4.5 MCG/ACT Inhaler INHALE 2 PUFFS BY MOUTH TWICE DAILY 10.2 g 11    buprenorphine HCl-naloxone HCl (SUBOXONE) 8-2 MG per film Place 1 Film under the tongue 2 times daily. Can increase to TID if needed for cravings or withdrawal 90 Film 0    buPROPion (WELLBUTRIN XL) 150 MG 24 hr tablet Take 1 tablet (150 mg) by mouth every morning      cloNIDine (CATAPRES) 0.1 MG tablet Take 1 tablet (0.1 mg) by mouth 3 times daily as needed (opioid withdrawal). 12 tablet 0    cyclobenzaprine (FLEXERIL) 10 MG tablet Take 1 tablet (10 mg) by mouth 3 times daily as needed for muscle spasms. 40 tablet 1    docusate sodium (COLACE) 100 MG capsule TAKE 1 CAPSULE(100 MG) BY MOUTH TWICE DAILY as needed for constipation 60 capsule 3    empagliflozin (JARDIANCE) 10 MG TABS tablet Take 1 tablet (10 mg) by mouth daily 90 tablet 4    furosemide (LASIX) 20 MG tablet Take 1 tablet (20 mg) by mouth daily as  needed (swelling)      gabapentin (NEURONTIN) 300 MG capsule Take 1 capsule (300 mg) by mouth 3 times daily as needed (withdrawal). 12 capsule 0    ibuprofen (ADVIL/MOTRIN) 600 MG tablet Take 1 tablet (600 mg) by mouth every 6 hours as needed for moderate pain. 40 tablet 2    naloxone (NARCAN) 4 MG/0.1ML nasal spray Spray 1 spray (4 mg) into one nostril alternating nostrils once as needed for opioid reversal. every 2-3 minutes until assistance arrives 0.2 mL 11    ondansetron (ZOFRAN ODT) 4 MG ODT tab Take 1 tablet (4 mg) by mouth every 8 hours as needed for nausea or vomiting. 9 tablet 0    phentermine (ADIPEX-P) 37.5 MG tablet Take 0.5-1 tablets (18.75-37.5 mg) by mouth every morning (before breakfast). 90 tablet 1    sildenafil (VIAGRA) 100 MG tablet Take 0.5-1 tablets ( mg) by mouth daily as needed (ED) 30 tablet 1     No current facility-administered medications for this visit.       MEDICATION LIST (after visit)  Current Outpatient Medications   Medication Sig Dispense Refill    albuterol (PROAIR HFA/PROVENTIL HFA/VENTOLIN HFA) 108 (90 Base) MCG/ACT inhaler INHALE 2 PUFFS BY MOUTH EVERY 6 HOURS AS NEEDED FOR WHEEZING 18 g 0    ARIPiprazole (ABILIFY) 10 MG tablet Take 1 tablet (10 mg) by mouth daily      aspirin (ASA) 81 MG EC tablet Take 1 tablet (81 mg) by mouth daily 90 tablet 3    atorvastatin (LIPITOR) 20 MG tablet Take 1 tablet (20 mg) by mouth daily 90 tablet 3    budesonide-formoterol (SYMBICORT) 80-4.5 MCG/ACT Inhaler INHALE 2 PUFFS BY MOUTH TWICE DAILY 10.2 g 11    buprenorphine HCl-naloxone HCl (SUBOXONE) 8-2 MG per film Place 1 Film under the tongue 2 times daily. Can increase to TID if needed for cravings or withdrawal 90 Film 0    buPROPion (WELLBUTRIN XL) 150 MG 24 hr tablet Take 1 tablet (150 mg) by mouth every morning      cloNIDine (CATAPRES) 0.1 MG tablet Take 1 tablet (0.1 mg) by mouth 3 times daily as needed (opioid withdrawal). 12 tablet 0    cyclobenzaprine (FLEXERIL) 10 MG tablet  Take 1 tablet (10 mg) by mouth 3 times daily as needed for muscle spasms. 40 tablet 1    docusate sodium (COLACE) 100 MG capsule TAKE 1 CAPSULE(100 MG) BY MOUTH TWICE DAILY as needed for constipation 60 capsule 3    empagliflozin (JARDIANCE) 10 MG TABS tablet Take 1 tablet (10 mg) by mouth daily 90 tablet 4    furosemide (LASIX) 20 MG tablet Take 1 tablet (20 mg) by mouth daily as needed (swelling)      gabapentin (NEURONTIN) 300 MG capsule Take 1 capsule (300 mg) by mouth 3 times daily as needed (withdrawal). 12 capsule 0    ibuprofen (ADVIL/MOTRIN) 600 MG tablet Take 1 tablet (600 mg) by mouth every 6 hours as needed for moderate pain. 40 tablet 2    naloxone (NARCAN) 4 MG/0.1ML nasal spray Spray 1 spray (4 mg) into one nostril alternating nostrils once as needed for opioid reversal. every 2-3 minutes until assistance arrives 0.2 mL 11    ondansetron (ZOFRAN ODT) 4 MG ODT tab Take 1 tablet (4 mg) by mouth every 8 hours as needed for nausea or vomiting. 9 tablet 0    phentermine (ADIPEX-P) 37.5 MG tablet Take 0.5-1 tablets (18.75-37.5 mg) by mouth every morning (before breakfast). 90 tablet 1    sildenafil (VIAGRA) 100 MG tablet Take 0.5-1 tablets ( mg) by mouth daily as needed (ED) 30 tablet 1     No current facility-administered medications for this visit.         Allergies   Allergen Reactions    Seafood Other (See Comments)     Eyes turn yellow    Ibuprofen Nausea and Vomiting       Ernestina Tripp, [facility]  [facility]  [phone]

## 2025-05-20 ENCOUNTER — RESULTS FOLLOW-UP (OUTPATIENT)
Dept: ADDICTION MEDICINE | Facility: CLINIC | Age: 60
End: 2025-05-20

## 2025-05-20 LAB
BUPRENORPHINE UR CFM-MCNC: 207 NG/ML
BUPRENORPHINE/CREAT UR: 57 NG/MG {CREAT}
FENTANYL UR CFM-MCNC: 47 NG/ML
FENTANYL/CREAT UR: 13 NG/MG {CREAT}
GABAPENTIN UR QL CFM: PRESENT
MORPHINE UR CFM-MCNC: 249 NG/ML
MORPHINE/CREAT UR: 68 NG/MG {CREAT}
NALOXONE UR CFM-MCNC: 343 NG/ML
NALOXONE: 94 NG/MG {CREAT}
NORBUPRENORPHINE UR CFM-MCNC: 193 NG/ML
NORBUPRENORPHINE/CREAT UR: 53 NG/MG {CREAT}
NORFENTANYL UR CFM-MCNC: 411 NG/ML
NORFENTANYL/CREAT UR: 112 NG/MG {CREAT}

## 2025-06-10 ENCOUNTER — OFFICE VISIT (OUTPATIENT)
Dept: ADDICTION MEDICINE | Facility: CLINIC | Age: 60
End: 2025-06-10
Payer: COMMERCIAL

## 2025-06-10 VITALS
SYSTOLIC BLOOD PRESSURE: 135 MMHG | DIASTOLIC BLOOD PRESSURE: 85 MMHG | HEIGHT: 71 IN | BODY MASS INDEX: 44.1 KG/M2 | WEIGHT: 315 LBS | OXYGEN SATURATION: 99 % | HEART RATE: 71 BPM

## 2025-06-10 DIAGNOSIS — F11.93 OPIOID WITHDRAWAL (H): ICD-10-CM

## 2025-06-10 DIAGNOSIS — F11.20 OPIOID USE DISORDER, SEVERE, DEPENDENCE (H): Primary | ICD-10-CM

## 2025-06-10 PROCEDURE — 80305 DRUG TEST PRSMV DIR OPT OBS: CPT | Performed by: FAMILY MEDICINE

## 2025-06-10 PROCEDURE — 3075F SYST BP GE 130 - 139MM HG: CPT | Performed by: FAMILY MEDICINE

## 2025-06-10 PROCEDURE — 3079F DIAST BP 80-89 MM HG: CPT | Performed by: FAMILY MEDICINE

## 2025-06-10 PROCEDURE — G2211 COMPLEX E/M VISIT ADD ON: HCPCS | Performed by: FAMILY MEDICINE

## 2025-06-10 PROCEDURE — 99214 OFFICE O/P EST MOD 30 MIN: CPT | Performed by: FAMILY MEDICINE

## 2025-06-10 RX ORDER — BUPRENORPHINE AND NALOXONE 8; 2 MG/1; MG/1
1 FILM, SOLUBLE BUCCAL; SUBLINGUAL 2 TIMES DAILY
Qty: 10 FILM | Refills: 0 | Status: SHIPPED | OUTPATIENT
Start: 2025-06-10 | End: 2025-06-16

## 2025-06-10 RX ORDER — GABAPENTIN 300 MG/1
300 CAPSULE ORAL 3 TIMES DAILY PRN
Qty: 60 CAPSULE | Refills: 0 | Status: SHIPPED | OUTPATIENT
Start: 2025-06-10 | End: 2025-06-19

## 2025-06-10 RX ORDER — ONDANSETRON 4 MG/1
4 TABLET, ORALLY DISINTEGRATING ORAL EVERY 8 HOURS PRN
Qty: 9 TABLET | Refills: 0 | Status: SHIPPED | OUTPATIENT
Start: 2025-06-10 | End: 2025-06-19

## 2025-06-10 RX ORDER — CLONIDINE HYDROCHLORIDE 0.1 MG/1
0.1 TABLET ORAL 3 TIMES DAILY PRN
Qty: 12 TABLET | Refills: 0 | Status: SHIPPED | OUTPATIENT
Start: 2025-06-10

## 2025-06-10 NOTE — PROGRESS NOTES
[facility]    A/P                                                    ASSESSMENT/PLAN    1. Opioid use disorder, severe, dependence (H) (Primary)  Needs improvement.  Reporting return after about 2 weeks of abstinence.  Use has been triggered by what sounds like neuropathic pain in his feet (which he plans to discuss further with his PCP at upcoming visit - we discussed briefly that there are many causes for his symptoms so this really deserves a discussion with PCP).  We discussed importance of continuing Suboxone consistently.  He plans to resume in about 2 days (waits 72 hours from last use to prevent precipitated withdrawal).  Has found adequate symptom relief with Suboxone 8-2mg BID so we will continue with this dose.  Has Narcan.    - Drugs of Abuse Screen Urine (POC CUPS) POCT; Standing  - buprenorphine HCl-naloxone HCl (SUBOXONE) 8-2 MG per film; Place 1 Film under the tongue 2 times daily. Can increase to TID if needed for cravings or withdrawal  Dispense: 10 Film; Refill: 0  - Drugs of Abuse Screen Urine (POC CUPS) POCT    2. Opioid withdrawal (H)  Reviewed use of comfort medications.  We discussed option of taking gabapentin more consistently and ongoing to help address underlying neuropathic pain in his feet - especially since this has been a trigger for return to use.    - cloNIDine (CATAPRES) 0.1 MG tablet; Take 1 tablet (0.1 mg) by mouth 3 times daily as needed (opioid withdrawal).  Dispense: 12 tablet; Refill: 0  - gabapentin (NEURONTIN) 300 MG capsule; Take 1 capsule (300 mg) by mouth 3 times daily as needed (withdrawal, nerve pain).  Dispense: 60 capsule; Refill: 0  - ondansetron (ZOFRAN ODT) 4 MG ODT tab; Take 1 tablet (4 mg) by mouth every 8 hours as needed for nausea or vomiting.  Dispense: 9 tablet; Refill: 0        PDMP Review         Value Time User    State PDMP site checked  Yes 5/6/2025 12:56 PM Ernestina Tripp, DO              RTC  Return in 6 days (on  6/16/2025) for in person.    The longitudinal plan of care for the diagnosis(es)/condition(s) as documented were addressed during this visit. Due to the added complexity in care, I will continue to support Robles in the subsequent management and with ongoing continuity of care.      Counseled the patient on the importance of having a recovery program in addition to medication to manage recovery.  Components include avoiding isolating, having willingness to change, avoiding triggers and managing cravings. Encouraged having some type of sober network and practicing honesty with trusted support person(s). Encouraged other services such as counseling, 12 step or other self-help organizations.      Opioid warning reviewed.  Risk of overdose following a period of abstinence due to decrease tolerance was discussed including risk of death.  Strongly recommended abstain from alcohol, benzodiazepines, THC, opioids and other drugs of abuse.  Increased risk of return to opioid use after use of these substances discussed.  Increased risk of overdose/death with use of other substances particularly benzodiazepines/alcohol reviewed.        YOSELIN Bryant is a 59 year old male with PMHx of HTN, morbid obesity, pre-diabetes, COPD/ashtma, YANIRA, anxiety, depression, and OUD who presents to clinic today for follow up.     Brief history of use:    Last use of illicit opioids was in approximately 2019.  Has been on buprenorphine for OUD since at least 2014.      +urine fentanyl on 4/18/24 and 5/16/24.  Confirmation urine drug test on 7/18 positive for fentanyl and xylazine.      Brother was murdered August 2024.  Ran out of Suboxone for a month and returned to intermittent opioid use.  Lost to follow-up after 11/1/24.  RTC on 3/11/25 and then again on 5/6/25.       Plan from most recent office visit (5/16/25):  1. Opioid use disorder, severe, dependence (H)  (Primary)  Reporting symptoms are controlled with 1-2 films per day.  Previously taking 3 films per day so will leave option to increase to TID if needed, especially given acute stressors.  Encouraged continued abstinence.  Has Narcan.  Encouraged engagement with CRPS, consider additional psychosocial treatment if not meeting recovery goals.    - Drugs of Abuse Screen Urine (POC CUPS) POCT  - Drug Confirmation Panel Urine with Creat - lab collect; Future  - Drug Confirmation Panel Urine with Creat - lab collect  - buprenorphine HCl-naloxone HCl (SUBOXONE) 8-2 MG per film; Place 1 Film under the tongue 2 times daily. Can increase to TID if needed for cravings or withdrawal  Dispense: 90 Film; Refill: 0     2. Recurrent major depressive disorder, in full remission  3. Grief  Overall mental health seems stable despite acute grief/stress related to his mom have heart attack and still waiting to hear update from siblings on whether he needs to go to [place] or not.  I am happy he has returned to his psychiatric provider.      TODAY'S VISIT  HPI Ranulfo 10, 2025  - Mom doing OK s/p MI, he went to visit  - Did well in [place] - was taking Suboxone 8-2mg BID which controlled cravings and relieved withdrawal, did have precipitated withdrawal, mom saw this happen and was upset  - Return to use when return home - relates this use to pain in his feet - when waking up feet hurt, numb/tingling (pins and needles), balance feels off - toes still feel numb and heel is sore  - Hasn't taken Suboxone for about 9-11 days  - Trying to keep his weight down - concerned Suboxone increases his appetite  - Last use yesterday, waits 72 hours to avoid precipitated withdrawal        7/18/2024    11:00 AM 3/11/2025     1:00 PM 5/6/2025     1:00 PM   PHQ   PHQ-9 Total Score 6 18 3   Q9: Thoughts of better off dead/self-harm past 2 weeks Not at all Not at all Not at all           6/15/2023    12:00 PM 4/18/2024     1:00 PM 3/11/2025     1:00 PM   SHABBIR-7  "SCORE   Total Score 0 3 4       OBJECTIVE                                                    PHYSICAL EXAM:  /85 (BP Location: Right arm, Patient Position: Chair, Cuff Size: Adult Large)   Pulse 71   Ht 1.803 m (5' 11\")   Wt (!) 147.9 kg (326 lb 1.9 oz)   SpO2 99%   BMI 45.48 kg/m      GENERAL: healthy, alert and no distress  EYES: Eyes grossly normal to inspection, sclerae normal  RESP: No respiratory distress, no coughing or wheezing  MS: no gross musculoskeletal defects noted  SKIN: no pallor or jaundice, no diaphoresis  NEURO: Normal gait, no tremor, mentation intact and speech normal  MENTAL STATUS EXAM  Appearance/Behavior: No appearant distress  Speech: Normal  Mood/Affect: depressed, flat  Insight: fair    LAB  Results for orders placed or performed in visit on 06/10/25   Drugs of Abuse Screen Urine (POC CUPS) POCT     Status: Abnormal   Result Value Ref Range    POCT Kit Lot Number [id number]     POCT Kit Expiration Date 9594505     Temperature Urine POCT 92 F 90 F, 92 F, 94 F, 96 F, 98 F, 100 F    Specific [place] POCT 1.025 1.005, 1.015, 1.025    pH Qual Urine POCT 9 pH 4 pH, 5 pH, 7 pH, 9 pH    Creatinine Qual Urine POCT 50 mg/dL 20 mg/dL, 50 mg/dL, 100 mg/dL, 200 mg/dL    Internal QC Qual Urine POCT Valid Valid    Amphetamine Qual Urine POCT Negative Negative    Barbiturate Qual Urine POCT Negative Negative    Buprenorphine Qual Urine POCT Negative Negative    Benzodiazepine Qual Urine POCT Negative Negative    Cocaine Qual Urine POCT Negative Negative    Methamphetamine Qual Urine POCT Negative Negative    MDMA Qual Urine POCT Negative Negative    Methadone Qual Urine POCT Negative Negative    Opiate Qual Urine POCT Screen Positive (A) Negative    Oxycodone Qual Urine POCT Negative Negative    Phencyclidine Qual Urine POCT Negative Negative    THC Qual Urine POCT Negative Negative         HISTORY                                                    Problem list reviewed & adjusted, as " indicated.  Patient Active Problem List   Diagnosis    Type 2 diabetes mellitus with diabetic polyneuropathy, without long-term current use of insulin (H)    Morbid obesity (H)    Opioid use disorder, severe, in sustained remission (H)    YANIRA on CPAP    Essential hypertension    Recurrent major depressive disorder, in full remission    Chronic obstructive pulmonary disease with acute exacerbation (H)    Heart failure with preserved ejection fraction, unspecified HF chronicity (H)         MEDICATION LIST (prior to visit)  Current Outpatient Medications   Medication Sig Dispense Refill    albuterol (PROAIR HFA/PROVENTIL HFA/VENTOLIN HFA) 108 (90 Base) MCG/ACT inhaler INHALE 2 PUFFS BY MOUTH EVERY 6 HOURS AS NEEDED FOR WHEEZING 18 g 0    ARIPiprazole (ABILIFY) 10 MG tablet Take 1 tablet (10 mg) by mouth daily      aspirin (ASA) 81 MG EC tablet Take 1 tablet (81 mg) by mouth daily 90 tablet 3    atorvastatin (LIPITOR) 20 MG tablet Take 1 tablet (20 mg) by mouth daily 90 tablet 3    budesonide-formoterol (SYMBICORT) 80-4.5 MCG/ACT Inhaler INHALE 2 PUFFS BY MOUTH TWICE DAILY 10.2 g 11    buprenorphine HCl-naloxone HCl (SUBOXONE) 8-2 MG per film Place 1 Film under the tongue 2 times daily. Can increase to TID if needed for cravings or withdrawal 10 Film 0    buPROPion (WELLBUTRIN XL) 150 MG 24 hr tablet Take 1 tablet (150 mg) by mouth every morning      cloNIDine (CATAPRES) 0.1 MG tablet Take 1 tablet (0.1 mg) by mouth 3 times daily as needed (opioid withdrawal). 12 tablet 0    cyclobenzaprine (FLEXERIL) 10 MG tablet Take 1 tablet (10 mg) by mouth 3 times daily as needed for muscle spasms. 40 tablet 1    docusate sodium (COLACE) 100 MG capsule TAKE 1 CAPSULE(100 MG) BY MOUTH TWICE DAILY as needed for constipation 60 capsule 3    empagliflozin (JARDIANCE) 10 MG TABS tablet Take 1 tablet (10 mg) by mouth daily 90 tablet 4    furosemide (LASIX) 20 MG tablet Take 1 tablet (20 mg) by mouth daily as needed (swelling)       gabapentin (NEURONTIN) 300 MG capsule Take 1 capsule (300 mg) by mouth 3 times daily as needed (withdrawal, nerve pain). 60 capsule 0    ibuprofen (ADVIL/MOTRIN) 600 MG tablet Take 1 tablet (600 mg) by mouth every 6 hours as needed for moderate pain. 40 tablet 2    naloxone (NARCAN) 4 MG/0.1ML nasal spray Spray 1 spray (4 mg) into one nostril alternating nostrils once as needed for opioid reversal. every 2-3 minutes until assistance arrives 0.2 mL 11    ondansetron (ZOFRAN ODT) 4 MG ODT tab Take 1 tablet (4 mg) by mouth every 8 hours as needed for nausea or vomiting. 9 tablet 0    phentermine (ADIPEX-P) 37.5 MG tablet Take 0.5-1 tablets (18.75-37.5 mg) by mouth every morning (before breakfast). 90 tablet 1    sildenafil (VIAGRA) 100 MG tablet Take 0.5-1 tablets ( mg) by mouth daily as needed (ED) 30 tablet 1     No current facility-administered medications for this visit.       MEDICATION LIST (after visit)  Current Outpatient Medications   Medication Sig Dispense Refill    albuterol (PROAIR HFA/PROVENTIL HFA/VENTOLIN HFA) 108 (90 Base) MCG/ACT inhaler INHALE 2 PUFFS BY MOUTH EVERY 6 HOURS AS NEEDED FOR WHEEZING 18 g 0    ARIPiprazole (ABILIFY) 10 MG tablet Take 1 tablet (10 mg) by mouth daily      aspirin (ASA) 81 MG EC tablet Take 1 tablet (81 mg) by mouth daily 90 tablet 3    atorvastatin (LIPITOR) 20 MG tablet Take 1 tablet (20 mg) by mouth daily 90 tablet 3    budesonide-formoterol (SYMBICORT) 80-4.5 MCG/ACT Inhaler INHALE 2 PUFFS BY MOUTH TWICE DAILY 10.2 g 11    buprenorphine HCl-naloxone HCl (SUBOXONE) 8-2 MG per film Place 1 Film under the tongue 2 times daily. Can increase to TID if needed for cravings or withdrawal 10 Film 0    buPROPion (WELLBUTRIN XL) 150 MG 24 hr tablet Take 1 tablet (150 mg) by mouth every morning      cloNIDine (CATAPRES) 0.1 MG tablet Take 1 tablet (0.1 mg) by mouth 3 times daily as needed (opioid withdrawal). 12 tablet 0    cyclobenzaprine (FLEXERIL) 10 MG tablet Take 1  tablet (10 mg) by mouth 3 times daily as needed for muscle spasms. 40 tablet 1    docusate sodium (COLACE) 100 MG capsule TAKE 1 CAPSULE(100 MG) BY MOUTH TWICE DAILY as needed for constipation 60 capsule 3    empagliflozin (JARDIANCE) 10 MG TABS tablet Take 1 tablet (10 mg) by mouth daily 90 tablet 4    furosemide (LASIX) 20 MG tablet Take 1 tablet (20 mg) by mouth daily as needed (swelling)      gabapentin (NEURONTIN) 300 MG capsule Take 1 capsule (300 mg) by mouth 3 times daily as needed (withdrawal, nerve pain). 60 capsule 0    ibuprofen (ADVIL/MOTRIN) 600 MG tablet Take 1 tablet (600 mg) by mouth every 6 hours as needed for moderate pain. 40 tablet 2    naloxone (NARCAN) 4 MG/0.1ML nasal spray Spray 1 spray (4 mg) into one nostril alternating nostrils once as needed for opioid reversal. every 2-3 minutes until assistance arrives 0.2 mL 11    ondansetron (ZOFRAN ODT) 4 MG ODT tab Take 1 tablet (4 mg) by mouth every 8 hours as needed for nausea or vomiting. 9 tablet 0    phentermine (ADIPEX-P) 37.5 MG tablet Take 0.5-1 tablets (18.75-37.5 mg) by mouth every morning (before breakfast). 90 tablet 1    sildenafil (VIAGRA) 100 MG tablet Take 0.5-1 tablets ( mg) by mouth daily as needed (ED) 30 tablet 1     No current facility-administered medications for this visit.         Allergies   Allergen Reactions    Seafood Other (See Comments)     Eyes turn yellow    Ibuprofen Nausea and Vomiting       Ernestina Tripp [facility]  [facility]  [phone]

## 2025-06-10 NOTE — PROGRESS NOTES
[facility]       Rooming information:    Point of care urine drug screen positive for:  Lab Results   Component Value Date    BUP Screen Positive (A) 05/16/2025    BZO Negative 05/16/2025    BAR Negative 05/16/2025    MERCEDEZ Negative 05/16/2025    MAMP Negative 05/16/2025    AMP Negative 05/16/2025    MDMA Negative 05/16/2025    MTD Negative 05/16/2025    [id number] Screen Positive (A) 05/16/2025    OXY Negative 05/16/2025    PCP Negative 05/16/2025    THC Negative 05/16/2025    TEMP 96 F 05/16/2025    SGPOCT 1.015 05/16/2025       *POC urine drug screen does not screen for Fentanyl    PHQ-9 Scores:       7/18/2024    11:00 AM 3/11/2025     1:00 PM 5/6/2025     1:00 PM   PHQ   PHQ-9 Total Score 6 18 3   Q9: Thoughts of better off dead/self-harm past 2 weeks Not at all Not at all Not at all     SHABBIR-7 Scores:      6/15/2023    12:00 PM 4/18/2024     1:00 PM 3/11/2025     1:00 PM   SHABBIR-7 SCORE   Total Score 0 3 4       Any other recent substance use:     Patient stated he will tall to provider about it     NICOTINE-No  If using nicotine, ready to quit? No    Side effects related to medications pt would like to discuss with provider (constipation, dry mouth, HA, GI upset, sedation?) No     Narcan currently available: Yes    Primary care provider: Pawan Castro MD     Mental health provider: [facility]  (follow up on MH referral if needed)    Any housing, insurance deficits?: no    Contact information up to date? yes    3rd Party Involvement no (please obtain ANAYELI if pt would like to include)        Pennie Poon MA  Anna 10, 2025  3:15 PM

## 2025-06-10 NOTE — PATIENT INSTRUCTIONS
Come see me Monday, June 16 @ 1:30pm.      [facility]  [address], [address]   [place], [place], [address]  Phone: [phone]  Fax: [phone]    Open Monday-Friday  Closed over lunch hour  Walk in hours: 9am-11:30am and 12:30-3pm

## 2025-06-16 ENCOUNTER — OFFICE VISIT (OUTPATIENT)
Dept: BEHAVIORAL HEALTH | Facility: CLINIC | Age: 60
End: 2025-06-16
Payer: COMMERCIAL

## 2025-06-16 VITALS — DIASTOLIC BLOOD PRESSURE: 91 MMHG | SYSTOLIC BLOOD PRESSURE: 148 MMHG | HEART RATE: 84 BPM | OXYGEN SATURATION: 100 %

## 2025-06-16 DIAGNOSIS — F11.20 OPIOID USE DISORDER, SEVERE, DEPENDENCE (H): Primary | ICD-10-CM

## 2025-06-16 DIAGNOSIS — G57.93 NEUROPATHIC PAIN OF BOTH FEET: ICD-10-CM

## 2025-06-16 PROCEDURE — 99214 OFFICE O/P EST MOD 30 MIN: CPT | Performed by: FAMILY MEDICINE

## 2025-06-16 PROCEDURE — 3077F SYST BP >= 140 MM HG: CPT | Performed by: FAMILY MEDICINE

## 2025-06-16 PROCEDURE — 99207 PR NO BILLABLE SERVICE THIS VISIT: CPT

## 2025-06-16 PROCEDURE — 3080F DIAST BP >= 90 MM HG: CPT | Performed by: FAMILY MEDICINE

## 2025-06-16 PROCEDURE — 80305 DRUG TEST PRSMV DIR OPT OBS: CPT | Performed by: FAMILY MEDICINE

## 2025-06-16 PROCEDURE — G2211 COMPLEX E/M VISIT ADD ON: HCPCS | Performed by: FAMILY MEDICINE

## 2025-06-16 RX ORDER — BUPRENORPHINE AND NALOXONE 8; 2 MG/1; MG/1
1 FILM, SOLUBLE BUCCAL; SUBLINGUAL 2 TIMES DAILY
Qty: 60 FILM | Refills: 0 | Status: SHIPPED | OUTPATIENT
Start: 2025-06-19

## 2025-06-16 ASSESSMENT — PATIENT HEALTH QUESTIONNAIRE - PHQ9: SUM OF ALL RESPONSES TO PHQ QUESTIONS 1-9: 0

## 2025-06-16 NOTE — PROGRESS NOTES
[facility]    A/P                                                    ASSESSMENT/PLAN    1. Opioid use disorder, severe, dependence (H) (Primary)  Improving.  Back on Suboxone 8-2mg BID.  Reporting symptoms are well controlled.  Continue Suboxone, no change.  He is traveling to [place] for his birthday and 4th of July.  Discussed importance of taking Suboxone consistently.  We also discussed option of Sublocade.  He is considering and will let me know.  Has Narcan.  - Drugs of Abuse Screen Urine (POC CUPS) POCT  - buprenorphine HCl-naloxone HCl (SUBOXONE) 8-2 MG per film; Place 1 Film under the tongue 2 times daily.  Dispense: 60 Film; Refill: 0    2. Neuropathic pain of both feet  Improving.  Encouraged him to discuss with PCP.  For now continue gabapentin.          PDMP Review         Value Time User    State PDMP site checked  Yes 5/6/2025 12:56 PM Ernestina Tripp,               RTC  Return in 29 days (on 7/15/2025) for in person.    The longitudinal plan of care for the diagnosis(es)/condition(s) as documented were addressed during this visit. Due to the added complexity in care, I will continue to support Robles in the subsequent management and with ongoing continuity of care.      Counseled the patient on the importance of having a recovery program in addition to medication to manage recovery.  Components include avoiding isolating, having willingness to change, avoiding triggers and managing cravings. Encouraged having some type of sober network and practicing honesty with trusted support person(s). Encouraged other services such as counseling, 12 step or other self-help organizations.      Opioid warning reviewed.  Risk of overdose following a period of abstinence due to decrease tolerance was discussed including risk of death.  Strongly recommended abstain from alcohol, benzodiazepines, THC, opioids and other drugs of abuse.  Increased risk of return to opioid use after use  of these substances discussed.  Increased risk of overdose/death with use of other substances particularly benzodiazepines/alcohol reviewed.        YOSELIN Bryant is a 59 year old male with PMHx of HTN, morbid obesity, pre-diabetes, COPD/ashtma, YANIRA, anxiety, depression, and OUD who presents to clinic today for follow up.     Brief history of use:    Last use of illicit opioids was in approximately 2019.  Has been on buprenorphine for OUD since at least 2014.      +urine fentanyl on 4/18/24 and 5/16/24.  Confirmation urine drug test on 7/18 positive for fentanyl and xylazine.      Brother was murdered August 2024.  Ran out of Suboxone for a month and returned to intermittent opioid use.  Lost to follow-up after 11/1/24.  RTC on 3/11/25 and then again on 5/6/25.       Plan from most recent office visit (6/10/25):  1. Opioid use disorder, severe, dependence (H) (Primary)  Needs improvement.  Reporting return after about 2 weeks of abstinence.  Use has been triggered by what sounds like neuropathic pain in his feet (which he plans to discuss further with his PCP at upcoming visit - we discussed briefly that there are many causes for his symptoms so this really deserves a discussion with PCP).  We discussed importance of continuing Suboxone consistently.  He plans to resume in about 2 days (waits 72 hours from last use to prevent precipitated withdrawal).  Has found adequate symptom relief with Suboxone 8-2mg BID so we will continue with this dose.  Has Narcan.    - Drugs of Abuse Screen Urine (POC CUPS) POCT; Standing  - buprenorphine HCl-naloxone HCl (SUBOXONE) 8-2 MG per film; Place 1 Film under the tongue 2 times daily. Can increase to TID if needed for cravings or withdrawal  Dispense: 10 Film; Refill: 0  - Drugs of Abuse Screen Urine (POC CUPS) POCT     2. Opioid withdrawal (H)  Reviewed use of comfort medications.  We discussed option of taking  gabapentin more consistently and ongoing to help address underlying neuropathic pain in his feet - especially since this has been a trigger for return to use.    - cloNIDine (CATAPRES) 0.1 MG tablet; Take 1 tablet (0.1 mg) by mouth 3 times daily as needed (opioid withdrawal).  Dispense: 12 tablet; Refill: 0  - gabapentin (NEURONTIN) 300 MG capsule; Take 1 capsule (300 mg) by mouth 3 times daily as needed (withdrawal, nerve pain).  Dispense: 60 capsule; Refill: 0  - ondansetron (ZOFRAN ODT) 4 MG ODT tab; Take 1 tablet (4 mg) by mouth every 8 hours as needed for nausea or vomiting.  Dispense: 9 tablet; Refill: 0    TODAY'S VISIT  HPI Jun 16, 2025  - Met with Bryon - appreciated this, considering meetings again  -Resumed Suboxone 8-2mg BID since last visit, no issues with preciptiated wihtdrwal  - No side effects  - No cravings  - Numbness/tingling in feet better since last visit - just in big toe and little toe, taking gabpentin about 2x/day  - May leave for [place] later this week after seeing PCP  - Neighbor has been quiet lately but sleep has still been a little bit off (one night wasn't able to fall asleep, another night fell asleep for a while but then woke up and couldn't fall back to sleep)  - Mood has been ok, not too much aggravation lately        3/11/2025     1:00 PM 5/6/2025     1:00 PM 6/16/2025     1:00 PM   PHQ   PHQ-9 Total Score 18 3 0   Q9: Thoughts of better off dead/self-harm past 2 weeks Not at all Not at all Not at all           6/15/2023    12:00 PM 4/18/2024     1:00 PM 3/11/2025     1:00 PM   SHABBIR-7 SCORE   Total Score 0 3 4       OBJECTIVE                                                    PHYSICAL EXAM:  BP (!) 148/91   Pulse 84   SpO2 100%     GENERAL: healthy, alert and no distress  EYES: Eyes grossly normal to inspection, sclerae normal  RESP: No respiratory distress, no coughing or wheezing  SKIN: no pallor or jaundice  NEURO: Normal gait, mentation intact and speech normal  MENTAL  STATUS EXAM  Appearance/Behavior: No appearant distress  Speech: Normal  Mood/Affect: normal affect  Insight: Adequate    LAB  Results for orders placed or performed in visit on 06/16/25   Drugs of Abuse Screen Urine (POC CUPS) POCT     Status: Abnormal   Result Value Ref Range    POCT Kit Lot Number [id number]     POCT Kit Expiration Date 08/05/2026     Temperature Urine POCT 96 F 90 F, 92 F, 94 F, 96 F, 98 F, 100 F    Specific [place] POCT 1.025 1.005, 1.015, 1.025    pH Qual Urine POCT 5 pH 4 pH, 5 pH, 7 pH, 9 pH    Creatinine Qual Urine POCT 20 mg/dL 20 mg/dL, 50 mg/dL, 100 mg/dL, 200 mg/dL    Internal QC Qual Urine POCT Valid Valid    Amphetamine Qual Urine POCT Negative Negative    Barbiturate Qual Urine POCT Negative Negative    Buprenorphine Qual Urine POCT Screen Positive (A) Negative    Benzodiazepine Qual Urine POCT Negative Negative    Cocaine Qual Urine POCT Negative Negative    Methamphetamine Qual Urine POCT Negative Negative    MDMA Qual Urine POCT Negative Negative    Methadone Qual Urine POCT Negative Negative    Opiate Qual Urine POCT Negative Negative    Oxycodone Qual Urine POCT Negative Negative    Phencyclidine Qual Urine POCT Negative Negative    THC Qual Urine POCT Negative Negative         HISTORY                                                    Problem list reviewed & adjusted, as indicated.  Patient Active Problem List   Diagnosis    Type 2 diabetes mellitus with diabetic polyneuropathy, without long-term current use of insulin (H)    Morbid obesity (H)    Opioid use disorder, severe, in sustained remission (H)    YANIRA on CPAP    Essential hypertension    Recurrent major depressive disorder, in full remission    Chronic obstructive pulmonary disease with acute exacerbation (H)    Heart failure with preserved ejection fraction, unspecified HF chronicity (H)         MEDICATION LIST (prior to visit)  Current Outpatient Medications   Medication Sig Dispense Refill    albuterol (PROAIR  HFA/PROVENTIL HFA/VENTOLIN HFA) 108 (90 Base) MCG/ACT inhaler INHALE 2 PUFFS BY MOUTH EVERY 6 HOURS AS NEEDED FOR WHEEZING 18 g 0    ARIPiprazole (ABILIFY) 10 MG tablet Take 1 tablet (10 mg) by mouth daily      aspirin (ASA) 81 MG EC tablet Take 1 tablet (81 mg) by mouth daily 90 tablet 3    atorvastatin (LIPITOR) 20 MG tablet Take 1 tablet (20 mg) by mouth daily 90 tablet 3    budesonide-formoterol (SYMBICORT) 80-4.5 MCG/ACT Inhaler INHALE 2 PUFFS BY MOUTH TWICE DAILY 10.2 g 11    [START ON 6/19/2025] buprenorphine HCl-naloxone HCl (SUBOXONE) 8-2 MG per film Place 1 Film under the tongue 2 times daily. 60 Film 0    buPROPion (WELLBUTRIN XL) 150 MG 24 hr tablet Take 1 tablet (150 mg) by mouth every morning      cloNIDine (CATAPRES) 0.1 MG tablet Take 1 tablet (0.1 mg) by mouth 3 times daily as needed (opioid withdrawal). 12 tablet 0    cyclobenzaprine (FLEXERIL) 10 MG tablet Take 1 tablet (10 mg) by mouth 3 times daily as needed for muscle spasms. 40 tablet 1    docusate sodium (COLACE) 100 MG capsule TAKE 1 CAPSULE(100 MG) BY MOUTH TWICE DAILY as needed for constipation 60 capsule 3    empagliflozin (JARDIANCE) 10 MG TABS tablet Take 1 tablet (10 mg) by mouth daily 90 tablet 4    furosemide (LASIX) 20 MG tablet Take 1 tablet (20 mg) by mouth daily as needed (swelling)      gabapentin (NEURONTIN) 300 MG capsule Take 1 capsule (300 mg) by mouth 3 times daily as needed (withdrawal, nerve pain). 60 capsule 0    ibuprofen (ADVIL/MOTRIN) 600 MG tablet Take 1 tablet (600 mg) by mouth every 6 hours as needed for moderate pain. 40 tablet 2    naloxone (NARCAN) 4 MG/0.1ML nasal spray Spray 1 spray (4 mg) into one nostril alternating nostrils once as needed for opioid reversal. every 2-3 minutes until assistance arrives 0.2 mL 11    ondansetron (ZOFRAN ODT) 4 MG ODT tab Take 1 tablet (4 mg) by mouth every 8 hours as needed for nausea or vomiting. 9 tablet 0    phentermine (ADIPEX-P) 37.5 MG tablet Take 0.5-1 tablets  (18.75-37.5 mg) by mouth every morning (before breakfast). 90 tablet 1    sildenafil (VIAGRA) 100 MG tablet Take 0.5-1 tablets ( mg) by mouth daily as needed (ED) 30 tablet 1     No current facility-administered medications for this visit.       MEDICATION LIST (after visit)  Current Outpatient Medications   Medication Sig Dispense Refill    albuterol (PROAIR HFA/PROVENTIL HFA/VENTOLIN HFA) 108 (90 Base) MCG/ACT inhaler INHALE 2 PUFFS BY MOUTH EVERY 6 HOURS AS NEEDED FOR WHEEZING 18 g 0    ARIPiprazole (ABILIFY) 10 MG tablet Take 1 tablet (10 mg) by mouth daily      aspirin (ASA) 81 MG EC tablet Take 1 tablet (81 mg) by mouth daily 90 tablet 3    atorvastatin (LIPITOR) 20 MG tablet Take 1 tablet (20 mg) by mouth daily 90 tablet 3    budesonide-formoterol (SYMBICORT) 80-4.5 MCG/ACT Inhaler INHALE 2 PUFFS BY MOUTH TWICE DAILY 10.2 g 11    [START ON 6/19/2025] buprenorphine HCl-naloxone HCl (SUBOXONE) 8-2 MG per film Place 1 Film under the tongue 2 times daily. 60 Film 0    buPROPion (WELLBUTRIN XL) 150 MG 24 hr tablet Take 1 tablet (150 mg) by mouth every morning      cloNIDine (CATAPRES) 0.1 MG tablet Take 1 tablet (0.1 mg) by mouth 3 times daily as needed (opioid withdrawal). 12 tablet 0    cyclobenzaprine (FLEXERIL) 10 MG tablet Take 1 tablet (10 mg) by mouth 3 times daily as needed for muscle spasms. 40 tablet 1    docusate sodium (COLACE) 100 MG capsule TAKE 1 CAPSULE(100 MG) BY MOUTH TWICE DAILY as needed for constipation 60 capsule 3    empagliflozin (JARDIANCE) 10 MG TABS tablet Take 1 tablet (10 mg) by mouth daily 90 tablet 4    furosemide (LASIX) 20 MG tablet Take 1 tablet (20 mg) by mouth daily as needed (swelling)      gabapentin (NEURONTIN) 300 MG capsule Take 1 capsule (300 mg) by mouth 3 times daily as needed (withdrawal, nerve pain). 60 capsule 0    ibuprofen (ADVIL/MOTRIN) 600 MG tablet Take 1 tablet (600 mg) by mouth every 6 hours as needed for moderate pain. 40 tablet 2    naloxone (NARCAN) 4  MG/0.1ML nasal spray Spray 1 spray (4 mg) into one nostril alternating nostrils once as needed for opioid reversal. every 2-3 minutes until assistance arrives 0.2 mL 11    ondansetron (ZOFRAN ODT) 4 MG ODT tab Take 1 tablet (4 mg) by mouth every 8 hours as needed for nausea or vomiting. 9 tablet 0    phentermine (ADIPEX-P) 37.5 MG tablet Take 0.5-1 tablets (18.75-37.5 mg) by mouth every morning (before breakfast). 90 tablet 1    sildenafil (VIAGRA) 100 MG tablet Take 0.5-1 tablets ( mg) by mouth daily as needed (ED) 30 tablet 1     No current facility-administered medications for this visit.         Allergies   Allergen Reactions    Seafood Other (See Comments)     Eyes turn yellow    Ibuprofen Nausea and Vomiting       Ernestina Tripp, [facility]  [facility]  [phone]

## 2025-06-16 NOTE — NURSING NOTE
[facility]      Rooming information:    Approximate last use of full opioid agonist: 2 weeks  Taking buprenorphine? Yes: suboxone  How much per day? 8mg BID  Number of buprenorphine films/tablets remaining currently: 3 days left  Side effects related to buprenorphine (constipation, dry mouth, sedation?) No   Narcan currently available: Yes  Other recent substance use:    Denies  NICOTINE-No      Point of care urine drug screen positive for:  Lab Results   Component Value Date    BUP Screen Positive (A) 06/16/2025    BZO Negative 06/16/2025    BAR Negative 06/16/2025    MERCEDEZ Negative 06/16/2025    MAMP Negative 06/16/2025    AMP Negative 06/16/2025    MDMA Negative 06/16/2025    MTD Negative 06/16/2025    QYN940 Negative 06/16/2025    OXY Negative 06/16/2025    PCP Negative 06/16/2025    THC Negative 06/16/2025    TEMP 96 F 06/16/2025    SGPOCT 1.025 06/16/2025       *POC urine drug screen does not screen for Fentanyl    Depression Response    Patient completed the PHQ-9 assessment for depression and scored >9? No  Question 9 on the PHQ-9 was positive for suicidality? No  Does patient have current mental health provider? Yes- [facility]  C-SSRS screener risk level.       Is this a virtual visit? No    I personally notified the following: visit provider          6/16/2025     1:00 PM   PHQ Assesment Total Score(s)   PHQ-9 Score 0         Housing needs: stable    Insurance: active    Current legal issues: denies    Contact information up to date? On file    3rd Party Involvement none today (please obtain ANAYELI if pt would like to include)    Pal Arboleda MA  June 16, 2025  12:59 PM

## 2025-06-16 NOTE — PROGRESS NOTES
[facility] Individual Session Summary     Session Start Time: 1:09 pm     Session End Time: 1:31 pm     Duration: 22 minutes      DATA: Peer  met with Tobin Bryant in the Recovery Clinic to introduce and to provide patient further support and resources for their recovery.  Engaged in a discussion regarding where pt is at in terms of their recovery. Pt stated DOC fentanyl.  Pt very relaxed,, calm and communicative.    Intervention: Facilitated an individual session, utilized active listening, provided validation, utilized motivational interviewing techniques, provided shared experience.    Assessment: Patient appeared calm and communicative    Plan: Peer  will continue to provide support as needed. Provided patient with business card to pt encouraging pt to reach out to peer  if needed additional support and resources.        Patient Identified Goals  To continue to utilize their suboxone as prescribed., To follow up with the recommendation and getting in to treatment., To attend and participate in a sober support group meeting., and To continue to take my other medications as prescribed.    Support Needs:   Ongoing care, support and resources for opioid substance use disorder as well as other substances.       Key Risk Factors to Recovery:   PRC encourages being aware of risk factors that can lead to re-use which include avoiding isolation, avoiding triggers and managing cravings in a healthy manner. being open and willing to acceptance and change on a daily basis.  PRC encourages pt to establish a sober network calling tree to reach out to when needed.  Continue to practice honesty with ourselves and trusted support person(s).   PRC encourages regular attendance at recovery based meetings as well as finding a sponsor for mentoring and accountability.   PRC encourages consideration of other services such as counseling for mental health issues  which can correlate with our substance use.      Bryon Crump  June 16, 2025  4:44 PM    Attestation: Donald Welander, [facility] Providers oversight and supervision of care.

## 2025-06-19 ENCOUNTER — OFFICE VISIT (OUTPATIENT)
Dept: INTERNAL MEDICINE | Facility: CLINIC | Age: 60
End: 2025-06-19
Payer: COMMERCIAL

## 2025-06-19 VITALS
SYSTOLIC BLOOD PRESSURE: 137 MMHG | DIASTOLIC BLOOD PRESSURE: 84 MMHG | HEART RATE: 84 BPM | WEIGHT: 315 LBS | TEMPERATURE: 97.1 F | BODY MASS INDEX: 44.1 KG/M2 | RESPIRATION RATE: 17 BRPM | HEIGHT: 71 IN | OXYGEN SATURATION: 95 %

## 2025-06-19 DIAGNOSIS — E78.2 MIXED HYPERLIPIDEMIA: ICD-10-CM

## 2025-06-19 DIAGNOSIS — Z00.00 ANNUAL PHYSICAL EXAM: Primary | ICD-10-CM

## 2025-06-19 DIAGNOSIS — I50.30 HEART FAILURE WITH PRESERVED EJECTION FRACTION, UNSPECIFIED HF CHRONICITY (H): ICD-10-CM

## 2025-06-19 DIAGNOSIS — J44.1 CHRONIC OBSTRUCTIVE PULMONARY DISEASE WITH ACUTE EXACERBATION (H): ICD-10-CM

## 2025-06-19 DIAGNOSIS — R20.2 TINGLING OF RIGHT UPPER EXTREMITY: ICD-10-CM

## 2025-06-19 DIAGNOSIS — I10 ESSENTIAL HYPERTENSION: ICD-10-CM

## 2025-06-19 DIAGNOSIS — F11.21 OPIOID USE DISORDER, SEVERE, IN SUSTAINED REMISSION (H): ICD-10-CM

## 2025-06-19 DIAGNOSIS — Z12.5 SCREENING FOR PROSTATE CANCER: ICD-10-CM

## 2025-06-19 DIAGNOSIS — F33.42 RECURRENT MAJOR DEPRESSIVE DISORDER, IN FULL REMISSION: ICD-10-CM

## 2025-06-19 DIAGNOSIS — E11.42 TYPE 2 DIABETES MELLITUS WITH DIABETIC POLYNEUROPATHY, WITHOUT LONG-TERM CURRENT USE OF INSULIN (H): ICD-10-CM

## 2025-06-19 DIAGNOSIS — E66.01 MORBID OBESITY (H): ICD-10-CM

## 2025-06-19 DIAGNOSIS — G47.33 OSA ON CPAP: ICD-10-CM

## 2025-06-19 LAB
ERYTHROCYTE [DISTWIDTH] IN BLOOD BY AUTOMATED COUNT: 15.2 % (ref 10–15)
EST. AVERAGE GLUCOSE BLD GHB EST-MCNC: 120 MG/DL
HBA1C MFR BLD: 5.8 % (ref 0–5.6)
HCT VFR BLD AUTO: 37.5 % (ref 40–53)
HGB BLD-MCNC: 12.3 G/DL (ref 13.3–17.7)
MCH RBC QN AUTO: 30.2 PG (ref 26.5–33)
MCHC RBC AUTO-ENTMCNC: 32.8 G/DL (ref 31.5–36.5)
MCV RBC AUTO: 92 FL (ref 78–100)
PLATELET # BLD AUTO: 245 10E3/UL (ref 150–450)
RBC # BLD AUTO: 4.07 10E6/UL (ref 4.4–5.9)
WBC # BLD AUTO: 6.1 10E3/UL (ref 4–11)

## 2025-06-19 RX ORDER — BUDESONIDE AND FORMOTEROL FUMARATE DIHYDRATE 80; 4.5 UG/1; UG/1
2 AEROSOL RESPIRATORY (INHALATION) 2 TIMES DAILY
Qty: 10.2 G | Refills: 11 | Status: SHIPPED | OUTPATIENT
Start: 2025-06-19

## 2025-06-19 RX ORDER — CHLORHEXIDINE GLUCONATE ORAL RINSE 1.2 MG/ML
SOLUTION DENTAL
COMMUNITY
Start: 2025-05-02

## 2025-06-19 RX ORDER — ASPIRIN 81 MG/1
81 TABLET, COATED ORAL DAILY
Status: SHIPPED
Start: 2025-06-19

## 2025-06-19 RX ORDER — GABAPENTIN 300 MG/1
300 CAPSULE ORAL 3 TIMES DAILY
Qty: 270 CAPSULE | Refills: 4 | Status: SHIPPED | OUTPATIENT
Start: 2025-06-19

## 2025-06-19 RX ORDER — ALBUTEROL SULFATE 90 UG/1
INHALANT RESPIRATORY (INHALATION)
Qty: 18 G | Refills: 0 | Status: SHIPPED | OUTPATIENT
Start: 2025-06-19

## 2025-06-19 RX ORDER — ATORVASTATIN CALCIUM 20 MG/1
20 TABLET, FILM COATED ORAL DAILY
Qty: 90 TABLET | Refills: 4 | Status: SHIPPED | OUTPATIENT
Start: 2025-06-19

## 2025-06-19 ASSESSMENT — PAIN SCALES - GENERAL: PAINLEVEL_OUTOF10: NO PAIN (0)

## 2025-06-19 NOTE — PROGRESS NOTES
Office Visit - Physical   Tobin Bryant   59 year old  male    Date of visit: 6/19/2025  Physician: Pawan Castro MD     Assessment and Plan   1. Annual physical exam (Primary)  Is a 59-year-old man with issues as discussed below.  Ongoing health as well as customer recommended.  Vaccines offered and deferred by patient today    2. Screening for prostate cancer  - Prostate Specific Antigen Screen; Future    3. Type 2 diabetes mellitus with diabetic polyneuropathy, without long-term current use of insulin (H)  Has generally been well-controlled, continue same, increase gabapentin to given worsening neuropathy symptoms  - atorvastatin (LIPITOR) 20 MG tablet; Take 1 tablet (20 mg) by mouth daily.  Dispense: 90 tablet; Refill: 4  - aspirin (ASA) 81 MG EC tablet; Take 1 tablet (81 mg) by mouth daily.  - CBC with platelets; Future  - Comprehensive metabolic panel; Future  - Hemoglobin A1c; Future  - TSH with free T4 reflex; Future  - Albumin Random Urine Quantitative with Creat Ratio; Future  - Vitamin B12; Future    4. Heart failure with preserved ejection fraction, unspecified HF chronicity (H)  Appears euvolemic continue same    5. Essential hypertension  Pressure okay continue same    6. Mixed hyperlipidemia  Encouraged to take rosuvastatin  - Lipid panel reflex to direct LDL Fasting; Future    7. Chronic obstructive pulmonary disease with acute exacerbation (H)  Also uses Symbicort stable  - albuterol (PROAIR HFA/PROVENTIL HFA/VENTOLIN HFA) 108 (90 Base) MCG/ACT inhaler; INHALE 2 PUFFS BY MOUTH EVERY 6 HOURS AS NEEDED FOR WHEEZING  Dispense: 18 g; Refill: 0    8. YANIRA on CPAP    9. Recurrent major depressive disorder, in full remission  Per psychiatry    10. Opioid use disorder, severe, in sustained remission (H)  He is on Suboxone    11. Tingling of right upper extremity  Encouraged follow-up with Yane Hagan in the spine clinic    12. Morbid obesity (H)  The following high BMI interventions were performed  this visit: encouragement to exercise and lifestyle education regarding diet    Return in about 6 months (around 12/19/2025) for Follow up.     Chief Complaint   Chief Complaint   Patient presents with    Diabetes     Diabetes follow up        Patient Profile   Social History     Social History Narrative    Lives alone.  Not working (last worked 2006). Originally from [place], some college.  2 grown children        Past Medical History   Patient Active Problem List   Diagnosis    Type 2 diabetes mellitus with diabetic polyneuropathy, without long-term current use of insulin (H)    Morbid obesity (H)    Opioid use disorder, severe, in sustained remission (H)    YANIRA on CPAP    Essential hypertension    Recurrent major depressive disorder, in full remission    Chronic obstructive pulmonary disease with acute exacerbation (H)    Heart failure with preserved ejection fraction, unspecified HF chronicity (H)    Mixed hyperlipidemia       Past Surgical History  He has a past surgical history that includes Keloid Excision and Cataract Extraction (Right).     History of Present Illness   This 59 year old man comes in for follow-up and annual visit.  Overall he is doing okay.  Some ongoing numbness in his feet, recently started gabapentin but only taking occasionally once a day.  Continues to Suboxone, needs refill of his inhalers, generally stable though.  Reviewed medications    Review of Systems: A comprehensive review of systems was negative except as noted.     Medications and Allergies   Current Outpatient Medications   Medication Sig Dispense Refill    albuterol (PROAIR HFA/PROVENTIL HFA/VENTOLIN HFA) 108 (90 Base) MCG/ACT inhaler INHALE 2 PUFFS BY MOUTH EVERY 6 HOURS AS NEEDED FOR WHEEZING 18 g 0    ARIPiprazole (ABILIFY) 10 MG tablet Take 1 tablet (10 mg) by mouth daily      aspirin (ASA) 81 MG EC tablet Take 1 tablet (81 mg) by mouth daily.      atorvastatin (LIPITOR) 20 MG tablet Take 1 tablet (20 mg) by mouth  daily. 90 tablet 4    budesonide-formoterol (SYMBICORT/BREYNA) 80-4.5 MCG/ACT inhaler Inhale 2 puffs into the lungs 2 times daily. 10.2 g 11    buprenorphine HCl-naloxone HCl (SUBOXONE) 8-2 MG per film Place 1 Film under the tongue 2 times daily. 60 Film 0    buPROPion (WELLBUTRIN XL) 150 MG 24 hr tablet Take 1 tablet (150 mg) by mouth every morning      chlorhexidine (PERIDEX) 0.12 % solution SWISH AND SPIT 15 ML TWICE DAILY AND SPIT OUT AFTER 30 SECONDS. DO NOT SWALLOW. DO NOT EAT OR DRINK FOR 30 MINS AFTER*      cloNIDine (CATAPRES) 0.1 MG tablet Take 1 tablet (0.1 mg) by mouth 3 times daily as needed (opioid withdrawal). 12 tablet 0    cyclobenzaprine (FLEXERIL) 10 MG tablet Take 1 tablet (10 mg) by mouth 3 times daily as needed for muscle spasms. 40 tablet 1    docusate sodium (COLACE) 100 MG capsule TAKE 1 CAPSULE(100 MG) BY MOUTH TWICE DAILY as needed for constipation 60 capsule 3    empagliflozin (JARDIANCE) 10 MG TABS tablet Take 1 tablet (10 mg) by mouth daily 90 tablet 4    furosemide (LASIX) 20 MG tablet Take 1 tablet (20 mg) by mouth daily as needed (swelling)      gabapentin (NEURONTIN) 300 MG capsule Take 1 capsule (300 mg) by mouth 3 times daily. 270 capsule 4    naloxone (NARCAN) 4 MG/0.1ML nasal spray Spray 1 spray (4 mg) into one nostril alternating nostrils once as needed for opioid reversal. every 2-3 minutes until assistance arrives 0.2 mL 11    phentermine (ADIPEX-P) 37.5 MG tablet Take 0.5-1 tablets (18.75-37.5 mg) by mouth every morning (before breakfast). 90 tablet 1     Allergies   Allergen Reactions    Seafood Other (See Comments)     Eyes turn yellow    Ibuprofen Nausea and Vomiting        Family and Social History   Family History   Problem Relation Age of Onset    No Known Problems Mother     No Known Problems Father     No Known Problems Sister     No Known Problems Sister     No Known Problems Sister     No Known Problems Sister     No Known Problems Brother     No Known Problems  "Brother     No Known Problems Daughter     No Known Problems Son         Social History     Tobacco Use    Smoking status: Never    Smokeless tobacco: Never   Vaping Use    Vaping status: Never Used   Substance Use Topics    Alcohol use: No    Drug use: Not Currently        Physical Exam   General Appearance:   No acute distress    /84 (BP Location: Left arm, Patient Position: Sitting, Cuff Size: Adult Large)   Pulse 84   Temp 97.1  F (36.2  C) (Tympanic)   Resp 17   Ht 1.803 m (5' 11\")   Wt (!) 146.3 kg (322 lb 8 oz)   SpO2 95%   BMI 44.98 kg/m      EYES: Eyelids, conjunctiva, and sclera were normal.   HEAD, EARS, NOSE, MOUTH, AND THROAT: Head and face were normal. Hearing was normal to voice and the ears were normal to external exam. Nose appearance was normal and there was no discharge.   NECK: Neck appearance was normal.   RESPIRATORY: Breathing pattern was normal and the chest moved symmetrically.   Lung sounds were normal and there were no abnormal sounds.  CARDIOVASCULAR: Heart rate and rhythm were normal.  S1 and S2 were normal and there were no extra sounds or murmurs.  There was no peripheral edema.  GASTROINTESTINAL: The abdomen was normal in contour.    MUSCULOSKELETAL: Skeletal configuration was normal and muscle mass was normal for age. Joint appearance was overall normal.  NEUROLOGIC: The patient was alert and oriented to person, place, time, and circumstance. Speech was normal. Cranial nerves were normal. Motor strength was normal for age. The patient was normally coordinated.  PSYCHIATRIC:  Mood and affect were normal and the patient had normal recent and remote memory. The patient's judgment and insight were normal.       Additional Information   The longitudinal plan of care for the diagnosis(es)/condition(s) as documented were addressed during this visit. Due to the added complexity in care, I will continue to support Robles in the subsequent management and with ongoing continuity of " care.       Pawan Castro MD  Internal Medicine  Contact me at [phone]  Answers submitted by the patient for this visit:  Diabetes Visit (Submitted on 6/19/2025)  Chief Complaint: Chronic problems general questions HPI Form  Frequency of checking blood sugars:: not at all  Diabetic concerns:: none  Paraesthesia present:: numbness in feet  Have you had a diabetic eye exam within the last year?: No  General Questionnaire (Submitted on 6/19/2025)  Chief Complaint: Chronic problems general questions HPI Form  How many servings of fruits and vegetables do you eat daily?: 0-1  On average, how many sweetened beverages do you drink each day (Examples: soda, juice, sweet tea, etc.  Do NOT count diet or artificially sweetened beverages)?: 3  How many minutes a day do you exercise enough to make your heart beat faster?: 9 or less  How many days a week do you exercise enough to make your heart beat faster?: 3 or less  How many days per week do you miss taking your medication?: 0  Questionnaire about: Chronic problems general questions HPI Form (Submitted on 6/19/2025)  Chief Complaint: Chronic problems general questions HPI Form

## 2025-07-07 DIAGNOSIS — J44.1 CHRONIC OBSTRUCTIVE PULMONARY DISEASE WITH ACUTE EXACERBATION (H): ICD-10-CM

## 2025-07-07 RX ORDER — ALBUTEROL SULFATE 90 UG/1
2 AEROSOL, METERED RESPIRATORY (INHALATION) EVERY 6 HOURS PRN
Qty: 18 G | Refills: 2 | Status: SHIPPED | OUTPATIENT
Start: 2025-07-07

## 2025-07-15 ENCOUNTER — OFFICE VISIT (OUTPATIENT)
Dept: ADDICTION MEDICINE | Facility: CLINIC | Age: 60
End: 2025-07-15
Payer: COMMERCIAL

## 2025-07-15 VITALS
SYSTOLIC BLOOD PRESSURE: 146 MMHG | WEIGHT: 309 LBS | DIASTOLIC BLOOD PRESSURE: 83 MMHG | HEART RATE: 75 BPM | OXYGEN SATURATION: 98 % | BODY MASS INDEX: 43.1 KG/M2

## 2025-07-15 DIAGNOSIS — F43.21 GRIEF: Primary | ICD-10-CM

## 2025-07-15 DIAGNOSIS — F11.20 OPIOID USE DISORDER, SEVERE, DEPENDENCE (H): ICD-10-CM

## 2025-07-15 DIAGNOSIS — F11.20 OPIOID USE DISORDER, SEVERE, DEPENDENCE (H): Primary | ICD-10-CM

## 2025-07-15 PROCEDURE — 80305 DRUG TEST PRSMV DIR OPT OBS: CPT | Performed by: FAMILY MEDICINE

## 2025-07-15 PROCEDURE — 1126F AMNT PAIN NOTED NONE PRSNT: CPT | Performed by: FAMILY MEDICINE

## 2025-07-15 PROCEDURE — 3077F SYST BP >= 140 MM HG: CPT | Performed by: FAMILY MEDICINE

## 2025-07-15 PROCEDURE — 99214 OFFICE O/P EST MOD 30 MIN: CPT | Performed by: FAMILY MEDICINE

## 2025-07-15 PROCEDURE — G2211 COMPLEX E/M VISIT ADD ON: HCPCS | Performed by: FAMILY MEDICINE

## 2025-07-15 PROCEDURE — 3079F DIAST BP 80-89 MM HG: CPT | Performed by: FAMILY MEDICINE

## 2025-07-15 RX ORDER — BUPRENORPHINE AND NALOXONE 8; 2 MG/1; MG/1
1 FILM, SOLUBLE BUCCAL; SUBLINGUAL 3 TIMES DAILY
Qty: 90 FILM | Refills: 0 | Status: SHIPPED | OUTPATIENT
Start: 2025-07-15

## 2025-07-15 ASSESSMENT — ANXIETY QUESTIONNAIRES
4. TROUBLE RELAXING: SEVERAL DAYS
1. FEELING NERVOUS, ANXIOUS, OR ON EDGE: NOT AT ALL
GAD7 TOTAL SCORE: 3
5. BEING SO RESTLESS THAT IT IS HARD TO SIT STILL: NOT AT ALL
7. FEELING AFRAID AS IF SOMETHING AWFUL MIGHT HAPPEN: NOT AT ALL
2. NOT BEING ABLE TO STOP OR CONTROL WORRYING: NOT AT ALL
3. WORRYING TOO MUCH ABOUT DIFFERENT THINGS: SEVERAL DAYS
6. BECOMING EASILY ANNOYED OR IRRITABLE: SEVERAL DAYS
GAD7 TOTAL SCORE: 3

## 2025-07-15 ASSESSMENT — PAIN SCALES - GENERAL: PAINLEVEL_OUTOF10: NO PAIN (0)

## 2025-07-15 ASSESSMENT — PATIENT HEALTH QUESTIONNAIRE - PHQ9: SUM OF ALL RESPONSES TO PHQ QUESTIONS 1-9: 3

## 2025-07-15 NOTE — PROGRESS NOTES
[facility] - Addiction Medicine       Rooming information:  Pt is asking to go up on Suboxone to TID, says 16 mg doesn't hold him all day  Sometimes having difficulties falling asleep   Losing Wt, eating more fruit    Point of care urine drug screen positive for:  Lab Results   Component Value Date    BUP Screen Positive (A) 07/15/2025    BZO Negative 07/15/2025    BAR Negative 07/15/2025    MERCEDEZ Negative 07/15/2025    MAMP Negative 07/15/2025    AMP Negative 07/15/2025    MDMA Negative 07/15/2025    MTD Negative 07/15/2025    JWH231 Negative 07/15/2025    OXY Negative 07/15/2025    PCP Negative 07/15/2025    THC Negative 07/15/2025    TEMP 92 F 07/15/2025    SGPOCT 1.005 07/15/2025       *POC urine drug screen does not screen for Fentanyl    PHQ-9 Scores:       5/6/2025     1:00 PM 6/16/2025     1:00 PM 7/15/2025    11:00 AM   PHQ   PHQ-9 Total Score 3 0 3   Q9: Thoughts of better off dead/self-harm past 2 weeks Not at all Not at all Not at all     SHABBIR-7 Scores:      4/18/2024     1:00 PM 3/11/2025     1:00 PM 7/15/2025    11:00 AM   SHABBIR-7 SCORE   Total Score 3 4 3       Any other recent substance use:     Denies    NICOTINE-No  If using nicotine, ready to quit? No    Side effects related to medications pt would like to discuss with provider (constipation, dry mouth, HA, GI upset, sedation?) Yes - dry mouth     Narcan currently available: Yes    Primary care provider: Pawan Castro MD     Mental health provider: Yrn  (follow up on MH referral if needed)    Any housing, insurance deficits?: denies    Contact information up to date? yes    3rd Party Involvement NA (please obtain ANAYELI if pt would like to include)        Vanessa Parada LPN  July 15, 2025  11:04 AM

## 2025-07-15 NOTE — PROGRESS NOTES
Patient: Tobin Bryant  Date: July 15, 2025  Preferred Name: Robles    Visit Location :[facility]  Type of visit: In-Person  Visit start time:     11:38     pm   Visit end time:       12:16 pm  Length of session:  38 minutes    Drug of choice:  N/A  Last use: N/A    S - Subjective (Client's Words, Feelings, and Insights):  Client states (exact quote) : I want to lose weight and focus   Client-Identified Goals/Values:  Family   Current Challenges/Triggers Drama  The patient, who is in recovery, reports that their recovery is going well. They state that their urine analysis (U/A) has been clean. The patient expresses a desire to settle down and focus on their life. He also mentioned wanting to lose weight and shared that he feel good. The patient and the peer  discussed healthy food options.    O - Objective (What the Peer Observes):    Appearance, mood, tone, behavior : interested  Participation in session:  engaged          A - Assessment (Peer's Clinical Impressions & Stage of Change):        Stage of change: Maintenance    As evidenced by UA Clean     Motivation Level: High      P - Plan (Next Steps & Follow-Up):  Peer Interventions Today (select all that apply):      Agreed upon Recovery Plan: Bryon  meetings   Follow-up date or contact plan:   Referrals Made {peerreferrals:[id number]}  If referral made: ANAYELI completed? No      Peer Attestation: Donald Welander, [facility] provides oversight and supervision of patient care    Ingrid Alaniz  July 15, 2025  4:14 PM

## 2025-07-15 NOTE — PROGRESS NOTES
[facility]    A/P                                                    ASSESSMENT/PLAN    1. Opioid use disorder, severe, dependence (H)  Improving but noting some breakthrough withdrawal symptoms.  Will increase to previous effective dose of 8-2mg TID.  He will meet with CPRS today and also plans to call another CPRS as well.    - Drugs of Abuse Screen Urine (POC CUPS) POCT  - buprenorphine HCl-naloxone HCl (SUBOXONE) 8-2 MG per film; Place 1 Film under the tongue 3 times daily.  Dispense: 90 Film; Refill: 0    2. Grief (Primary)  Shares some about the loss of his wife 4 years ago, tearful.  Support provided.          PDMP Review         Value Time User    State PDMP site checked  Yes 7/15/2025 11:27 AM Ernestina Tripp,               RTC  Return in about 4 weeks (around 8/12/2025) for in person - 8/12 @ 11:30am (please add appt), and 9/9 in person please.    The longitudinal plan of care for the diagnosis(es)/condition(s) as documented were addressed during this visit. Due to the added complexity in care, I will continue to support Robles in the subsequent management and with ongoing continuity of care.      Counseled the patient on the importance of having a recovery program in addition to medication to manage recovery.  Components include avoiding isolating, having willingness to change, avoiding triggers and managing cravings. Encouraged having some type of sober network and practicing honesty with trusted support person(s). Encouraged other services such as counseling, 12 step or other self-help organizations.      Opioid warning reviewed.  Risk of overdose following a period of abstinence due to decrease tolerance was discussed including risk of death.  Strongly recommended abstain from alcohol, benzodiazepines, THC, opioids and other drugs of abuse.  Increased risk of return to opioid use after use of these substances discussed.  Increased risk of overdose/death with use of  other substances particularly benzodiazepines/alcohol reviewed.        SUBJECTIVE                                                        Neeruanca Bryant is a 60 year old male with PMHx of HTN, morbid obesity, pre-diabetes, COPD/ashtma, YANIRA, anxiety, depression, and OUD who presents to clinic today for follow up.     Brief history of use:    Last use of illicit opioids was in approximately 2019.  Has been on buprenorphine for OUD since at least 2014.      +urine fentanyl on 4/18/24 and 5/16/24.  Confirmation urine drug test on 7/18 positive for fentanyl and xylazine.      Brother was murdered August 2024.  Ran out of Suboxone for a month and returned to intermittent opioid use.  Lost to follow-up after 11/1/24.  RTC on 3/11/25 and then again on 5/6/25.       Plan from most recent office visit (6/16/25):  1. Opioid use disorder, severe, dependence (H) (Primary)  Improving.  Back on Suboxone 8-2mg BID.  Reporting symptoms are well controlled.  Continue Suboxone, no change.  He is traveling to [place] for his birthday and 4th of July.  Discussed importance of taking Suboxone consistently.  We also discussed option of Sublocade.  He is considering and will let me know.  Has Narcan.  - Drugs of Abuse Screen Urine (POC CUPS) POCT  - buprenorphine HCl-naloxone HCl (SUBOXONE) 8-2 MG per film; Place 1 Film under the tongue 2 times daily.  Dispense: 60 Film; Refill: 0     2. Neuropathic pain of both feet  Improving.  Encouraged him to discuss with PCP.  For now continue gabapentin.         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[employer]) and it used to bring him rocío but it no longer does, ever since his wife Payton  of a brain aneurysm in  - still in touch with her family but doesn't see them, doesn't sleep in their bedroom          2025     1:00 PM 2025     1:00 PM 7/15/2025    11:00 AM   PHQ   PHQ-9 Total Score 3 0 3   Q9: Thoughts of better off dead/self-harm past 2 weeks Not at all Not at all Not at all           2024     1:00 PM 3/11/2025     1:00 PM 7/15/2025    11:00 AM   SHABBIR-7 SCORE   Total Score 3 4 3       OBJECTIVE                                                    PHYSICAL EXAM:  BP (!) 146/83 (BP Location: Right arm, Patient Position: Sitting, Cuff Size: Adult Large)   Pulse 75   Wt (!) 140.2 kg (309 lb)   SpO2 98%   BMI 43.10 kg/m      GENERAL: healthy, alert and no distress  EYES: Eyes grossly normal to inspection, sclerae normal  RESP: No respiratory distress  SKIN: no pallor or jaundice  NEURO: Normal gait, mentation intact and speech normal  MENTAL STATUS EXAM  Appearance/Behavior: No appearant distress  Speech: Normal  Mood/Affect: normal affect  Insight: Fair    LAB  Results for orders placed or performed in visit on 07/15/25   Drugs of Abuse Screen Urine (POC CUPS) POCT     Status: Abnormal   Result Value Ref Range    POCT Kit Lot Number [id number]     POCT Kit Expiration Date 2026     Temperature Urine POCT 92 F 90 F, 92 F, 94 F, 96 F, 98 F, 100 F    Specific [place] POCT 1.005 1.005, 1.015, 1.025    pH Qual Urine POCT 7 pH 4 pH, 5 pH, 7 pH, 9 pH    Creatinine Qual Urine POCT 50 mg/dL 20 mg/dL, 50 mg/dL, 100 mg/dL, 200 mg/dL    Internal QC Qual Urine POCT Valid Valid    Amphetamine Qual Urine POCT Negative Negative    Barbiturate Qual Urine POCT Negative Negative    Buprenorphine Qual Urine POCT Screen Positive (A) Negative    Benzodiazepine Qual Urine POCT Negative  Negative    Cocaine Qual Urine POCT Negative Negative    Methamphetamine Qual Urine POCT Negative Negative    MDMA Qual Urine POCT Negative Negative    Methadone Qual Urine POCT Negative Negative    Opiate Qual Urine POCT Negative Negative    Oxycodone Qual Urine POCT Negative Negative    Phencyclidine Qual Urine POCT Negative Negative    THC Qual Urine POCT Negative Negative         HISTORY                                                    Problem list reviewed & adjusted, as indicated.  Patient Active Problem List   Diagnosis    Type 2 diabetes mellitus with diabetic polyneuropathy, without long-term current use of insulin (H)    Morbid obesity (H)    Opioid use disorder, severe, in sustained remission (H)    YANIRA on CPAP    Essential hypertension    Recurrent major depressive disorder, in full remission    Chronic obstructive pulmonary disease with acute exacerbation (H)    Heart failure with preserved ejection fraction, unspecified HF chronicity (H)    Mixed hyperlipidemia         MEDICATION LIST (prior to visit)  Current Outpatient Medications   Medication Sig Dispense Refill    ARIPiprazole (ABILIFY) 10 MG tablet Take 1 tablet (10 mg) by mouth daily (Patient taking differently: Take 10 mg by mouth daily. PRN)      buprenorphine HCl-naloxone HCl (SUBOXONE) 8-2 MG per film Place 1 Film under the tongue 3 times daily. 90 Film 0    buPROPion (WELLBUTRIN XL) 150 MG 24 hr tablet Take 1 tablet (150 mg) by mouth every morning      cloNIDine (CATAPRES) 0.1 MG tablet Take 1 tablet (0.1 mg) by mouth 3 times daily as needed (opioid withdrawal). 12 tablet 0    empagliflozin (JARDIANCE) 10 MG TABS tablet Take 1 tablet (10 mg) by mouth daily 90 tablet 4    furosemide (LASIX) 20 MG tablet Take 1 tablet (20 mg) by mouth daily as needed (swelling)      phentermine (ADIPEX-P) 37.5 MG tablet Take 0.5-1 tablets (18.75-37.5 mg) by mouth every morning (before breakfast). 90 tablet 1    albuterol (VENTOLIN HFA) 108 (90 Base) MCG/ACT  inhaler INHALE 2 PUFFS BY MOUTH EVERY 6 HOURS AS NEEDED FOR WHEEZING 18 g 2    aspirin (ASA) 81 MG EC tablet Take 1 tablet (81 mg) by mouth daily.      atorvastatin (LIPITOR) 20 MG tablet Take 1 tablet (20 mg) by mouth daily. 90 tablet 4    budesonide-formoterol (SYMBICORT/BREYNA) 80-4.5 MCG/ACT inhaler Inhale 2 puffs into the lungs 2 times daily. 10.2 g 11    chlorhexidine (PERIDEX) 0.12 % solution SWISH AND SPIT 15 ML TWICE DAILY AND SPIT OUT AFTER 30 SECONDS. DO NOT SWALLOW. DO NOT EAT OR DRINK FOR 30 MINS AFTER*      cyclobenzaprine (FLEXERIL) 10 MG tablet Take 1 tablet (10 mg) by mouth 3 times daily as needed for muscle spasms. 40 tablet 1    docusate sodium (COLACE) 100 MG capsule TAKE 1 CAPSULE(100 MG) BY MOUTH TWICE DAILY as needed for constipation 60 capsule 3    gabapentin (NEURONTIN) 300 MG capsule Take 1 capsule (300 mg) by mouth 3 times daily. 270 capsule 4    naloxone (NARCAN) 4 MG/0.1ML nasal spray Spray 1 spray (4 mg) into one nostril alternating nostrils once as needed for opioid reversal. every 2-3 minutes until assistance arrives 0.2 mL 11     No current facility-administered medications for this visit.       MEDICATION LIST (after visit)  Current Outpatient Medications   Medication Sig Dispense Refill    ARIPiprazole (ABILIFY) 10 MG tablet Take 1 tablet (10 mg) by mouth daily (Patient taking differently: Take 10 mg by mouth daily. PRN)      buprenorphine HCl-naloxone HCl (SUBOXONE) 8-2 MG per film Place 1 Film under the tongue 3 times daily. 90 Film 0    buPROPion (WELLBUTRIN XL) 150 MG 24 hr tablet Take 1 tablet (150 mg) by mouth every morning      cloNIDine (CATAPRES) 0.1 MG tablet Take 1 tablet (0.1 mg) by mouth 3 times daily as needed (opioid withdrawal). 12 tablet 0    empagliflozin (JARDIANCE) 10 MG TABS tablet Take 1 tablet (10 mg) by mouth daily 90 tablet 4    furosemide (LASIX) 20 MG tablet Take 1 tablet (20 mg) by mouth daily as needed (swelling)      phentermine (ADIPEX-P) 37.5 MG  tablet Take 0.5-1 tablets (18.75-37.5 mg) by mouth every morning (before breakfast). 90 tablet 1    albuterol (VENTOLIN HFA) 108 (90 Base) MCG/ACT inhaler INHALE 2 PUFFS BY MOUTH EVERY 6 HOURS AS NEEDED FOR WHEEZING 18 g 2    aspirin (ASA) 81 MG EC tablet Take 1 tablet (81 mg) by mouth daily.      atorvastatin (LIPITOR) 20 MG tablet Take 1 tablet (20 mg) by mouth daily. 90 tablet 4    budesonide-formoterol (SYMBICORT/BREYNA) 80-4.5 MCG/ACT inhaler Inhale 2 puffs into the lungs 2 times daily. 10.2 g 11    chlorhexidine (PERIDEX) 0.12 % solution SWISH AND SPIT 15 ML TWICE DAILY AND SPIT OUT AFTER 30 SECONDS. DO NOT SWALLOW. DO NOT EAT OR DRINK FOR 30 MINS AFTER*      cyclobenzaprine (FLEXERIL) 10 MG tablet Take 1 tablet (10 mg) by mouth 3 times daily as needed for muscle spasms. 40 tablet 1    docusate sodium (COLACE) 100 MG capsule TAKE 1 CAPSULE(100 MG) BY MOUTH TWICE DAILY as needed for constipation 60 capsule 3    gabapentin (NEURONTIN) 300 MG capsule Take 1 capsule (300 mg) by mouth 3 times daily. 270 capsule 4    naloxone (NARCAN) 4 MG/0.1ML nasal spray Spray 1 spray (4 mg) into one nostril alternating nostrils once as needed for opioid reversal. every 2-3 minutes until assistance arrives 0.2 mL 11     No current facility-administered medications for this visit.         Allergies   Allergen Reactions    Seafood Other (See Comments)     Eyes turn yellow    Ibuprofen Nausea and Vomiting       Ernestina Tripp, [facility]  [facility]  [phone]

## 2025-07-22 ENCOUNTER — TELEPHONE (OUTPATIENT)
Dept: INTERNAL MEDICINE | Facility: CLINIC | Age: 60
End: 2025-07-22
Payer: COMMERCIAL

## 2025-07-22 NOTE — TELEPHONE ENCOUNTER
July 22, 2025    Professional Statement of Need for Housing was received via fax for Dr. Castro.  Patient label was attached to paperwork and placed in provider's inbox to be signed.    Shelia Stephen

## 2025-08-01 ENCOUNTER — TRANSFERRED RECORDS (OUTPATIENT)
Dept: MULTI SPECIALTY CLINIC | Facility: CLINIC | Age: 60
End: 2025-08-01
Payer: COMMERCIAL

## 2025-08-01 LAB — RETINOPATHY: NORMAL

## 2025-08-12 ENCOUNTER — OFFICE VISIT (OUTPATIENT)
Dept: ADDICTION MEDICINE | Facility: CLINIC | Age: 60
End: 2025-08-12
Payer: COMMERCIAL

## 2025-08-12 VITALS
SYSTOLIC BLOOD PRESSURE: 146 MMHG | WEIGHT: 315 LBS | OXYGEN SATURATION: 98 % | DIASTOLIC BLOOD PRESSURE: 81 MMHG | BODY MASS INDEX: 44.1 KG/M2 | HEIGHT: 71 IN | HEART RATE: 63 BPM

## 2025-08-12 DIAGNOSIS — F11.20 OPIOID USE DISORDER, SEVERE, DEPENDENCE (H): ICD-10-CM

## 2025-08-12 DIAGNOSIS — F11.20 OPIOID USE DISORDER, SEVERE, DEPENDENCE (H): Primary | ICD-10-CM

## 2025-08-12 LAB
AMPHETAMINE QUAL URINE POCT: NEGATIVE
BARBITURATE QUAL URINE POCT: NEGATIVE
BENZODIAZEPINE QUAL URINE POCT: NEGATIVE
BUPRENORPHINE QUAL URINE POCT: ABNORMAL
COCAINE QUAL URINE POCT: NEGATIVE
CREAT UR-MCNC: 368 MG/DL
CREATININE QUAL URINE POCT: ABNORMAL
INTERNAL QC QUAL URINE POCT: ABNORMAL
MDMA QUAL URINE POCT: NEGATIVE
METHADONE QUAL URINE POCT: NEGATIVE
METHAMPHETAMINE QUAL URINE POCT: NEGATIVE
OPIATE QUAL URINE POCT: ABNORMAL
OXYCODONE QUAL URINE POCT: NEGATIVE
PH QUAL URINE POCT: ABNORMAL
PHENCYCLIDINE QUAL URINE POCT: NEGATIVE
POCT KIT EXPIRATION DATE: ABNORMAL
POCT KIT LOT NUMBER: ABNORMAL
SPECIFIC GRAVITY POCT: 1.02
TEMPERATURE URINE POCT: ABNORMAL
THC QUAL URINE POCT: NEGATIVE

## 2025-08-12 PROCEDURE — 3079F DIAST BP 80-89 MM HG: CPT | Performed by: FAMILY MEDICINE

## 2025-08-12 PROCEDURE — 3077F SYST BP >= 140 MM HG: CPT | Performed by: FAMILY MEDICINE

## 2025-08-12 PROCEDURE — 99214 OFFICE O/P EST MOD 30 MIN: CPT | Performed by: FAMILY MEDICINE

## 2025-08-12 PROCEDURE — 80305 DRUG TEST PRSMV DIR OPT OBS: CPT | Performed by: FAMILY MEDICINE

## 2025-08-12 PROCEDURE — G2211 COMPLEX E/M VISIT ADD ON: HCPCS | Performed by: FAMILY MEDICINE

## 2025-08-12 RX ORDER — BUPRENORPHINE AND NALOXONE 8; 2 MG/1; MG/1
1 FILM, SOLUBLE BUCCAL; SUBLINGUAL 3 TIMES DAILY
Qty: 90 FILM | Refills: 0 | Status: SHIPPED | OUTPATIENT
Start: 2025-08-18

## 2025-08-12 ASSESSMENT — PATIENT HEALTH QUESTIONNAIRE - PHQ9: SUM OF ALL RESPONSES TO PHQ QUESTIONS 1-9: 7

## 2025-08-13 ENCOUNTER — TELEPHONE (OUTPATIENT)
Dept: ADDICTION MEDICINE | Facility: CLINIC | Age: 60
End: 2025-08-13
Payer: COMMERCIAL

## 2025-08-14 ENCOUNTER — TELEPHONE (OUTPATIENT)
Dept: INTERNAL MEDICINE | Facility: CLINIC | Age: 60
End: 2025-08-14
Payer: COMMERCIAL

## 2025-08-18 ENCOUNTER — VIRTUAL VISIT (OUTPATIENT)
Dept: ADDICTION MEDICINE | Facility: CLINIC | Age: 60
End: 2025-08-18
Payer: COMMERCIAL

## 2025-08-18 DIAGNOSIS — F11.20 OPIOID USE DISORDER, SEVERE, DEPENDENCE (H): Primary | ICD-10-CM

## 2025-08-21 ENCOUNTER — OFFICE VISIT (OUTPATIENT)
Dept: INTERNAL MEDICINE | Facility: CLINIC | Age: 60
End: 2025-08-21
Payer: COMMERCIAL

## 2025-08-21 ENCOUNTER — TELEPHONE (OUTPATIENT)
Dept: BEHAVIORAL HEALTH | Facility: CLINIC | Age: 60
End: 2025-08-21

## 2025-08-21 VITALS
HEIGHT: 71 IN | OXYGEN SATURATION: 95 % | HEART RATE: 81 BPM | TEMPERATURE: 98 F | RESPIRATION RATE: 14 BRPM | BODY MASS INDEX: 43.82 KG/M2 | WEIGHT: 313 LBS | DIASTOLIC BLOOD PRESSURE: 80 MMHG | SYSTOLIC BLOOD PRESSURE: 132 MMHG

## 2025-08-21 DIAGNOSIS — R69 DIAGNOSIS DEFERRED: Primary | ICD-10-CM

## 2025-08-21 DIAGNOSIS — G47.33 OSA ON CPAP: ICD-10-CM

## 2025-08-21 DIAGNOSIS — E66.01 MORBID OBESITY (H): ICD-10-CM

## 2025-08-21 DIAGNOSIS — I10 ESSENTIAL HYPERTENSION: ICD-10-CM

## 2025-08-21 DIAGNOSIS — E11.42 TYPE 2 DIABETES MELLITUS WITH DIABETIC POLYNEUROPATHY, WITHOUT LONG-TERM CURRENT USE OF INSULIN (H): Primary | ICD-10-CM

## 2025-08-21 DIAGNOSIS — M79.671 HEEL PAIN, BILATERAL: ICD-10-CM

## 2025-08-21 DIAGNOSIS — I50.30 HEART FAILURE WITH PRESERVED EJECTION FRACTION, UNSPECIFIED HF CHRONICITY (H): ICD-10-CM

## 2025-08-21 DIAGNOSIS — M79.672 HEEL PAIN, BILATERAL: ICD-10-CM

## 2025-08-21 ASSESSMENT — PAIN SCALES - GENERAL: PAINLEVEL_OUTOF10: NO PAIN (0)

## 2025-08-25 ENCOUNTER — DOCUMENTATION ONLY (OUTPATIENT)
Dept: BEHAVIORAL HEALTH | Facility: CLINIC | Age: 60
End: 2025-08-25
Payer: COMMERCIAL

## 2025-08-25 ENCOUNTER — HOSPITAL ENCOUNTER (OUTPATIENT)
Dept: BEHAVIORAL HEALTH | Facility: CLINIC | Age: 60
Discharge: HOME | End: 2025-08-25
Attending: PSYCHIATRY & NEUROLOGY
Payer: COMMERCIAL

## 2025-08-25 DIAGNOSIS — F11.20 OPIOID USE DISORDER, SEVERE, DEPENDENCE (H): ICD-10-CM

## 2025-08-25 PROCEDURE — H0001 ALCOHOL AND/OR DRUG ASSESS: HCPCS

## 2025-08-25 ASSESSMENT — ANXIETY QUESTIONNAIRES
7. FEELING AFRAID AS IF SOMETHING AWFUL MIGHT HAPPEN: NOT AT ALL
1. FEELING NERVOUS, ANXIOUS, OR ON EDGE: NOT AT ALL
6. BECOMING EASILY ANNOYED OR IRRITABLE: NOT AT ALL
5. BEING SO RESTLESS THAT IT IS HARD TO SIT STILL: NOT AT ALL
3. WORRYING TOO MUCH ABOUT DIFFERENT THINGS: NOT AT ALL
GAD7 TOTAL SCORE: 0
4. TROUBLE RELAXING: NOT AT ALL
GAD7 TOTAL SCORE: 0
2. NOT BEING ABLE TO STOP OR CONTROL WORRYING: NOT AT ALL

## 2025-08-25 ASSESSMENT — COLUMBIA-SUICIDE SEVERITY RATING SCALE - C-SSRS
ATTEMPT LIFETIME: NO
1. HAVE YOU WISHED YOU WERE DEAD OR WISHED YOU COULD GO TO SLEEP AND NOT WAKE UP?: NO
TOTAL  NUMBER OF INTERRUPTED ATTEMPTS LIFETIME: NO
TOTAL  NUMBER OF ABORTED OR SELF INTERRUPTED ATTEMPTS LIFETIME: NO
2. HAVE YOU ACTUALLY HAD ANY THOUGHTS OF KILLING YOURSELF?: NO
6. HAVE YOU EVER DONE ANYTHING, STARTED TO DO ANYTHING, OR PREPARED TO DO ANYTHING TO END YOUR LIFE?: NO

## 2025-08-25 ASSESSMENT — PATIENT HEALTH QUESTIONNAIRE - PHQ9: SUM OF ALL RESPONSES TO PHQ QUESTIONS 1-9: 0

## 2025-08-26 ENCOUNTER — DOCUMENTATION ONLY (OUTPATIENT)
Dept: BEHAVIORAL HEALTH | Facility: CLINIC | Age: 60
End: 2025-08-26
Payer: COMMERCIAL

## 2025-08-28 ENCOUNTER — TELEPHONE (OUTPATIENT)
Dept: ADDICTION MEDICINE | Facility: CLINIC | Age: 60
End: 2025-08-28
Payer: COMMERCIAL

## 2025-08-28 DIAGNOSIS — F11.20 OPIOID USE DISORDER, SEVERE, DEPENDENCE (H): Primary | ICD-10-CM

## 2025-09-04 ENCOUNTER — OFFICE VISIT (OUTPATIENT)
Dept: PODIATRY | Facility: CLINIC | Age: 60
End: 2025-09-04
Attending: INTERNAL MEDICINE
Payer: COMMERCIAL

## 2025-09-04 DIAGNOSIS — E11.42 TYPE 2 DIABETES MELLITUS WITH DIABETIC POLYNEUROPATHY, WITHOUT LONG-TERM CURRENT USE OF INSULIN (H): Primary | ICD-10-CM

## 2025-09-04 DIAGNOSIS — M79.671 HEEL PAIN, BILATERAL: ICD-10-CM

## 2025-09-04 DIAGNOSIS — M79.89 LEG SWELLING: ICD-10-CM

## 2025-09-04 DIAGNOSIS — M79.672 HEEL PAIN, BILATERAL: ICD-10-CM
